# Patient Record
Sex: MALE | Race: WHITE
[De-identification: names, ages, dates, MRNs, and addresses within clinical notes are randomized per-mention and may not be internally consistent; named-entity substitution may affect disease eponyms.]

---

## 2020-01-13 ENCOUNTER — HOSPITAL ENCOUNTER (INPATIENT)
Dept: HOSPITAL 46 - ED | Age: 75
LOS: 2 days | Discharge: HOME | DRG: 446 | End: 2020-01-15
Attending: SURGERY | Admitting: SURGERY
Payer: MEDICARE

## 2020-01-13 VITALS — WEIGHT: 144.91 LBS | HEIGHT: 67 IN | BODY MASS INDEX: 22.74 KG/M2

## 2020-01-13 DIAGNOSIS — Z79.899: ICD-10-CM

## 2020-01-13 DIAGNOSIS — R63.0: ICD-10-CM

## 2020-01-13 DIAGNOSIS — E78.5: ICD-10-CM

## 2020-01-13 DIAGNOSIS — Z79.82: ICD-10-CM

## 2020-01-13 DIAGNOSIS — K83.1: Primary | ICD-10-CM

## 2020-01-13 DIAGNOSIS — Z87.891: ICD-10-CM

## 2020-01-13 DIAGNOSIS — I10: ICD-10-CM

## 2020-01-13 DIAGNOSIS — K82.8: ICD-10-CM

## 2020-01-13 DIAGNOSIS — R63.4: ICD-10-CM

## 2020-01-13 DIAGNOSIS — Z79.02: ICD-10-CM

## 2020-01-13 PROCEDURE — A9579 GAD-BASE MR CONTRAST NOS,1ML: HCPCS

## 2020-01-13 NOTE — XMS
Encounter Summary
  Created on: 2020
 
 Pasquale Nehemias Martinez
 External Reference #: [id number]
 : 43
 Sex: Male
 
 Demographics
 
 
+-----------------------+----------------------+
| Address               | [address]         |
|                       | [place]  [address] |
+-----------------------+----------------------+
| Home Phone            | [phone]      |
+-----------------------+----------------------+
| Preferred Language    | Unknown              |
+-----------------------+----------------------+
| Marital Status        |               |
+-----------------------+----------------------+
| Yazidi Affiliation | 1077                 |
+-----------------------+----------------------+
| Race                  | Unknown              |
+-----------------------+----------------------+
| Ethnic Group          | Unknown              |
+-----------------------+----------------------+
 
 
 Author
 
 
+--------------+--------------------------------------------+
| Author       | [facility] [place]  |
|              | and [facility]                                |
+--------------+--------------------------------------------+
| Organization | [facility]  |
|              | and [place]                                |
+--------------+--------------------------------------------+
| Address      | Unknown                                    |
+--------------+--------------------------------------------+
| Phone        | Unavailable                                |
+--------------+--------------------------------------------+
 
 
 
 Support
 
 
+--------------+--------------+---------------------+-----------------+
| Name         | Relationship | Address             | Phone           |
+--------------+--------------+---------------------+-----------------+
| Rupa Giordano | ECON         | [address]             | [phone] |
|              |              | [place]   |                 |
|              |              | [id number]               |                 |
+--------------+--------------+---------------------+-----------------+
 
 
 
 
 Care Team Providers
 
 
+-----------------------+------+-------------+
| Care Team Member Name | Role | Phone       |
+-----------------------+------+-------------+
| No, Physician         | PCP  | Unavailable |
+-----------------------+------+-------------+
 
 
 
 Reason for Visit
 
 
+--------+--------------+
| Reason | Comments     |
+--------+--------------+
| Other  | instructions |
+--------+--------------+
 
 
 
 Encounter Details
 
 
+--------+-----------+----------------------+----------------------+----------------------+
| Date   | Type      | Department           | Care Team            | Description          |
+--------+-----------+----------------------+----------------------+----------------------+
| 10/22/ | Telephone |   [facility]      |   Augustin Lopez DNP      | Other (instructions) |
| 2019   |           | VASCULAR SURGERY     | [address]     |                      |
|        |           | [address] | [address]  [place], [place]  |                      |
|        |           |  [place]  [place]     | [address]  [phone]  |                      |
|        |           | [address]           |  [phone] (Fax)  |                      |
|        |           | [phone]         |                      |                      |
+--------+-----------+----------------------+----------------------+----------------------+
 
 
 
 Social History
 
 
+---------------+-------+-----------+--------+------+
| Tobacco Use   | Types | Packs/Day | Years  | Date |
|               |       |           | Used   |      |
+---------------+-------+-----------+--------+------+
| Former Smoker |       |           |        |      |
+---------------+-------+-----------+--------+------+
 
 
 
+---------------------+---+---+---+
| Smokeless Tobacco:  |   |   |   |
| Former User         |   |   |   |
+---------------------+---+---+---+
 
 
 
+------------------------------+
| Comments: quit 50 years ago  |
 
+------------------------------+
 
 
 
+---------------+-------------+---------+----------+
| Alcohol Use   | Drinks/Week | oz/Week | Comments |
+---------------+-------------+---------+----------+
| Not Currently |             |         | 90m days |
+---------------+-------------+---------+----------+
 
 
 
+------------------+---------------+
| Sex Assigned at  | Date Recorded |
| Birth            |               |
+------------------+---------------+
| Not on file      |               |
+------------------+---------------+
 
 
 
+----------------+-------------+-------------+
| Job Start Date | Occupation  | Industry    |
+----------------+-------------+-------------+
| Not on file    | Not on file | Not on file |
+----------------+-------------+-------------+
 
 
 
+----------------+--------------+------------+
| Travel History | Travel Start | Travel End |
+----------------+--------------+------------+
 
 
 
+-------------------------------------+
| No recent travel history available. |
+-------------------------------------+
 documented as of this encounter
 
 Functional Status
 
 
+---------------------------------------------+----------+--------------------+
| Functional Status                           | Response | Date of Assessment |
+---------------------------------------------+----------+--------------------+
| Are you deaf or do you have serious         | No       | 2019         |
| difficulty hearing?                         |          |                    |
+---------------------------------------------+----------+--------------------+
| Are you blind or do you have serious        | No       | 2019         |
| difficulty seeing, even when wearing        |          |                    |
| glasses?                                    |          |                    |
+---------------------------------------------+----------+--------------------+
| Do you have serious difficulty walking or   | No       | 2019         |
| climbing stairs? (5 years old or older)     |          |                    |
+---------------------------------------------+----------+--------------------+
| Do you have difficulty dressing or bathing? | No       | 2019         |
|  (5 years old or older)                     |          |                    |
+---------------------------------------------+----------+--------------------+
| Because of a physical, mental, or emotional | No       | 2019         |
 
|  condition, do you have difficulty doing    |          |                    |
| errands alone such as visiting a doctor's   |          |                    |
| office or shopping? [15 years old or        |          |                    |
| older)]                                     |          |                    |
+---------------------------------------------+----------+--------------------+
 
 
 
+---------------------------------------------+----------+--------------------+
| Cognitive Status                            | Response | Date of Assessment |
+---------------------------------------------+----------+--------------------+
| Because of a physical, mental, or emotional | No       | 2019         |
|  condition, do you have serious difficulty  |          |                    |
| concentrating, remembering, or making       |          |                    |
| decisions? (5 years old or older)           |          |                    |
+---------------------------------------------+----------+--------------------+
 documented as of this encounter
 
 Plan of Treatment
 
 
+--------+-------------+------------------+----------------------+-------------+
| Date   | Type        | Specialty        | Care Team            | Description |
+--------+-------------+------------------+----------------------+-------------+
| / | Appointment | Radiology        |   Augustin Lopez DNP      |             |
|    |             |                  | [address]     |             |
|        |             |                  | [place]  |             |
|        |             |                  | [address]  [phone]  |             |
|        |             |                  |  [phone] (Fax)  |             |
+--------+-------------+------------------+----------------------+-------------+
| / | Office      | Vascular Surgery |   Augustin Lopez DNP      |             |
|    | Visit       |                  | [address]     |             |
|        |             |                  | [place]  |             |
|        |             |                  | [id number] [phone]  |             |
|        |             |                  |  [phone] (Fax)  |             |
+--------+-------------+------------------+----------------------+-------------+
 documented as of this encounter
 
 Visit Diagnoses
 Not on filedocumented in this encounter

## 2020-01-13 NOTE — XMS
Encounter Summary
  Created on: 2020
 
 Pasquale Nehemias Martinez
 External Reference #: [id number]
 : 43
 Sex: Male
 
 Demographics
 
 
+-----------------------+----------------------+
| Address               | [address]         |
|                       | [place]  [address] |
+-----------------------+----------------------+
| Home Phone            | [phone]      |
+-----------------------+----------------------+
| Preferred Language    | Unknown              |
+-----------------------+----------------------+
| Marital Status        |               |
+-----------------------+----------------------+
| Synagogue Affiliation | 1077                 |
+-----------------------+----------------------+
| Race                  | Unknown              |
+-----------------------+----------------------+
| Ethnic Group          | Unknown              |
+-----------------------+----------------------+
 
 
 Author
 
 
+--------------+--------------------------------------------+
| Author       | [facility] [place]  |
|              | and [facility]                                |
+--------------+--------------------------------------------+
| Organization | [facility] [place]  |
|              | and [place]                                |
+--------------+--------------------------------------------+
| Address      | Unknown                                    |
+--------------+--------------------------------------------+
| Phone        | Unavailable                                |
+--------------+--------------------------------------------+
 
 
 
 Support
 
 
+--------------+--------------+---------------------+-----------------+
| Name         | Relationship | Address             | Phone           |
+--------------+--------------+---------------------+-----------------+
| Rupa Giordano | E[employer]         | [address]             | [phone] |
|              |              | [facility] [place]   |                 |
|              |              | [address]               |                 |
+--------------+--------------+---------------------+-----------------+
 
 
 
 
 Care Team Providers
 
 
+-----------------------+------+-------------+
| Care Team Member Name | Role | Phone       |
+-----------------------+------+-------------+
| No, Physician         | PCP  | Unavailable |
+-----------------------+------+-------------+
 
 
 
 Reason for Visit
 
 
+-----------+------------------+
| Reason    | Comments         |
+-----------+------------------+
| Follow-up |                  |
+-----------+------------------+
| Other     | speak with nurse |
+-----------+------------------+
 
 
 
 Encounter Details
 
 
+--------+-----------+----------------------+----------------------+--------------------+
| Date   | Type      | Department           | Care Team            | Description        |
+--------+-----------+----------------------+----------------------+--------------------+
| / | Telephone |   [facility]      |   Briana Casper, | Follow-up; Other   |
| 2019   |           | VASCULAR SURGERY     |  RN                  | (speak with nurse) |
|        |           | [address] |                      |                    |
|        |           |  [address]  [place]     |                      |                    |
|        |           | [address]           |                      |                    |
|        |           | [phone]         |                      |                    |
+--------+-----------+----------------------+----------------------+--------------------+
 
 
 
 Social History
 
 
+---------------+-------+-----------+--------+------+
| Tobacco Use   | Types | Packs/Day | Years  | Date |
|               |       |           | Used   |      |
+---------------+-------+-----------+--------+------+
| Former Smoker |       |           |        |      |
+---------------+-------+-----------+--------+------+
 
 
 
+---------------------+---+---+---+
| Smokeless Tobacco:  |   |   |   |
| Former User         |   |   |   |
+---------------------+---+---+---+
 
 
 
 
+------------------------------+
| Comments: quit 50 years ago  |
+------------------------------+
 
 
 
+---------------+-------------+---------+----------+
| Alcohol Use   | Drinks/Week | oz/Week | Comments |
+---------------+-------------+---------+----------+
| Not Currently |             |         | 90m days |
+---------------+-------------+---------+----------+
 
 
 
+------------------+---------------+
| Sex Assigned at  | Date Recorded |
| Birth            |               |
+------------------+---------------+
| Not on file      |               |
+------------------+---------------+
 
 
 
+----------------+-------------+-------------+
| Job Start Date | Occupation  | Industry    |
+----------------+-------------+-------------+
| Not on file    | Not on file | Not on file |
+----------------+-------------+-------------+
 
 
 
+----------------+--------------+------------+
| Travel History | Travel Start | Travel End |
+----------------+--------------+------------+
 
 
 
+-------------------------------------+
| No recent travel history available. |
+-------------------------------------+
 documented as of this encounter
 
 Functional Status
 
 
+---------------------------------------------+----------+--------------------+
| Functional Status                           | Response | Date of Assessment |
+---------------------------------------------+----------+--------------------+
| Are you deaf or do you have serious         | No       | 2019         |
| difficulty hearing?                         |          |                    |
+---------------------------------------------+----------+--------------------+
| Are you blind or do you have serious        | No       | 2019         |
| difficulty seeing, even when wearing        |          |                    |
| glasses?                                    |          |                    |
+---------------------------------------------+----------+--------------------+
| Do you have serious difficulty walking or   | No       | 2019         |
| climbing stairs? (5 years old or older)     |          |                    |
+---------------------------------------------+----------+--------------------+
| Do you have difficulty dressing or bathing? | No       | 2019         |
|  (5 years old or older)                     |          |                    |
 
+---------------------------------------------+----------+--------------------+
| Because of a physical, mental, or emotional | No       | 2019         |
|  condition, do you have difficulty doing    |          |                    |
| errands alone such as visiting a doctor's   |          |                    |
| office or shopping? [15 years old or        |          |                    |
| older)]                                     |          |                    |
+---------------------------------------------+----------+--------------------+
 
 
 
+---------------------------------------------+----------+--------------------+
| Cognitive Status                            | Response | Date of Assessment |
+---------------------------------------------+----------+--------------------+
| Because of a physical, mental, or emotional | No       | 2019         |
|  condition, do you have serious difficulty  |          |                    |
| concentrating, remembering, or making       |          |                    |
| decisions? (5 years old or older)           |          |                    |
+---------------------------------------------+----------+--------------------+
 documented as of this encounter
 
 Plan of Treatment
 
 
+--------+-------------+------------------+----------------------+-------------+
| Date   | Type        | Specialty        | Care Team            | Description |
+--------+-------------+------------------+----------------------+-------------+
| / | Appointment | Radiology        |   Augustin Lopez DNP      |             |
|    |             |                  | [address]     |             |
|        |             |                  | [place]  |             |
|        |             |                  | [address]  [phone]  |             |
|        |             |                  |  [phone] (Fax)  |             |
+--------+-------------+------------------+----------------------+-------------+
| / | Office      | Vascular Surgery |   Augustin Lopez DNP      |             |
|    | Visit       |                  | [address]     |             |
|        |             |                  | [place]  |             |
|        |             |                  | [address]  [phone]  |             |
|        |             |                  |  [phone] (Fax)  |             |
+--------+-------------+------------------+----------------------+-------------+
 documented as of this encounter
 
 Visit Diagnoses
 Not on filedocumented in this encounter

## 2020-01-13 NOTE — XMS
Clinical Summary
  Created on: 2020
 
 Lashanda Rahman
 External Reference #: [id number]
 : 43
 Sex: Male
 
 Demographics
 
 
+-----------------------+----------------------+
| Address               | [address]         |
|                       | [place]  [address] |
+-----------------------+----------------------+
| Home Phone            | [phone]      |
+-----------------------+----------------------+
| Preferred Language    | Unknown              |
+-----------------------+----------------------+
| Marital Status        | Unknown              |
+-----------------------+----------------------+
| Zoroastrian Affiliation | Unknown              |
+-----------------------+----------------------+
| Race                  | Unknown              |
+-----------------------+----------------------+
| Ethnic Group          | Unknown              |
+-----------------------+----------------------+
 
 
 Author
 
 
+--------------+------------------------------------------+
| Author       | [facility] (Historical as of  |
|              | 19)                                 |
+--------------+------------------------------------------+
| Organization | [facility] ([facility] as [facility]  |
|              | 19)                                 |
+--------------+------------------------------------------+
| Address      | Unknown                                  |
+--------------+------------------------------------------+
| Phone        | Unavailable                              |
+--------------+------------------------------------------+
 
 
 
 Care Team Providers
 
 
+-----------------------+------+-------------+
| Care Team Member Name | Role | Phone       |
+-----------------------+------+-------------+
 PP   | Unavailable |
+-----------------------+------+-------------+
 
 
 
 Allergies
 
 Not on File
 
 Current Medications
 Not on file
 
 Active Problems
 Not on file
 
 Social History
 
 
+----------------+-------+-----------+--------+------+
| Tobacco Use    | Types | Packs/Day | Years  | Date |
|                |       |           | Used   |      |
+----------------+-------+-----------+--------+------+
| Never Assessed |       |           |        |      |
+----------------+-------+-----------+--------+------+
 
 
 
+------------------+---------------+
| Sex Assigned at  | Date Recorded |
| Birth            |               |
+------------------+---------------+
| Not on file      |               |
+------------------+---------------+
 
 
 
 Plan of Treatment
 
 
+---------------------+-----------+-----------+----------+
| Health Maintenance  | Due Date  | Last Done | Comments |
+---------------------+-----------+-----------+----------+
| Vaccine:            |  |           |          |
| Dtap/Tdap/Td (1 -   | 3         |           |          |
| Tdap)               |           |           |          |
+---------------------+-----------+-----------+----------+
| Colon Cancer        |  |           |          |
| Screening           | 4         |           |          |
| (Colonoscopy)       |           |           |          |
+---------------------+-----------+-----------+----------+
| Vaccine: Zoster (1  |  |           |          |
| of 2)               | 4         |           |          |
+---------------------+-----------+-----------+----------+
| Vaccine:            |  |           |          |
| Pneumococcal 65+    | 9         |           |          |
| Low/Medium Risk (1  |           |           |          |
| of 2 - PCV13)       |           |           |          |
+---------------------+-----------+-----------+----------+
| Vaccine: Influenza  |  |           |          |
| (#1)                | 9         |           |          |
+---------------------+-----------+-----------+----------+
 
 
 
 Results
 Not on filefrom Last 3 Months
 
 
 Insurance
 
 
+----------------+--------+-------------+------+-------------+----------------------+
| Payer          | Benefi | Subscriber  | Type | Phone       | Address              |
|                | t Plan | ID          |      |             |                      |
|                |  /     |             |      |             |                      |
|                | Group  |             |      |             |                      |
+----------------+--------+-------------+------+-------------+----------------------+
| [employer]       | [employer] | [id number]   |      | [phone] |   [employer] [id number]  |
| ADMINISTRATION | [address]     |             |      | [id number]        | FEE  [id number]   |
|                | [address]I |             |      |             | [address]IVE  [place]    |
|                | [place] |             |      |             | [id number]                |
|                | ON     |             |      |             |                      |
|                | GENERI |             |      |             |                      |
|                | C      |             |      |             |                      |
+----------------+--------+-------------+------+-------------+----------------------+
 
 
 
+----------------+--------+-------------+--------+-------------+---------------------+
| Guarantor Name | [employer] | Relation to | Date   | Phone       | Billing Address     |
|                | t Type |  Patient    | of     |             |                     |
|                |        |             | Birth  |             |                     |
+----------------+--------+-------------+--------+-------------+---------------------+
| LASHANDA RAHMAN   | Vetera | Self        | / |   Home:     |   [address]      |
|                | ns     |             | 1945   | [phone] | [place] [address] |
|                | [address]i |             |        | [id number]        |                     |
|                | [address] |             |        |             |                     |
|                | on     |             |        |             |                     |
+----------------+--------+-------------+--------+-------------+---------------------+
| LASHANDA RAHMAN   | Person | Self        | / |             |                     |
|                | al/Fam |             |    |             |                     |
|                | deidre    |             |        |             |                     |
+----------------+--------+-------------+--------+-------------+---------------------+

## 2020-01-13 NOTE — XMS
Encounter Summary
  Created on: 2020
 
 Pasquale Nehemias Martinez
 External Reference #: [id number]
 : 43
 Sex: Male
 
 Demographics
 
 
+-----------------------+----------------------+
| Address               | [address]         |
|                       | [place]  [address] |
+-----------------------+----------------------+
| Home Phone            | [phone]      |
+-----------------------+----------------------+
| Preferred Language    | Unknown              |
+-----------------------+----------------------+
| Marital Status        |               |
+-----------------------+----------------------+
| Anglican Affiliation | [id number]                 |
+-----------------------+----------------------+
| Race                  | Unknown              |
+-----------------------+----------------------+
| Ethnic Group          | Unknown              |
+-----------------------+----------------------+
 
 
 Author
 
 
+--------------+--------------------------------------------+
| Author       | [facility]  |
|              | and [facility]                                |
+--------------+--------------------------------------------+
| Organization | [facility]  |
|              | and [place]                                |
+--------------+--------------------------------------------+
| Address      | Unknown                                    |
+--------------+--------------------------------------------+
| Phone        | Unavailable                                |
+--------------+--------------------------------------------+
 
 
 
 Support
 
 
+--------------+--------------+---------------------+-----------------+
| Name         | Relationship | Address             | Phone           |
+--------------+--------------+---------------------+-----------------+
| Rupa Giordano | ECON         | [address]             | [phone] |
|              |              | [facility] [place]   |                 |
|              |              | [address]               |                 |
+--------------+--------------+---------------------+-----------------+
 
 
 
 
 Care Team Providers
 
 
+-----------------------+------+-------------+
| Care Team Member Name | Role | Phone       |
+-----------------------+------+-------------+
| No, Physician         | PCP  | Unavailable |
+-----------------------+------+-------------+
 
 
 
 Reason for Referral
 Diagnostic/Screening (Routine)
 
+--------+--------+-----------+--------------+--------------+--------------+
| Status | Reason | Specialty | Diagnoses /  | Referred By  | Referred To  |
|        |        |           | Procedures   | Contact      | Contact      |
+--------+--------+-----------+--------------+--------------+--------------+
| Closed |        | Radiology |   Diagnoses  |   Erasmo         |              |
|        |        |           |  Mesenteric  | Vascular     |              |
|        |        |           | ischemia,    | Surgery      |              |
|        |        |           | chronic      | [address]         |              |
|        |        |           | ([facility])        | [address]  |              |
|        |        |           | Procedures   | [address]        |              |
|        |        |           | IR Angiogram | [place], [place] |              |
|        |        |           |  Mesenteric  |  [address]  |              |
|        |        |           | Visceral     |  Phone:      |              |
|        |        |           |              | [phone] |              |
|        |        |           |              |   Fax:       |              |
|        |        |           |              | [phone] |              |
+--------+--------+-----------+--------------+--------------+--------------+
 
 
 
 
 Reason for Visit
 Auth/Cert
 
+--------+--------+-----------+--------------+--------------+--------------+
| Status | Reason | Specialty | Diagnoses /  | Referred By  | Referred To  |
|        |        |           | Procedures   | Contact      | Contact      |
+--------+--------+-----------+--------------+--------------+--------------+
|        |        |           |   Diagnoses  |              |              |
|        |        |           |  Mesenteric  |              |              |
|        |        |           | ischemia,    |              |              |
|        |        |           | chronic      |              |              |
|        |        |           | ([facility])        |              |              |
|        |        |           | Procedures   |              |              |
|        |        |           | IR ANGIOGRAM |              |              |
|        |        |           |  VISCERAL    |              |              |
|        |        |           | SELECTIVE    |              |              |
+--------+--------+-----------+--------------+--------------+--------------+
 
 
 
 
 Encounter Details
 
 
 
+--------+-----------+----------------------+----------------------+----------------------+
| Date   | Type      | Department           | Care Team            | Description          |
+--------+-----------+----------------------+----------------------+----------------------+
| / | Hospital  |   [facility]    |   Sukhdeep Pardo MD     | Mesenteric ischemia, |
| 2019   | Encounter [facility]       | [address] [place] [place]     |  chronic (HCC)       |
|        |           | CLINICAL DECISION    | [address]  [facility] [place]  |                      |
|        |           | [address]  [address] | [address]           |                      |
|        |           |   [place]       | [phone]         |                      |
|        |           | [address]           | [phone] (Fax)   |                      |
|        |           | [phone]         |                      |                      |
+--------+-----------+----------------------+----------------------+----------------------+
 
 
 
 Social History
 
 
+---------------+-------+-----------+--------+------+
| Tobacco Use   | Types | Packs/Day | Years  | Date |
|               |       |           | Used   |      |
+---------------+-------+-----------+--------+------+
| Former Smoker |       |           |        |      |
+---------------+-------+-----------+--------+------+
 
 
 
+---------------------+---+---+---+
| Smokeless Tobacco:  |   |   |   |
| Former User         |   |   |   |
+---------------------+---+---+---+
 
 
 
+------------------------------+
| Comments: quit 50 years ago  |
+------------------------------+
 
 
 
+---------------+-------------+---------+----------+
| Alcohol Use   | Drinks/Week | oz/Week | Comments |
+---------------+-------------+---------+----------+
| Not Currently |             |         | 90m days |
+---------------+-------------+---------+----------+
 
 
 
+------------------+---------------+
| Sex Assigned at  | Date Recorded |
| Birth            |               |
+------------------+---------------+
| Not on file      |               |
+------------------+---------------+
 
 
 
+----------------+-------------+-------------+
| Job Start Date | Occupation  | Industry    |
+----------------+-------------+-------------+
| Not on file    | Not on file | Not on file |
 
+----------------+-------------+-------------+
 
 
 
+----------------+--------------+------------+
| Travel History | Travel Start | Travel End |
+----------------+--------------+------------+
 
 
 
+-------------------------------------+
| No recent travel history available. |
+-------------------------------------+
 documented as of this encounter
 
 Last Filed Vital Signs
 
 
+-------------------+----------------------+----------------------+----------+
| Vital Sign        | Reading              | Time Taken           | Comments |
+-------------------+----------------------+----------------------+----------+
| Blood Pressure    | 150/90               | 2019  1:25 PM  |          |
|                   |                      | PST                  |          |
+-------------------+----------------------+----------------------+----------+
| Pulse             | 80                   | 2019  1:25 PM  |          |
|                   |                      | PST                  |          |
+-------------------+----------------------+----------------------+----------+
| Temperature       | 36.6   C (97.9   F)  | 2019 11:27 AM  |          |
|                   |                      | PST                  |          |
+-------------------+----------------------+----------------------+----------+
| Respiratory Rate  | 18                   | 2019 11:27 AM  |          |
|                   |                      | PST                  |          |
+-------------------+----------------------+----------------------+----------+
| Oxygen Saturation | 97%                  | 2019  1:25 PM  |          |
|                   |                      | PST                  |          |
+-------------------+----------------------+----------------------+----------+
| Inhaled Oxygen    | -                    | -                    |          |
| Concentration     |                      |                      |          |
+-------------------+----------------------+----------------------+----------+
| Weight            | 79.3 kg (174 lb 13.2 | 2019  8:01 AM  |          |
|                   |  oz)                 | PST                  |          |
+-------------------+----------------------+----------------------+----------+
| Height            | 167.6 cm (5' 6")     | 2019  8:01 AM  |          |
|                   |                      | PST                  |          |
+-------------------+----------------------+----------------------+----------+
| Body Mass Index   | 28.22                | 2019  8:01 AM  |          |
|                   |                      | PST                  |          |
+-------------------+----------------------+----------------------+----------+
 documented in this encounter
 
 Functional Status
 
 
+---------------------------------------------+----------+--------------------+
| Functional Status                           | Response | Date of Assessment |
+---------------------------------------------+----------+--------------------+
| Are you deaf or do you have serious         | No       | 2019         |
| difficulty hearing?                         |          |                    |
+---------------------------------------------+----------+--------------------+
| Are you blind or do you have serious        | No       | 2019         |
 
| difficulty seeing, even when wearing        |          |                    |
| glasses?                                    |          |                    |
+---------------------------------------------+----------+--------------------+
| Do you have serious difficulty walking or   | No       | 2019         |
| climbing stairs? (5 years old or older)     |          |                    |
+---------------------------------------------+----------+--------------------+
| Do you have difficulty dressing or bathing? | No       | 2019         |
|  (5 years old or older)                     |          |                    |
+---------------------------------------------+----------+--------------------+
| Because of a physical, mental, or emotional | No       | 2019         |
|  condition, do you have difficulty doing    |          |                    |
| errands alone such as visiting a doctor's   |          |                    |
| office or shopping? [15 years old or        |          |                    |
| older)]                                     |          |                    |
+---------------------------------------------+----------+--------------------+
 
 
 
+---------------------------------------------+----------+--------------------+
| Cognitive Status                            | Response | Date of Assessment |
+---------------------------------------------+----------+--------------------+
| Because of a physical, mental, or emotional | No       | 2019         |
|  condition, do you have serious difficulty  |          |                    |
| concentrating, remembering, or making       |          |                    |
| decisions? (5 years old or older)           |          |                    |
+---------------------------------------------+----------+--------------------+
 documented as of this encounter
 
 Discharge Instructions
 Instructions Katie Angelo RN - 2019Formatting of this note might be different fro
m the original.
 
 Peripheral Angioplasty
 Peripheral angioplasty is a procedure that helps open blockages in peripheral arteries. The
se vessels carry blood to your lower body, legs, and arms.
 Talk with your healthcare provider about the risks and complications of angioplasty. 
    
 Balloon is inserted Balloon is inflated Blood flow is improved 
 Before the procedure
 Recommendations of what to do include:
  Tell your healthcare provider about all medicines you take including over-the-counter me
dicines, herbal supplements, and any allergies you may have, especially to iodine.
  Follow any directions you  re given for not eating or drinking before the procedure.
  Arrange for a family member or friend to drive you home.
 During the procedure
  You may get medicine through an IV (intravenous) line to relax you. An injection will nu
mb the site your healthcare provider will use to perform the procedure. The site used is gen
erally an artery in the groin although the wrist or arm may be used. The healthcare provider
 makes a tiny skin cut (incision) near an artery in your groin.
  Your healthcare provider puts a thin, flexible tube (catheter) through the incision. He 
or she then threads the catheter into the affected artery while using X-ray monitoring.
  Contrast   dye  is injected into the catheter. X-rays (angiography) are taken.
  A tiny balloon is pushed through the catheter to the blockage. Your healthcare provider 
inflates and deflates the balloon a few times. This compresses the plaque. A small metal or 
mesh tube (stent) may be put in the artery to help keep it open. The balloon and catheter ar
e then taken out.
 After the procedure
 You  ll be taken to a recovery area. Pressure is put on the insertion site for about 30 to
 45 minutes. Your healthcare provider will tell you how long to lie down and keep the insert
ion site still.You will go home that day or spend the night in the facility. You will be g
 
mariana aftercare instructions for when you go home.
 Call your healthcare provider
 Call your healthcare provider right away or get immediate medical attentionif:
  You notice a lump or bleeding at the site where the catheter was inserted
  You feel increasing pain at the insertion site
  You become lightheaded or dizzy
  You have leg pain or numbness
  You have a leg that turns blue or feels cold
  You develop a fever greater than 101.5F (38.6C).
  You develop a skin rash
  You cannot urinate after the procedure
  You have chest pain or shortness of breath 
 Date Last Reviewed: 2016-2018 [employer]. [address] [place], [place] [address]. All [employer]
ts reserved. This information is not intended as a substitute for professional medical care.
 Always follow your healthcare professional's instructions.
 
 
 documented in this encounter
 
 Medications at Time of Discharge
 
 
+----------------------+----------------------+-----------+---------+----------+----------+
| Medication           | Sig                  | Dispensed | Refills | Start    | End Date |
|                      |                      |           |         | Date     |          |
+----------------------+----------------------+-----------+---------+----------+----------+
|   ASPIRIN 81 PO      | Take 81 mg by mouth. |           | 0       |          |          |
+----------------------+----------------------+-----------+---------+----------+----------+
|   clopidogrel        | Take 1 tablet by     |   30      | 11      | 20 |          |
| (PLAVIX) 75 mg       | mouth Daily.         | tablet    |         | 19       |          |
| tablet               |                      |           |         |          |          |
+----------------------+----------------------+-----------+---------+----------+----------+
|   docusate sodium    | Take 1 capsule by    |   30      | 0       | 20 |          |
| (COLACE) 100 mg      | mouth 2 times daily. | capsule   |         | 19       |          |
| capsule              |                      |           |         |          |          |
+----------------------+----------------------+-----------+---------+----------+----------+
|                      | Take 25 mg by mouth  |           | 0       |          |          |
| hydroCHLOROthiazide  | Daily.               |           |         |          |          |
| 25 mg tablet         |                      |           |         |          |          |
+----------------------+----------------------+-----------+---------+----------+----------+
|                      | Take 1 tablet by     |   30      | 0       | 20 |          |
| HYDROcodone-acetamin | mouth every 4 hours  | tablet    |         | 19       |          |
| ophen (NORCO) 5-325  | as needed for Pain.  |           |         |          |          |
| mg per tablet        |                      |           |         |          |          |
+----------------------+----------------------+-----------+---------+----------+----------+
|   losartan (COZAAR)  | Take 50 mg by mouth  |           | 0       |          |          |
| 50 mg tablet         | Daily.               |           |         |          |          |
+----------------------+----------------------+-----------+---------+----------+----------+
|   Multiple           | Take  by mouth.      |           | 0       |          |          |
| Vitamins-Minerals    |                      |           |         |          |          |
| (CENTRUM SILVER      |                      |           |         |          |          |
| 50+MEN PO)           |                      |           |         |          |          |
+----------------------+----------------------+-----------+---------+----------+----------+
|   simvastatin        | Take 10 mg by mouth  |           | 0       |          |          |
| (ZOCOR) 10 mg tablet | nightly.             |           |         |          |          |
+----------------------+----------------------+-----------+---------+----------+----------+
 documented as of this encounter
 
 Plan of Treatment
 
 
 
+--------+-------------+------------------+----------------------+-------------+
| Date   | Type        | Specialty        | Care Team            | Description |
+--------+-------------+------------------+----------------------+-------------+
| / | Appointment | Radiology        |   Augustin Lopez DNP      |             |
|    |             |                  | [address]     |             |
|        |             |                  | [place]  |             |
|        |             |                  | [address]  [phone]  |             |
|        |             |                  |  [phone] (Fax)  |             |
+--------+-------------+------------------+----------------------+-------------+
| / | Office      | Vascular Surgery |   Augustin Lopez DNP      |             |
|    | Visit       |                  | [address]     |             |
|        |             |                  | [place]  |             |
|        |             |                  | [address]  [phone]  |             |
|        |             |                  |  [phone] (Fax)  |             |
+--------+-------------+------------------+----------------------+-------------+
 documented as of this encounter
 
 Procedures
 
 
+---------------------+--------+-------------+----------------------+----------------------+
| Procedure Name      | Priori | Date/Time   | Associated Diagnosis | Comments             |
|                     | ty     |             |                      |                      |
+---------------------+--------+-------------+----------------------+----------------------+
| IR ANGIOGRAM        | Routin | 2019  |   Mesenteric         |   Results for this   |
| MESENTERIC VISCERAL | e      | 10:03 AM    | ischemia, chronic    | procedure are in the |
|                     |        | PST         | ([facility])                |  results section.    |
+---------------------+--------+-------------+----------------------+----------------------+
 documented in this encounter
 
 Results
 IR Angiogram Mesenteric Visceral (2019 10:03 AM PST)
 
+----------+
| Specimen |
+----------+
|          |
+----------+
 
 
 
+-----------------------------------------------------------------------+---------------+
| Impressions                                                           | Performed At  |
+-----------------------------------------------------------------------+---------------+
|      1.   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
| around large   branches.  2.   After angioplasty, moderate            |               |
| angiographic results.  3.   Not a good place for stent placement due  |               |
| to multiple large branches.                                           |               |
+-----------------------------------------------------------------------+---------------+
 
 
 
+----------------------------------------------------------------------------+--------------
-+
| Narrative                                                                  | Performed At 
 |
+----------------------------------------------------------------------------+--------------
-+
 
|   This result has an attachment that is not available.  ABDOMINAL          |   [facility]
 |
| AORTOGRAM PREOPERATIVE DIAGNOSIS: Chronic mesenteric ischemia              |              
 |
| POSTOPERATIVE DIAGNOSIS: Same PROCEDURE:   1. Moderate conscious           |              
 |
| sedation2. Ultrasound guided access of the right common femoral            |              
 |
| artery3. Aortogram4. Selective catheterization of superior mesenteric      |              
 |
| artery with mesenteric arteriogram5.   Superior mesenteric artery          |              
 |
| angioplasty using 7 x 40 mm angioplasty balloon SURGEON: Sukhdeep Pardo MD       |              
 |
| ASSISTANT: None ANESTHESIA: Moderate sedation and local anesthesia         |              
 |
|       Informed consent was obtained from the patient. Continuous           |              
 |
| cardiac monitoring was performed throughout the procedure. Conscious       |              
 |
| sedation was provided by the nursing staff during the procedure under      |              
 |
| my supervision.          Sedation time: 60 minutes                         |              
 |
|   Medications: 1 mg Versed IV, 50 Mcg Fentanyl IV ESTIMATED BLOOD          |              
 |
| LOSS: Minimal CONTRAST: 89 mL of Omnipaque 240 INDICATIONS: See            |              
 |
| preoperative history and physical FINDINGS: SMA was nearly occluded at     |              
 |
|  origin.   After angioplasty, there was still a focal area of stenosis     |              
 |
|  but this was not in a good place to stent due to multiple large           |              
 |
| branches around stenosis.   However, there was improved flow               |              
 |
| throughout SMA at end of case. DESCRIPTION OF PROCEDURE:   The patient     |              
 |
|  was properly identified and brought to the Cath Lab. The patient was      |              
 |
| placed supine on the catheter table and patient was given moderate         |              
 |
| sedation by nursing staff under my supervision. Patient's bilateral        |              
 |
| groins were then prepped and draped in the usual sterile fashion.          |              
 |
| Local anesthetic was given to the right groin and the right common         |              
 |
| femoral artery was accessed under ultrasound guidance using a              |              
 |
| micropuncture needle. A micropuncture sheath was inserted over a wire      |              
 |
| and up sized to a 4 French sheath. A Omni flush catheter was then          |              
 |
| inserted into the aorta and aortogram was then performed with the          |              
 |
| findings as described above. Next, an Amplatzer wire was then inserted     |              
 |
|  over the catheter and the 4 French sheath was removed. A 6.5 French       |              
 |
 
| durable sheath was then inserted over the wire with the tip of the         |              
 |
| sheath being placed into the proximal SMA. A vertebral catheter was        |              
 |
| inserted over the wire and the wire was then removed. A Glidewire was      |              
 |
| then placed into the vertebral catheter and used to cross through the      |              
 |
| SMA occlusion.   Next, a 260 fix core wire was then inserted over the      |              
 |
| catheter.   The SMA was then angioplastied using 7 x 40 mm angioplasty     |              
 |
|  balloon.   A final arteriogram was then performed through the sheath.     |              
 |
|  The steerable sheath was then removed and a Mynx closure device was       |              
 |
| then used on the right common femoral artery and hemostasis was            |              
 |
| ensured. The patient was then taken to the observation unit for            |              
 |
| further monitoring.                                                        |              
 |
|patient was given moderate sedation by nursing staff under my supervision.  |              
 |
|Patient's bilateral groins were then prepped and draped in the usual        |              
 |
|sterile fashion. Local anesthetic was given to the right groin and the      |              
 |
|right common femoral artery was accessed under ultrasound guidance using a  |              
 |
|micropuncture needle. A micropuncture sheath was inserted over a wire and   |              
 |
|up sized to a 4 French sheath. A Omni flush catheter was then inserted      |              
 |
|into the aorta and aortogram was then performed with the findings as        |              
 |
|described above. Next, an Amplatzer wire was then inserted over the         |              
 |
|catheter and the 4 French sheath was removed. A 6.5 French durable sheath   |              
 |
|was then inserted over the wire with the tip of the sheath being placed     |              
 |
|into the proximal SMA. A vertebral catheter was inserted over the wire and  |              
 |
|the wire was then removed. A Glidewire was then placed into the vertebral   |              
 |
|catheter and used to cross through the SMA occlusion.   Next, a 260 fix     |              
 |
|core wire was then inserted over the catheter.   The SMA was then           |              
 |
|angioplastied using 7 x 40 mm angioplasty balloon.   A final arteriogram    |              
 |
|was then performed through the sheath. The steerable sheath was then        |              
 |
|removed and a Mynx closure device was then used on the right common         |              
 |
|femoral artery and hemostasis was ensured. The patient was then taken to    |              
 |
|the observation unit for further monitoring.                                |              
 |
 
|                                                                            |              
 |
+----------------------------------------------------------------------------+--------------
-+
 
 
 
+---------------+---------+--------------------+--------------+
| Performing    | Address | City/State/Zipcode | Phone Number |
| Organization  |         |                    |              |
+---------------+---------+--------------------+--------------+
|   [facility] |         |                    |              |
+---------------+---------+--------------------+--------------+
 documented in this encounter
 
 Visit Diagnoses
 
 
+-----------------------------------------------------------------------------------+
| Diagnosis                                                                         |
+-----------------------------------------------------------------------------------+
|   Mesenteric ischemia, chronic (HCC)  Chronic vascular insufficiency of intestine |
+-----------------------------------------------------------------------------------+
 documented in this encounter
 
 Administered Medications
 
 
+----------------------------------+--------+----------+--------+------+------+
| Medication Order                 | MAR    | Action   | Dose   | Rate | Site |
|                                  | Action | Date     |        |      |      |
+----------------------------------+--------+----------+--------+------+------+
|   clopidogrel (PLAVIX) tablet    | Given  | 20 | 300 mg |      |      |
| Oral, PRN, Starting 19  |        | 19  9:45 |        |      |      |
| at 0945                          |        |  AM PST  |        |      |      |
+----------------------------------+--------+----------+--------+------+------+
 
 
 
+-----------------------------------+---+
|                                   |   |
+-----------------------------------+---+
|   fentaNYL (PF) injection   |   |
| mcg   mcg, Intravenous,     |   |
| EVERY 2 HOURS PRN, Pain, Starting |   |
|  19 at 1028              |   |
+-----------------------------------+---+
|                                   |   |
+-----------------------------------+---+
 
 
 
+---------------------------------+-------+----------+--------+---+---+
|   fentaNYL (PF) injection       | Given | 20 | 50 mcg |   |   |
| Intravenous, PRN, Starting Wed  |       | 19  8:46 |        |   |   |
| 19 at 0846                 |       |  AM PST  |        |   |   |
+---------------------------------+-------+----------+--------+---+---+
 
 
 
 
+---+---+
|   |   |
+---+---+
 
 
 
+-------------------------------+-------+----------+--------+---+---+
|   heparin 1,000 units/mL      | Given | 20 | 7,000  |   |   |
| injection  Intravenous, PRN,  |       | 19  9:13 | Units  |   |   |
| Starting Wed 19 at 0913  |       |  AM PST  |        |   |   |
+-------------------------------+-------+----------+--------+---+---+
 
 
 
+---+---+
|   |   |
+---+---+
 
 
 
+-----------------------------------+-------+----------+----------+---+---+
|   HYDROcodone-acetaminophen       | Given | 20 | 1 tablet |   |   |
| (NORCO) 5-325 mg per tablet 1-2   |       | 19 10:51 |          |   |   |
| tablet  1-2 tablet, Oral, EVERY 4 |       |  AM PST  |          |   |   |
|  HOURS PRN, Pain, Starting Wed    |       |          |          |   |   |
| 19 at 1028                   |       |          |          |   |   |
+-----------------------------------+-------+----------+----------+---+---+
 
 
 
+---+---+
|   |   |
+---+---+
 
 
 
+-----------------------------------+-------+----------+--------+---+---+
|   iohexol (OMNIPAQUE 240) 240     | Given | 20 | 89 mLs |   |   |
| mg/mL injection  Intravenous,     |       | 19  9:47 |        |   |   |
| PRN, Starting Wed 19 at 0945 |       |  AM PST  |        |   |   |
+-----------------------------------+-------+----------+--------+---+---+
 
 
 
+-------+----------+--------+---+---+
| Given | 20 | 30 mLs |   |   |
|       | 19  9:47 |        |   |   |
|       |  AM PST  |        |   |   |
+-------+----------+--------+---+---+
| Given | 20 | 59 mLs |   |   |
|       | 19  9:45 |        |   |   |
|       |  AM PST  |        |   |   |
+-------+----------+--------+---+---+
 
 
 
+---+---+
|   |   |
+---+---+
 
 
 
 
+---------------------------------+-------+----------+--------+---+---+
|   lidocaine 1% injection  PRN,  | Given | 20 | 10 mLs |   |   |
| Starting Wed 19 at 0846    |       | 19  8:46 |        |   |   |
|                                 |       |  AM PST  |        |   |   |
+---------------------------------+-------+----------+--------+---+---+
 
 
 
+-----------------------------------+---+
|                                   |   |
+-----------------------------------+---+
|   metoclopramide (REGLAN) 5 mg/mL |   |
|  injection 10 mg  10 mg,          |   |
| Intravenous, EVERY 4 HOURS PRN,   |   |
| Nausea, Vomiting, Starting Wed    |   |
| 19 at 1021, Use if           |   |
| ondansetron and prochlorperazine  |   |
| ineffective after 30min or not    |   |
| ordered. Use PO option unless NPO |   |
|  status or unable to tolerate.    |   |
| Protect from light.,              |   |
| Post-op/Phase II                  |   |
+-----------------------------------+---+
|                                   |   |
+-----------------------------------+---+
|   metoclopramide (REGLAN) tablet  |   |
| 10 mg  10 mg, Oral, EVERY 4 HOURS |   |
|  PRN, Nausea, Vomiting, Starting  |   |
| Wed 19 at 1021, Use if       |   |
| ondansetron and prochlorperazine  |   |
| ineffective after 30min or not    |   |
| ordered, Post-op/Phase II         |   |
+-----------------------------------+---+
|                                   |   |
+-----------------------------------+---+
 
 
 
+-------------------------------+-------+----------+------+---+---+
|   midazolam (VERSED) 1 mg/mL  | Given | 20 | 1 mg |   |   |
| injection  Intravenous, PRN,  |       | 19  8:46 |      |   |   |
| Starting Wed 19 at 0846  |       |  AM PST  |      |   |   |
+-------------------------------+-------+----------+------+---+---+
 
 
 
+---+---+
|   |   |
+---+---+
 
 
 
+---------------------------------+-------+----------+---------+---+---+
|   nitroglycerin injection       | Given | 20 | 300 mcg |   |   |
| Intravenous, PRN, Starting Wed  |       | 19  9:25 |         |   |   |
| 19 at 0925                 |       |  AM PST  |         |   |   |
+---------------------------------+-------+----------+---------+---+---+
 
 
 
 
+---+---+
|   |   |
+---+---+
 
 
 
+-----------------------------------+-------+----------+------+---+---+
|   ondansetron (ZOFRAN ODT)        | Given | 20 | 4 mg |   |   |
| disintegrating tablet 4 mg  4 mg, |       | 19 12:11 |      |   |   |
|  Oral, EVERY 6 HOURS PRN, Nausea, |       |  PM PST  |      |   |   |
|  Vomiting, Starting 19   |       |          |      |   |   |
| at 1021, First line agent,        |       |          |      |   |   |
| Post-op/Phase II                  |       |          |      |   |   |
+-----------------------------------+-------+----------+------+---+---+
 
 
 
+-----------------------------------+---+
|                                   |   |
+-----------------------------------+---+
|   ondansetron (ZOFRAN) injection  |   |
| 4 mg  4 mg, Intravenous, EVERY 6  |   |
| HOURS PRN, Nausea, Vomiting,      |   |
| Starting Wed 19 at 1021,     |   |
| First line agent. Use PO option   |   |
| unless NPO status or unable to    |   |
| tolerate., Post-op/Phase II       |   |
+-----------------------------------+---+
|                                   |   |
+-----------------------------------+---+
 documented in this encounter

## 2020-01-13 NOTE — XMS
Encounter Summary
  Created on: 2020
 
 Pasquale Nehemias Martinez
 External Reference #: [id number]
 : 43
 Sex: Male
 
 Demographics
 
 
+-----------------------+----------------------+
| Address               | [address]         |
|                       | [place]  [address] |
+-----------------------+----------------------+
| Home Phone            | [phone]      |
+-----------------------+----------------------+
| Preferred Language    | Unknown              |
+-----------------------+----------------------+
| Marital Status        |               |
+-----------------------+----------------------+
| Restoration Affiliation | [id number]                 |
+-----------------------+----------------------+
| Race                  | Unknown              |
+-----------------------+----------------------+
| Ethnic Group          | Unknown              |
+-----------------------+----------------------+
 
 
 Author
 
 
+--------------+--------------------------------------------+
| Author       | [facility]  |
|              | and [facility]                                |
+--------------+--------------------------------------------+
| Organization | [facility]  |
|              | and [place]                                |
+--------------+--------------------------------------------+
| Address      | Unknown                                    |
+--------------+--------------------------------------------+
| Phone        | Unavailable                                |
+--------------+--------------------------------------------+
 
 
 
 Support
 
 
+--------------+--------------+---------------------+-----------------+
| Name         | Relationship | Address             | Phone           |
+--------------+--------------+---------------------+-----------------+
| Rupa Giordano | ECON         | [address]             | [phone] |
|              |              | [facility] [place]   |                 |
|              |              | [id number]               |                 |
+--------------+--------------+---------------------+-----------------+
 
 
 
 
 Care Team Providers
 
 
+-----------------------+------+-------------+
| Care Team Member Name | Role | Phone       |
+-----------------------+------+-------------+
| No, Physician         | PCP  | Unavailable |
+-----------------------+------+-------------+
 
 
 
 Reason for Visit
 
 
+-----------+------------------+
| Reason    | Comments         |
+-----------+------------------+
| Follow-up |                  |
+-----------+------------------+
| Other     | speak with nurse |
+-----------+------------------+
 
 
 
 Encounter Details
 
 
+--------+-----------+----------------------+----------------------+--------------------+
| Date   | Type      | Department           | Care Team            | Description        |
+--------+-----------+----------------------+----------------------+--------------------+
| / | Telephone |   [facility]      |   Briana Casper, | Follow-up; Other   |
| 2019   |           | VASCULAR SURGERY     |  RN                  | (speak with nurse) |
|        |           | [address] |                      |                    |
|        |           |  [place]  [place]     |                      |                    |
|        |           | [address]           |                      |                    |
|        |           | [phone]         |                      |                    |
+--------+-----------+----------------------+----------------------+--------------------+
 
 
 
 Social History
 
 
+---------------+-------+-----------+--------+------+
| Tobacco Use   | Types | Packs/Day | Years  | Date |
|               |       |           | Used   |      |
+---------------+-------+-----------+--------+------+
| Former Smoker |       |           |        |      |
+---------------+-------+-----------+--------+------+
 
 
 
+---------------------+---+---+---+
| Smokeless Tobacco:  |   |   |   |
| Former User         |   |   |   |
+---------------------+---+---+---+
 
 
 
 
+------------------------------+
| Comments: quit 50 years ago  |
+------------------------------+
 
 
 
+---------------+-------------+---------+----------+
| Alcohol Use   | Drinks/Week | oz/Week | Comments |
+---------------+-------------+---------+----------+
| Not Currently |             |         | 90m days |
+---------------+-------------+---------+----------+
 
 
 
+------------------+---------------+
| Sex Assigned at  | Date Recorded |
| Birth            |               |
+------------------+---------------+
| Not on file      |               |
+------------------+---------------+
 
 
 
+----------------+-------------+-------------+
| Job Start Date | Occupation  | Industry    |
+----------------+-------------+-------------+
| Not on file    | Not on file | Not on file |
+----------------+-------------+-------------+
 
 
 
+----------------+--------------+------------+
| Travel History | Travel Start | Travel End |
+----------------+--------------+------------+
 
 
 
+-------------------------------------+
| No recent travel history available. |
+-------------------------------------+
 documented as of this encounter
 
 Functional Status
 
 
+---------------------------------------------+----------+--------------------+
| Functional Status                           | Response | Date of Assessment |
+---------------------------------------------+----------+--------------------+
| Are you deaf or do you have serious         | No       | 2019         |
| difficulty hearing?                         |          |                    |
+---------------------------------------------+----------+--------------------+
| Are you blind or do you have serious        | No       | 2019         |
| difficulty seeing, even when wearing        |          |                    |
| glasses?                                    |          |                    |
+---------------------------------------------+----------+--------------------+
| Do you have serious difficulty walking or   | No       | 2019         |
| climbing stairs? (5 years old or older)     |          |                    |
+---------------------------------------------+----------+--------------------+
| Do you have difficulty dressing or bathing? | No       | 2019         |
|  (5 years old or older)                     |          |                    |
 
+---------------------------------------------+----------+--------------------+
| Because of a physical, mental, or emotional | No       | 2019         |
|  condition, do you have difficulty doing    |          |                    |
| errands alone such as visiting a doctor's   |          |                    |
| office or shopping? [15 years old or        |          |                    |
| older)]                                     |          |                    |
+---------------------------------------------+----------+--------------------+
 
 
 
+---------------------------------------------+----------+--------------------+
| Cognitive Status                            | Response | Date of Assessment |
+---------------------------------------------+----------+--------------------+
| Because of a physical, mental, or emotional | No       | 2019         |
|  condition, do you have serious difficulty  |          |                    |
| concentrating, remembering, or making       |          |                    |
| decisions? (5 years old or older)           |          |                    |
+---------------------------------------------+----------+--------------------+
 documented as of this encounter
 
 Plan of Treatment
 
 
+--------+-------------+------------------+----------------------+-------------+
| Date   | Type        | Specialty        | Care Team            | Description |
+--------+-------------+------------------+----------------------+-------------+
| / | Appointment | Radiology        |   Augustin Lopez DNP      |             |
|    |             |                  | [address]     |             |
|        |             |                  | [place]  |             |
|        |             |                  | [address]  [phone]  |             |
|        |             |                  |  [phone] (Fax)  |             |
+--------+-------------+------------------+----------------------+-------------+
| / | Office      | Vascular Surgery |   Augustin Lopez DNP      |             |
|    | Visit       |                  | [address]     |             |
|        |             |                  | [place]  |             |
|        |             |                  | [address]  [phone]  |             |
|        |             |                  |  [phone] (Fax)  |             |
+--------+-------------+------------------+----------------------+-------------+
 documented as of this encounter
 
 Visit Diagnoses
 Not on filedocumented in this encounter

## 2020-01-13 NOTE — XMS
Encounter Summary
  Created on: 2020
 
 Pasquale Nehemias Martinez
 External Reference #: [id number]
 : 43
 Sex: Male
 
 Demographics
 
 
+-----------------------+----------------------+
| Address               | [address]         |
|                       | [place]  [address] |
+-----------------------+----------------------+
| Home Phone            | [phone]      |
+-----------------------+----------------------+
| Preferred Language    | Unknown              |
+-----------------------+----------------------+
| Marital Status        |               |
+-----------------------+----------------------+
| Yazdanism Affiliation | 1077                 |
+-----------------------+----------------------+
| Race                  | Unknown              |
+-----------------------+----------------------+
| Ethnic Group          | Unknown              |
+-----------------------+----------------------+
 
 
 Author
 
 
+--------------+--------------------------------------------+
| Author       | [facility]  |
|              | and [facility]                                |
+--------------+--------------------------------------------+
| Organization | [facility]  |
|              | and [place]                                |
+--------------+--------------------------------------------+
| Address      | Unknown                                    |
+--------------+--------------------------------------------+
| Phone        | Unavailable                                |
+--------------+--------------------------------------------+
 
 
 
 Support
 
 
+--------------+--------------+---------------------+-----------------+
| Name         | Relationship | Address             | Phone           |
+--------------+--------------+---------------------+-----------------+
| Rupa Giordano | ECON         | [address]             | [phone] |
|              |              | [place], [place]   |                 |
|              |              | [address]               |                 |
+--------------+--------------+---------------------+-----------------+
 
 
 
 
 Care Team Providers
 
 
+-----------------------+------+-------------+
| Care Team Member Name | Role | Phone       |
+-----------------------+------+-------------+
| No, Physician         | PCP  | Unavailable |
+-----------------------+------+-------------+
 
 
 
 Encounter Details
 
 
+--------+-----------+---------------------+----------------------+----------------------+
| Date   | Type      | Department          | Care Team            | Description          |
+--------+-----------+---------------------+----------------------+----------------------+
| 10/23/ | Hospital  |   [facility]     |   Augustin Lopez DNP      | Mesenteric ischemia, |
| 2019   | Encounter | VASCULAR SURGERY    | [address]     |  chronic (HCC)       |
|        |           | ULTRASOUND  [address]    | [place]  |                      |
|        |           | [place]   | [address] [phone]  |                      |
|        |           | [place]        |  [phone] (Fax)  |                      |
|        |           | [address]          |                      |                      |
|        |           | [phone]        |                      |                      |
+--------+-----------+---------------------+----------------------+----------------------+
 
 
 
 Social History
 
 
+---------------+-------+-----------+--------+------+
| Tobacco Use   | Types | Packs/Day | Years  | Date |
|               |       |           | Used   |      |
+---------------+-------+-----------+--------+------+
| Former Smoker |       |           |        |      |
+---------------+-------+-----------+--------+------+
 
 
 
+---------------------+---+---+---+
| Smokeless Tobacco:  |   |   |   |
| Former User         |   |   |   |
+---------------------+---+---+---+
 
 
 
+------------------------------+
| Comments: quit 50 years ago  |
+------------------------------+
 
 
 
+---------------+-------------+---------+----------+
| Alcohol Use   | Drinks/Week | oz/Week | Comments |
+---------------+-------------+---------+----------+
| Not Currently |             |         | 90m days |
+---------------+-------------+---------+----------+
 
 
 
 
+------------------+---------------+
| Sex Assigned at  | Date Recorded |
| Birth            |               |
+------------------+---------------+
| Not on file      |               |
+------------------+---------------+
 
 
 
+----------------+-------------+-------------+
| Job Start Date | Occupation  | Industry    |
+----------------+-------------+-------------+
| Not on file    | Not on file | Not on file |
+----------------+-------------+-------------+
 
 
 
+----------------+--------------+------------+
| Travel History | Travel Start | Travel End |
+----------------+--------------+------------+
 
 
 
+-------------------------------------+
| No recent travel history available. |
+-------------------------------------+
 documented as of this encounter
 
 Functional Status
 
 
+---------------------------------------------+----------+--------------------+
| Functional Status                           | Response | Date of Assessment |
+---------------------------------------------+----------+--------------------+
| Are you deaf or do you have serious         | No       | 2019         |
| difficulty hearing?                         |          |                    |
+---------------------------------------------+----------+--------------------+
| Are you blind or do you have serious        | No       | 2019         |
| difficulty seeing, even when wearing        |          |                    |
| glasses?                                    |          |                    |
+---------------------------------------------+----------+--------------------+
| Do you have serious difficulty walking or   | No       | 2019         |
| climbing stairs? (5 years old or older)     |          |                    |
+---------------------------------------------+----------+--------------------+
| Do you have difficulty dressing or bathing? | No       | 2019         |
|  (5 years old or older)                     |          |                    |
+---------------------------------------------+----------+--------------------+
| Because of a physical, mental, or emotional | No       | 2019         |
|  condition, do you have difficulty doing    |          |                    |
| errands alone such as visiting a doctor's   |          |                    |
| office or shopping? [15 years old or        |          |                    |
| older)]                                     |          |                    |
+---------------------------------------------+----------+--------------------+
 
 
 
+---------------------------------------------+----------+--------------------+
| Cognitive Status                            | Response | Date of Assessment |
 
+---------------------------------------------+----------+--------------------+
| Because of a physical, mental, or emotional | No       | 2019         |
|  condition, do you have serious difficulty  |          |                    |
| concentrating, remembering, or making       |          |                    |
| decisions? (5 years old or older)           |          |                    |
+---------------------------------------------+----------+--------------------+
 documented as of this encounter
 
 Medications at Time of Discharge
 
 
+----------------------+----------------------+-----------+---------+----------+----------+
| Medication           | Sig                  | Dispensed | Refills | Start    | End Date |
|                      |                      |           |         | Date     |          |
+----------------------+----------------------+-----------+---------+----------+----------+
|   ASPIRIN 81 PO      | Take 81 mg by mouth. |           | 0       |          |          |
+----------------------+----------------------+-----------+---------+----------+----------+
|   docusate sodium    | Take 1 capsule by    |   30      | 0       | 20 |          |
| (COLACE) 100 mg      | mouth 2 times daily. | capsule   |         | 19       |          |
| capsule              |                      |           |         |          |          |
+----------------------+----------------------+-----------+---------+----------+----------+
|                      | Take 25 mg by mouth  |           | 0       |          |          |
| hydroCHLOROthiazide  | Daily.               |           |         |          |          |
| 25 mg tablet         |                      |           |         |          |          |
+----------------------+----------------------+-----------+---------+----------+----------+
|                      | Take 1 tablet by     |   30      | 0       | 20 |          |
| HYDROcodone-acetamin | mouth every 4 hours  | tablet    |         | 19       |          |
| ophen (NORCO) 5-325  | as needed for Pain.  |           |         |          |          |
| mg per tablet        |                      |           |         |          |          |
+----------------------+----------------------+-----------+---------+----------+----------+
|   losartan (COZAAR)  | Take 50 mg by mouth  |           | 0       |          |          |
| 50 mg tablet         | Daily.               |           |         |          |          |
+----------------------+----------------------+-----------+---------+----------+----------+
|   Multiple           | Take  by mouth.      |           | 0       |          |          |
| Vitamins-Minerals    |                      |           |         |          |          |
| (CENTRUM SILVER      |                      |           |         |          |          |
| 50+MEN PO)           |                      |           |         |          |          |
+----------------------+----------------------+-----------+---------+----------+----------+
|   simvastatin        | Take 10 mg by mouth  |           | 0       |          |          |
| (ZOCOR) 10 mg tablet | nightly.             |           |         |          |          |
+----------------------+----------------------+-----------+---------+----------+----------+
 documented as of this encounter
 
 Plan of Treatment
 
 
+--------+-------------+------------------+----------------------+-------------+
| Date   | Type        | Specialty        | Care Team            | Description |
+--------+-------------+------------------+----------------------+-------------+
| / | Appointment | Radiology        |   Augustin Lopez DNP      |             |
| 2020   |             |                  | [address]     |             |
|        |             |                  | [place]  |             |
|        |             |                  | [address]  [phone]  |             |
|        |             |                  |  [phone] (Fax)  |             |
+--------+-------------+------------------+----------------------+-------------+
| / | Office      | Vascular Surgery |   Augustin Lopez DNP      |             |
|    | Visit       |                  | [address]     |             |
|        |             |                  | [place]  |             |
|        |             |                  | [address]  [phone]  |             |
|        |             |                  |  [phone] (Fax)  |             |
 
+--------+-------------+------------------+----------------------+-------------+
 documented as of this encounter
 
 Visit Diagnoses
 
 
+-----------------------------------------------------------------------------------+
| Diagnosis                                                                         |
+-----------------------------------------------------------------------------------+
|   Mesenteric ischemia, chronic (HCC)  Chronic vascular insufficiency of intestine |
+-----------------------------------------------------------------------------------+
 documented in this encounter

## 2020-01-13 NOTE — XMS
Encounter Summary
  Created on: 2020
 
 Pasquale Nehemias Martinez
 External Reference #: [id number]
 : 43
 Sex: Male
 
 Demographics
 
 
+-----------------------+----------------------+
| Address               | [address]         |
|                       | [place]  [address] |
+-----------------------+----------------------+
| Home Phone            | [phone]      |
+-----------------------+----------------------+
| Preferred Language    | Unknown              |
+-----------------------+----------------------+
| Marital Status        |               |
+-----------------------+----------------------+
| Sikh Affiliation | 1077                 |
+-----------------------+----------------------+
| Race                  | Unknown              |
+-----------------------+----------------------+
| Ethnic Group          | Unknown              |
+-----------------------+----------------------+
 
 
 Author
 
 
+--------------+--------------------------------------------+
| Author       | [facility]  |
|              | and [facility]                                |
+--------------+--------------------------------------------+
| Organization | [facility]  |
|              | and [place]                                |
+--------------+--------------------------------------------+
| Address      | Unknown                                    |
+--------------+--------------------------------------------+
| Phone        | Unavailable                                |
+--------------+--------------------------------------------+
 
 
 
 Support
 
 
+--------------+--------------+---------------------+-----------------+
| Name         | Relationship | Address             | Phone           |
+--------------+--------------+---------------------+-----------------+
| Rupa Giordano | E[employer]         | [address]             | [phone] |
|              |              | [place]   |                 |
|              |              | [address]               |                 |
+--------------+--------------+---------------------+-----------------+
 
 
 
 
 Care Team Providers
 
 
+-----------------------+------+-------------+
| Care Team Member Name | Role | Phone       |
+-----------------------+------+-------------+
| No, Physician         | PCP  | Unavailable |
+-----------------------+------+-------------+
 
 
 
 Reason for Visit
 
 
+-------------------+----------+
| Reason            | Comments |
+-------------------+----------+
| Medication Refill |          |
+-------------------+----------+
 
 
 
 Encounter Details
 
 
+--------+--------+----------------------+----------------------+-------------------+
| Date   | Type   | Department           | Care Team            | Description       |
+--------+--------+----------------------+----------------------+-------------------+
| 10/09/ | Refill |   [facility]      |   Yudi Mckeon,  | Medication Refill |
|    |        | VASCULAR SURGERY     | Medical Assistant    |                   |
|        |        | [address] |                      |                   |
|        |        |  [place]  [place]     |                      |                   |
|        |        | [address]           |                      |                   |
|        |        | [phone]         |                      |                   |
+--------+--------+----------------------+----------------------+-------------------+
 
 
 
 Social History
 
 
+---------------+-------+-----------+--------+------+
| Tobacco Use   | Types | Packs/Day | Years  | Date |
|               |       |           | Used   |      |
+---------------+-------+-----------+--------+------+
| Former Smoker |       |           |        |      |
+---------------+-------+-----------+--------+------+
 
 
 
+---------------------+---+---+---+
| Smokeless Tobacco:  |   |   |   |
| Former User         |   |   |   |
+---------------------+---+---+---+
 
 
 
+------------------------------+
| Comments: quit 50 years ago  |
 
+------------------------------+
 
 
 
+---------------+-------------+---------+----------+
| Alcohol Use   | Drinks/Week | oz/Week | Comments |
+---------------+-------------+---------+----------+
| Not Currently |             |         | 90m days |
+---------------+-------------+---------+----------+
 
 
 
+------------------+---------------+
| Sex Assigned at  | Date Recorded |
| Birth            |               |
+------------------+---------------+
| Not on file      |               |
+------------------+---------------+
 
 
 
+----------------+-------------+-------------+
| Job Start Date | Occupation  | Industry    |
+----------------+-------------+-------------+
| Not on file    | Not on file | Not on file |
+----------------+-------------+-------------+
 
 
 
+----------------+--------------+------------+
| Travel History | Travel Start | Travel End |
+----------------+--------------+------------+
 
 
 
+-------------------------------------+
| No recent travel history available. |
+-------------------------------------+
 documented as of this encounter
 
 Functional Status
 
 
+---------------------------------------------+----------+--------------------+
| Functional Status                           | Response | Date of Assessment |
+---------------------------------------------+----------+--------------------+
| Are you deaf or do you have serious         | No       | 2019         |
| difficulty hearing?                         |          |                    |
+---------------------------------------------+----------+--------------------+
| Are you blind or do you have serious        | No       | 2019         |
| difficulty seeing, even when wearing        |          |                    |
| glasses?                                    |          |                    |
+---------------------------------------------+----------+--------------------+
| Do you have serious difficulty walking or   | No       | 2019         |
| climbing stairs? (5 years old or older)     |          |                    |
+---------------------------------------------+----------+--------------------+
| Do you have difficulty dressing or bathing? | No       | 2019         |
|  (5 years old or older)                     |          |                    |
+---------------------------------------------+----------+--------------------+
| Because of a physical, mental, or emotional | No       | 2019         |
 
|  condition, do you have difficulty doing    |          |                    |
| errands alone such as visiting a doctor's   |          |                    |
| office or shopping? [15 years old or        |          |                    |
| older)]                                     |          |                    |
+---------------------------------------------+----------+--------------------+
 
 
 
+---------------------------------------------+----------+--------------------+
| Cognitive Status                            | Response | Date of Assessment |
+---------------------------------------------+----------+--------------------+
| Because of a physical, mental, or emotional | No       | 2019         |
|  condition, do you have serious difficulty  |          |                    |
| concentrating, remembering, or making       |          |                    |
| decisions? (5 years old or older)           |          |                    |
+---------------------------------------------+----------+--------------------+
 documented as of this encounter
 
 Plan of Treatment
 
 
+--------+-------------+------------------+----------------------+-------------+
| Date   | Type        | Specialty        | Care Team            | Description |
+--------+-------------+------------------+----------------------+-------------+
| / | Appointment | Radiology        |   Augustin Lopez DNP      |             |
|    |             |                  | [address]     |             |
|        |             |                  | [place]  |             |
|        |             |                  | [address]  [phone]  |             |
|        |             |                  |  [phone] (Fax)  |             |
+--------+-------------+------------------+----------------------+-------------+
| / | Office      | Vascular Surgery |   Augustin Lopez DNP      |             |
| 2020   | Visit       |                  | [address]     |             |
|        |             |                  | [place]  |             |
|        |             |                  | [address] [phone]  |             |
|        |             |                  |  [phone] (Fax)  |             |
+--------+-------------+------------------+----------------------+-------------+
 documented as of this encounter
 
 Visit Diagnoses
 Not on filedocumented in this encounter

## 2020-01-13 NOTE — XMS
Encounter Summary
  Created on: 2020
 
 Pasquale Nehemias Martinez
 External Reference #: [id number]
 : 43
 Sex: Male
 
 Demographics
 
 
+-----------------------+----------------------+
| Address               | [address]         |
|                       | [place]  [address] |
+-----------------------+----------------------+
| Home Phone            | [phone]      |
+-----------------------+----------------------+
| Preferred Language    | Unknown              |
+-----------------------+----------------------+
| Marital Status        |               |
+-----------------------+----------------------+
| Taoism Affiliation | 1077                 |
+-----------------------+----------------------+
| Race                  | Unknown              |
+-----------------------+----------------------+
| Ethnic Group          | Unknown              |
+-----------------------+----------------------+
 
 
 Author
 
 
+--------------+--------------------------------------------+
| Author       | [facility] [place]  |
|              | and [facility]                                |
+--------------+--------------------------------------------+
| Organization | [facility]  |
|              | and [place]                                |
+--------------+--------------------------------------------+
| Address      | Unknown                                    |
+--------------+--------------------------------------------+
| Phone        | Unavailable                                |
+--------------+--------------------------------------------+
 
 
 
 Support
 
 
+--------------+--------------+---------------------+-----------------+
| Name         | Relationship | Address             | Phone           |
+--------------+--------------+---------------------+-----------------+
| Rupa Giordano | ECON         | [address]             | [phone] |
|              |              | [place], [place]   |                 |
|              |              | [address]               |                 |
+--------------+--------------+---------------------+-----------------+
 
 
 
 
 Care Team Providers
 
 
+-----------------------+------+-------------+
| Care Team Member Name | Role | Phone       |
+-----------------------+------+-------------+
| No, Physician         | PCP  | Unavailable |
+-----------------------+------+-------------+
 
 
 
 Reason for Visit
 
 
+-----------+----------+
| Reason    | Comments |
+-----------+----------+
| Follow-up |          |
+-----------+----------+
 
 
 
 Encounter Details
 
 
+--------+-----------+----------------------+----------------------+-------------+
| Date   | Type      | Department           | Care Team            | Description |
+--------+-----------+----------------------+----------------------+-------------+
| 10/02/ | Telephone |   [facility]      |   Augustin Lopez DNP      | Follow-up   |
| 2019   |           | VASCULAR SURGERY     | [address]     |             |
|        |           | [address] | [address]  [place], [place]  |             |
|        |           |  E  [place]     | [address]  [phone]  |             |
|        |           | [address]           |  [phone] (Fax)  |             |
|        |           | [phone]         |                      |             |
+--------+-----------+----------------------+----------------------+-------------+
 
 
 
 Social History
 
 
+---------------+-------+-----------+--------+------+
| Tobacco Use   | Types | Packs/Day | Years  | Date |
|               |       |           | Used   |      |
+---------------+-------+-----------+--------+------+
| Former Smoker |       |           |        |      |
+---------------+-------+-----------+--------+------+
 
 
 
+---------------------+---+---+---+
| Smokeless Tobacco:  |   |   |   |
| Former User         |   |   |   |
+---------------------+---+---+---+
 
 
 
+------------------------------+
| Comments: quit 50 years ago  |
 
+------------------------------+
 
 
 
+---------------+-------------+---------+----------+
| Alcohol Use   | Drinks/Week | oz/Week | Comments |
+---------------+-------------+---------+----------+
| Not Currently |             |         | 90m days |
+---------------+-------------+---------+----------+
 
 
 
+------------------+---------------+
| Sex Assigned at  | Date Recorded |
| Birth            |               |
+------------------+---------------+
| Not on file      |               |
+------------------+---------------+
 
 
 
+----------------+-------------+-------------+
| Job Start Date | Occupation  | Industry    |
+----------------+-------------+-------------+
| Not on file    | Not on file | Not on file |
+----------------+-------------+-------------+
 
 
 
+----------------+--------------+------------+
| Travel History | Travel Start | Travel End |
+----------------+--------------+------------+
 
 
 
+-------------------------------------+
| No recent travel history available. |
+-------------------------------------+
 documented as of this encounter
 
 Functional Status
 
 
+---------------------------------------------+----------+--------------------+
| Functional Status                           | Response | Date of Assessment |
+---------------------------------------------+----------+--------------------+
| Are you deaf or do you have serious         | No       | 2019         |
| difficulty hearing?                         |          |                    |
+---------------------------------------------+----------+--------------------+
| Are you blind or do you have serious        | No       | 2019         |
| difficulty seeing, even when wearing        |          |                    |
| glasses?                                    |          |                    |
+---------------------------------------------+----------+--------------------+
| Do you have serious difficulty walking or   | No       | 2019         |
| climbing stairs? (5 years old or older)     |          |                    |
+---------------------------------------------+----------+--------------------+
| Do you have difficulty dressing or bathing? | No       | 2019         |
|  (5 years old or older)                     |          |                    |
+---------------------------------------------+----------+--------------------+
| Because of a physical, mental, or emotional | No       | 2019         |
 
|  condition, do you have difficulty doing    |          |                    |
| errands alone such as visiting a doctor's   |          |                    |
| office or shopping? [15 years old or        |          |                    |
| older)]                                     |          |                    |
+---------------------------------------------+----------+--------------------+
 
 
 
+---------------------------------------------+----------+--------------------+
| Cognitive Status                            | Response | Date of Assessment |
+---------------------------------------------+----------+--------------------+
| Because of a physical, mental, or emotional | No       | 2019         |
|  condition, do you have serious difficulty  |          |                    |
| concentrating, remembering, or making       |          |                    |
| decisions? (5 years old or older)           |          |                    |
+---------------------------------------------+----------+--------------------+
 documented as of this encounter
 
 Plan of Treatment
 
 
+--------+-------------+------------------+----------------------+-------------+
| Date   | Type        | Specialty        | Care Team            | Description |
+--------+-------------+------------------+----------------------+-------------+
| / | Appointment | Radiology        |   Augustin Lopez DNP      |             |
| 2020   |             |                  | [address]     |             |
|        |             |                  | [place]  |             |
|        |             |                  | [address]  [phone]  |             |
|        |             |                  |  [phone] (Fax)  |             |
+--------+-------------+------------------+----------------------+-------------+
| / | Office      | Vascular Surgery |   Augustin Lopez DNP      |             |
|    | Visit       |                  | [address]     |             |
|        |             |                  | [address]  [place]  |             |
|        |             |                  | [address]  [phone]  |             |
|        |             |                  |  [phone] (Fax)  |             |
+--------+-------------+------------------+----------------------+-------------+
 documented as of this encounter
 
 Visit Diagnoses
 
 
+------------------------------------------------------------------------------------+
| Diagnosis                                                                          |
+------------------------------------------------------------------------------------+
|   Mesenteric ischemia, chronic (HCC) - Primary  Chronic vascular insufficiency of  |
| intestine                                                                          |
+------------------------------------------------------------------------------------+
 documented in this encounter

## 2020-01-13 NOTE — XMS
Encounter Summary
  Created on: 2020
 
 Pasquale Nehemias Martinez
 External Reference #: [id number]
 : 43
 Sex: Male
 
 Demographics
 
 
+-----------------------+----------------------+
| Address               | [address]         |
|                       | [place]  [address] |
+-----------------------+----------------------+
| Home Phone            | [phone]      |
+-----------------------+----------------------+
| Preferred Language    | Unknown              |
+-----------------------+----------------------+
| Marital Status        |               |
+-----------------------+----------------------+
| Roman Catholic Affiliation | 1077                 |
+-----------------------+----------------------+
| Race                  | Unknown              |
+-----------------------+----------------------+
| Ethnic Group          | Unknown              |
+-----------------------+----------------------+
 
 
 Author
 
 
+--------------+--------------------------------------------+
| Author       | [facility] [place]  |
|              | and [facility]                                |
+--------------+--------------------------------------------+
| Organization | [facility]  |
|              | and [place]                                |
+--------------+--------------------------------------------+
| Address      | Unknown                                    |
+--------------+--------------------------------------------+
| Phone        | Unavailable                                |
+--------------+--------------------------------------------+
 
 
 
 Support
 
 
+--------------+--------------+---------------------+-----------------+
| Name         | Relationship | Address             | Phone           |
+--------------+--------------+---------------------+-----------------+
| Rupa Giordano | ECON         | [address]             | [phone] |
|              |              | [place], [place]   |                 |
|              |              | [address]               |                 |
+--------------+--------------+---------------------+-----------------+
 
 
 
 
 Care Team Providers
 
 
+-----------------------+------+-------------+
| Care Team Member Name | Role | Phone       |
+-----------------------+------+-------------+
| No, Physician         | PCP  | Unavailable |
+-----------------------+------+-------------+
 
 
 
 Reason for Visit
 
 
+-----------+----------+
| Reason    | Comments |
+-----------+----------+
| Follow-up |          |
+-----------+----------+
 
 
 
 Encounter Details
 
 
+--------+-----------+----------------------+----------------------+-------------+
| Date   | Type      | Department           | Care Team            | Description |
+--------+-----------+----------------------+----------------------+-------------+
| / | Telephone |   [facility]      |   Yudi Mckeon,  | Follow-up   |
| 2019   |           | VASCULAR SURGERY     | Medical Assistant    |             |
|        |           | [address] |                      |             |
|        |           |  [place]  [place], [place]     |                      |             |
|        |           | [address]           |                      |             |
|        |           | [phone]         |                      |             |
+--------+-----------+----------------------+----------------------+-------------+
 
 
 
 Social History
 
 
+---------------+-------+-----------+--------+------+
| Tobacco Use   | Types | Packs/Day | Years  | Date |
|               |       |           | Used   |      |
+---------------+-------+-----------+--------+------+
| Former Smoker |       |           |        |      |
+---------------+-------+-----------+--------+------+
 
 
 
+---------------------+---+---+---+
| Smokeless Tobacco:  |   |   |   |
| Former User         |   |   |   |
+---------------------+---+---+---+
 
 
 
+------------------------------+
| Comments: quit 50 years ago  |
 
+------------------------------+
 
 
 
+---------------+-------------+---------+----------+
| Alcohol Use   | Drinks/Week | oz/Week | Comments |
+---------------+-------------+---------+----------+
| Not Currently |             |         | 90m days |
+---------------+-------------+---------+----------+
 
 
 
+------------------+---------------+
| Sex Assigned at  | Date Recorded |
| Birth            |               |
+------------------+---------------+
| Not on file      |               |
+------------------+---------------+
 
 
 
+----------------+-------------+-------------+
| Job Start Date | Occupation  | Industry    |
+----------------+-------------+-------------+
| Not on file    | Not on file | Not on file |
+----------------+-------------+-------------+
 
 
 
+----------------+--------------+------------+
| Travel History | Travel Start | Travel End |
+----------------+--------------+------------+
 
 
 
+-------------------------------------+
| No recent travel history available. |
+-------------------------------------+
 documented as of this encounter
 
 Functional Status
 
 
+---------------------------------------------+----------+--------------------+
| Functional Status                           | Response | Date of Assessment |
+---------------------------------------------+----------+--------------------+
| Are you deaf or do you have serious         | No       | 2019         |
| difficulty hearing?                         |          |                    |
+---------------------------------------------+----------+--------------------+
| Are you blind or do you have serious        | No       | 2019         |
| difficulty seeing, even when wearing        |          |                    |
| glasses?                                    |          |                    |
+---------------------------------------------+----------+--------------------+
| Do you have serious difficulty walking or   | No       | 2019         |
| climbing stairs? (5 years old or older)     |          |                    |
+---------------------------------------------+----------+--------------------+
| Do you have difficulty dressing or bathing? | No       | 2019         |
|  (5 years old or older)                     |          |                    |
+---------------------------------------------+----------+--------------------+
| Because of a physical, mental, or emotional | No       | 2019         |
 
|  condition, do you have difficulty doing    |          |                    |
| errands alone such as visiting a doctor's   |          |                    |
| office or shopping? [15 years old or        |          |                    |
| older)]                                     |          |                    |
+---------------------------------------------+----------+--------------------+
 
 
 
+---------------------------------------------+----------+--------------------+
| Cognitive Status                            | Response | Date of Assessment |
+---------------------------------------------+----------+--------------------+
| Because of a physical, mental, or emotional | No       | 2019         |
|  condition, do you have serious difficulty  |          |                    |
| concentrating, remembering, or making       |          |                    |
| decisions? (5 years old or older)           |          |                    |
+---------------------------------------------+----------+--------------------+
 documented as of this encounter
 
 Plan of Treatment
 
 
+--------+-------------+------------------+----------------------+-------------+
| Date   | Type        | Specialty        | Care Team            | Description |
+--------+-------------+------------------+----------------------+-------------+
| / | Appointment | Radiology        |   Augustin Lopez DNP      |             |
|    |             |                  | [address]     |             |
|        |             |                  | [place]  |             |
|        |             |                  | [id number] [phone]  |             |
|        |             |                  |  [phone] (Fax)  |             |
+--------+-------------+------------------+----------------------+-------------+
| / | Office      | Vascular Surgery |   Augustin Lopez DNP      |             |
| 2020   | Visit       |                  | [address]     |             |
|        |             |                  | [place]  |             |
|        |             |                  | [address]  [phone]  |             |
|        |             |                  |  [phone] (Fax)  |             |
+--------+-------------+------------------+----------------------+-------------+
 documented as of this encounter
 
 Visit Diagnoses
 Not on filedocumented in this encounter

## 2020-01-13 NOTE — XMS
Clinical Summary
  Created on: 2020
 
 Nehemias Giordano
 External Reference #: [id number]
 : 43
 Sex: Male
 
 Demographics
 
 
+-----------------------+----------------------+
| Address               | [address]         |
|                       | [place]  [address] |
+-----------------------+----------------------+
| Home Phone            | [phone]      |
+-----------------------+----------------------+
| Preferred Language    | Unknown              |
+-----------------------+----------------------+
| Marital Status        |               |
+-----------------------+----------------------+
| Holiness Affiliation | [id number]                 |
+-----------------------+----------------------+
| Race                  | Unknown              |
+-----------------------+----------------------+
| Ethnic Group          | Unknown              |
+-----------------------+----------------------+
 
 
 Author
 
 
+--------------+--------------------------------------------+
| Author       | [facility]  |
|              | and [facility]                                |
+--------------+--------------------------------------------+
| Organization | [facility]  |
|              | and [place]                                |
+--------------+--------------------------------------------+
| Address      | Unknown                                    |
+--------------+--------------------------------------------+
| Phone        | Unavailable                                |
+--------------+--------------------------------------------+
 
 
 
 Support
 
 
+--------------+--------------+---------------------+-----------------+
| Name         | Relationship | Address             | Phone           |
+--------------+--------------+---------------------+-----------------+
| Rupa Giordano | ECON         | [address]             | [phone] |
|              |              | [facility] [place]   |                 |
|              |              | [address]               |                 |
+--------------+--------------+---------------------+-----------------+
 
 
 
 
 Care Team Providers
 
 
+-----------------------+------+-------------+
| Care Team Member Name | Role | Phone       |
+-----------------------+------+-------------+
| No, Physician         | PCP  | Unavailable |
+-----------------------+------+-------------+
 
 
 
 Allergies
 No Known Allergies
 
 Medications
 
 
+----------------------+----------------------+-----------+---------+------+------+-------+
| Medication           | Sig                  | Dispensed | Refills | Star | End  | Statu |
|                      |                      |           |         | t    | Date | s     |
|                      |                      |           |         | Date |      |       |
+----------------------+----------------------+-----------+---------+------+------+-------+
|   simvastatin        | Take 10 mg by mouth  |           | 0       |      |      | Activ |
| (ZOCOR) 10 mg tablet | nightly.             |           |         |      |      | e     |
+----------------------+----------------------+-----------+---------+------+------+-------+
|                      | Take 25 mg by mouth  |           | 0       |      |      | Activ |
| hydroCHLOROthiazide  | Daily.               |           |         |      |      | e     |
| 25 mg tablet         |                      |           |         |      |      |       |
+----------------------+----------------------+-----------+---------+------+------+-------+
|   Multiple           | Take  by mouth.      |           | 0       |      |      | Activ |
| Vitamins-Minerals    |                      |           |         |      |      | e     |
| (CENTRUM SILVER      |                      |           |         |      |      |       |
| 50+MEN PO)           |                      |           |         |      |      |       |
+----------------------+----------------------+-----------+---------+------+------+-------+
|   ASPIRIN 81 PO      | Take 81 mg by mouth. |           | 0       |      |      | Activ |
|                      |                      |           |         |      |      | e     |
+----------------------+----------------------+-----------+---------+------+------+-------+
|   losartan (COZAAR)  | Take 50 mg by mouth  |           | 0       |      |      | Activ |
| 50 mg tablet         | Daily.               |           |         |      |      | e     |
+----------------------+----------------------+-----------+---------+------+------+-------+
|                      | Take 1 tablet by     |   30      | 0       | 09/3 |      | Activ |
| HYDROcodone-acetamin | mouth every 4 hours  | tablet    |         | 0/20 |      | e     |
| ophen (NORCO) 5-325  | as needed for Pain.  |           |         | 19   |      |       |
| mg per tablet        |                      |           |         |      |      |       |
+----------------------+----------------------+-----------+---------+------+------+-------+
|   docusate sodium    | Take 1 capsule by    |   30      | 0       | 09/3 |      | Activ |
| (COLACE) 100 mg      | mouth 2 times daily. | capsule   |         | 0/20 |      | e     |
| capsule              |                      |           |         | 19   |      |       |
+----------------------+----------------------+-----------+---------+------+------+-------+
|   clopidogrel        | Take 1 tablet by     |   30      | 11      | 12/0 |      | Activ |
| (PLAVIX) 75 mg       | mouth Daily.         | tablet    |         | 4/20 |      | e     |
| tablet               |                      |           |         | 19   |      |       |
+----------------------+----------------------+-----------+---------+------+------+-------+
 
 
 
 Active Problems
 
 
 
+------------------------------+------------+
| Problem                      | Noted Date |
+------------------------------+------------+
| H/O endarterectomy           | 2020 |
+------------------------------+------------+
| S/P angioplasty              | 2020 |
+------------------------------+------------+
| Hypertension                 | 2019 |
+------------------------------+------------+
| Hyperlipidemia               | 2019 |
+------------------------------+------------+
| Mesenteric ischemia, chronic | 2019 |
+------------------------------+------------+
 
 
 
+-------------------------------------------------------------+
|   Overview:   Added automatically from request for surgery  |
| [id number]                                                     |
+-------------------------------------------------------------+
 
 
 
 Resolved Problems
 
 
+-------------------+----------+-----------+
| Problem           | Noted    | Resolved  |
|                   | Date     | Date      |
+-------------------+----------+-----------+
| Diabetes mellitus | 20 |  |
|                   | 19       | 9         |
+-------------------+----------+-----------+
 
 
 
 Encounters
 
 
+--------+-----------+------------------+----------------------+----------------------+
| Date   | Type      | Specialty        | Care Team            | Description          |
+--------+-----------+------------------+----------------------+----------------------+
| / | Telephone | Vascular Surgery |   Briana Casper, | Appointment Question |
| 2020   |           |                  |  RN                  |                      |
+--------+-----------+------------------+----------------------+----------------------+
| / | Office    | Vascular Surgery |   Augustin Lopez DNP      | Mesenteric ischemia, |
|    | Visit     |                  |                      |  chronic (HCC)       |
|        |           |                  |                      | (Primary Dx); H/O    |
|        |           |                  |                      | endarterectomy; S/P  |
|        |           |                  |                      | angioplasty          |
+--------+-----------+------------------+----------------------+----------------------+
| / | Telephone | Vascular Surgery |   Briana Casper, | Appointment          |
|    |           |                  |  RN                  |                      |
+--------+-----------+------------------+----------------------+----------------------+
| / | Telephone | Vascular Surgery |   Briana Casper, | Follow-up; Other     |
|    |           |                  |  RN                  | (speak with nurse)   |
+--------+-----------+------------------+----------------------+----------------------+
| / | Telephone | Vascular Surgery |   Yudi Mckeon,  | Follow-up            |
2019   |           |                  | Medical Assistant    |                      |
+--------+-----------+------------------+----------------------+----------------------+
 
| / | Hospital  |                  |   Sukhdeep Pardo MD     | Mesenteric ischemia, |
|    | Encounter |                  |                      |  chronic (HCC)       |
+--------+-----------+------------------+----------------------+----------------------+
| / | Telephone | Radiology        |   Sukhdeep Pardo MD     |                      |
|    |           |                  |                      |                      |
+--------+-----------+------------------+----------------------+----------------------+
| / | Telephone | Vascular Surgery |   Briana Casper, | Results              |
|    |           |                  |  RN                  |                      |
+--------+-----------+------------------+----------------------+----------------------+
| / | Telephone | Vascular Surgery |   Genie Saxena,  | Diagnostic Order     |
|    |           |                  | RN                   |                      |
+--------+-----------+------------------+----------------------+----------------------+
| / | Telephone | Vascular Surgery |   Augustin Lopez DNP      | Imaging (CT order)   |
|    |           |                  |                      |                      |
+--------+-----------+------------------+----------------------+----------------------+
| 10/29/ | Telephone | Vascular Surgery |   Sukhdeep Pardo MD     | Other (Labs and      |
|    |           |                  |                      | Imaging)             |
+--------+-----------+------------------+----------------------+----------------------+
| 10/23/ | Office    | Vascular Surgery |   Augustin Lopez DNP      | Mesenteric ischemia, |
|    | Visit     |                  |                      |  chronic (HCC)       |
|        |           |                  |                      | (Primary Dx); H/O    |
|        |           |                  |                      | endarterectomy       |
+--------+-----------+------------------+----------------------+----------------------+
| 10/23/ | Hospital  | Radiology        |   Augustin Lopez DNP      | Mesenteric ischemia, |
|    | Encounter |                  |                      |  chronic (HCC)       |
+--------+-----------+------------------+----------------------+----------------------+
| 10/22/ | Telephone | Vascular Surgery |   Augustin Lopez DNP      | Other (instructions) |
|    |           |                  |                      |                      |
+--------+-----------+------------------+----------------------+----------------------+
 from Last 3 Months
 
 Family History
 
 
+-----------------+----------+------+----------+
| Medical History | Relation | Name | Comments |
+-----------------+----------+------+----------+
| Qiana hyperten  | Neg Hx   |      |          |
+-----------------+----------+------+----------+
 
 
 
 Social History
 
 
+---------------+-------+-----------+--------+------+
| Tobacco Use   | Types | Packs/Day | Years  | Date |
|               |       |           | Used   |      |
+---------------+-------+-----------+--------+------+
| Former Smoker |       |           |        |      |
+---------------+-------+-----------+--------+------+
 
 
 
+---------------------+---+---+---+
| Smokeless Tobacco:  |   |   |   |
| Former User         |   |   |   |
+---------------------+---+---+---+
 
 
 
 
+------------------------------+
| Comments: quit 50 years ago  |
+------------------------------+
 
 
 
+---------------+-------------+---------+----------+
| Alcohol Use   | Drinks/Week | oz/Week | Comments |
+---------------+-------------+---------+----------+
| Not Currently |             |         | 90m days |
+---------------+-------------+---------+----------+
 
 
 
+------------------+---------------+
| Sex Assigned at  | Date Recorded |
| Birth            |               |
+------------------+---------------+
| Not on file      |               |
+------------------+---------------+
 
 
 
+----------------+-------------+-------------+
| Job Start Date | Occupation  | Industry    |
+----------------+-------------+-------------+
| Not on file    | Not on file | Not on file |
+----------------+-------------+-------------+
 
 
 
+----------------+--------------+------------+
| Travel History | Travel Start | Travel End |
+----------------+--------------+------------+
 
 
 
+-------------------------------------+
| No recent travel history available. |
+-------------------------------------+
 
 
 
 Last Filed Vital Signs
 
 
+-------------------+----------------------+----------------------+----------+
| Vital Sign        | Reading              | Time Taken           | Comments |
+-------------------+----------------------+----------------------+----------+
| Blood Pressure    | 126/79               | 2020 10:14 AM  |          |
|                   |                      | PST                  |          |
+-------------------+----------------------+----------------------+----------+
| Pulse             | 64                   | 2020 10:14 AM  |          |
|                   |                      | PST                  |          |
+-------------------+----------------------+----------------------+----------+
| Temperature       | 36.6   C (97.9   F)  | 2019 11:27 AM  |          |
|                   |                      | PST                  |          |
+-------------------+----------------------+----------------------+----------+
| Respiratory Rate  | 18                   | 2019 11:27 AM  |          |
 
|                   |                      | PST                  |          |
+-------------------+----------------------+----------------------+----------+
| Oxygen Saturation | 100%                 | 2020 10:14 AM  |          |
|                   |                      | PST                  |          |
+-------------------+----------------------+----------------------+----------+
| Inhaled Oxygen    | -                    | -                    |          |
| Concentration     |                      |                      |          |
+-------------------+----------------------+----------------------+----------+
| Weight            | 79.3 kg (174 lb 13.2 | 2019  8:01 AM  |          |
|                   |  oz)                 | PST                  |          |
+-------------------+----------------------+----------------------+----------+
| Height            | 167.6 cm (5' 6")     | 2019  8:01 AM  |          |
|                   |                      | PST                  |          |
+-------------------+----------------------+----------------------+----------+
| Body Mass Index   | 28.22                | 2019  8:01 AM  |          |
|                   |                      | PST                  |          |
+-------------------+----------------------+----------------------+----------+
 
 
 
 Plan of Treatment
 
 
+--------+-------------+------------------+----------------------+-------------+
| Date   | Type        | Specialty        | Care Team            | Description |
+--------+-------------+------------------+----------------------+-------------+
| / | Appointment | Radiology        |   Augustin Lopez DNP      |             |
|    |             |                  | [address]     |             |
|        |             |                  | [place]  |             |
|        |             |                  | [address]  [phone]  |             |
|        |             |                  |  [phone] (Fax)  |             |
+--------+-------------+------------------+----------------------+-------------+
| / | Office      | Vascular Surgery |   Augustin Lopez DNP      |             |
|    | Visit       |                  | [address]     |             |
|        |             |                  | [place]  |             |
|        |             |                  | [address]  [phone]  |             |
|        |             |                  |  [phone] (Fax)  |             |
+--------+-------------+------------------+----------------------+-------------+
 
 
 
+----------------------+-----------+------------+----------+
| Health Maintenance   | Due Date  | Last Done  | Comments |
+----------------------+-----------+------------+----------+
| Vaccine:             |  |            |          |
| Dtap/Tdap/Td (1 -    | 5         |            |          |
| Tdap)                |           |            |          |
+----------------------+-----------+------------+----------+
| Colorectal Cancer    |  |            |          |
| Screening            | 4         |            |          |
| (Colonoscopy)        |           |            |          |
+----------------------+-----------+------------+----------+
| AAA Screening        |  |            |          |
|                      | 9         |            |          |
+----------------------+-----------+------------+----------+
| Vaccine:             |  |            |          |
| Pneumococcal 65+ (1  | 9         |            |          |
| of 2 - PCV13)        |           |            |          |
+----------------------+-----------+------------+----------+
| Vaccine: Zoster (2   | 04/15/200 | 2009 |          |
 
| of 3)                | 9         |            |          |
+----------------------+-----------+------------+----------+
| Adult Annual         |  |            |          |
| Wellness Visit       | 9         |            |          |
+----------------------+-----------+------------+----------+
| Vaccine: Influenza   |  | 2016 |          |
| (#1)                 | 9         |            |          |
+----------------------+-----------+------------+----------+
 
 
 
 Implants
 
 
+-------------------------------+-------+------+-------------+--------+--------+--------+
| Implanted                     | Type  | Area | Manufacture | Device | Shelf  | Model  |
|                               |       |      | r           |        | Expira | /      |
|                               |       |      |             | Identi | tion   | Serial |
|                               |       |      |             | fier   | Date   |  / Lot |
+-------------------------------+-------+------+-------------+--------+--------+--------+
| Xgrft Vascu-Guard Ptch 0.8x8  | Graft |      | BOBBI      |        | / | VG-010 |
| - SnaImplanted: Qty: 1 on     |       |      | BIOSCIENCE  |        |    | 8N /NA |
| 2019 by Sukhdeep Pardo MD  |       |      | - [place]      |        |        |        |
| at [facility]        |       |      |             |        |        | /[id number] |
| [facility]                |       |      |             |        |        |  |
|                               |       |      |             |        |        | 1520   |
+-------------------------------+-------+------+-------------+--------+--------+--------+
 
 
 
 Procedures
 
 
+---------------------+--------+-------------+----------------------+----------------------+
| Procedure Name      | Priori | Date/Time   | Associated Diagnosis | Comments             |
|                     | ty     |             |                      |                      |
+---------------------+--------+-------------+----------------------+----------------------+
| IR ANGIOGRAM        | Routin | 2019  |   Mesenteric         |   Results for this   |
| MESENTERIC VISCERAL | e      | 10:03 AM    | ischemia, chronic    | procedure are in the |
|                     |        | PST         | ([facility])                |  results section.    |
+---------------------+--------+-------------+----------------------+----------------------+
| IMAGING REPORT -    |        | 11/15/2019  |                      |   Results for this   |
| EXTERNAL SCAN       |        | 12:00 AM    |                      | procedure are in the |
|                     |        | PST         |                      |  results section.    |
+---------------------+--------+-------------+----------------------+----------------------+
 from Last 3 Months
 
 Results
 IR Angiogram Mesenteric Visceral (2019 10:03 AM PST)
 
+----------+
| Specimen |
+----------+
|          |
+----------+
 
 
 
+-----------------------------------------------------------------------+---------------+
| Impressions                                                           | Performed At  |
 
+-----------------------------------------------------------------------+---------------+
|      1.   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
| around large   branches.  2.   After angioplasty, moderate            |               |
| angiographic results.  3.   Not a good place for stent placement due  |               |
| to multiple large branches.                                           |               |
+-----------------------------------------------------------------------+---------------+
 
 
 
+----------------------------------------------------------------------------+--------------
-+
| Narrative                                                                  | Performed At 
 |
+----------------------------------------------------------------------------+--------------
-+
|   This result has an attachment that is not available.  ABDOMINAL          |   [facility]
 |
| AORTOGRAM PREOPERATIVE DIAGNOSIS: Chronic mesenteric ischemia              |              
 |
| POSTOPERATIVE DIAGNOSIS: Same PROCEDURE:   1. Moderate conscious           |              
 |
| sedation2. Ultrasound guided access of the right common femoral            |              
 |
| artery3. Aortogram4. Selective catheterization of superior mesenteric      |              
 |
| artery with mesenteric arteriogram5.   Superior mesenteric artery          |              
 |
| angioplasty using 7 x 40 mm angioplasty balloon SURGEON: Sukhdeep Pardo MD       |              
 |
| ASSISTANT: None ANESTHESIA: Moderate sedation and local anesthesia         |              
 |
|       Informed consent was obtained from the patient. Continuous           |              
 |
| cardiac monitoring was performed throughout the procedure. Conscious       |              
 |
| sedation was provided by the nursing staff during the procedure under      |              
 |
| my supervision.          Sedation time: 60 minutes                         |              
 |
|   Medications: 1 mg Versed IV, 50 Mcg Fentanyl IV ESTIMATED BLOOD          |              
 |
| LOSS: Minimal CONTRAST: 89 mL of Omnipaque 240 INDICATIONS: See            |              
 |
| preoperative history and physical FINDINGS: SMA was nearly occluded at     |              
 |
|  origin.   After angioplasty, there was still a focal area of stenosis     |              
 |
|  but this was not in a good place to stent due to multiple large           |              
 |
| branches around stenosis.   However, there was improved flow               |              
 |
| throughout SMA at end of case. DESCRIPTION OF PROCEDURE:   The patient     |              
 |
|  was properly identified and brought to the Cath Lab. The patient was      |              
 |
| placed supine on the catheter table and patient was given moderate         |              
 |
| sedation by nursing staff under my supervision. Patient's bilateral        |              
 |
| groins were then prepped and draped in the usual sterile fashion.          |              
 
 |
| Local anesthetic was given to the right groin and the right common         |              
 |
| femoral artery was accessed under ultrasound guidance using a              |              
 |
| micropuncture needle. A micropuncture sheath was inserted over a wire      |              
 |
| and up sized to a 4 French sheath. A Omni flush catheter was then          |              
 |
| inserted into the aorta and aortogram was then performed with the          |              
 |
| findings as described above. Next, an Amplatzer wire was then inserted     |              
 |
|  over the catheter and the 4 French sheath was removed. A 6.5 French       |              
 |
| durable sheath was then inserted over the wire with the tip of the         |              
 |
| sheath being placed into the proximal SMA. A vertebral catheter was        |              
 |
| inserted over the wire and the wire was then removed. A Glidewire was      |              
 |
| then placed into the vertebral catheter and used to cross through the      |              
 |
| SMA occlusion.   Next, a 260 fix core wire was then inserted over the      |              
 |
| catheter.   The SMA was then angioplastied using 7 x 40 mm angioplasty     |              
 |
|  balloon.   A final arteriogram was then performed through the sheath.     |              
 |
|  The steerable sheath was then removed and a Mynx closure device was       |              
 |
| then used on the right common femoral artery and hemostasis was            |              
 |
| ensured. The patient was then taken to the observation unit for            |              
 |
| further monitoring.                                                        |              
 |
|patient was given moderate sedation by nursing staff under my supervision.  |              
 |
|Patient's bilateral groins were then prepped and draped in the usual        |              
 |
|sterile fashion. Local anesthetic was given to the right groin and the      |              
 |
|right common femoral artery was accessed under ultrasound guidance using a  |              
 |
|micropuncture needle. A micropuncture sheath was inserted over a wire and   |              
 |
|up sized to a 4 French sheath. A Omni flush catheter was then inserted      |              
 |
|into the aorta and aortogram was then performed with the findings as        |              
 |
|described above. Next, an Amplatzer wire was then inserted over the         |              
 |
|catheter and the 4 French sheath was removed. A 6.5 French durable sheath   |              
 |
|was then inserted over the wire with the tip of the sheath being placed     |              
 |
|into the proximal SMA. A vertebral catheter was inserted over the wire and  |              
 |
|the wire was then removed. A Glidewire was then placed into the vertebral   |              
 
 |
|catheter and used to cross through the SMA occlusion.   Next, a 260 fix     |              
 |
|core wire was then inserted over the catheter.   The SMA was then           |              
 |
|angioplastied using 7 x 40 mm angioplasty balloon.   A final arteriogram    |              
 |
|was then performed through the sheath. The steerable sheath was then        |              
 |
|removed and a Mynx closure device was then used on the right common         |              
 |
|femoral artery and hemostasis was ensured. The patient was then taken to    |              
 |
|the observation unit for further monitoring.                                |              
 |
|                                                                            |              
 |
+----------------------------------------------------------------------------+--------------
-+
 
 
 
+---------------+---------+--------------------+--------------+
| Performing    | Address | City/State/Zipcode | Phone Number |
| Organization  |         |                    |              |
+---------------+---------+--------------------+--------------+
|   [facility] |         |                    |              |
+---------------+---------+--------------------+--------------+
 IMAGING REPORT - EXTERNAL SCAN (11/15/2019 12:00 AM PST)
 
+------------------------------------------------------------------------+--------------+
| Narrative                                                              | Performed At |
+------------------------------------------------------------------------+--------------+
|   This result has an attachment that is not available.  Ordered by an  |              |
| unspecified provider.                                                  |              |
+------------------------------------------------------------------------+--------------+
 from Last 3 Months
 
 Insurance
 
 
+----------------+--------+-------------+--------+-------+---------+--------+
| Payer          | Benefi | Subscriber  | Effect | Phone | Address | Type   |
|                | t Plan | ID          | dave    |       |         |        |
|                |  /     |             | Dates  |       |         |        |
|                | Group  |             |        |       |         |        |
+----------------+--------+-------------+--------+-------+---------+--------+
| [employer] [facility] [place]     | [id number]   | 20 |       |         | Indemn |
|                | CHOICE |             | 18-Pre |       |         | ity    |
|                |  PC3   |             | sent   |       |         |        |
+----------------+--------+-------------+--------+-------+---------+--------+
 
 
 
+--------------------+--------+-------------+--------+-------------+---------------------+
| Guarantor Name     | [employer] | Relation to | Date   | Phone       | Billing Address     |
|                    | t Type |  Patient    | of     |             |                     |
|                    |        |             | Birth  |             |                     |
+--------------------+--------+-------------+--------+-------------+---------------------+
| Nehemias Giordano | Person | Self        | / |             |   [address]      |
 
|                    | al/Ray |             | 1945   | [phone] | [place] [id number] |
|                    | [place]    |             |        | 4 (Home)    |                     |
+--------------------+--------+-------------+--------+-------------+---------------------+
 
 
 
 Advance Directives
 
 
+-----------+-----------------+----------------+-------------+
| Type      | Date Recorded   | Patient        | Explanation |
|           |                 | Representative |             |
+-----------+-----------------+----------------+-------------+
| Power of  |                 |                |             |
|   |                 |                |             |
+-----------+-----------------+----------------+-------------+
| Advance   | 2019  8:41 |                | no          |
| Directive |  AM             |                |             |
+-----------+-----------------+----------------+-------------+
 
 
 
+-------------+-------------+-------------+----------+
| Code Status | Date        | Date        | Comments |
|             | Activated   | Inactivated |          |
+-------------+-------------+-------------+----------+
| Full Code   | 2019   | 2019   |          |
|             | 3:07 PM     | 1:49 PM     |          |
+-------------+-------------+-------------+----------+

## 2020-01-13 NOTE — XMS
Encounter Summary
  Created on: 2020
 
 Pasquale Nehemias Martinez
 External Reference #: [id number]
 : 43
 Sex: Male
 
 Demographics
 
 
+-----------------------+----------------------+
| Address               | [address]         |
|                       | [place]  [address] |
+-----------------------+----------------------+
| Home Phone            | [phone]      |
+-----------------------+----------------------+
| Preferred Language    | Unknown              |
+-----------------------+----------------------+
| Marital Status        |               |
+-----------------------+----------------------+
| Quaker Affiliation | [id number]                 |
+-----------------------+----------------------+
| Race                  | Unknown              |
+-----------------------+----------------------+
| Ethnic Group          | Unknown              |
+-----------------------+----------------------+
 
 
 Author
 
 
+--------------+--------------------------------------------+
| Author       | [facility]  |
|              | and [facility]                                |
+--------------+--------------------------------------------+
| Organization | [facility]  |
|              | and [place]                                |
+--------------+--------------------------------------------+
| Address      | Unknown                                    |
+--------------+--------------------------------------------+
| Phone        | Unavailable                                |
+--------------+--------------------------------------------+
 
 
 
 Support
 
 
+--------------+--------------+---------------------+-----------------+
| Name         | Relationship | Address             | Phone           |
+--------------+--------------+---------------------+-----------------+
| Rupa Giordano | ECON         | [address]             | [phone] |
|              |              | [place], [place]   |                 |
|              |              | [address]               |                 |
+--------------+--------------+---------------------+-----------------+
 
 
 
 
 Care Team Providers
 
 
+-----------------------+------+-------------+
| Care Team Member Name | Role | Phone       |
+-----------------------+------+-------------+
 PCP  | Unavailable |
+-----------------------+------+-------------+
 
 
 
 Reason for Visit
 
 
+---------+-----------+
| Reason  | Comments  |
+---------+-----------+
| Consult | abd pain  |
+---------+-----------+
 Evaluate & Treat (Routine)
 
+--------+--------+-----------+--------------+--------------+---------------+
| Status | Reason | Specialty | Diagnoses /  | Referred By  | Referred To   |
|        |        |           | Procedures   | Contact      | Contact       |
+--------+--------+-----------+--------------+--------------+---------------+
| Closed |        |           |   Diagnoses  |   Ferranco,  |   Sukhdeep Pardo, |
|        |        |           |  Lower       | Claudio DELEON,    |  MD  [address]     |
|        |        |           | abdominal    | AR[address]  [address]     | [address]   |
|        |        |           | pain,        | [address]   | [address]         |
|        |        |           | unspecified  | [address]    | [place], [place]  |
|        |        |           |              | [place]    | [address]    |
|        |        |           |              | [address]        | Phone:        |
|        |        |           |              | Phone:       | [phone]  |
|        |        |           |              | [phone] |  Fax:         |
|        |        |           |              |   Fax:       | [phone]  |
|        |        |           |              | [phone] |               |
+--------+--------+-----------+--------------+--------------+---------------+
 
 
 
 
 Encounter Details
 
 
+--------+---------+----------------------+----------------------+----------------------+
| Date   | Type    | Department           | Care Team            | Description          |
+--------+---------+----------------------+----------------------+----------------------+
| / | Office  |   [facility]      |   Sukhdeep Pardo MD     | Mesenteric ischemia, |
| 2019   | Visit   | VASCULAR SURGERY     | [address]     |  chronic (HCC)       |
|        |         | [address] | [facility] [place]  | (Primary Dx)         |
|        |         |  E  [facility] [place]     | [address]           |                      |
|        |         | [address]           | [phone]         |                      |
|        |         | [phone]         | [phone] (Fax)   |                      |
+--------+---------+----------------------+----------------------+----------------------+
 
 
 
 Social History
 
 
 
+---------------+-------+-----------+--------+------+
| Tobacco Use   | Types | Packs/Day | Years  | Date |
|               |       |           | Used   |      |
+---------------+-------+-----------+--------+------+
| Former Smoker |       |           |        |      |
+---------------+-------+-----------+--------+------+
 
 
 
+---------------------+---+---+---+
| Smokeless Tobacco:  |   |   |   |
| Never Used          |   |   |   |
+---------------------+---+---+---+
 
 
 
+------------------------------+
| Comments: quit 50 years ago  |
+------------------------------+
 
 
 
+------------------+---------------+
| Sex Assigned at  | Date Recorded |
| Birth            |               |
+------------------+---------------+
| Not on file      |               |
+------------------+---------------+
 
 
 
+----------------+-------------+-------------+
| Job Start Date | Occupation  | Industry    |
+----------------+-------------+-------------+
| Not on file    | Not on file | Not on file |
+----------------+-------------+-------------+
 
 
 
+----------------+--------------+------------+
| Travel History | Travel Start | Travel End |
+----------------+--------------+------------+
 
 
 
+-------------------------------------+
| No recent travel history available. |
+-------------------------------------+
 documented as of this encounter
 
 Last Filed Vital Signs
 
 
+-------------------+---------+----------------------+----------+
| Vital Sign        | Reading | Time Taken           | Comments |
+-------------------+---------+----------------------+----------+
| Blood Pressure    | 133/71  | 2019  8:53 AM  |          |
|                   |         | PDT                  |          |
+-------------------+---------+----------------------+----------+
 
| Pulse             | 66      | 2019  8:53 AM  |          |
|                   |         | PDT                  |          |
+-------------------+---------+----------------------+----------+
| Temperature       | -       | -                    |          |
+-------------------+---------+----------------------+----------+
| Respiratory Rate  | -       | -                    |          |
+-------------------+---------+----------------------+----------+
| Oxygen Saturation | 97%     | 2019  8:53 AM  |          |
|                   |         | PDT                  |          |
+-------------------+---------+----------------------+----------+
| Inhaled Oxygen    | -       | -                    |          |
| Concentration     |         |                      |          |
+-------------------+---------+----------------------+----------+
| Weight            | -       | -                    |          |
+-------------------+---------+----------------------+----------+
| Height            | -       | -                    |          |
+-------------------+---------+----------------------+----------+
| Body Mass Index   | -       | -                    |          |
+-------------------+---------+----------------------+----------+
 documented in this encounter
 
 Progress Notes
 Sukhdeep Pardo MD - 2019  9:00 AM PDTFormatting of this note might be different from the
 original.
 Subjective 
 Subjective 
 
 Mr. Giordano is a pleasant 74 y.o. male with no significant PMH who is referred to me for abd
ominal pain. Patient has been describing intermittent abdominal pain that radiates across hi
s abdomen to his back. Patient does not endorse any specific worsening pain associated with 
eating, however he does state that he has not been eating a lot. Patient has been watching h
is diet and has been trying to decrease his carbohydrates. Patient has lost 24 lbs over the 
last 2 months. The patient had a recent CT and MRI abdomen from [facility]. [facility]
ent denies any surgical intervention for this abdominal pain, however has had past abdominal
 surgeries. The patient indicates that his heart function is good. 
 
 History reviewed. No pertinent past medical history.
 
 History reviewed. No pertinent surgical history.
 
 Social History 
 
 Tobacco Use 
   Smoking status: Former Smoker 
   Smokeless tobacco: Never Used 
   Tobacco comment: quit 50 years ago  
 Substance Use Topics 
   Alcohol use: Not on file 
   Drug use: Not on file 
 
 History reviewed. No pertinent family history.
 
 Current Outpatient Medications on File Prior to Visit 
 Medication Sig Dispense Refill 
   ASPIRIN 81 PO Take 81 mg by mouth.   
   hydroCHLOROthiazide 25 mg tablet Take 25 mg by mouth Daily.   
   losartan (COZAAR) 50 mg tablet Take 50 mg by mouth Daily.   
   Multiple Vitamins-Minerals (CENTRUM SILVER 50+MEN PO) Take  by mouth.   
   simvastatin (ZOCOR) 10 mg tablet Take 10 mg by mouth nightly.   
 
 
 No current facility-administered medications on file prior to visit.  
 
 No Known Allergies
 
 Comprehensive ROS performed and pertinent items described in the HPI. 
 
  
 
 Objective 
 Objective 
 
 Vitals:reviewed
 CONSTITUTIONAL:  Conversant, well developed, NAD
 EYES: Anicteric sclerae, no lid drag, no proptosis
 RESP: Normal effort, regular, even, unlabored rate
 CV: No peripheral edema, rate regular
 SKIN: Pink, warm, dry without rash/lesion
 MS: ROM not limited, no digital cyanosis, normal gait
 NEURO: Cranial nerves II-XII grossly intact, A&O times 3
 PSYCH: appropriate affect, speech and tone, judgement and insight intact
 Vascular:  Palpable femoral pulses
 
  
 
 Assessment 
 Assessment and Plan 
 
 CT and MRI abdomen results from [facility] reviewed and discussed with the [facility]
ent. The patient's imaging is is concerning for mesenteric ischemia as he has short segment 
occlusion of the superior mesenteric artery. The patient does not have classic symptoms for 
mesenteric ischemia, however he does have concerning symptoms nonetheless of significant josette
ght loss and intermittent abdominal pain. Surgical options for treatment of the patient's me
senteric artery disease have been discussed with the patient and I have discussed my recomme
ndation of pursuing surgery to prevent worsening symptoms. Patient understands that this is 
a major surgery requiring extended hospital stay afterwards and he will be placed under gene
ral anesthesia. Discussed surgical option and mechanism of superior mesenteric artery endart
erectomy with the patient. We have discussed benefits and risks of this procedure, including
 bleeding, infection, heart attack, stroke, death. Patient is understanding and agreeable to
 superior mesenteric artery endarterectomy, and has signed consent for surgery on 19. MARIMAR carroll was instructed not to eat or drink fluids after 11:59 PM on 19. All questions an
d concerns addressed. Patient will follow up after surgery. Patient understands and is agree
able. 
 
  
 
 Attending Note: Documentation assistance provided by Dale Santiago (Scribe). Information sandeep
rded by the scribe has been reviewed and validated by me. I agree with its contents.
 
 Signed by: Dale Santiago, Juan Manuelibe 19, 16:57
 
 Sukhdeep Pardo MDElectronically signed by Sukhdeep Pardo MD at 2019  4:59 PM PDTdocumented in
 this encounter
 
 Plan of Treatment
 
 
+--------+-------------+------------------+----------------------+-------------+
| Date   | Type        | Specialty        | Care Team            | Description |
+--------+-------------+------------------+----------------------+-------------+
| / | Appointment | Radiology        |   Augustin Lopez DNP      |             |
 
|    |             |                  |  [address]     |             |
|        |             |                  | [place]  |             |
|        |             |                  | [id number] [phone]  |             |
|        |             |                  |  [phone] (Fax)  |             |
+--------+-------------+------------------+----------------------+-------------+
| / | Office      | Vascular Surgery |   Augustin Lopez DNP      |             |
|    | Visit       |                  | [address]     |             |
|        |             |                  | [place]  |             |
|        |             |                  | [address]  [phone]  |             |
|        |             |                  |  [phone] (Fax)  |             |
+--------+-------------+------------------+----------------------+-------------+
 
 
 
+-------------+------+--------+----------------------+----------------------+
| Name        | Type | Priori | Associated Diagnoses | Order Schedule       |
|             |      | ty     |                      |                      |
+-------------+------+--------+----------------------+----------------------+
| ECG 12 lead | ECG  | Routin |   Mesenteric         | 1 Occurrences        |
|             |      | e      | ischemia, chronic    | starting 2019  |
|             |      |        | (HCC)                | until 2020     |
+-------------+------+--------+----------------------+----------------------+
 documented as of this encounter
 
 Results
 XR Chest PA and Lateral (2019 10:15 AM PDT)
 
+----------+
| Specimen |
+----------+
|          |
+----------+
 
 
 
+------------------------------------------------------------------------+---------------+
| Narrative                                                              | Performed At  |
+------------------------------------------------------------------------+---------------+
|      CHEST PA AND LATERAL     CLINICAL INFORMATION:  Pre operative     |   [facility] |
| evaluation     COMPARISON:  CT ANGIOGRAPHY ABDOMEN AND PELVIS          |               |
| (2019);     FINDINGS:  There is a BB in the posteromedial right   |               |
| lower lobe, better defined on  CT abdomen 2019. the lungs are     |               |
| clear.   The heart size is normal  c..   Mild to moderate degenerative |               |
|  changes of the thoracic spine     IMPRESSION:  No active              |               |
| intrathoracic disease.           Signed by: PADILLA Singh Shawn  Sign   |               |
| Date/Time: 2019 10:27 AM                                         |               |
+------------------------------------------------------------------------+---------------+
 
 
 
+------------------------------------------------------------------------+
| Procedure Note                                                         |
+------------------------------------------------------------------------+
|   Cuong, Rad Results In - 2019 10:31 AM PDT                        |
| CHEST PA AND LATERAL                                                   |
|                                                                        |
| CLINICAL INFORMATION:                                                  |
| Pre operative evaluation                                               |
|                                                                        |
| COMPARISON:                                                            |
 
| CT ANGIOGRAPHY ABDOMEN AND PELVIS (2019);                         |
|                                                                        |
| FINDINGS:                                                              |
| There is a BB in the posteromedial right lower lobe, better defined on |
| CT abdomen 2019. the lungs are clear.  The heart size is normal   |
| c..  Mild to moderate degenerative changes of the thoracic spine       |
|                                                                        |
| IMPRESSION:                                                            |
| No active intrathoracic disease.                                       |
|                                                                        |
| Signed by: PADILLA Singh Shawn                                          |
| Sign Date/Time: 2019 10:27 AM                                    |
+------------------------------------------------------------------------+
 
 
 
+---------------+---------+--------------------+--------------+
| Performing    | Address | City/State/Zipcode | Phone Number |
| Organization  |         |                    |              |
+---------------+---------+--------------------+--------------+
|   [facility] |         |                    |              |
+---------------+---------+--------------------+--------------+
 documented in this encounter
 
 Visit Diagnoses
 
 
+------------------------------------------------------------------------------------+
| Diagnosis                                                                          |
+------------------------------------------------------------------------------------+
|   Mesenteric ischemia, chronic (HCC) - Primary  Chronic vascular insufficiency of  |
| intestine                                                                          |
+------------------------------------------------------------------------------------+
 documented in this encounter

## 2020-01-13 NOTE — XMS
Encounter Summary
  Created on: 2020
 
 Pasquale Nehemias Martinez
 External Reference #: [id number]
 : 43
 Sex: Male
 
 Demographics
 
 
+-----------------------+----------------------+
| Address               | [address]         |
|                       | [place]  [address] |
+-----------------------+----------------------+
| Home Phone            | [phone]      |
+-----------------------+----------------------+
| Preferred Language    | Unknown              |
+-----------------------+----------------------+
| Marital Status        |               |
+-----------------------+----------------------+
| Yazidism Affiliation | [id number]                 |
+-----------------------+----------------------+
| Race                  | Unknown              |
+-----------------------+----------------------+
| Ethnic Group          | Unknown              |
+-----------------------+----------------------+
 
 
 Author
 
 
+--------------+--------------------------------------------+
| Author       | [facility]  |
|              | and [facility]                                |
+--------------+--------------------------------------------+
| Organization | [facility]  |
|              | and [place]                                |
+--------------+--------------------------------------------+
| Address      | Unknown                                    |
+--------------+--------------------------------------------+
| Phone        | Unavailable                                |
+--------------+--------------------------------------------+
 
 
 
 Support
 
 
+--------------+--------------+---------------------+-----------------+
| Name         | Relationship | Address             | Phone           |
+--------------+--------------+---------------------+-----------------+
| Rupa Giordnao | ECON         | [address]             | [phone] |
|              |              | [facility] [place]   |                 |
|              |              | [address]               |                 |
+--------------+--------------+---------------------+-----------------+
 
 
 
 
 Care Team Providers
 
 
+-----------------------+------+-------------+
| Care Team Member Name | Role | Phone       |
+-----------------------+------+-------------+
| No, Physician         | PCP  | Unavailable |
+-----------------------+------+-------------+
 
 
 
 Reason for Referral
 Diagnostic/Screening (Routine)
 
+------------+--------+-----------+--------------+--------------+---------------+
| Status     | Reason | Specialty | Diagnoses /  | Referred By  | Referred To   |
|            |        |           | Procedures   | Contact      | Contact       |
+------------+--------+-----------+--------------+--------------+---------------+
| Authorized |        |           |   Diagnoses  |   Augustin Lopez,  |   [facility]  |
|            |        |           |  Mesenteric  | DNP  [address]    | HOSPITAL      |
|            |        |           | ischemia,    | [address]  | [address]       |
|            |        |           | chronic      |  [address]       | [place]   |
|            |        |           | (Formerly [facility])  H/O   | [address] [address] | [place] |
|            |        |           | endarterecto |  [address]       |  [address]   |
|            |        |           | my           | Phone:       | Phone:        |
|            |        |           | Procedures   | [phone] | [phone]  |
|            |        |           | CT Angiogram |   Fax:       |  Fax:         |
|            |        |           |  Abdomen     | [phone] | [phone]  |
|            |        |           | Pelvis w     |              |               |
|            |        |           | Contrast     |              |               |
+------------+--------+-----------+--------------+--------------+---------------+
 
 
 
 
 Reason for Visit
 
 
+-----------+----------+
| Reason    | Comments |
+-----------+----------+
| Follow-up |          |
+-----------+----------+
 
 
 
 Encounter Details
 
 
+--------+---------+----------------------+----------------------+----------------------+
| Date   | Type    | Department           | Care Team            | Description          |
+--------+---------+----------------------+----------------------+----------------------+
| 10/23/ | Office  |   [facility]      |   Augustin Lopez, ZAY      | Mesenteric ischemia, |
| 2019   | Visit   | VASCULAR SURGERY     | [address]     |  chronic (HCC)       |
|        |         | [address] | [address]  [place], [place]  | (Primary Dx); H/O    |
|        |         |  E  [place], [place]     | [address]  [phone]  | endarterectomy       |
|        |         | [address]           |  [phone] (Fax)  |                      |
|        |         | [phone]         |                      |                      |
+--------+---------+----------------------+----------------------+----------------------+
 
 
 
 
 Social History
 
 
+---------------+-------+-----------+--------+------+
| Tobacco Use   | Types | Packs/Day | Years  | Date |
|               |       |           | Used   |      |
+---------------+-------+-----------+--------+------+
| Former Smoker |       |           |        |      |
+---------------+-------+-----------+--------+------+
 
 
 
+---------------------+---+---+---+
| Smokeless Tobacco:  |   |   |   |
| Former User         |   |   |   |
+---------------------+---+---+---+
 
 
 
+------------------------------+
| Comments: quit 50 years ago  |
+------------------------------+
 
 
 
+---------------+-------------+---------+----------+
| Alcohol Use   | Drinks/Week | oz/Week | Comments |
+---------------+-------------+---------+----------+
| Not Currently |             |         | 90m days |
+---------------+-------------+---------+----------+
 
 
 
+------------------+---------------+
| Sex Assigned at  | Date Recorded |
| Birth            |               |
+------------------+---------------+
| Not on file      |               |
+------------------+---------------+
 
 
 
+----------------+-------------+-------------+
| Job Start Date | Occupation  | Industry    |
+----------------+-------------+-------------+
| Not on file    | Not on file | Not on file |
+----------------+-------------+-------------+
 
 
 
+----------------+--------------+------------+
| Travel History | Travel Start | Travel End |
+----------------+--------------+------------+
 
 
 
+-------------------------------------+
 
| No recent travel history available. |
+-------------------------------------+
 documented as of this encounter
 
 Last Filed Vital Signs
 
 
+-------------------+----------------------+----------------------+----------+
| Vital Sign        | Reading              | Time Taken           | Comments |
+-------------------+----------------------+----------------------+----------+
| Blood Pressure    | 141/87               | 10/23/2019  9:22 AM  |          |
|                   |                      | PDT                  |          |
+-------------------+----------------------+----------------------+----------+
| Pulse             | 69                   | 10/23/2019  9:22 AM  |          |
|                   |                      | PDT                  |          |
+-------------------+----------------------+----------------------+----------+
| Temperature       | -                    | -                    |          |
+-------------------+----------------------+----------------------+----------+
| Respiratory Rate  | -                    | -                    |          |
+-------------------+----------------------+----------------------+----------+
| Oxygen Saturation | 100%                 | 10/23/2019  9:22 AM  |          |
|                   |                      | PDT                  |          |
+-------------------+----------------------+----------------------+----------+
| Inhaled Oxygen    | -                    | -                    |          |
| Concentration     |                      |                      |          |
+-------------------+----------------------+----------------------+----------+
| Weight            | 73.5 kg (162 lb 1.6  | 10/23/2019  9:22 AM  |          |
|                   | oz)                  | PDT                  |          |
+-------------------+----------------------+----------------------+----------+
| Height            | -                    | -                    |          |
+-------------------+----------------------+----------------------+----------+
| Body Mass Index   | 25.77                | 2019 10:43 AM  |          |
|                   |                      | PDT                  |          |
+-------------------+----------------------+----------------------+----------+
 documented in this encounter
 
 Functional Status
 
 
+---------------------------------------------+----------+--------------------+
| Functional Status                           | Response | Date of Assessment |
+---------------------------------------------+----------+--------------------+
| Are you deaf or do you have serious         | No       | 2019         |
| difficulty hearing?                         |          |                    |
+---------------------------------------------+----------+--------------------+
| Are you blind or do you have serious        | No       | 2019         |
| difficulty seeing, even when wearing        |          |                    |
| glasses?                                    |          |                    |
+---------------------------------------------+----------+--------------------+
| Do you have serious difficulty walking or   | No       | 2019         |
| climbing stairs? (5 years old or older)     |          |                    |
+---------------------------------------------+----------+--------------------+
| Do you have difficulty dressing or bathing? | No       | 2019         |
|  (5 years old or older)                     |          |                    |
+---------------------------------------------+----------+--------------------+
| Because of a physical, mental, or emotional | No       | 2019         |
|  condition, do you have difficulty doing    |          |                    |
| errands alone such as visiting a doctor's   |          |                    |
| office or shopping? [15 years old or        |          |                    |
| older)]                                     |          |                    |
 
+---------------------------------------------+----------+--------------------+
 
 
 
+---------------------------------------------+----------+--------------------+
| Cognitive Status                            | Response | Date of Assessment |
+---------------------------------------------+----------+--------------------+
| Because of a physical, mental, or emotional | No       | 2019         |
|  condition, do you have serious difficulty  |          |                    |
| concentrating, remembering, or making       |          |                    |
| decisions? (5 years old or older)           |          |                    |
+---------------------------------------------+----------+--------------------+
 documented as of this encounter
 
 Progress Notes
 Augustin Lopez DNP - 10/23/2019 10:30 AM [facility]
 [address] [place] [address]
 Office: [phone] Fax: [phone]
 
 DATE OF VISIT: 10/23/2019
 PATIENT NAME: Nehemias Giordano
 : 1945; AGE: 74 y.o.; Sex:M 
 PHONE  NUMBER:  [phone]  (Home)
 MRN: [id number]; 
 PROVIDER: Augustin Lopez DNP
 PRIMARY  CARE  /  REFERRING  PHYSICIAN:  No ref. provider found  /  No Physician on file  /
 p 
 
 REASON  FOR  EVALUATION  /  CHIEF  COMPLAINT: Vascular Surgery Postoperative Visit for SMA 
endarterectomy 
 
 The patient presents today for a Vascular Surgery Postoperative Visit.
 
 Mr. Giordano is a pleasant 74 y.o. male with no significant PMH who is referred to me for abd
ominal pain. Patient has been describing intermittent abdominal pain that radiates across hi
s abdomen to his back. Patient does not endorse any specific worsening pain associated with 
eating, however he does state that he has not been eating a lot. Patient has been watching h
is diet and has been trying to decrease his carbohydrates. Patient has lost 24 lbs over the 
last 2 months. The patient had a recent CT and MRI abdomen from [facility]. Elyse
ent denies any surgical intervention for this abdominal pain, however has had past abdominal
 surgeries. Due to chronic mesenteric ischemia, he is status post SMA endarterectomy with karsten
vine pericardial patch on 2019. The patient reports his weight has been stable, appetit
e is fair, eating more, slightly constipated. Pain is controlled with current analgesics. Me
dications being used: acetaminophen. The patient denies  fever, wound drainage, increasing r
edness, pus, increasing pain, increasing swelling. Wife reports he is not drinking enough wa
ter and has been sedentary after surgery. Physical examination revealed surgical incision wh
ich is healing well without signs of infection. He has been able to ambulate without difficu
lty. He has chronic back pain. 
 
 VITAL SIGNS: /87  | Pulse 69  | Wt 73.5 kg (162 lb 1.6 oz)  | SpO2 100%  | BMI 25.77 
kg/m  
 
 PHYSICAL EXAM: 
 Constitutional: Well nourished, no signs of distress 
 Cardiovascular: Normal rate, regular rhythm.
 Pulmonary/Chest: No respiratory distress. No adventitious sounds. 
 Abdominal: Soft. No abdominal distension or tenderness. 
 Musculoskeletal: Normal range of motion. 
 Extremities: No edema, cyanosis or clubbing.
 Neurological: He is alert and oriented. No muscle weakness and normal gait.
 
 VASCULAR: Palpable femoralpulses were present bilaterally with palpable bilateral popli
teal pulses. Palpable pulses were present in bilateral dorsalis pedis and poster
ior tibial artery arteries. Palpable bilateral carotid, radial, brachial pulses. Surgical  i
ncision  wounds  are healing well without signs of infection.
 
 Assessment & Plan:
 Chronic mesenteric ischemia with status post SMA endarterectomy - The patient is doing well
. All staples removed today. Wound care discussed with patient, monitor for signs of infecti
on. Patient unable to tolerate abdominal ultrasound today. We will need to obtain a CTA for 
postop evaluation, order sent to [facility]. Continue aspirin daily. I've explained to th
e patient that in the event he experiences mesenteric ischemia as evidenced by post-prandial
 abdominal pain, appetite change, or unintentional weight loss, the patient should contact m
e immediately to return to my clinic or go to nearby emergency room right away.
 
 Hypertension - The patient is advised to maintain his antihypertensive medication to keep s
ystolic blood pressure target level of 130-140 mm Hg and diastolic blood pressure level of 7
0-80 mm Hg. The patient is advised that uncontrolled hypertension can accelerate atheroscler
otic disease progression. 
 
 Dyslipidemia - The patient is advised to undergo lipid level evaluation with fasting blood 
test every 6 months with target LDL level of less than 70 mg/dL. The patient is advised to c
ontinue taking antiplatelet daily for life to reduce the risk of myocardial infarction and p
eripheral arterial disease ( He is currently on aspirin). Additionally, the patient is infor
med that hyperlipidemia can accelerate atherosclerotic disease progression and negatively af
fect the outcome of lower leg vascular interventions. 
 
 Augustin Lopez DNP
 Electronically signed by Augustin Lopez DNP at 10/23/2019 11:16 AM PDTdocumented in this encounte
r
 
 Plan of Treatment
 
 
+--------+-------------+------------------+----------------------+-------------+
| Date   | Type        | Specialty        | Care Team            | Description |
+--------+-------------+------------------+----------------------+-------------+
| / | Appointment | Radiology        |   Augustin Lopez DNP      |             |
|    |             |                  |  [address]     |             |
|        |             |                  | [facility] [place]  |             |
|        |             |                  | [id number] [phone]  |             |
|        |             |                  |  [phone] (Fax)  |             |
+--------+-------------+------------------+----------------------+-------------+
| / | Office      | Vascular Surgery |   Augustin Lopez DNP      |             |
|    | Visit       |                  | [address]     |             |
|        |             |                  | [address]  [place]  |             |
|        |             |                  | [address]  [phone]  |             |
|        |             |                  |  [phone] (Fax)  |             |
+--------+-------------+------------------+----------------------+-------------+
 
 
 
+----------------------+---------+--------+----------------------+----------------------+
| Name                 | Type    | Priori | Associated Diagnoses | Order Schedule       |
|                      |         | ty     |                      |                      |
+----------------------+---------+--------+----------------------+----------------------+
| CT Angiogram Abdomen | Imaging | Routin |   Mesenteric         | Expected: 10/23/2019 |
|  Pelvis w Contrast   |         | e      | ischemia, chronic    |  (Approximate),      |
|                      |         |        | (HCC)  H/O           | Expires: 10/23/2020  |
|                      |         |        | endarterectomy       |                      |
+----------------------+---------+--------+----------------------+----------------------+
 
| Creatinine           | Lab     | Routin |   Mesenteric         | Expected: 10/23/2019 |
|                      |         | e      | ischemia, chronic    |  (Approximate),      |
|                      |         |        | (HCC)  H/O           | Expires: 10/23/2020  |
|                      |         |        | endarterectomy       |                      |
+----------------------+---------+--------+----------------------+----------------------+
 documented as of this encounter
 
 Visit Diagnoses
 
 
+------------------------------------------------------------------------------------+
| Diagnosis                                                                          |
+------------------------------------------------------------------------------------+
|   Mesenteric ischemia, chronic (HCC) - Primary  Chronic vascular insufficiency of  |
| intestine                                                                          |
+------------------------------------------------------------------------------------+
|   H/O endarterectomy  Personal history of surgery to other organs                  |
+------------------------------------------------------------------------------------+
 documented in this encounter

## 2020-01-13 NOTE — XMS
Encounter Summary
  Created on: 2020
 
 Pasquale Nehemias Martinez
 External Reference #: [id number]
 : 43
 Sex: Male
 
 Demographics
 
 
+-----------------------+----------------------+
| Address               | [address]         |
|                       | [place]  [address] |
+-----------------------+----------------------+
| Home Phone            | [phone]      |
+-----------------------+----------------------+
| Preferred Language    | Unknown              |
+-----------------------+----------------------+
| Marital Status        |               |
+-----------------------+----------------------+
| Islam Affiliation | 1077                 |
+-----------------------+----------------------+
| Race                  | Unknown              |
+-----------------------+----------------------+
| Ethnic Group          | Unknown              |
+-----------------------+----------------------+
 
 
 Author
 
 
+--------------+--------------------------------------------+
| Author       | [facility]  |
|              | and [facility]                                |
+--------------+--------------------------------------------+
| Organization | [facility]  |
|              | and [place]                                |
+--------------+--------------------------------------------+
| Address      | Unknown                                    |
+--------------+--------------------------------------------+
| Phone        | Unavailable                                |
+--------------+--------------------------------------------+
 
 
 
 Support
 
 
+--------------+--------------+---------------------+-----------------+
| Name         | Relationship | Address             | Phone           |
+--------------+--------------+---------------------+-----------------+
| Rupa Giordano | ECON         | [address]             | [phone] |
|              |              | [place]   |                 |
|              |              | [address]               |                 |
+--------------+--------------+---------------------+-----------------+
 
 
 
 
 Care Team Providers
 
 
+-----------------------+------+-------------+
| Care Team Member Name | Role | Phone       |
+-----------------------+------+-------------+
| No, Physician         | PCP  | Unavailable |
+-----------------------+------+-------------+
 
 
 
 Reason for Visit
 
 
+-------------------+----------+
| Reason            | Comments |
+-------------------+----------+
| Medication Refill |          |
+-------------------+----------+
 
 
 
 Encounter Details
 
 
+--------+--------+----------------------+----------------------+-------------------+
| Date   | Type   | Department           | Care Team            | Description       |
+--------+--------+----------------------+----------------------+-------------------+
| 10/09/ | Refill |   [facility]      |   Yudi Mckeon,  | Medication Refill |
|    |        | VASCULAR SURGERY     | Medical Assistant    |                   |
|        |        | [address] |                      |                   |
|        |        |  E  [place]     |                      |                   |
|        |        | [address]           |                      |                   |
|        |        | [phone]         |                      |                   |
+--------+--------+----------------------+----------------------+-------------------+
 
 
 
 Social History
 
 
+---------------+-------+-----------+--------+------+
| Tobacco Use   | Types | Packs/Day | Years  | Date |
|               |       |           | Used   |      |
+---------------+-------+-----------+--------+------+
| Former Smoker |       |           |        |      |
+---------------+-------+-----------+--------+------+
 
 
 
+---------------------+---+---+---+
| Smokeless Tobacco:  |   |   |   |
| Former User         |   |   |   |
+---------------------+---+---+---+
 
 
 
+------------------------------+
| Comments: quit 50 years ago  |
 
+------------------------------+
 
 
 
+---------------+-------------+---------+----------+
| Alcohol Use   | Drinks/Week | oz/Week | Comments |
+---------------+-------------+---------+----------+
| Not Currently |             |         | 90m days |
+---------------+-------------+---------+----------+
 
 
 
+------------------+---------------+
| Sex Assigned at  | Date Recorded |
| Birth            |               |
+------------------+---------------+
| Not on file      |               |
+------------------+---------------+
 
 
 
+----------------+-------------+-------------+
| Job Start Date | Occupation  | Industry    |
+----------------+-------------+-------------+
| Not on file    | Not on file | Not on file |
+----------------+-------------+-------------+
 
 
 
+----------------+--------------+------------+
| Travel History | Travel Start | Travel End |
+----------------+--------------+------------+
 
 
 
+-------------------------------------+
| No recent travel history available. |
+-------------------------------------+
 documented as of this encounter
 
 Functional Status
 
 
+---------------------------------------------+----------+--------------------+
| Functional Status                           | Response | Date of Assessment |
+---------------------------------------------+----------+--------------------+
| Are you deaf or do you have serious         | No       | 2019         |
| difficulty hearing?                         |          |                    |
+---------------------------------------------+----------+--------------------+
| Are you blind or do you have serious        | No       | 2019         |
| difficulty seeing, even when wearing        |          |                    |
| glasses?                                    |          |                    |
+---------------------------------------------+----------+--------------------+
| Do you have serious difficulty walking or   | No       | 2019         |
| climbing stairs? (5 years old or older)     |          |                    |
+---------------------------------------------+----------+--------------------+
| Do you have difficulty dressing or bathing? | No       | 2019         |
|  (5 years old or older)                     |          |                    |
+---------------------------------------------+----------+--------------------+
| Because of a physical, mental, or emotional | No       | 2019         |
 
|  condition, do you have difficulty doing    |          |                    |
| errands alone such as visiting a doctor's   |          |                    |
| office or shopping? [15 years old or        |          |                    |
| older)]                                     |          |                    |
+---------------------------------------------+----------+--------------------+
 
 
 
+---------------------------------------------+----------+--------------------+
| Cognitive Status                            | Response | Date of Assessment |
+---------------------------------------------+----------+--------------------+
| Because of a physical, mental, or emotional | No       | 2019         |
|  condition, do you have serious difficulty  |          |                    |
| concentrating, remembering, or making       |          |                    |
| decisions? (5 years old or older)           |          |                    |
+---------------------------------------------+----------+--------------------+
 documented as of this encounter
 
 Plan of Treatment
 
 
+--------+-------------+------------------+----------------------+-------------+
| Date   | Type        | Specialty        | Care Team            | Description |
+--------+-------------+------------------+----------------------+-------------+
| / | Appointment | Radiology        |   Augustin Lopez DNP      |             |
|    |             |                  | [address]     |             |
|        |             |                  | [place]  |             |
|        |             |                  | [address]  [phone]  |             |
|        |             |                  |  [phone] (Fax)  |             |
+--------+-------------+------------------+----------------------+-------------+
| / | Office      | Vascular Surgery |   Augustin Lopez DNP      |             |
| 2020   | Visit       |                  | [address]     |             |
|        |             |                  | [place]  |             |
|        |             |                  | [address] [phone]  |             |
|        |             |                  |  [phone] (Fax)  |             |
+--------+-------------+------------------+----------------------+-------------+
 documented as of this encounter
 
 Visit Diagnoses
 Not on filedocumented in this encounter

## 2020-01-13 NOTE — XMS
Encounter Summary
  Created on: 2020
 
 Pasquale Nehemias Martinez
 External Reference #: [id number]
 : 43
 Sex: Male
 
 Demographics
 
 
+-----------------------+----------------------+
| Address               | [address]         |
|                       | [place]  [address] |
+-----------------------+----------------------+
| Home Phone            | [phone]      |
+-----------------------+----------------------+
| Preferred Language    | Unknown              |
+-----------------------+----------------------+
| Marital Status        |               |
+-----------------------+----------------------+
| Caodaism Affiliation | 1077                 |
+-----------------------+----------------------+
| Race                  | Unknown              |
+-----------------------+----------------------+
| Ethnic Group          | Unknown              |
+-----------------------+----------------------+
 
 
 Author
 
 
+--------------+--------------------------------------------+
| Author       | [facility]  |
|              | and [facility]                                |
+--------------+--------------------------------------------+
| Organization | [facility]  |
|              | and [place]                                |
+--------------+--------------------------------------------+
| Address      | Unknown                                    |
+--------------+--------------------------------------------+
| Phone        | Unavailable                                |
+--------------+--------------------------------------------+
 
 
 
 Support
 
 
+--------------+--------------+---------------------+-----------------+
| Name         | Relationship | Address             | Phone           |
+--------------+--------------+---------------------+-----------------+
| Rupa Giordano | [employer]         | [address]             | [phone] |
|              |              | [place], [place], [place]   |                 |
|              |              | [address]               |                 |
+--------------+--------------+---------------------+-----------------+
 
 
 
 
 Care Team Providers
 
 
+-----------------------+------+-------------+
| Care Team Member Name | Role | Phone       |
+-----------------------+------+-------------+
| No, Physician         | PCP  | Unavailable |
+-----------------------+------+-------------+
 
 
 
 Reason for Visit
 
 
+--------+------------------+
| Reason | Comments         |
+--------+------------------+
| Other  | Labs and Imaging |
+--------+------------------+
 
 
 
 Encounter Details
 
 
+--------+-----------+----------------------+----------------------+------------------+
| Date   | Type      | Department           | Care Team            | Description      |
+--------+-----------+----------------------+----------------------+------------------+
| 10/29/ | Telephone |   [facility]      |   Sukhdeep Pardo MD     | Other (Labs and  |
|    |           | VASCULAR SURGERY     | [address]     | Imaging)         |
|        |           | [address] | [address]  [place], [place]  |                  |
|        |           |  E  [place]     | [address]           |                  |
|        |           | [address]           | [phone]         |                  |
|        |           | [phone]         | [phone] (Fax)   |                  |
+--------+-----------+----------------------+----------------------+------------------+
 
 
 
 Social History
 
 
+---------------+-------+-----------+--------+------+
| Tobacco Use   | Types | Packs/Day | Years  | Date |
|               |       |           | Used   |      |
+---------------+-------+-----------+--------+------+
| Former Smoker |       |           |        |      |
+---------------+-------+-----------+--------+------+
 
 
 
+---------------------+---+---+---+
| Smokeless Tobacco:  |   |   |   |
| Former User         |   |   |   |
+---------------------+---+---+---+
 
 
 
+------------------------------+
| Comments: quit 50 years ago  |
 
+------------------------------+
 
 
 
+---------------+-------------+---------+----------+
| Alcohol Use   | Drinks/Week | oz/Week | Comments |
+---------------+-------------+---------+----------+
| Not Currently |             |         | 90m days |
+---------------+-------------+---------+----------+
 
 
 
+------------------+---------------+
| Sex Assigned at  | Date Recorded |
| Birth            |               |
+------------------+---------------+
| Not on file      |               |
+------------------+---------------+
 
 
 
+----------------+-------------+-------------+
| Job Start Date | Occupation  | Industry    |
+----------------+-------------+-------------+
| Not on file    | Not on file | Not on file |
+----------------+-------------+-------------+
 
 
 
+----------------+--------------+------------+
| Travel History | Travel Start | Travel End |
+----------------+--------------+------------+
 
 
 
+-------------------------------------+
| No recent travel history available. |
+-------------------------------------+
 documented as of this encounter
 
 Functional Status
 
 
+---------------------------------------------+----------+--------------------+
| Functional Status                           | Response | Date of Assessment |
+---------------------------------------------+----------+--------------------+
| Are you deaf or do you have serious         | No       | 2019         |
| difficulty hearing?                         |          |                    |
+---------------------------------------------+----------+--------------------+
| Are you blind or do you have serious        | No       | 2019         |
| difficulty seeing, even when wearing        |          |                    |
| glasses?                                    |          |                    |
+---------------------------------------------+----------+--------------------+
| Do you have serious difficulty walking or   | No       | 2019         |
| climbing stairs? (5 years old or older)     |          |                    |
+---------------------------------------------+----------+--------------------+
| Do you have difficulty dressing or bathing? | No       | 2019         |
|  (5 years old or older)                     |          |                    |
+---------------------------------------------+----------+--------------------+
| Because of a physical, mental, or emotional | No       | 2019         |
 
|  condition, do you have difficulty doing    |          |                    |
| errands alone such as visiting a doctor's   |          |                    |
| office or shopping? [15 years old or        |          |                    |
| older)]                                     |          |                    |
+---------------------------------------------+----------+--------------------+
 
 
 
+---------------------------------------------+----------+--------------------+
| Cognitive Status                            | Response | Date of Assessment |
+---------------------------------------------+----------+--------------------+
| Because of a physical, mental, or emotional | No       | 2019         |
|  condition, do you have serious difficulty  |          |                    |
| concentrating, remembering, or making       |          |                    |
| decisions? (5 years old or older)           |          |                    |
+---------------------------------------------+----------+--------------------+
 documented as of this encounter
 
 Plan of Treatment
 
 
+--------+-------------+------------------+----------------------+-------------+
| Date   | Type        | Specialty        | Care Team            | Description |
+--------+-------------+------------------+----------------------+-------------+
| / | Appointment | Radiology        |   Augustin Lopez DNP      |             |
|    |             |                  | [address]     |             |
|        |             |                  | [place]  |             |
|        |             |                  | [address]  [phone]  |             |
|        |             |                  |  [phone] (Fax)  |             |
+--------+-------------+------------------+----------------------+-------------+
| / | Office      | Vascular Surgery |   Augustin Lopez DNP      |             |
|    | Visit       |                  | [address]     |             |
|        |             |                  | [place]  |             |
|        |             |                  | [address]  [phone]  |             |
|        |             |                  |  [phone] (Fax)  |             |
+--------+-------------+------------------+----------------------+-------------+
 documented as of this encounter
 
 Visit Diagnoses
 Not on filedocumented in this encounter

## 2020-01-13 NOTE — XMS
Encounter Summary
  Created on: 2020
 
 Pasquale Nehemias Martinez
 External Reference #: [id number]
 : 43
 Sex: Male
 
 Demographics
 
 
+-----------------------+----------------------+
| Address               | [address]         |
|                       | [place]  [address] |
+-----------------------+----------------------+
| Home Phone            | [phone]      |
+-----------------------+----------------------+
| Preferred Language    | Unknown              |
+-----------------------+----------------------+
| Marital Status        |               |
+-----------------------+----------------------+
| Orthodox Affiliation | 1077                 |
+-----------------------+----------------------+
| Race                  | Unknown              |
+-----------------------+----------------------+
| Ethnic Group          | Unknown              |
+-----------------------+----------------------+
 
 
 Author
 
 
+--------------+--------------------------------------------+
| Author       | [facility]  |
|              | and [facility]                                |
+--------------+--------------------------------------------+
| Organization | [facility]  |
|              | and [place]                                |
+--------------+--------------------------------------------+
| Address      | Unknown                                    |
+--------------+--------------------------------------------+
| Phone        | Unavailable                                |
+--------------+--------------------------------------------+
 
 
 
 Support
 
 
+--------------+--------------+---------------------+-----------------+
| Name         | Relationship | Address             | Phone           |
+--------------+--------------+---------------------+-----------------+
| Rupa Giordano | ECON         | [address]             | [phone] |
|              |              | [place], [place]   |                 |
|              |              | [address]               |                 |
+--------------+--------------+---------------------+-----------------+
 
 
 
 
 Care Team Providers
 
 
+-----------------------+------+-------------+
| Care Team Member Name | Role | Phone       |
+-----------------------+------+-------------+
| No, Physician         | PCP  | Unavailable |
+-----------------------+------+-------------+
 
 
 
 Encounter Details
 
 
+--------+-----------+---------------------+----------------------+----------------------+
| Date   | Type      | Department          | Care Team            | Description          |
+--------+-----------+---------------------+----------------------+----------------------+
| / | Preadmit  |   [facility]    |   Sukhdeep Pardo MD     | Mesenteric ischemia, |
| 2019   | Visit     | CENTER [facility]     | [address]     |  chronic (HCC)       |
|        |           | CLINIC  [address]   | [address]  [place], [place]  |                      |
|        |           | [address]  [place]  | [address]           |                      |
|        |           | [address]          | [phone]         |                      |
|        |           | [phone]        | [phone] (Fax)   |                      |
+--------+-----------+---------------------+----------------------+----------------------+
 
 
 
 Social History
 
 
+---------------+-------+-----------+--------+------+
| Tobacco Use   | Types | Packs/Day | Years  | Date |
|               |       |           | Used   |      |
+---------------+-------+-----------+--------+------+
| Former Smoker |       |           |        |      |
+---------------+-------+-----------+--------+------+
 
 
 
+---------------------+---+---+---+
| Smokeless Tobacco:  |   |   |   |
| Former User         |   |   |   |
+---------------------+---+---+---+
 
 
 
+------------------------------+
| Comments: quit 50 years ago  |
+------------------------------+
 
 
 
+---------------+-------------+---------+----------+
| Alcohol Use   | Drinks/Week | oz/Week | Comments |
+---------------+-------------+---------+----------+
| Not Currently |             |         | 90m days |
+---------------+-------------+---------+----------+
 
 
 
 
+------------------+---------------+
| Sex Assigned at  | Date Recorded |
| Birth            |               |
+------------------+---------------+
| Not on file      |               |
+------------------+---------------+
 
 
 
+----------------+-------------+-------------+
| Job Start Date | Occupation  | Industry    |
+----------------+-------------+-------------+
| Not on file    | Not on file | Not on file |
+----------------+-------------+-------------+
 
 
 
+----------------+--------------+------------+
| Travel History | Travel Start | Travel End |
+----------------+--------------+------------+
 
 
 
+-------------------------------------+
| No recent travel history available. |
+-------------------------------------+
 documented as of this encounter
 
 Last Filed Vital Signs
 
 
+-------------------+----------------------+----------------------+----------+
| Vital Sign        | Reading              | Time Taken           | Comments |
+-------------------+----------------------+----------------------+----------+
| Blood Pressure    | 122/56               | 2019  9:24 AM  |          |
|                   |                      | PDT                  |          |
+-------------------+----------------------+----------------------+----------+
| Pulse             | 62                   | 2019  9:24 AM  |          |
|                   |                      | PDT                  |          |
+-------------------+----------------------+----------------------+----------+
| Temperature       | -                    | -                    |          |
+-------------------+----------------------+----------------------+----------+
| Respiratory Rate  | -                    | -                    |          |
+-------------------+----------------------+----------------------+----------+
| Oxygen Saturation | 97%                  | 2019  9:24 AM  |          |
|                   |                      | PDT                  |          |
+-------------------+----------------------+----------------------+----------+
| Inhaled Oxygen    | -                    | -                    |          |
| Concentration     |                      |                      |          |
+-------------------+----------------------+----------------------+----------+
| Weight            | 80.6 kg (177 lb 11.1 | 2019  9:24 AM  |          |
|                   |  oz)                 | PDT                  |          |
+-------------------+----------------------+----------------------+----------+
| Height            | 167.6 cm (5' 6")     | 2019  9:24 AM  |          |
|                   |                      | PDT                  |          |
+-------------------+----------------------+----------------------+----------+
| Body Mass Index   | 28.68                | 2019  9:24 AM  |          |
|                   |                      | PDT                  |          |
+-------------------+----------------------+----------------------+----------+
 
 documented in this encounter
 
 Plan of Treatment
 
 
+--------+-------------+------------------+----------------------+-------------+
| Date   | Type        | Specialty        | Care Team            | Description |
+--------+-------------+------------------+----------------------+-------------+
| / | Appointment | Radiology        |   Augustin Lopez DNP      |             |
|    |             |                  | [address]     |             |
|        |             |                  | [place]  |             |
|        |             |                  | [address]  [phone]  |             |
|        |             |                  |  [phone] (Fax)  |             |
+--------+-------------+------------------+----------------------+-------------+
| / | Office      | Vascular Surgery |   Augustin Lopez DNP      |             |
|    | Visit       |                  | [address]     |             |
|        |             |                  | [address]  [place]  |             |
|        |             |                  | [address]  [phone]  |             |
|        |             |                  |  [phone] (Fax)  |             |
+--------+-------------+------------------+----------------------+-------------+
 documented as of this encounter
 
 Procedures
 
 
+------------------+--------+-------------+----------------------+----------------------+
| Procedure Name   | Priori | Date/Time   | Associated Diagnosis | Comments             |
|                  | ty     |             |                      |                      |
+------------------+--------+-------------+----------------------+----------------------+
| XR CHEST PA AND  | Routin | 2019  |   Mesenteric         |   Results for this   |
| LATERAL          | e      | 10:15 AM    | ischemia, chronic    | procedure are in the |
|                  |        | PDT         | ([facility])                |  results section.    |
+------------------+--------+-------------+----------------------+----------------------+
| TYPE AND SCREEN  | ASAP   | 2019  |                      |   Results for this   |
|                  |        |  9:15 AM    |                      | procedure are in the |
|                  |        | PDT         |                      |  results section.    |
+------------------+--------+-------------+----------------------+----------------------+
| CBC WITH         | Timed  | 2019  |                      |   Results for this   |
| DIFFERENTIAL     |        |  9:14 AM    |                      | procedure are in the |
|                  |        | PDT         |                      |  results section.    |
+------------------+--------+-------------+----------------------+----------------------+
| COMPREHENSIVE    | Timed  | 2019  |                      |   Results for this   |
| METABOLIC PANEL  |        |  9:14 AM    |                      | procedure are in the |
|                  |        | PDT         |                      |  results section.    |
+------------------+--------+-------------+----------------------+----------------------+
| ECG 12 LEAD      | Timed  | 2019  |                      |   Results for this   |
|                  |        |  9:12 AM    |                      | procedure are in the |
|                  |        | PDT         |                      |  results section.    |
+------------------+--------+-------------+----------------------+----------------------+
 documented in this encounter
 
 Results
 XR Chest PA and Lateral (2019 10:15 AM PDT)
 
+----------+
| Specimen |
+----------+
|          |
+----------+
 
 
 
 
+------------------------------------------------------------------------+---------------+
| Narrative                                                              | Performed At  |
+------------------------------------------------------------------------+---------------+
|      CHEST PA AND LATERAL     CLINICAL INFORMATION:  Pre operative     |   [facility] |
| evaluation     COMPARISON:  CT ANGIOGRAPHY ABDOMEN AND PELVIS          |               |
| (2019);     FINDINGS:  There is a BB in the posteromedial right   |               |
| lower lobe, better defined on  CT abdomen 2019. the lungs are     |               |
| clear.   The heart size is normal  c..   Mild to moderate degenerative |               |
|  changes of the thoracic spine     IMPRESSION:  No active              |               |
| intrathoracic disease.           Signed by: PADILLA Singh Shawn  Sign   |               |
| Date/Time: 2019 10:27 AM                                         |               |
+------------------------------------------------------------------------+---------------+
 
 
 
+------------------------------------------------------------------------+
| Procedure Note                                                         |
+------------------------------------------------------------------------+
|   Cuong, Rad Results In - 2019 10:31 AM PDT                        |
| CHEST PA AND LATERAL                                                   |
|                                                                        |
| CLINICAL INFORMATION:                                                  |
| Pre operative evaluation                                               |
|                                                                        |
| COMPARISON:                                                            |
| CT ANGIOGRAPHY ABDOMEN AND PELVIS (2019);                         |
|                                                                        |
| FINDINGS:                                                              |
| There is a BB in the posteromedial right lower lobe, better defined on |
| CT abdomen 2019. the lungs are clear.  The heart size is normal   |
| c..  Mild to moderate degenerative changes of the thoracic spine       |
|                                                                        |
| IMPRESSION:                                                            |
| No active intrathoracic disease.                                       |
|                                                                        |
| Signed by: PADILLA Singh Shawn                                          |
| Sign Date/Time: 2019 10:27 AM                                    |
+------------------------------------------------------------------------+
 
 
 
+---------------+---------+--------------------+--------------+
| Performing    | Address | City/State/Zipcode | Phone Number |
| Organization  |         |                    |              |
+---------------+---------+--------------------+--------------+
|   [facility] |         |                    |              |
+---------------+---------+--------------------+--------------+
 Type and Screen (2019  9:15 AM PDT)
 
+-----------+------------------------+-----------+------------+--------------+
| Component | Value                  | Ref Range | Performed  | Pathologist  |
|           |                        |           | At         | Signature    |
+-----------+------------------------+-----------+------------+--------------+
| ABO Rh    | O POSITIVE             |           | [facility]       |              |
|           |                        |           | LABORATORY |              |
+-----------+------------------------+-----------+------------+--------------+
 
| Antibody  | NEGATIVE               |           | [facility]       |              |
| Screen    |                        |           | LABORATORY |              |
+-----------+------------------------+-----------+------------+--------------+
| Antibody  | Testing performed at   |           | [facility]       |              |
| Screen    | [facility];[address]          |           | LABORATORY |              |
|           | [address];[place] [address] |           |            |              |
+-----------+------------------------+-----------+------------+--------------+
 
 
 
+----------+
| Specimen |
+----------+
| Blood    |
+----------+
 
 
 
+-------------------+------------------+--------------------+----------------+
| Performing        | Address          | City/State/Zipcode | Phone Number   |
| Organization      |                  |                    |                |
+-------------------+------------------+--------------------+----------------+
|   [facility] |   [address] | [place] [address] |   [phone] |
+-------------------+------------------+--------------------+----------------+
 CBC with Differential (2019  9:14 AM PDT)
 
+-------------+-------------------------+-----------------+------------+--------------+
| Component   | Value                   | Ref Range       | Performed  | Pathologist  |
|             |                         |                 | At         | Signature    |
+-------------+-------------------------+-----------------+------------+--------------+
| WBC         | 4.96                    | 3.80 - 11.00    | [facility]       |              |
|             |                         | K/uL            | LABORATORY |              |
+-------------+-------------------------+-----------------+------------+--------------+
| RBC         | 4.20                    | 4.20 - 5.70     | [facility]       |              |
|             |                         | M/uL            | LABORATORY |              |
+-------------+-------------------------+-----------------+------------+--------------+
| Hemoglobin  | 13.9                    | 13.2 - 17.0     | [facility]       |              |
|             |                         | g/dL            | LABORATORY |              |
+-------------+-------------------------+-----------------+------------+--------------+
| Hematocrit  | 41.3                    | 39.0 - 50.0 %   | [facility]       |              |
|             |                         |                 | LABORATORY |              |
+-------------+-------------------------+-----------------+------------+--------------+
| MCV         | 98.4                    | 80.0 - 100.0 fl | [facility]       |              |
|             |                         |                 | LABORATORY |              |
+-------------+-------------------------+-----------------+------------+--------------+
| MCH         | 33.2                    | 27.0 - 34.0 pg  | [facility]       |              |
|             |                         |                 | LABORATORY |              |
+-------------+-------------------------+-----------------+------------+--------------+
| MCHC        | 33.7                    | 32.0 - 35.5     | [facility]       |              |
|             |                         | g/dL            | LABORATORY |              |
+-------------+-------------------------+-----------------+------------+--------------+
| RDW-SD      | 46.8                    | 37 - 53 fl      | [facility]       |              |
|             |                         |                 | LABORATORY |              |
+-------------+-------------------------+-----------------+------------+--------------+
| Platelet    | 152                     | 150 - 400 K/uL  | [facility]       |              |
| Count       |                         |                 | LABORATORY |              |
+-------------+-------------------------+-----------------+------------+--------------+
| MPV         | 8.0                     | fl              | [facility]       |              |
|             |                         |                 | LABORATORY |              |
+-------------+-------------------------+-----------------+------------+--------------+
 
| Diff Type   | MANUAL                  |                 | [facility]       |              |
|             |                         |                 | LABORATORY |              |
+-------------+-------------------------+-----------------+------------+--------------+
| % Segmented | 35                      | %               | [facility]       |              |
|             |                         |                 | LABORATORY |              |
| Neutrophils |                         |                 |            |              |
+-------------+-------------------------+-----------------+------------+--------------+
| %           | 56                      | %               | [facility]       |              |
| Lymphocytes |                         |                 | LABORATORY |              |
+-------------+-------------------------+-----------------+------------+--------------+
| % Reactive  | 4                       | %               | [facility]       |              |
| Lymphocytes |                         |                 | LABORATORY |              |
+-------------+-------------------------+-----------------+------------+--------------+
| % Monocytes | 5                       | %               | [facility]       |              |
|             |                         |                 | LABORATORY |              |
+-------------+-------------------------+-----------------+------------+--------------+
| Neutrophils | 1.74 (L)                | 1.90 - 7.40     | [facility]       |              |
| , Absolute  |                         | K/uL            | LABORATORY |              |
+-------------+-------------------------+-----------------+------------+--------------+
| Absolute    | 2.77                    | 1.00 - 3.90     | [facility]       |              |
| Lymphocytes |                         | K/uL            | LABORATORY |              |
+-------------+-------------------------+-----------------+------------+--------------+
| Absolute    | 0.20 (H)                | 0.00 K/uL       | [facility]       |              |
| Variant     |                         |                 | LABORATORY |              |
| Lymphocytes |                         |                 |            |              |
+-------------+-------------------------+-----------------+------------+--------------+
| Absolute    | 0.25                    | 0.00 - 0.80     | [facility]       |              |
| Monocytes   |                         | K/uL            | LABORATORY |              |
+-------------+-------------------------+-----------------+------------+--------------+
| RBC         | RBC AND PLT MORPHOLOGY  |                 | [facility]       |              |
| Morphology  | APPEAR NORMALComment:   |                 | LABORATORY |              |
|             | Testing performed at    |                 |            |              |
|             | [facility], [address]  |                 |            |              |
|             | [place]     |                 |            |              |
|             | [address]                   |                 |            |              |
+-------------+-------------------------+-----------------+------------+--------------+
 
 
 
+----------+
| Specimen |
+----------+
| Blood    |
+----------+
 
 
 
+-------------------+------------------+--------------------+----------------+
| Performing        | Address          | City/State/Zipcode | Phone Number   |
| Organization      |                  |                    |                |
+-------------------+------------------+--------------------+----------------+
|   [facility] |   [address] | [place] [address] |   [phone] |
+-------------------+------------------+--------------------+----------------+
 Comprehensive Metabolic Panel (2019  9:14 AM PDT)
 
+-------------+--------------------------+-----------------+------------+--------------+
| Component   | Value                    | Ref Range       | Performed  | Pathologist  |
|             |                          |                 | At         | Signature    |
+-------------+--------------------------+-----------------+------------+--------------+
| Na          | 138                      | 135 - 145       | [facility]       |              |
 
|             |                          | mmol/L          | LABORATORY |              |
+-------------+--------------------------+-----------------+------------+--------------+
| K           | 3.8                      | 3.5 - 4.9       | [facility]       |              |
|             |                          | mmol/L          | LABORATORY |              |
+-------------+--------------------------+-----------------+------------+--------------+
| Cl          | 107                      | 99 - 109 mmol/L | [facility]       |              |
|             |                          |                 | LABORATORY |              |
+-------------+--------------------------+-----------------+------------+--------------+
| CO2         | 28                       | 23 - 32 mmol/L  | [facility]       |              |
|             |                          |                 | LABORATORY |              |
+-------------+--------------------------+-----------------+------------+--------------+
| Anion Gap   | 7                        | 5 - 20 mmol/L   | [facility]       |              |
|             |                          |                 | LABORATORY |              |
+-------------+--------------------------+-----------------+------------+--------------+
| Glucose     | 104 (H)                  | 65 - 99 mg/dL   | [facility]       |              |
|             |                          |                 | LABORATORY |              |
+-------------+--------------------------+-----------------+------------+--------------+
| BUN         | 16                       | 8 - 25 mg/dL    | [facility]       |              |
|             |                          |                 | LABORATORY |              |
+-------------+--------------------------+-----------------+------------+--------------+
| Creatinine  | 1.0                      | 0.70 - 1.30     | [facility]       |              |
|             |                          | mg/dL           | LABORATORY |              |
+-------------+--------------------------+-----------------+------------+--------------+
| BUN/Creatin | 16                       |                 | [facility]       |              |
| ine Ratio   |                          |                 | LABORATORY |              |
+-------------+--------------------------+-----------------+------------+--------------+
| Calcium     | 9.6                      | 8.5 - 10.5      | [facility]       |              |
|             |                          | mg/dL           | LABORATORY |              |
+-------------+--------------------------+-----------------+------------+--------------+
| Protein,    | 7.3                      | 6.3 - 8.2 g/dL  | [facility]       |              |
| Total       |                          |                 | LABORATORY |              |
+-------------+--------------------------+-----------------+------------+--------------+
| Albumin     | 4.1                      | 3.3 - 4.8 g/dL  | [facility]       |              |
|             |                          |                 | LABORATORY |              |
+-------------+--------------------------+-----------------+------------+--------------+
| Globulin    | 3.2                      | 1.3 - 4.9 g/dL  | [facility]       |              |
|             |                          |                 | LABORATORY |              |
+-------------+--------------------------+-----------------+------------+--------------+
| A/G Ratio   | 1.3                      | 1.0 - 2.4       | [facility]       |              |
|             |                          |                 | LABORATORY |              |
+-------------+--------------------------+-----------------+------------+--------------+
| BILIRUBIN,  | 0.5                      | 0.1 - 1.5 mg/dL | [facility]       |              |
| TOTAL       |                          |                 | LABORATORY |              |
+-------------+--------------------------+-----------------+------------+--------------+
| ALK PHOS    | 70                       | 35 - 115 U/L    | [facility]       |              |
|             |                          |                 | LABORATORY |              |
+-------------+--------------------------+-----------------+------------+--------------+
| AST         | 14                       | 10 - 45 U/L     | [facility]       |              |
|             |                          |                 | LABORATORY |              |
+-------------+--------------------------+-----------------+------------+--------------+
| ALT         | 22                       | 10 - 65 U/L     | [facility]       |              |
|             |                          |                 | LABORATORY |              |
+-------------+--------------------------+-----------------+------------+--------------+
| Estimated   | >60Comment: GFR <60:     | >60             | [facility]       |              |
| GFR         | CHRONIC KIDNEY DISEASE,  | mL/min/1.73m2   | LABORATORY |              |
|             | IF FOUND OVER A 3 MONTH  |                 |            |              |
|             | PERIOD.GFR <15: KIDNEY   |                 |            |              |
|             | FAILURE.FOR       |                 |            |              |
|             | [employer], MULTIPLY THE  |                 |            |              |
|             | CALCULATED GFR BY        |                 |            |              |
 
|             | 1.210.This eGFR is       |                 |            |              |
|             | calculated using the     |                 |            |              |
|             | MDRD IDMS traceable      |                 |            |              |
|             | equation.Testing         |                 |            |              |
|             | performed at [facility], [address] |                 |            |              |
|             |  [facility],        |                 |            |              |
|             | [place]   [address]    |                 |            |              |
+-------------+--------------------------+-----------------+------------+--------------+
 
 
 
+----------+
| Specimen |
+----------+
| Blood    |
+----------+
 
 
 
+-------------------+------------------+--------------------+----------------+
| Performing        | Address          | City/State/Zipcode | Phone Number   |
| Organization      |                  |                    |                |
+-------------------+------------------+--------------------+----------------+
|   [facility] |   [address] | [place] [address] |   [phone] |
+-------------------+------------------+--------------------+----------------+
 ECG 12 lead (2019  9:12 AM PDT)
 
+-------------+------------------------+-----------+------------+--------------+
| Component   | Value                  | Ref Range | Performed  | Pathologist  |
|             |                        |           | At         | Signature    |
+-------------+------------------------+-----------+------------+--------------+
| VENTRICULAR | 61                     | BPM       | [facility]  |              |
|  RATE EKG   |                        |           |            |              |
+-------------+------------------------+-----------+------------+--------------+
| ATRIAL RATE | 61                     | BPM       | [facility]  |              |
+-------------+------------------------+-----------+------------+--------------+
| P-R         | 200                    | ms        | [facility]  |              |
| INTERVAL    |                        |           |            |              |
+-------------+------------------------+-----------+------------+--------------+
| QRS         | 78                     | ms        | [facility]  |              |
| DURATION    |                        |           |            |              |
+-------------+------------------------+-----------+------------+--------------+
| Q-T         | 448                    | ms        | [facility]  |              |
| INTERVAL    |                        |           |            |              |
+-------------+------------------------+-----------+------------+--------------+
| Q-T         | 450                    | ms        | [facility]  |              |
| INTERVAL    |                        |           |            |              |
| (CORRECTED) |                        |           |            |              |
+-------------+------------------------+-----------+------------+--------------+
| P WAVE AXIS | 28                     | degrees   | [facility]  |              |
+-------------+------------------------+-----------+------------+--------------+
| QRS AXIS    | -5                     | degrees   | [facility]  |              |
+-------------+------------------------+-----------+------------+--------------+
| T AXIS      | 0                      | degrees   | [facility]  |              |
+-------------+------------------------+-----------+------------+--------------+
| INTERPRETAT | Normal sinus           |           | [facility]  |              |
| ION TEXT    | rhythmNormal ECGNo     |           |            |              |
|             | previous ECGs          |           |            |              |
|             | availableConfirmed by  |           |            |              |
|             | Blake Benedict MD  |           |            |              |
 
|             | (68) on 2019      |           |            |              |
|             | 8:32:02 PM             |           |            |              |
+-------------+------------------------+-----------+------------+--------------+
 
 
 
+----------+
| Specimen |
+----------+
|          |
+----------+
 
 
 
+----------------------------------------------------------+--------------+
| Narrative                                                | Performed At |
+----------------------------------------------------------+--------------+
|   This result has an attachment that is not available.   |              |
+----------------------------------------------------------+--------------+
 
 
 
+--------------+---------+--------------------+--------------+
| Performing   | Address | City/State/Zipcode | Phone Number |
| Organization |         |                    |              |
+--------------+---------+--------------------+--------------+
|   [facility]  |         |                    |              |
+--------------+---------+--------------------+--------------+
 documented in this encounter
 
 Visit Diagnoses
 
 
+-----------------------------------------------------------------------------------+
| Diagnosis                                                                         |
+-----------------------------------------------------------------------------------+
|   Mesenteric ischemia, chronic (HCC)  Chronic vascular insufficiency of intestine |
+-----------------------------------------------------------------------------------+
 documented in this encounter

## 2020-01-13 NOTE — XMS
Encounter Summary
  Created on: 2020
 
 Pasquale Nehemias Martinez
 External Reference #: [id number]
 : 43
 Sex: Male
 
 Demographics
 
 
+-----------------------+----------------------+
| Address               | [address]         |
|                       | [place]  [address] |
+-----------------------+----------------------+
| Home Phone            | [phone]      |
+-----------------------+----------------------+
| Preferred Language    | Unknown              |
+-----------------------+----------------------+
| Marital Status        |               |
+-----------------------+----------------------+
| Church Affiliation | [id number]                 |
+-----------------------+----------------------+
| Race                  | Unknown              |
+-----------------------+----------------------+
| Ethnic Group          | Unknown              |
+-----------------------+----------------------+
 
 
 Author
 
 
+--------------+--------------------------------------------+
| Author       | [facility]  |
|              | and [place]                                |
+--------------+--------------------------------------------+
| Organization | [facility]  |
|              | and [place]                                |
+--------------+--------------------------------------------+
| Address      | Unknown                                    |
+--------------+--------------------------------------------+
| Phone        | Unavailable                                |
+--------------+--------------------------------------------+
 
 
 
 Support
 
 
+--------------+--------------+---------------------+-----------------+
| Name         | Relationship | Address             | Phone           |
+--------------+--------------+---------------------+-----------------+
| Rupa Giordano | ECON         | [address]             | [phone] |
|              |              | [place], [place]   |                 |
|              |              | [address]               |                 |
+--------------+--------------+---------------------+-----------------+
 
 
 
 
 Care Team Providers
 
 
+-----------------------+------+-------------+
| Care Team Member Name | Role | Phone       |
+-----------------------+------+-------------+
| No, Physician         | PCP  | Unavailable |
+-----------------------+------+-------------+
 
 
 
 Reason for Referral
 Diagnostic/Screening (Routine)
 
+--------+--------+-----------+--------------+--------------+--------------+
| Status | Reason | Specialty | Diagnoses /  | Referred By  | Referred To  |
|        |        |           | Procedures   | Contact      | Contact      |
+--------+--------+-----------+--------------+--------------+--------------+
| Closed |        | Radiology |   Diagnoses  |   Augustin Lopez,  |              |
|        |        |           |  Mesenteric  | DNP  [address]    |              |
|        |        |           | ischemia,    | [address]  |              |
|        |        |           | chronic      |  [place] [address]       |              |
|        |        |           | ([facility])  H/O   | [place] |              |
|        |        |           | endarterecto |  [id number]       |              |
|        |        |           | my           | Phone:       |              |
|        |        |           | Procedures   | [phone] |              |
|        |        |           | VAS          |   Fax:       |              |
|        |        |           | Mesenteric   | [phone] |              |
|        |        |           | Arterial     |              |              |
|        |        |           | Duplex       |              |              |
+--------+--------+-----------+--------------+--------------+--------------+
 
 
 
 
 Reason for Visit
 
 
+-----------+----------+
| Reason    | Comments |
+-----------+----------+
| Follow-up |          |
+-----------+----------+
 
 
 
 Encounter Details
 
 
+--------+---------+----------------------+----------------------+----------------------+
| Date   | Type    | Department           | Care Team            | Description          |
+--------+---------+----------------------+----------------------+----------------------+
| / | Office  |   [facility]      |   Augustin Lopez DNP      | Mesenteric ischemia, |
|    | Visit   | VASCULAR SURGERY     | [address]     |  chronic (HCC)       |
|        |         | [address] | [place]  | (Primary Dx); H/O    |
|        |         |  E  [place], [place]     | [address]  [phone]  | endarterectomy; S/P  |
|        |         | [address]           |  [phone] (Fax)  | angioplasty          |
|        |         | [phone]         |                      |                      |
+--------+---------+----------------------+----------------------+----------------------+
 
 
 
 
 Social History
 
 
+---------------+-------+-----------+--------+------+
| Tobacco Use   | Types | Packs/Day | Years  | Date |
|               |       |           | Used   |      |
+---------------+-------+-----------+--------+------+
| Former Smoker |       |           |        |      |
+---------------+-------+-----------+--------+------+
 
 
 
+---------------------+---+---+---+
| Smokeless Tobacco:  |   |   |   |
| Former User         |   |   |   |
+---------------------+---+---+---+
 
 
 
+------------------------------+
| Comments: quit 50 years ago  |
+------------------------------+
 
 
 
+---------------+-------------+---------+----------+
| Alcohol Use   | Drinks/Week | oz/Week | Comments |
+---------------+-------------+---------+----------+
| Not Currently |             |         | 90m days |
+---------------+-------------+---------+----------+
 
 
 
+------------------+---------------+
| Sex Assigned at  | Date Recorded |
| Birth            |               |
+------------------+---------------+
| Not on file      |               |
+------------------+---------------+
 
 
 
+----------------+-------------+-------------+
| Job Start Date | Occupation  | Industry    |
+----------------+-------------+-------------+
| Not on file    | Not on file | Not on file |
+----------------+-------------+-------------+
 
 
 
+----------------+--------------+------------+
| Travel History | Travel Start | Travel End |
+----------------+--------------+------------+
 
 
 
+-------------------------------------+
 
| No recent travel history available. |
+-------------------------------------+
 documented as of this encounter
 
 Last Filed Vital Signs
 
 
+-------------------+---------+----------------------+----------+
| Vital Sign        | Reading | Time Taken           | Comments |
+-------------------+---------+----------------------+----------+
| Blood Pressure    | 126/79  | 2020 10:14 AM  |          |
|                   |         | PST                  |          |
+-------------------+---------+----------------------+----------+
| Pulse             | 64      | 2020 10:14 AM  |          |
|                   |         | PST                  |          |
+-------------------+---------+----------------------+----------+
| Temperature       | -       | -                    |          |
+-------------------+---------+----------------------+----------+
| Respiratory Rate  | -       | -                    |          |
+-------------------+---------+----------------------+----------+
| Oxygen Saturation | 100%    | 2020 10:14 AM  |          |
|                   |         | PST                  |          |
+-------------------+---------+----------------------+----------+
| Inhaled Oxygen    | -       | -                    |          |
| Concentration     |         |                      |          |
+-------------------+---------+----------------------+----------+
| Weight            | -       | -                    |          |
+-------------------+---------+----------------------+----------+
| Height            | -       | -                    |          |
+-------------------+---------+----------------------+----------+
| Body Mass Index   | -       | -                    |          |
+-------------------+---------+----------------------+----------+
 documented in this encounter
 
 Functional Status
 
 
+---------------------------------------------+----------+--------------------+
| Functional Status                           | Response | Date of Assessment |
+---------------------------------------------+----------+--------------------+
| Are you deaf or do you have serious         | No       | 2019         |
| difficulty hearing?                         |          |                    |
+---------------------------------------------+----------+--------------------+
| Are you blind or do you have serious        | No       | 2019         |
| difficulty seeing, even when wearing        |          |                    |
| glasses?                                    |          |                    |
+---------------------------------------------+----------+--------------------+
| Do you have serious difficulty walking or   | No       | 2019         |
| climbing stairs? (5 years old or older)     |          |                    |
+---------------------------------------------+----------+--------------------+
| Do you have difficulty dressing or bathing? | No       | 2019         |
|  (5 years old or older)                     |          |                    |
+---------------------------------------------+----------+--------------------+
| Because of a physical, mental, or emotional | No       | 2019         |
|  condition, do you have difficulty doing    |          |                    |
| errands alone such as visiting a doctor's   |          |                    |
| office or shopping? [15 years old or        |          |                    |
| older)]                                     |          |                    |
+---------------------------------------------+----------+--------------------+
 
 
 
 
+---------------------------------------------+----------+--------------------+
| Cognitive Status                            | Response | Date of Assessment |
+---------------------------------------------+----------+--------------------+
| Because of a physical, mental, or emotional | No       | 2019         |
|  condition, do you have serious difficulty  |          |                    |
| concentrating, remembering, or making       |          |                    |
| decisions? (5 years old or older)           |          |                    |
+---------------------------------------------+----------+--------------------+
 documented as of this encounter
 
 Progress Notes
 Augustin Lopez DNP - 2020 10:30 AM [facility]
 [address] [place] [address]
 Office: [phone] Fax: [phone]
 
 DATE OF VISIT: 2020
 PATIENT NAME: Nehemias Giordano
 : 1945; AGE: 74 y.o.; Sex:M 
 PHONE  NUMBER:  [phone]  (Home)
 MRN: [id number]; 
 PROVIDER: Augustin Lopez DNP
 PRIMARY  CARE  /  REFERRING  PHYSICIAN:  No ref. provider found  /  No Physician on file  /
 p 
 
 REASON  FOR  EVALUATION  /  CHIEF  COMPLAINT: Vascular Surgery Postoperative Visit for SMA 
endarterectomy and balloon angioplasty 
 
 The patient presents today for a Vascular Surgery Postoperative Visit.
 
 Mr. Giordano is a pleasant 74 y.o. male with no significant PMH who is referred to me for abd
ominal pain. Patient has been describing intermittent abdominal pain that radiates across hi
s abdomen to his back. Patient does not endorse any specific worsening pain associated with 
eating, however he does state that he has not been eating a lot. Patient has been watching h
is diet and has been trying to decrease his carbohydrates. Patient has lost 24 lbs over the 
last 2 months. The patient had a recent CT and MRI abdomen from [facility]. Elyse
ent denies any surgical intervention for this abdominal pain, however has had past abdominal
 surgeries. Due to chronic mesenteric ischemia, he is status post SMA endarterectomy with karsten
vine pericardial patch on 2019. 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 
balloon angioplasty of SMA on 2019. After angioplasty, there was still a focal area of 
stenosis but this was not in a good place to stent due to multiple large branches around albino
nosis. However, there was improved flow throughout SMA at end of case.The patient reports hi
s weight has been stable around 150 Ibs, appetite is fair. He reports he has been feeling ga
ssy and has frequent BM's. He eats cereal in the morning with added fiber. He also has hemor
rhoids, which he has been applying OTC cream. His wife reports he is not drinking enough sven
er and has been sedentary after surgery. He denies abdominal pain during or after meals. Phy
sical examination revealed surgical incision is healed, right groin access site is healed. BELINDA castaneda has been able to ambulate without difficulty. He has chronic back pain. He is taking aspir
in, plavix and statin daily. 
 
 VITAL SIGNS: /79  | Pulse 64  | SpO2 100%  
 
 PHYSICAL EXAM: 
 Constitutional: Well nourished, no signs of distress 
 Cardiovascular: Normal rate, regular rhythm.
 Pulmonary/Chest: No respiratory distress. No adventitious sounds. 
 Abdominal: Soft. No abdominal distension or tenderness. 
 Musculoskeletal: Normal range of motion. 
 Extremities: No edema, cyanosis or clubbing.
 
 Neurological: He is alert and oriented. No muscle weakness and normal gait.
 VASCULAR: Palpable femoralpulses were present bilaterally with palpable bilateral popli
teal pulses. Palpable pulses were present in bilateral dorsalis pedis and poster
ior tibial artery arteries. Palpable bilateral carotid, radial, brachial pulses. 
 
 Assessment & Plan:
 Chronic mesenteric ischemia with status post SMA endarterectomy and balloon angioplasty - C
ontinue aspirin and plavix daily. I've explained to the patient that in the event he experie
nces mesenteric ischemia as evidenced by post-prandial abdominal pain, appetite change, or u
nintentional weight loss, the patient should contact me immediately to return to my clinic o
r go to nearby emergency room right away. Recommend the patient to stop taking added fiber a
nd drink more water. RN will call in 1 month to check on the patient. Follow up with me in 3
 months with mesenteric arterial ultrasound. 
 
 Hypertension - The patient is advised to maintain his antihypertensive medication to keep s
ystolic blood pressure target level of 130-140 mm Hg and diastolic blood pressure level of 7
0-80 mm Hg. The patient is advised that uncontrolled hypertension can accelerate atheroscler
otic disease progression. 
 
 Dyslipidemia - The patient is advised to undergo lipid level evaluation with fasting blood 
test every 6 months with target LDL level of less than 70 mg/dL. The patient is advised to c
ontinue taking antiplatelet daily for life to reduce the risk of myocardial infarction and p
eripheral arterial disease ( He is currently on aspirin). Additionally, the patient is infor
med that hyperlipidemia can accelerate atherosclerotic disease progression and negatively af
fect the outcome of lower leg vascular interventions. 
 
 Augustin Lopez DNPElectronically signed by Augustin Lopez DNP at 2020  4:06 PM PSTdocumented in t
his encounter
 
 Plan of Treatment
 
 
+--------+-------------+------------------+----------------------+-------------+
| Date   | Type        | Specialty        | Care Team            | Description |
+--------+-------------+------------------+----------------------+-------------+
| / | Appointment | Radiology        |   Augustin Lopez DNP      |             |
2020   |             |                  | [address]     |             |
|        |             |                  | [place]  |             |
|        |             |                  | [id number] [phone]  |             |
|        |             |                  |  [phone] (Fax)  |             |
+--------+-------------+------------------+----------------------+-------------+
| / | Office      | Vascular Surgery |   Augustin Lopez DNP      |             |
2020   | Visit       |                  | [address]     |             |
|        |             |                  | [place]  |             |
|        |             |                  | [address]  [phone]  |             |
|        |             |                  |  [phone] (Fax)  |             |
+--------+-------------+------------------+----------------------+-------------+
 
 
 
+-----------------+---------+--------+----------------------+----------------------+
| Name            | Type    | Priori | Associated Diagnoses | Order Schedule       |
|                 |         | ty     |                      |                      |
+-----------------+---------+--------+----------------------+----------------------+
| VAS Mesenteric  | Imaging | Routin |   Mesenteric         | Expected: 2020 |
| Arterial Duplex |         | e      | ischemia, chronic    |  (Approximate),      |
|                 |         |        | (HCC)  H/O           | Expires: 2021  |
|                 |         |        | endarterectomy       |                      |
+-----------------+---------+--------+----------------------+----------------------+
 documented as of this encounter
 
 
 Visit Diagnoses
 
 
+---------------------------------------------------------------------------------------+
| Diagnosis                                                                             |
+---------------------------------------------------------------------------------------+
|   Mesenteric ischemia, chronic (HCC) - Primary  Chronic vascular insufficiency of     |
| intestine                                                                             |
+---------------------------------------------------------------------------------------+
|   H/O endarterectomy  Personal history of surgery to other organs                     |
+---------------------------------------------------------------------------------------+
|   S/P angioplasty  Postsurgical percutaneous transluminal coronary angioplasty status |
+---------------------------------------------------------------------------------------+
 documented in this encounter

## 2020-01-13 NOTE — XMS
Encounter Summary
  Created on: 2020
 
 Pasquale Nehemias Martinez
 External Reference #: [id number]
 : 43
 Sex: Male
 
 Demographics
 
 
+-----------------------+----------------------+
| Address               | [address]         |
|                       | [place]  [address] |
+-----------------------+----------------------+
| Home Phone            | [phone]      |
+-----------------------+----------------------+
| Preferred Language    | Unknown              |
+-----------------------+----------------------+
| Marital Status        |               |
+-----------------------+----------------------+
| Protestant Affiliation | [id number]                 |
+-----------------------+----------------------+
| Race                  | Unknown              |
+-----------------------+----------------------+
| Ethnic Group          | Unknown              |
+-----------------------+----------------------+
 
 
 Author
 
 
+--------------+--------------------------------------------+
| Author       | [facility]  |
|              | and [facility]                                |
+--------------+--------------------------------------------+
| Organization | [facility]  |
|              | and [place]                                |
+--------------+--------------------------------------------+
| Address      | Unknown                                    |
+--------------+--------------------------------------------+
| Phone        | Unavailable                                |
+--------------+--------------------------------------------+
 
 
 
 Support
 
 
+--------------+--------------+---------------------+-----------------+
| Name         | Relationship | Address             | Phone           |
+--------------+--------------+---------------------+-----------------+
| Rupa Giordano | E[employer]         | [address]             | [phone] |
|              |              | [place], [place]   |                 |
|              |              | [address]               |                 |
+--------------+--------------+---------------------+-----------------+
 
 
 
 
 Care Team Providers
 
 
+-----------------------+------+-------------+
| Care Team Member Name | Role | Phone       |
+-----------------------+------+-------------+
 PCP  | Unavailable |
+-----------------------+------+-------------+
 
 
 
 Reason for Visit
 
 
+-------------+----------+
| Reason      | Comments |
+-------------+----------+
| Advice Only |          |
+-------------+----------+
 
 
 
 Encounter Details
 
 
+--------+-----------+----------------------+----------------------+-------------+
| Date   | Type      | Department           | Care Team            | Description |
+--------+-----------+----------------------+----------------------+-------------+
| / | Telephone |   [facility]      |   Sukhdeep Pardo MD     | Advice Only |
| 2019   |           | VASCULAR SURGERY     | [address]     |             |
|        |           | [address] | [address]  [place], [place]  |             |
|        |           |  E  [place], [place]     | [address]           |             |
|        |           | [address]           | [phone]         |             |
|        |           | [phone]         | [phone] (Fax)   |             |
+--------+-----------+----------------------+----------------------+-------------+
 
 
 
 Social History
 
 
+---------------+-------+-----------+--------+------+
| Tobacco Use   | Types | Packs/Day | Years  | Date |
|               |       |           | Used   |      |
+---------------+-------+-----------+--------+------+
| Former Smoker |       |           |        |      |
+---------------+-------+-----------+--------+------+
 
 
 
+---------------------+---+---+---+
| Smokeless Tobacco:  |   |   |   |
| Never Used          |   |   |   |
+---------------------+---+---+---+
 
 
 
+------------------------------+
| Comments: quit 50 years ago  |
 
+------------------------------+
 
 
 
+------------------+---------------+
| Sex Assigned at  | Date Recorded |
| Birth            |               |
+------------------+---------------+
| Not on file      |               |
+------------------+---------------+
 
 
 
+----------------+-------------+-------------+
| Job Start Date | Occupation  | Industry    |
+----------------+-------------+-------------+
| Not on file    | Not on file | Not on file |
+----------------+-------------+-------------+
 
 
 
+----------------+--------------+------------+
| Travel History | Travel Start | Travel End |
+----------------+--------------+------------+
 
 
 
+-------------------------------------+
| No recent travel history available. |
+-------------------------------------+
 documented as of this encounter
 
 Plan of Treatment
 
 
+--------+-------------+------------------+----------------------+-------------+
| Date   | Type        | Specialty        | Care Team            | Description |
+--------+-------------+------------------+----------------------+-------------+
| / | Appointment | Radiology        |   Augustin Lopez DNP      |             |
|    |             |                  | [address]     |             |
|        |             |                  | [place]  |             |
|        |             |                  | [address]  [phone]  |             |
|        |             |                  |  [phone] (Fax)  |             |
+--------+-------------+------------------+----------------------+-------------+
| / | Office      | Vascular Surgery |   Augustin Lopez DNP      |             |
|    | Visit       |                  | [address]     |             |
|        |             |                  | [place]  |             |
|        |             |                  | [address]  [phone]  |             |
|        |             |                  |  [phone] (Fax)  |             |
+--------+-------------+------------------+----------------------+-------------+
 documented as of this encounter
 
 Visit Diagnoses
 Not on filedocumented in this encounter

## 2020-01-13 NOTE — XMS
Encounter Summary
  Created on: 2020
 
 Pasquale Nehemias Martinez
 External Reference #: [id number]
 : 43
 Sex: Male
 
 Demographics
 
 
+-----------------------+----------------------+
| Address               | [address]         |
|                       | [place]  [address] |
+-----------------------+----------------------+
| Home Phone            | [phone]      |
+-----------------------+----------------------+
| Preferred Language    | Unknown              |
+-----------------------+----------------------+
| Marital Status        |               |
+-----------------------+----------------------+
| Syn[employer] Affiliation | 1077                 |
+-----------------------+----------------------+
| Race                  | Unknown              |
+-----------------------+----------------------+
| Ethnic Group          | Unknown              |
+-----------------------+----------------------+
 
 
 Author
 
 
+--------------+--------------------------------------------+
| Author       | [facility]  |
|              | and [facility]                                |
+--------------+--------------------------------------------+
| Organization | [facility]  |
|              | and [place]                                |
+--------------+--------------------------------------------+
| Address      | Unknown                                    |
+--------------+--------------------------------------------+
| Phone        | Unavailable                                |
+--------------+--------------------------------------------+
 
 
 
 Support
 
 
+--------------+--------------+---------------------+-----------------+
| Name         | Relationship | Address             | Phone           |
+--------------+--------------+---------------------+-----------------+
| Rupa Giordano | ECON         | [address]             | [phone] |
|              |              | [place], [place]   |                 |
|              |              | [address]               |                 |
+--------------+--------------+---------------------+-----------------+
 
 
 
 
 Care Team Providers
 
 
+-----------------------+------+-------------+
| Care Team Member Name | Role | Phone       |
+-----------------------+------+-------------+
| No, Physician         | PCP  | Unavailable |
+-----------------------+------+-------------+
 
 
 
 Reason for Referral
 Diagnostic/Screening (Routine)
 
+--------+--------+-----------+--------------+--------------+--------------+
| Status | Reason | Specialty | Diagnoses /  | Referred By  | Referred To  |
|        |        |           | Procedures   | Contact      | Contact      |
+--------+--------+-----------+--------------+--------------+--------------+
| Closed |        | Radiology |   Diagnoses  |   Augsutin Lopez,  |              |
|        |        |           |  Mesenteric  | DNP  [address]    |              |
|        |        |           | ischemia,    | [address]  |              |
|        |        |           | chronic      |  [place] [address]       |              |
|        |        |           | ([facility])  H/O   | [place] |              |
|        |        |           | endarterecto |  [address]       |              |
|        |        |           | my           | Phone:       |              |
|        |        |           | Procedures   | [phone] |              |
|        |        |           | VAS          |   Fax:       |              |
|        |        |           | Mesenteric   | [phone] |              |
|        |        |           | Arterial     |              |              |
|        |        |           | Duplex       |              |              |
+--------+--------+-----------+--------------+--------------+--------------+
 
 
 
 
 Reason for Visit
 
 
+-----------+----------+
| Reason    | Comments |
+-----------+----------+
| Follow-up |          |
+-----------+----------+
 
 
 
 Encounter Details
 
 
+--------+---------+----------------------+----------------------+----------------------+
| Date   | Type    | Department           | Care Team            | Description          |
+--------+---------+----------------------+----------------------+----------------------+
| / | Office  |   [facility]      |   Augustin Lopez DNP      | Mesenteric ischemia, |
|    | Visit   | VASCULAR SURGERY     | [address]     |  chronic (HCC)       |
|        |         | [address] | [place]  | (Primary Dx); H/O    |
|        |         |  E  [place], [place]     | [address]  [phone]  | endarterectomy; S/P  |
|        |         | [address]           |  [phone] (Fax)  | angioplasty          |
|        |         | [phone]         |                      |                      |
+--------+---------+----------------------+----------------------+----------------------+
 
 
 
 
 Social History
 
 
+---------------+-------+-----------+--------+------+
| Tobacco Use   | Types | Packs/Day | Years  | Date |
|               |       |           | Used   |      |
+---------------+-------+-----------+--------+------+
| Former Smoker |       |           |        |      |
+---------------+-------+-----------+--------+------+
 
 
 
+---------------------+---+---+---+
| Smokeless Tobacco:  |   |   |   |
| Former User         |   |   |   |
+---------------------+---+---+---+
 
 
 
+------------------------------+
| Comments: quit 50 years ago  |
+------------------------------+
 
 
 
+---------------+-------------+---------+----------+
| Alcohol Use   | Drinks/Week | oz/Week | Comments |
+---------------+-------------+---------+----------+
| Not Currently |             |         | 90m days |
+---------------+-------------+---------+----------+
 
 
 
+------------------+---------------+
| Sex Assigned at  | Date Recorded |
| Birth            |               |
+------------------+---------------+
| Not on file      |               |
+------------------+---------------+
 
 
 
+----------------+-------------+-------------+
| Job Start Date | Occupation  | Industry    |
+----------------+-------------+-------------+
| Not on file    | Not on file | Not on file |
+----------------+-------------+-------------+
 
 
 
+----------------+--------------+------------+
| Travel History | Travel Start | Travel End |
+----------------+--------------+------------+
 
 
 
+-------------------------------------+
 
| No recent travel history available. |
+-------------------------------------+
 documented as of this encounter
 
 Last Filed Vital Signs
 
 
+-------------------+---------+----------------------+----------+
| Vital Sign        | Reading | Time Taken           | Comments |
+-------------------+---------+----------------------+----------+
| Blood Pressure    | 126/79  | 2020 10:14 AM  |          |
|                   |         | PST                  |          |
+-------------------+---------+----------------------+----------+
| Pulse             | 64      | 2020 10:14 AM  |          |
|                   |         | PST                  |          |
+-------------------+---------+----------------------+----------+
| Temperature       | -       | -                    |          |
+-------------------+---------+----------------------+----------+
| Respiratory Rate  | -       | -                    |          |
+-------------------+---------+----------------------+----------+
| Oxygen Saturation | 100%    | 2020 10:14 AM  |          |
|                   |         | PST                  |          |
+-------------------+---------+----------------------+----------+
| Inhaled Oxygen    | -       | -                    |          |
| Concentration     |         |                      |          |
+-------------------+---------+----------------------+----------+
| Weight            | -       | -                    |          |
+-------------------+---------+----------------------+----------+
| Height            | -       | -                    |          |
+-------------------+---------+----------------------+----------+
| Body Mass Index   | -       | -                    |          |
+-------------------+---------+----------------------+----------+
 documented in this encounter
 
 Functional Status
 
 
+---------------------------------------------+----------+--------------------+
| Functional Status                           | Response | Date of Assessment |
+---------------------------------------------+----------+--------------------+
| Are you deaf or do you have serious         | No       | 2019         |
| difficulty hearing?                         |          |                    |
+---------------------------------------------+----------+--------------------+
| Are you blind or do you have serious        | No       | 2019         |
| difficulty seeing, even when wearing        |          |                    |
| glasses?                                    |          |                    |
+---------------------------------------------+----------+--------------------+
| Do you have serious difficulty walking or   | No       | 2019         |
| climbing stairs? (5 years old or older)     |          |                    |
+---------------------------------------------+----------+--------------------+
| Do you have difficulty dressing or bathing? | No       | 2019         |
|  (5 years old or older)                     |          |                    |
+---------------------------------------------+----------+--------------------+
| Because of a physical, mental, or emotional | No       | 2019         |
|  condition, do you have difficulty doing    |          |                    |
| errands alone such as visiting a doctor's   |          |                    |
| office or shopping? [15 years old or        |          |                    |
| older)]                                     |          |                    |
+---------------------------------------------+----------+--------------------+
 
 
 
 
+---------------------------------------------+----------+--------------------+
| Cognitive Status                            | Response | Date of Assessment |
+---------------------------------------------+----------+--------------------+
| Because of a physical, mental, or emotional | No       | 2019         |
|  condition, do you have serious difficulty  |          |                    |
| concentrating, remembering, or making       |          |                    |
| decisions? (5 years old or older)           |          |                    |
+---------------------------------------------+----------+--------------------+
 documented as of this encounter
 
 Progress Notes
 Augustin Lopez DNP - 2020 10:30 AM [facility]
 [address] [address]
 Office: [phone] Fax: [phone]
 
 DATE OF VISIT: 2020
 PATIENT NAME: Nehemias Giordano
 : 1945; AGE: 74 y.o.; Sex:M 
 PHONE  NUMBER:  [phone]  (Home)
 MRN: [id number]; 
 PROVIDER: Augustin Lopez DNP
 PRIMARY  CARE  /  REFERRING  PHYSICIAN:  No ref. provider found  /  No Physician on file  /
 p 
 
 REASON  FOR  EVALUATION  /  CHIEF  COMPLAINT: Vascular Surgery Postoperative Visit for SMA 
endarterectomy and balloon angioplasty 
 
 The patient presents today for a Vascular Surgery Postoperative Visit.
 
 Mr. Giordano is a pleasant 74 y.o. male with no significant PMH who is referred to me for abd
ominal pain. Patient has been describing intermittent abdominal pain that radiates across hi
s abdomen to his back. Patient does not endorse any specific worsening pain associated with 
eating, however he does state that he has not been eating a lot. Patient has been watching h
is diet and has been trying to decrease his carbohydrates. Patient has lost 24 lbs over the 
last 2 months. The patient had a recent CT and MRI abdomen from [facility]. Elyse
ent denies any surgical intervention for this abdominal pain, however has had past abdominal
 surgeries. Due to chronic mesenteric ischemia, he is status post SMA endarterectomy with karsten
vine pericardial patch on 2019. 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 
balloon angioplasty of SMA on 2019. After angioplasty, there was still a focal area of 
stenosis but this was not in a good place to stent due to multiple large branches around albino
nosis. However, there was improved flow throughout SMA at end of case.The patient reports hi
s weight has been stable around 150 Ibs, appetite is fair. He reports he has been feeling ga
ssy and has frequent BM's. He eats cereal in the morning with added fiber. He also has hemor
rhoids, which he has been applying OTC cream. His wife reports he is not drinking enough sven
er and has been sedentary after surgery. He denies abdominal pain during or after meals. Phy
sical examination revealed surgical incision is healed, right groin access site is healed. BELINDA castaneda has been able to ambulate without difficulty. He has chronic back pain. He is taking aspir
in, plavix and statin daily. 
 
 VITAL SIGNS: /79  | Pulse 64  | SpO2 100%  
 
 PHYSICAL EXAM: 
 Constitutional: Well nourished, no signs of distress 
 Cardiovascular: Normal rate, regular rhythm.
 Pulmonary/Chest: No respiratory distress. No adventitious sounds. 
 Abdominal: Soft. No abdominal distension or tenderness. 
 Musculoskeletal: Normal range of motion. 
 Extremities: No edema, cyanosis or clubbing.
 
 Neurological: He is alert and oriented. No muscle weakness and normal gait.
 VASCULAR: Palpable femoralpulses were present bilaterally with palpable bilateral popli
teal pulses. Palpable pulses were present in bilateral dorsalis pedis and poster
ior tibial artery arteries. Palpable bilateral carotid, radial, brachial pulses. 
 
 Assessment & Plan:
 Chronic mesenteric ischemia with status post SMA endarterectomy and balloon angioplasty - C
ontinue aspirin and plavix daily. I've explained to the patient that in the event he experie
nces mesenteric ischemia as evidenced by post-prandial abdominal pain, appetite change, or u
nintentional weight loss, the patient should contact me immediately to return to my clinic o
r go to nearby emergency room right away. Recommend the patient to stop taking added fiber a
nd drink more water. RN will call in 1 month to check on the patient. Follow up with me in 3
 months with mesenteric arterial ultrasound. 
 
 Hypertension - The patient is advised to maintain his antihypertensive medication to keep s
ystolic blood pressure target level of 130-140 mm Hg and diastolic blood pressure level of 7
0-80 mm Hg. The patient is advised that uncontrolled hypertension can accelerate atheroscler
otic disease progression. 
 
 Dyslipidemia - The patient is advised to undergo lipid level evaluation with fasting blood 
test every 6 months with target LDL level of less than 70 mg/dL. The patient is advised to c
ontinue taking antiplatelet daily for life to reduce the risk of myocardial infarction and p
eripheral arterial disease ( He is currently on aspirin). Additionally, the patient is infor
med that hyperlipidemia can accelerate atherosclerotic disease progression and negatively af
fect the outcome of lower leg vascular interventions. 
 
 Augustin Lopez DNPElectronically signed by Augustin Lopez DNP at 2020  4:06 PM PSTdocumented in t
his encounter
 
 Plan of Treatment
 
 
+--------+-------------+------------------+----------------------+-------------+
| Date   | Type        | Specialty        | Care Team            | Description |
+--------+-------------+------------------+----------------------+-------------+
| / | Appointment | Radiology        |   Augustin Lopez DNP      |             |
2020   |             |                  | [address]     |             |
|        |             |                  | [place]  |             |
|        |             |                  | [id number] [phone]  |             |
|        |             |                  |  [phone] (Fax)  |             |
+--------+-------------+------------------+----------------------+-------------+
| / | Office      | Vascular Surgery |   Augustin Lopez DNP      |             |
2020   | Visit       |                  | [address]     |             |
|        |             |                  | [place]  |             |
|        |             |                  | [address]  [phone]  |             |
|        |             |                  |  [phone] (Fax)  |             |
+--------+-------------+------------------+----------------------+-------------+
 
 
 
+-----------------+---------+--------+----------------------+----------------------+
| Name            | Type    | Priori | Associated Diagnoses | Order Schedule       |
|                 |         | ty     |                      |                      |
+-----------------+---------+--------+----------------------+----------------------+
| VAS Mesenteric  | Imaging | Routin |   Mesenteric         | Expected: 2020 |
| Arterial Duplex |         | e      | ischemia, chronic    |  (Approximate),      |
|                 |         |        | (HCC)  H/O           | Expires: 2021  |
|                 |         |        | endarterectomy       |                      |
+-----------------+---------+--------+----------------------+----------------------+
 documented as of this encounter
 
 
 Visit Diagnoses
 
 
+---------------------------------------------------------------------------------------+
| Diagnosis                                                                             |
+---------------------------------------------------------------------------------------+
|   Mesenteric ischemia, chronic (HCC) - Primary  Chronic vascular insufficiency of     |
| intestine                                                                             |
+---------------------------------------------------------------------------------------+
|   H/O endarterectomy  Personal history of surgery to other organs                     |
+---------------------------------------------------------------------------------------+
|   S/P angioplasty  Postsurgical percutaneous transluminal coronary angioplasty status |
+---------------------------------------------------------------------------------------+
 documented in this encounter

## 2020-01-13 NOTE — XMS
Clinical Summary
  Created on: 2020
 
 Nehemias Giordano
 External Reference #: [id number]
 : 43
 Sex: Male
 
 Demographics
 
 
+-----------------------+----------------------+
| Address               | [address]         |
|                       | [place]  [address] |
+-----------------------+----------------------+
| Home Phone            | [phone]      |
+-----------------------+----------------------+
| Preferred Language    | Unknown              |
+-----------------------+----------------------+
| Marital Status        |               |
+-----------------------+----------------------+
| Orthodoxy Affiliation | 1077                 |
+-----------------------+----------------------+
| Race                  | Unknown              |
+-----------------------+----------------------+
| Ethnic Group          | Unknown              |
+-----------------------+----------------------+
 
 
 Author
 
 
+--------------+--------------------------------------------+
| Author       | [facility]  |
|              | and [facility]                                |
+--------------+--------------------------------------------+
| Organization | [facility]  |
|              | and [place]                                |
+--------------+--------------------------------------------+
| Address      | Unknown                                    |
+--------------+--------------------------------------------+
| Phone        | Unavailable                                |
+--------------+--------------------------------------------+
 
 
 
 Support
 
 
+--------------+--------------+---------------------+-----------------+
| Name         | Relationship | Address             | Phone           |
+--------------+--------------+---------------------+-----------------+
| Rupa Giordano | ECON         | [address]             | [phone] |
|              |              | [facility] [place]   |                 |
|              |              | [address]               |                 |
+--------------+--------------+---------------------+-----------------+
 
 
 
 
 Care Team Providers
 
 
+-----------------------+------+-------------+
| Care Team Member Name | Role | Phone       |
+-----------------------+------+-------------+
| No, Physician         | PCP  | Unavailable |
+-----------------------+------+-------------+
 
 
 
 Allergies
 No Known Allergies
 
 Medications
 
 
+----------------------+----------------------+-----------+---------+------+------+-------+
| Medication           | Sig                  | Dispensed | Refills | Star | End  | Statu |
|                      |                      |           |         | t    | Date | s     |
|                      |                      |           |         | Date |      |       |
+----------------------+----------------------+-----------+---------+------+------+-------+
|   simvastatin        | Take 10 mg by mouth  |           | 0       |      |      | Activ |
| (ZOCOR) 10 mg tablet | nightly.             |           |         |      |      | e     |
+----------------------+----------------------+-----------+---------+------+------+-------+
|                      | Take 25 mg by mouth  |           | 0       |      |      | Activ |
| hydroCHLOROthiazide  | Daily.               |           |         |      |      | e     |
| 25 mg tablet         |                      |           |         |      |      |       |
+----------------------+----------------------+-----------+---------+------+------+-------+
|   Multiple           | Take  by mouth.      |           | 0       |      |      | Activ |
| Vitamins-Minerals    |                      |           |         |      |      | e     |
| (CENTRUM SILVER      |                      |           |         |      |      |       |
| 50+MEN PO)           |                      |           |         |      |      |       |
+----------------------+----------------------+-----------+---------+------+------+-------+
|   ASPIRIN 81 PO      | Take 81 mg by mouth. |           | 0       |      |      | Activ |
|                      |                      |           |         |      |      | e     |
+----------------------+----------------------+-----------+---------+------+------+-------+
|   losartan (COZAAR)  | Take 50 mg by mouth  |           | 0       |      |      | Activ |
| 50 mg tablet         | Daily.               |           |         |      |      | e     |
+----------------------+----------------------+-----------+---------+------+------+-------+
|                      | Take 1 tablet by     |   30      | 0       | 09/3 |      | Activ |
| HYDROcodone-acetamin | mouth every 4 hours  | tablet    |         | 0/20 |      | e     |
| ophen (NORCO) 5-325  | as needed for Pain.  |           |         | 19   |      |       |
| mg per tablet        |                      |           |         |      |      |       |
+----------------------+----------------------+-----------+---------+------+------+-------+
|   docusate sodium    | Take 1 capsule by    |   30      | 0       | 09/3 |      | Activ |
| (COLACE) 100 mg      | mouth 2 times daily. | capsule   |         | 0/20 |      | e     |
| capsule              |                      |           |         | 19   |      |       |
+----------------------+----------------------+-----------+---------+------+------+-------+
|   clopidogrel        | Take 1 tablet by     |   30      | 11      | 12/0 |      | Activ |
| (PLAVIX) 75 mg       | mouth Daily.         | tablet    |         | 4/20 |      | e     |
| tablet               |                      |           |         | 19   |      |       |
+----------------------+----------------------+-----------+---------+------+------+-------+
 
 
 
 Active Problems
 
 
 
+------------------------------+------------+
| Problem                      | Noted Date |
+------------------------------+------------+
| H/O endarterectomy           | 2020 |
+------------------------------+------------+
| S/P angioplasty              | 2020 |
+------------------------------+------------+
| Hypertension                 | 2019 |
+------------------------------+------------+
| Hyperlipidemia               | 2019 |
+------------------------------+------------+
| Mesenteric ischemia, chronic | 2019 |
+------------------------------+------------+
 
 
 
+-------------------------------------------------------------+
|   Overview:   Added automatically from request for surgery  |
| [id number]                                                     |
+-------------------------------------------------------------+
 
 
 
 Resolved Problems
 
 
+-------------------+----------+-----------+
| Problem           | Noted    | Resolved  |
|                   | Date     | Date      |
+-------------------+----------+-----------+
| Diabetes mellitus | 20 |  |
|                   | 19       | 9         |
+-------------------+----------+-----------+
 
 
 
 Encounters
 
 
+--------+-----------+------------------+----------------------+----------------------+
| Date   | Type      | Specialty        | Care Team            | Description          |
+--------+-----------+------------------+----------------------+----------------------+
| / | Telephone | Vascular Surgery |   Briana Casper, | Appointment Question |
| 2020   |           |                  |  RN                  |                      |
+--------+-----------+------------------+----------------------+----------------------+
| / | Office    | Vascular Surgery |   Augustin Lopez DNP      | Mesenteric ischemia, |
|    | Visit     |                  |                      |  chronic (HCC)       |
|        |           |                  |                      | (Primary Dx); H/O    |
|        |           |                  |                      | endarterectomy; S/P  |
|        |           |                  |                      | angioplasty          |
+--------+-----------+------------------+----------------------+----------------------+
| / | Telephone | Vascular Surgery |   Briana Casper, | Appointment          |
|    |           |                  |  RN                  |                      |
+--------+-----------+------------------+----------------------+----------------------+
| / | Telephone | Vascular Surgery |   Briana Casper, | Follow-up; Other     |
|    |           |                  |  RN                  | (speak with nurse)   |
+--------+-----------+------------------+----------------------+----------------------+
| / | Telephone | Vascular Surgery |   Yudi Mckeon,  | Follow-up            |
2019   |           |                  | Medical Assistant    |                      |
+--------+-----------+------------------+----------------------+----------------------+
 
| / | Hospital  |                  |   Sukhdeep Pardo MD     | Mesenteric ischemia, |
|    | Encounter |                  |                      |  chronic (HCC)       |
+--------+-----------+------------------+----------------------+----------------------+
| / | Telephone | Radiology        |   Sukhdeep Pardo MD     |                      |
|    |           |                  |                      |                      |
+--------+-----------+------------------+----------------------+----------------------+
| / | Telephone | Vascular Surgery |   Briana Casper, | Results              |
|    |           |                  |  RN                  |                      |
+--------+-----------+------------------+----------------------+----------------------+
| / | Telephone | Vascular Surgery |   Genie Saxena,  | Diagnostic Order     |
|    |           |                  | RN                   |                      |
+--------+-----------+------------------+----------------------+----------------------+
| / | Telephone | Vascular Surgery |   Augustin Lopez DNP      | Imaging (CT order)   |
|    |           |                  |                      |                      |
+--------+-----------+------------------+----------------------+----------------------+
| 10/29/ | Telephone | Vascular Surgery |   Sukhdeep Pardo MD     | Other (Labs and      |
|    |           |                  |                      | Imaging)             |
+--------+-----------+------------------+----------------------+----------------------+
| 10/23/ | Office    | Vascular Surgery |   Augustin Lopez DNP      | Mesenteric ischemia, |
|    | Visit     |                  |                      |  chronic (HCC)       |
|        |           |                  |                      | (Primary Dx); H/O    |
|        |           |                  |                      | endarterectomy       |
+--------+-----------+------------------+----------------------+----------------------+
| 10/23/ | Hospital  | Radiology        |   Augustin Lopez DNP      | Mesenteric ischemia, |
|    | Encounter |                  |                      |  chronic (HCC)       |
+--------+-----------+------------------+----------------------+----------------------+
| 10/22/ | Telephone | Vascular Surgery |   Augustin Lopez DNP      | Other (instructions) |
|    |           |                  |                      |                      |
+--------+-----------+------------------+----------------------+----------------------+
 from Last 3 Months
 
 Family History
 
 
+-----------------+----------+------+----------+
| Medical History | Relation | Name | Comments |
+-----------------+----------+------+----------+
| Qiana hyperten  | Neg Hx   |      |          |
+-----------------+----------+------+----------+
 
 
 
 Social History
 
 
+---------------+-------+-----------+--------+------+
| Tobacco Use   | Types | Packs/Day | Years  | Date |
|               |       |           | Used   |      |
+---------------+-------+-----------+--------+------+
| Former Smoker |       |           |        |      |
+---------------+-------+-----------+--------+------+
 
 
 
+---------------------+---+---+---+
| Smokeless Tobacco:  |   |   |   |
| Former User         |   |   |   |
+---------------------+---+---+---+
 
 
 
 
+------------------------------+
| Comments: quit 50 years ago  |
+------------------------------+
 
 
 
+---------------+-------------+---------+----------+
| Alcohol Use   | Drinks/Week | oz/Week | Comments |
+---------------+-------------+---------+----------+
| Not Currently |             |         | 90m days |
+---------------+-------------+---------+----------+
 
 
 
+------------------+---------------+
| Sex Assigned at  | Date Recorded |
| Birth            |               |
+------------------+---------------+
| Not on file      |               |
+------------------+---------------+
 
 
 
+----------------+-------------+-------------+
| Job Start Date | Occupation  | Industry    |
+----------------+-------------+-------------+
| Not on file    | Not on file | Not on file |
+----------------+-------------+-------------+
 
 
 
+----------------+--------------+------------+
| Travel History | Travel Start | Travel End |
+----------------+--------------+------------+
 
 
 
+-------------------------------------+
| No recent travel history available. |
+-------------------------------------+
 
 
 
 Last Filed Vital Signs
 
 
+-------------------+----------------------+----------------------+----------+
| Vital Sign        | Reading              | Time Taken           | Comments |
+-------------------+----------------------+----------------------+----------+
| Blood Pressure    | 126/79               | 2020 10:14 AM  |          |
|                   |                      | PST                  |          |
+-------------------+----------------------+----------------------+----------+
| Pulse             | 64                   | 2020 10:14 AM  |          |
|                   |                      | PST                  |          |
+-------------------+----------------------+----------------------+----------+
| Temperature       | 36.6   C (97.9   F)  | 2019 11:27 AM  |          |
|                   |                      | PST                  |          |
+-------------------+----------------------+----------------------+----------+
| Respiratory Rate  | 18                   | 2019 11:27 AM  |          |
 
|                   |                      | PST                  |          |
+-------------------+----------------------+----------------------+----------+
| Oxygen Saturation | 100%                 | 2020 10:14 AM  |          |
|                   |                      | PST                  |          |
+-------------------+----------------------+----------------------+----------+
| Inhaled Oxygen    | -                    | -                    |          |
| Concentration     |                      |                      |          |
+-------------------+----------------------+----------------------+----------+
| Weight            | 79.3 kg (174 lb 13.2 | 2019  8:01 AM  |          |
|                   |  oz)                 | PST                  |          |
+-------------------+----------------------+----------------------+----------+
| Height            | 167.6 cm (5' 6")     | 2019  8:01 AM  |          |
|                   |                      | PST                  |          |
+-------------------+----------------------+----------------------+----------+
| Body Mass Index   | 28.22                | 2019  8:01 AM  |          |
|                   |                      | PST                  |          |
+-------------------+----------------------+----------------------+----------+
 
 
 
 Plan of Treatment
 
 
+--------+-------------+------------------+----------------------+-------------+
| Date   | Type        | Specialty        | Care Team            | Description |
+--------+-------------+------------------+----------------------+-------------+
| / | Appointment | Radiology        |   Augustin Lopez DNP      |             |
|    |             |                  | [address]     |             |
|        |             |                  | [place]  |             |
|        |             |                  | [address]  [phone]  |             |
|        |             |                  |  [phone] (Fax)  |             |
+--------+-------------+------------------+----------------------+-------------+
| / | Office      | Vascular Surgery |   Augustin Lopez DNP      |             |
|    | Visit       |                  | [address]     |             |
|        |             |                  | [place]  |             |
|        |             |                  | [address]  [phone]  |             |
|        |             |                  |  [phone] (Fax)  |             |
+--------+-------------+------------------+----------------------+-------------+
 
 
 
+----------------------+-----------+------------+----------+
| Health Maintenance   | Due Date  | Last Done  | Comments |
+----------------------+-----------+------------+----------+
| Vaccine:             |  |            |          |
| Dtap/Tdap/Td (1 -    | 5         |            |          |
| Tdap)                |           |            |          |
+----------------------+-----------+------------+----------+
| Colorectal Cancer    |  |            |          |
| Screening            | 4         |            |          |
| (Colonoscopy)        |           |            |          |
+----------------------+-----------+------------+----------+
| AAA Screening        |  |            |          |
|                      | 9         |            |          |
+----------------------+-----------+------------+----------+
| Vaccine:             |  |            |          |
| Pneumococcal 65+ (1  | 9         |            |          |
| of 2 - PCV13)        |           |            |          |
+----------------------+-----------+------------+----------+
| Vaccine: Zoster (2   | 04/15/200 | 2009 |          |
 
| of 3)                | 9         |            |          |
+----------------------+-----------+------------+----------+
| Adult Annual         |  |            |          |
| Wellness Visit       | 9         |            |          |
+----------------------+-----------+------------+----------+
| Vaccine: Influenza   |  | 2016 |          |
| (#1)                 | 9         |            |          |
+----------------------+-----------+------------+----------+
 
 
 
 Implants
 
 
+-------------------------------+-------+------+-------------+--------+--------+--------+
| Implanted                     | Type  | Area | Manufacture | Device | Shelf  | Model  |
|                               |       |      | r           |        | Expira | /      |
|                               |       |      |             | Identi | tion   | Serial |
|                               |       |      |             | fier   | Date   |  / Lot |
+-------------------------------+-------+------+-------------+--------+--------+--------+
| Xgrft Vascu-Guard Ptch 0.8x8  | Graft |      | BOBBI      |        | / | VG-010 |
| - SnaImplanted: Qty: 1 on     |       |      | BIOSCIENCE  |        |    | 8N /NA |
| 2019 by Sukhdeep Pardo MD  |       |      | - [place]      |        |        |        |
| at [facility]        |       |      |             |        |        | /[id number] |
| [facility]                |       |      |             |        |        |  |
|                               |       |      |             |        |        | 1520   |
+-------------------------------+-------+------+-------------+--------+--------+--------+
 
 
 
 Procedures
 
 
+---------------------+--------+-------------+----------------------+----------------------+
| Procedure Name      | Priori | Date/Time   | Associated Diagnosis | Comments             |
|                     | ty     |             |                      |                      |
+---------------------+--------+-------------+----------------------+----------------------+
| IR ANGIOGRAM        | Routin | 2019  |   Mesenteric         |   Results for this   |
| MESENTERIC VISCERAL | e      | 10:03 AM    | ischemia, chronic    | procedure are in the |
|                     |        | PST         | ([facility])                |  results section.    |
+---------------------+--------+-------------+----------------------+----------------------+
| IMAGING REPORT -    |        | 11/15/2019  |                      |   Results for this   |
| EXTERNAL SCAN       |        | 12:00 AM    |                      | procedure are in the |
|                     |        | PST         |                      |  results section.    |
+---------------------+--------+-------------+----------------------+----------------------+
 from Last 3 Months
 
 Results
 IR Angiogram Mesenteric Visceral (2019 10:03 AM PST)
 
+----------+
| Specimen |
+----------+
|          |
+----------+
 
 
 
+-----------------------------------------------------------------------+---------------+
| Impressions                                                           | Performed At  |
 
+-----------------------------------------------------------------------+---------------+
|      1.   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
| around large   branches.  2.   After angioplasty, moderate            |               |
| angiographic results.  3.   Not a good place for stent placement due  |               |
| to multiple large branches.                                           |               |
+-----------------------------------------------------------------------+---------------+
 
 
 
+----------------------------------------------------------------------------+--------------
-+
| Narrative                                                                  | Performed At 
 |
+----------------------------------------------------------------------------+--------------
-+
|   This result has an attachment that is not available.  ABDOMINAL          |   [facility]
 |
| AORTOGRAM PREOPERATIVE DIAGNOSIS: Chronic mesenteric ischemia              |              
 |
| POSTOPERATIVE DIAGNOSIS: Same PROCEDURE:   1. Moderate conscious           |              
 |
| sedation2. Ultrasound guided access of the right common femoral            |              
 |
| artery3. Aortogram4. Selective catheterization of superior mesenteric      |              
 |
| artery with mesenteric arteriogram5.   Superior mesenteric artery          |              
 |
| angioplasty using 7 x 40 mm angioplasty balloon SURGEON: Sukhdeep Pardo MD       |              
 |
| ASSISTANT: None ANESTHESIA: Moderate sedation and local anesthesia         |              
 |
|       Informed consent was obtained from the patient. Continuous           |              
 |
| cardiac monitoring was performed throughout the procedure. Conscious       |              
 |
| sedation was provided by the nursing staff during the procedure under      |              
 |
| my supervision.          Sedation time: 60 minutes                         |              
 |
|   Medications: 1 mg Versed IV, 50 Mcg Fentanyl IV ESTIMATED BLOOD          |              
 |
| LOSS: Minimal CONTRAST: 89 mL of Omnipaque 240 INDICATIONS: See            |              
 |
| preoperative history and physical FINDINGS: SMA was nearly occluded at     |              
 |
|  origin.   After angioplasty, there was still a focal area of stenosis     |              
 |
|  but this was not in a good place to stent due to multiple large           |              
 |
| branches around stenosis.   However, there was improved flow               |              
 |
| throughout SMA at end of case. DESCRIPTION OF PROCEDURE:   The patient     |              
 |
|  was properly identified and brought to the Cath Lab. The patient was      |              
 |
| placed supine on the catheter table and patient was given moderate         |              
 |
| sedation by nursing staff under my supervision. Patient's bilateral        |              
 |
| groins were then prepped and draped in the usual sterile fashion.          |              
 
 |
| Local anesthetic was given to the right groin and the right common         |              
 |
| femoral artery was accessed under ultrasound guidance using a              |              
 |
| micropuncture needle. A micropuncture sheath was inserted over a wire      |              
 |
| and up sized to a 4 French sheath. A Omni flush catheter was then          |              
 |
| inserted into the aorta and aortogram was then performed with the          |              
 |
| findings as described above. Next, an Amplatzer wire was then inserted     |              
 |
|  over the catheter and the 4 French sheath was removed. A 6.5 French       |              
 |
| durable sheath was then inserted over the wire with the tip of the         |              
 |
| sheath being placed into the proximal SMA. A vertebral catheter was        |              
 |
| inserted over the wire and the wire was then removed. A Glidewire was      |              
 |
| then placed into the vertebral catheter and used to cross through the      |              
 |
| SMA occlusion.   Next, a 260 fix core wire was then inserted over the      |              
 |
| catheter.   The SMA was then angioplastied using 7 x 40 mm angioplasty     |              
 |
|  balloon.   A final arteriogram was then performed through the sheath.     |              
 |
|  The steerable sheath was then removed and a Mynx closure device was       |              
 |
| then used on the right common femoral artery and hemostasis was            |              
 |
| ensured. The patient was then taken to the observation unit for            |              
 |
| further monitoring.                                                        |              
 |
|patient was given moderate sedation by nursing staff under my supervision.  |              
 |
|Patient's bilateral groins were then prepped and draped in the usual        |              
 |
|sterile fashion. Local anesthetic was given to the right groin and the      |              
 |
|right common femoral artery was accessed under ultrasound guidance using a  |              
 |
|micropuncture needle. A micropuncture sheath was inserted over a wire and   |              
 |
|up sized to a 4 French sheath. A Omni flush catheter was then inserted      |              
 |
|into the aorta and aortogram was then performed with the findings as        |              
 |
|described above. Next, an Amplatzer wire was then inserted over the         |              
 |
|catheter and the 4 French sheath was removed. A 6.5 French durable sheath   |              
 |
|was then inserted over the wire with the tip of the sheath being placed     |              
 |
|into the proximal SMA. A vertebral catheter was inserted over the wire and  |              
 |
|the wire was then removed. A Glidewire was then placed into the vertebral   |              
 
 |
|catheter and used to cross through the SMA occlusion.   Next, a 260 fix     |              
 |
|core wire was then inserted over the catheter.   The SMA was then           |              
 |
|angioplastied using 7 x 40 mm angioplasty balloon.   A final arteriogram    |              
 |
|was then performed through the sheath. The steerable sheath was then        |              
 |
|removed and a Mynx closure device was then used on the right common         |              
 |
|femoral artery and hemostasis was ensured. The patient was then taken to    |              
 |
|the observation unit for further monitoring.                                |              
 |
|                                                                            |              
 |
+----------------------------------------------------------------------------+--------------
-+
 
 
 
+---------------+---------+--------------------+--------------+
| Performing    | Address | City/State/Zipcode | Phone Number |
| Organization  |         |                    |              |
+---------------+---------+--------------------+--------------+
|   [facility] |         |                    |              |
+---------------+---------+--------------------+--------------+
 IMAGING REPORT - EXTERNAL SCAN (11/15/2019 12:00 AM PST)
 
+------------------------------------------------------------------------+--------------+
| Narrative                                                              | Performed At |
+------------------------------------------------------------------------+--------------+
|   This result has an attachment that is not available.  Ordered by an  |              |
| unspecified provider.                                                  |              |
+------------------------------------------------------------------------+--------------+
 from Last 3 Months
 
 Insurance
 
 
+----------------+--------+-------------+--------+-------+---------+--------+
| Payer          | Benefi | Subscriber  | Effect | Phone | Address | Type   |
|                | t Plan | ID          | dave    |       |         |        |
|                |  /     |             | Dates  |       |         |        |
|                | Group  |             |        |       |         |        |
+----------------+--------+-------------+--------+-------+---------+--------+
| [employer] [facility] [place]     | [id number]   | 20 |       |         | Indemn |
|                | CHOICE |             | 18-Pre |       |         | ity    |
|                |  PC3   |             | sent   |       |         |        |
+----------------+--------+-------------+--------+-------+---------+--------+
 
 
 
+--------------------+--------+-------------+--------+-------------+---------------------+
| Guarantor Name     | [employer] | Relation to | Date   | Phone       | Billing Address     |
|                    | t Type |  Patient    | of     |             |                     |
|                    |        |             | Birth  |             |                     |
+--------------------+--------+-------------+--------+-------------+---------------------+
| Nehemias Giordano | Person | Self        | / |             |   [address]      |
 
|                    | al/Ray |             | 1945   | [phone] | [place] [id number] |
|                    | [place]    |             |        | 4 (Home)    |                     |
+--------------------+--------+-------------+--------+-------------+---------------------+
 
 
 
 [employer] Directives
 
 
+-----------+-----------------+----------------+-------------+
| Type      | Date Recorded   | Patient        | Explanation |
|           |                 | Representative |             |
+-----------+-----------------+----------------+-------------+
| Power of  |                 |                |             |
|   |                 |                |             |
+-----------+-----------------+----------------+-------------+
| [employer]   | 2019  8:41 |                | no          |
| Directive |  AM             |                |             |
+-----------+-----------------+----------------+-------------+
 
 
 
+-------------+-------------+-------------+----------+
| Code Status | Date        | Date        | Comments |
|             | Activated   | Inactivated |          |
+-------------+-------------+-------------+----------+
| Full Code   | 2019   | 2019   |          |
|             | 3:07 PM     | 1:49 PM     |          |
+-------------+-------------+-------------+----------+

## 2020-01-13 NOTE — XMS
Encounter Summary
  Created on: 2020
 
 Pasquale Nehemias Martinez
 External Reference #: [id number]
 : 43
 Sex: Male
 
 Demographics
 
 
+-----------------------+----------------------+
| Address               | [address]         |
|                       | [place]  [address] |
+-----------------------+----------------------+
| Home Phone            | [phone]      |
+-----------------------+----------------------+
| Preferred Language    | Unknown              |
+-----------------------+----------------------+
| Marital Status        |               |
+-----------------------+----------------------+
| [employer] Affiliation | [id number]                 |
+-----------------------+----------------------+
| Race                  | Unknown              |
+-----------------------+----------------------+
| Ethnic Group          | Unknown              |
+-----------------------+----------------------+
 
 
 Author
 
 
+--------------+--------------------------------------------+
| Author       | [facility]  |
|              | and [place]                                |
+--------------+--------------------------------------------+
| Organization | [facility]  |
|              | and [place]                                |
+--------------+--------------------------------------------+
| Address      | Unknown                                    |
+--------------+--------------------------------------------+
| Phone        | Unavailable                                |
+--------------+--------------------------------------------+
 
 
 
 Support
 
 
+--------------+--------------+---------------------+-----------------+
| Name         | Relationship | Address             | Phone           |
+--------------+--------------+---------------------+-----------------+
| Rupa Giordano | ECON         | [address]             | [phone] |
|              |              | [address] [place] [address], [place]   |                 |
|              |              | [id number]               |                 |
+--------------+--------------+---------------------+-----------------+
 
 
 
 
 Care Team Providers
 
 
+-----------------------+------+-------------+
| Care Team Member Name | Role | Phone       |
+-----------------------+------+-------------+
| No, Physician         | PCP  | Unavailable |
+-----------------------+------+-------------+
 
 
 
 Reason for Visit
 Auth/Cert
 
+--------+--------+-----------+--------------+--------------+--------------+
| Status | Reason | Specialty | Diagnoses /  | Referred By  | Referred To  |
|        |        |           | Procedures   | Contact      | Contact      |
+--------+--------+-----------+--------------+--------------+--------------+
|        |        |           |   Diagnoses  |              |              |
|        |        |           |  Mesenteric  |              |              |
|        |        |           | ischemia,    |              |              |
|        |        |           | chronic      |              |              |
|        |        |           | ([facility])        |              |              |
|        |        |           | Procedures   |              |              |
|        |        |           | REVASCULARIZ |              |              |
|        |        |           | ATION        |              |              |
|        |        |           | MESENTERIC   |              |              |
+--------+--------+-----------+--------------+--------------+--------------+
 
 
 
 
 Encounter Details
 
 
+--------+---------+----------------------+----------------------+--------------------+
| Date   | Type    | Department           | Care Team            | Description        |
+--------+---------+----------------------+----------------------+--------------------+
| / | Surgery |   [facility]    |   Sukhdeep Pardo MD     | REVASCULARIZATION  |
| 2019   |         | [facility]       | [address]     | MESENTERIC         |
|        |         | OPERATING ROOM  [address]  | [address]  [place], [place]  |                    |
|        |         | [place]           | [address]           |                    |
|        |         | [place], [place]         | [phone]         |                    |
|        |         | [address]           | [phone] (Fax)   |                    |
|        |         | [phone]         |                      |                    |
+--------+---------+----------------------+----------------------+--------------------+
 
 
 
 Social History
 
 
+---------------+-------+-----------+--------+------+
| Tobacco Use   | Types | Packs/Day | Years  | Date |
|               |       |           | Used   |      |
+---------------+-------+-----------+--------+------+
| Former Smoker |       |           |        |      |
+---------------+-------+-----------+--------+------+
 
 
 
 
+---------------------+---+---+---+
| Smokeless Tobacco:  |   |   |   |
| Former User         |   |   |   |
+---------------------+---+---+---+
 
 
 
+------------------------------+
| Comments: quit 50 years ago  |
+------------------------------+
 
 
 
+---------------+-------------+---------+----------+
| Alcohol Use   | Drinks/Week | oz/Week | Comments |
+---------------+-------------+---------+----------+
| Not Currently |             |         | 90m days |
+---------------+-------------+---------+----------+
 
 
 
+------------------+---------------+
| Sex Assigned at  | Date Recorded |
| Birth            |               |
+------------------+---------------+
| Not on file      |               |
+------------------+---------------+
 
 
 
+----------------+-------------+-------------+
| Job Start Date | Occupation  | Industry    |
+----------------+-------------+-------------+
| Not on file    | Not on file | Not on file |
+----------------+-------------+-------------+
 
 
 
+----------------+--------------+------------+
| Travel History | Travel Start | Travel End |
+----------------+--------------+------------+
 
 
 
+-------------------------------------+
| No recent travel history available. |
+-------------------------------------+
 documented as of this encounter
 
 Last Filed Vital Signs
 
 
+-------------------+----------------------+----------------------+----------+
| Vital Sign        | Reading              | Time Taken           | Comments |
+-------------------+----------------------+----------------------+----------+
| Blood Pressure    | 141/66               | 2019 10:42 AM  |          |
|                   |                      | PDT                  |          |
+-------------------+----------------------+----------------------+----------+
 
| Pulse             | 76                   | 2019  7:57 AM  |          |
|                   |                      | PDT                  |          |
+-------------------+----------------------+----------------------+----------+
| Temperature       | 36.9   C (98.5   F)  | 2019  7:57 AM  |          |
|                   |                      | PDT                  |          |
+-------------------+----------------------+----------------------+----------+
| Respiratory Rate  | 18                   | 2019  7:57 AM  |          |
|                   |                      | PDT                  |          |
+-------------------+----------------------+----------------------+----------+
| Oxygen Saturation | 98%                  | 2019  7:57 AM  |          |
|                   |                      | PDT                  |          |
+-------------------+----------------------+----------------------+----------+
| Inhaled Oxygen    | -                    | -                    |          |
| Concentration     |                      |                      |          |
+-------------------+----------------------+----------------------+----------+
| Weight            | 78.3 kg (172 lb 9.9  | 2019  3:50 AM  |          |
|                   | oz)                  | PDT                  |          |
+-------------------+----------------------+----------------------+----------+
| Height            | 168.9 cm (5' 6.5")   | 2019 10:43 AM  |          |
|                   |                      | PDT                  |          |
+-------------------+----------------------+----------------------+----------+
| Body Mass Index   | 27.44                | 2019 10:43 AM  |          |
|                   |                      | PDT                  |          |
+-------------------+----------------------+----------------------+----------+
 documented in this encounter
 
 Functional Status
 
 
+---------------------------------------------+----------+--------------------+
| Functional Status                           | Response | Date of Assessment |
+---------------------------------------------+----------+--------------------+
| Are you deaf or do you have serious         | No       | 2019         |
| difficulty hearing?                         |          |                    |
+---------------------------------------------+----------+--------------------+
| Are you blind or do you have serious        | No       | 2019         |
| difficulty seeing, even when wearing        |          |                    |
| glasses?                                    |          |                    |
+---------------------------------------------+----------+--------------------+
| Do you have serious difficulty walking or   | No       | 2019         |
| climbing stairs? (5 years old or older)     |          |                    |
+---------------------------------------------+----------+--------------------+
| Do you have difficulty dressing or bathing? | No       | 2019         |
|  (5 years old or older)                     |          |                    |
+---------------------------------------------+----------+--------------------+
| Because of a physical, mental, or emotional | No       | 2019         |
|  condition, do you have difficulty doing    |          |                    |
| errands alone such as visiting a doctor's   |          |                    |
| office or shopping? [15 years old or        |          |                    |
| older)]                                     |          |                    |
+---------------------------------------------+----------+--------------------+
 
 
 
+---------------------------------------------+----------+--------------------+
| Cognitive Status                            | Response | Date of Assessment |
+---------------------------------------------+----------+--------------------+
| Because of a physical, mental, or emotional | No       | 2019         |
|  condition, do you have serious difficulty  |          |                    |
| concentrating, remembering, or making       |          |                    |
 
| decisions? (5 years old or older)           |          |                    |
+---------------------------------------------+----------+--------------------+
 documented as of this encounter
 
 Discharge Summaries
 Billy Gutierres PA-C - 2019  8:36 AM PDTFormatting of this note might be different f
rom the original.
 Physician Discharge Summary 
 
 Patient ID:
 Nehemias Giordano
 [id number]
 74 y.o.
 1945
 
 Admit date: 2019
 
 Discharge date and time: 2019
 
 Admitting Physician: Sukhdeep Pardo MD 
 
 Discharge Physician: Billy Gutierres PA-C
 
 Admission Diagnoses: Mesenteric ischemia, chronic (HCC) [K55.1]
 
 Discharge Diagnoses: Same; Status post mesenteric artery revascularization
 
 Admission Condition: poor
 
 Discharged Condition: stable
 
 Indication for Admission: Symptomatic SMA stenosis
 
 Hospital Course: Patient was admitted on 2019 and underwent open SMA endarterectomy an
d patch angioplasty. Post operative course has been uneventful. Began passing flatus two day
s ago and diet has been advanced. Stable for discharge home.
 
 Consults: none
 
 Treatments: surgery: SMA endart and patch angioplasty
 
 Discharge Exam:
 Vitals:reviewed
 CONSTITUTIONAL:   Conversant, well developed, NAD
 EYES: Anicteric sclerae, no lid drag, no proptosis
 RESP: Normal effort, regular, even, unlabored rate
 CV: No peripheral edema, rate regular
 ABD:  Appropriate tenderness.  Hypoactive bowel tones
 SKIN: Pink, warm, dry without rash/lesion
 MS: ROM not limited, no digital cyanosis, normal gait
 NEURO: Conversant, A&O times 3
 PSYCH: appropriate affect, speech and tone, judgement and insight intact
 Vascular:  Palpable brachial pulses bilaterally. Abdominal binder in place. Staples intact.
 
 Disposition: home
 
 Patient Instructions: Keep wound clean and dry, ice to area for comfort and remove dressing
 in two days. Leave open to air. Monitor for signs of infection. 
  
 Discharge Medications 
 
  
 New Medications  
   Details 
 docusate sodium 100 mg capsule
  Take 1 capsule by mouth 2 times daily.
 aka:  COLACE
  
 HYDROcodone-acetaminophen 5-325 mg per tablet
  Take 1 tablet by mouth every 4 hours as needed for Pain.
 aka:  NORCO
  
  
 Unchanged Medications  
   Details 
 ASPIRIN 81 PO
  Take 81 mg by mouth.
  
 CENTRUM SILVER 50+MEN PO
  Take  by mouth.
  
 hydroCHLOROthiazide 25 mg tablet
  Take 25 mg by mouth Daily.
  
 losartan 50 mg tablet
  Take 50 mg by mouth Daily.
 aka:  COZAAR
  
 simvastatin 10 mg tablet
  Take 10 mg by mouth nightly.
 aka:  ZOCOR
  
  
 
 Plan: POD 6. Stable for discharge. Continue aspirin and statin therapy. Continue ambulation
 and pain management. Getting more aggressive with constipation. Patient understands to come
 back in with worsening abdominal pain and failure of bowel movement. 
 Activity: activity as tolerated, ambulate in house and no lifting, Driving, or Strenuous ex
ercise for 6 weeks
 Diet: cardiac diet
 Wound Care: keep wound clean and dry, ice to area for comfort and remove dressing in two da
ys. Leave open to air. Monitor for signs of infection. 
 
 Follow-up with Vascular clinic in 2 weeks.
 
 Signed:
 Billy Gutierres PA-C
 2019
 8:36
 Electronically signed by Billy Gutierres PA-C at 2019  8:42 AM PDTdocumented in this 
encounter
 
 Discharge Instructions
 Instructions Araceli Bartholomew RN - 2019Formatting of this note might be different fro
m the original.
 
 Constipation (Adult)
 Constipation means that you have bowel movements that are less frequent than usual. Stools 
often become very hard and difficult to pass.
 Constipation is very common. At some point in life, it affects almost everyone. Since every
one's bowel habits are different, what is constipation to one person may not be to another. 
 
Your healthcare provider may do tests to diagnose constipation. It depends on whathe or sh
efinds when evaluating you.
 
 Symptoms of constipation include:
  Abdominal pain
  Bloating
  Vomiting
  Painful bowel movements
  Itching, swelling, bleeding, or pain around the anus
 Causes
 Constipation can have many causes. These include:
  Diet low in fiber
  Too much dairy
  Not drinking enough liquids
  Lack of exercise or physical activity (especially true for older adults)
  Changes in lifestyle or daily routine, including pregnancy, aging, work, and travel
  Frequent use or misuse of laxatives
  Ignoring the urge to have a bowel movement or delaying it until later
  Medicines, such as certain prescription pain medicines, iron supplements, antacids, cert
ain antidepressants, and calcium supplements
  Diseases like irritable bowel syndrome, bowel obstructions, stroke, diabetes, thyroid di
sease, Parkinson disease, hemorrhoids, and colon cancer
 Complications
 Potential complications of constipation can include:
  Hemorrhoids
  Rectal bleeding from hemorrhoids or anal fissures(skin tears)
  Hernias
  Dependency on laxatives
  Chronic constipation
  Fecal impaction, a severe form of constipation in which a large amount of hard stool is 
in your rectum that you can't pass
  Bowel obstruction or perforation
 Home care
 All treatment should be done after talking with your healthcare provider. This is especiall
y true if you have another medical problems, are taking prescription medicines, or are an ol
deandre adult. Treatment most often involves lifestyle changes. You may also need medicines. You
r healthcare provider will tell you which will work best for you. Follow the advice below to
 help avoid this problem in the future.
 Lifestyle changes
 These lifestyle changes can help prevent constipation:
  Diet. Eat a high-fiber diet, with fresh fruit and vegetables, and reduce dairy intake, m
eats, and processed foods
  Fluids. It's important to get enough fluids each day. Drink plenty of water when you eat
 more fiber. If you are on diet that limits the amount of fluid you can have, talk about thi
s with your healthcare provider.
  Regular exercise. Check with your healthcare provider first.
 Medicines
 Take any medicines as directed. Some laxatives are safe to use only every now and then. Oth
ers can be taken on a regular basis. While laxatives don't cause bowel dependence, they are 
treating the symptoms. So your constipation may return if you don't make other changes. Talk
 with your healthcare provider or pharmacist if you have questions.
 Prescription pain medicines can cause constipation. If you are taking this kind of medicine
, ask your healthcare provider if you should also take a stool softener.
 Medicines you may take to treat constipation include:
  Fiber supplements
  Stool softeners
  Laxatives
  Enemas
  Rectal suppositories
 Follow-up care
 
 Follow up with your healthcare provider if symptoms don't get better in the next few days. 
You may need to have more tests or see a specialist.
 Call 911
 Call 911 if any of these occur:
  Trouble breathing
  Stiff, rigid abdomen that is severely painful to touch
  Confusion
  Fainting or loss of consciousness
  Rapid heart rate
  Chest pain
 When to seek medical advice
 Call your healthcare provider right away if any of these occur:
  Fever of 100.4F (38C) or higher, or as directed by your healthcare provider
  Failure to resume normal bowel movements
  Pain in your abdomen or back gets worse
  Nausea or vomiting
  Swelling in your abdomen
  Blood in the stool
  Black, tarry stool
  Involuntary weight loss
  Weakness
 Date Last Reviewed: 2018 [employer]. [address] [place] [address]. All righ
ts reserved. This information is not intended as a substitute for professional medical care.
 Always follow your healthcare professional's instructions.
 
 After Open Abdominal Superior Mesenteric Artery Surgery
 You have hadsurgery to repair SMA stenosis.
 Home care
 Recommendations for taking care of yourself at home include the following:
  Avoid strenuous activity for 4 to 6 weeks after your surgery.
  Ask your healthcare provider how long it will be before you can return to work.
  Gradually increase your activity. It may take some time for you to return to your normal
 activity level.
  Don  t drive for 2 weeks after surgery or while you are taking opioid pain medicine. As
k someone to take you to any appointments.
  Check your incision every day for signs of infection (swelling, redness, drainage, or wa
rmth).
  Keep your incision clean. Wash it gently with soap and water when you shower.
  Don  t lift anything heavier than 5 pounds for 2 weeks after surgery.
  Avoid sitting or standing for long periods without moving your legs and feet.
  Keep your feet up when you sit in a chair.
  Take your medicines exactly as directed.
 
 When to call your healthcare provider
 Call your healthcare provider right away if you have any of the following:
  Redness, pain, swelling, or drainage from your incision
  Fever of 100.4F (38C) or higher, or as directed by your healthcare provider
  Sudden coldness, pain, or paleness in your leg
  Loss of feeling in your legs
  Severe or sudden pain in your stomach
  Fail to pass gas
  Bloody bowel movements
  Prolonged constipation
  Nausea or vomiting
  Trouble breathing
  Pain or heaviness in your chest or arms 
 Date Last Reviewed: 2016 [employer]. [address] [place] [id number]. All righ
ts reserved. This information is not intended as a substitute for professional medical care.
 
 Always follow your healthcare professional's instructions.
 
 
 documented in this encounter
 
 Medications at Time of Discharge
 
 
+----------------------+----------------------+-----------+---------+----------+----------+
| Medication           | Sig                  | Dispensed | Refills | Start    | End Date |
|                      |                      |           |         | Date     |          |
+----------------------+----------------------+-----------+---------+----------+----------+
|   ASPIRIN 81 PO      | Take 81 mg by mouth. |           | 0       |          |          |
+----------------------+----------------------+-----------+---------+----------+----------+
|   docusate sodium    | Take 1 capsule by    |   30      | 0       | / |          |
| (COLACE) 100 mg      | mouth 2 times daily. | capsule   |         | 19       |          |
| capsule              |                      |           |         |          |          |
+----------------------+----------------------+-----------+---------+----------+----------+
|                      | Take 25 mg by mouth  |           | 0       |          |          |
| hydroCHLOROthiazide  | Daily.               |           |         |          |          |
| 25 mg tablet         |                      |           |         |          |          |
+----------------------+----------------------+-----------+---------+----------+----------+
|                      | Take 1 tablet by     |   30      | 0       | 20 |          |
| HYDROcodone-acetamin | mouth every 4 hours  | tablet    |         | 19       |          |
| ophen (NORCO) 5-325  | as needed for Pain.  |           |         |          |          |
| mg per tablet        |                      |           |         |          |          |
+----------------------+----------------------+-----------+---------+----------+----------+
|   losartan (COZAAR)  | Take 50 mg by mouth  |           | 0       |          |          |
| 50 mg tablet         | Daily.               |           |         |          |          |
+----------------------+----------------------+-----------+---------+----------+----------+
|   Multiple           | Take  by mouth.      |           | 0       |          |          |
| Vitamins-Minerals    |                      |           |         |          |          |
| (CENTRUM SILVER      |                      |           |         |          |          |
| 50+MEN PO)           |                      |           |         |          |          |
+----------------------+----------------------+-----------+---------+----------+----------+
|   simvastatin        | Take 10 mg by mouth  |           | 0       |          |          |
| (ZOCOR) 10 mg tablet | nightly.             |           |         |          |          |
+----------------------+----------------------+-----------+---------+----------+----------+
 documented as of this encounter
 
 Progress Notes
 Araceli Bartholomew RN - 2019 11:37 AM PDTPatient discharged to home via private car. Ve
rbal and written instructions provided to patient, all questions addressed. Rx and all belon
gings gathered. IVs removed and bandages applied. Patient wheeled to car and discharged in s
table condition with family. 
 Electronically signed by Araceli Bartholomew RN at 2019 11:37 AM Gracy Mary RN 
- 2019  6:58 AM PDTPt passing gas but still no BM. Bowel tones: normoactive to hypoact
dave in upper quadrants, hypoactive in both lower quadrants.  Pt declined suppository twice, 
stating, "If I still haven't had a BM by the morning, I might do the suppository around 8."
 
 Pt set to dc home with wife @ 11 (lives in [place]).  
 
 Abdominal hydrocolloid dressing C/D/I, with no new drainage.  Pt splints appropriately when
 coughing.
 
 End of shift review complete.Electronically signed by Gracy Joe RN at 2019  8:
04 AM Beatrice Man RN - 2019  6:31 PM PDTPt advanced to full liquids, continues 
to pass gas but no BM yet. Bowel tones present. Will continue to monitor.
 
 Beatrice Goel RN Electronically signed by Beatrice Goel RN at 2019  6:32 PM Christopher Cho MD - 2019 12:46 PM PDTVascular Surgery
  still with no BM.  Just took 1 tab of Norco.  Seems to be controlling the pain.  He wants 
to wait till tomorrow. Discussed his care with his wife by phone.  Questions answered about 
diet, climbing 13 stairs to get into one level home. Wife will arrive by 11am with friend to
 help him home and into house.  Wants a walker for home as well. Ok to shower.Electronically
 signed by Christopher Man MD at 2019 12:48 PM Christopher Prince MD - 2019  8:46 AM PDTFormatting of this note might be different from the original.
 [facility]
 Service: Vascular Surgery
 Progress Note
 
 Hospital Day:   LOS: 5 days 
 Post-Op Day:  #5
 SUBJECTIVE
 
      Patient Summary:  S/p SMA endarterectomy for chronic mesenteric ischemia
 
      Events Overnight:    Abdominal binder in place. Passing gas, no stool.  No N/V. Thirst
y.
 
 Scheduled Medications 
   aspirin  81 mg Oral Daily 
   hydroCHLOROthiazide  25 mg Oral Daily 
   losartan  50 mg Oral Daily 
   pantoprazole  40 mg Intravenous Daily 
 
 Continuous Infusions 
   niCARdipine Stopped (19 1628) 
 
 PRN Medications
 acetaminophen, bisacodyl, HYDROcodone-acetaminophen, HYDROmorphone, insulin lispro, morphin
e, sodium chloride 0.9%
 
 OBJECTIVE
 Vital Signs:
 Vitals: 
  19 0715 
 BP: 155/74 
 Pulse: 71 
 Resp: 18 
 Temp: 36.7 C (98 F) 
 
 Physical Exam
 Vitals:reviewed
 CONSTITUTIONAL:  Alert/appropriate
 CV: No peripheral edema, rate regular
 ABD:  Incisional tenderness only. normal bowel tones
 DRSNG: dry, spotty strikethru.
 SKIN: Pink, warm, well hydrated
 LE: w/out calf tenderness. No edema. 
 
 DATA
 Recent Results (from the past 24 hour(s)) 
 Basic Metabolic Panel 
  Collection Time: 19  4:17 
 Result Value Ref Range 
  Na 135 135 - 145 mmol/L 
  K 3.8 3.5 - 4.9 mmol/L 
  Cl 102 99 - 109 mmol/L 
  CO2 26 23 - 32 mmol/L 
 
  Anion Gap 11 5 - 20 mmol/L 
  Glucose 140 (H) 65 - 99 mg/dL 
  BUN 8 8 - 25 mg/dL 
  Creatinine 0.9 0.70 - 1.30 mg/dL 
  BUN/Creatinine Ratio 9  
  Calcium 8.5 8.5 - 10.5 mg/dL 
  Estimated GFR >60 >60 mL/min/1.73m2 
 CBC with Differential 
  Collection Time: 19  4:17 
 Result Value Ref Range 
  WBC 5.40 3.80 - 11.00 K/uL 
  RBC 3.57 (L) 4.20 - 5.70 M/uL 
  Hemoglobin 12.1 (L) 13.2 - 17.0 g/dL 
  Hematocrit 34.5 (L) 39.0 - 50.0 % 
  MCV 96.7 80.0 - 100.0 fl 
  MCH 33.8 27.0 - 34.0 pg 
  MCHC 35.0 32.0 - 35.5 g/dL 
  RDW-SD 45.5 37 - 53 fl 
  Platelet Count 158 150 - 400 K/uL 
  MPV 7.5 fl 
  Diff Type AUTOMATED  
  % Neutrophils 55.42 % 
  % Lymphocytes 36.41 % 
  Monocyte % 6.61 % 
  Eosinophils % 1.23 % 
  Basophils % 0.33 % 
  Neutrophils, Absolute 3.00 1.90 - 7.40 K/uL 
  Absolute Lymphocytes 1.97 1.00 - 3.90 K/uL 
  Absolute Monocytes 0.36 0.00 - 0.80 K/uL 
  Eosinophils, Absolute 0.07 0.00 - 0.50 K/uL 
  Basophils, Absolute 0.02 0.00 - 0.10 K/uL 
 
 PROBLEM LIST
 
 Principal Problem:
   Mesenteric ischemia, chronic 
 Active Problems:
   Hypertension
   Hyperlipidemia
 
 ASSESSMENT & PLAN
 
 S/p SMA endarterectomy for chronic mesenteric ischemia.  POD5- ileus resolving. Will begin 
advance diet.  Continue ambulation.  Oral pain med, hold IV dilaudid. Home afternoon if tole
rates diet and pain controlled.   
 
 Disposition:  [facility]
 Code Status:  Full Code
 
 Christopher Man MD
 2019                                           [phone] signed by Mi
luciana Man MD at 2019 12:46 PM Gracy Mary RN - 2019  6:54
 AM PDTHourly rounding performed.  Pt passing gas but has not had BM yet. Ambulated 1 lap ar
ound unit. Cares & concerns will be passed on to dayshift RN.
 End of shift review complete.Electronically signed by Gracy Joe RN at 2019  7:
58 AM Natasha Ace DAVID RN - 2019  6:28 PM PDTA/O, up with supervision, passing gas t
his shift, still no BM, requesting dilaudid q2, plan to transition to PO pain meds tomorrow,
 advanced to clear liquids today, tolerating well
 
 Chart check complete
 
 
 Electronically signed by: Ace Mas RN 2019 18:30
 Electronically signed by Ace Mas RN at 2019  6:30 PM Christopher Prince MD - 2019  9:46 AM PDTFormatting of this note might be different from the [facility]
 Service: Vascular Surgery
 Progress Note
 
 Hospital Day:   LOS: 4 days 
 Post-Op Day:  3
 SUBJECTIVE
 
      Patient Summary:  S/p SMA endarterectomy for chronic mesenteric ischemia
 
      Events Overnight:  Transferred from ICU to floor. Abdominal binder in place. Passing g
as, no stool.  No N/V. Thirsty.
 
 Scheduled Medications 
   pantoprazole  40 mg Intravenous Daily 
 
 Continuous Infusions 
   dextrose 5% and sodium chloride 0.45% with KCl 20 mEq/L 125 mL/hr at 19 0219 
   niCARdipine Stopped (19 1628) 
 
 PRN Medications
 acetaminophen, bisacodyl, HYDROmorphone, insulin lispro, morphine, sodium chloride 0.9%
 
 OBJECTIVE
 Vital Signs:
 Vitals: 
  19 0807 
 BP: 174/85 
 Pulse: 83 
 Resp: 18 
 Temp: 37.9 C (100.2 F) 
 
 Physical Exam
 Vitals:reviewed
 CONSTITUTIONAL:  Alert/appropriate
 CV: No peripheral edema, rate regular
 ABD:  Incisional tenderness only. normal bowel tones
 DRSNG: dry, spotty strikethru.
 SKIN: Pink, warm, well hydrated
 LE: w/out calf tenderness. No edema. 
 
 DATA
 Recent Results (from the past 24 hour(s)) 
 Basic Metabolic Panel 
  Collection Time: 19  4:29 
 Result Value Ref Range 
  Na 135 135 - 145 mmol/L 
  K 4.0 3.5 - 4.9 mmol/L 
  Cl 103 99 - 109 mmol/L 
  CO2 25 23 - 32 mmol/L 
  Anion Gap 11 5 - 20 mmol/L 
  Glucose 140 (H) 65 - 99 mg/dL 
  BUN 9 8 - 25 mg/dL 
  Creatinine 0.8 0.70 - 1.30 mg/dL 
  BUN/Creatinine Ratio 11  
 
  Calcium 8.7 8.5 - 10.5 mg/dL 
  Estimated GFR >60 >60 mL/min/1.73m2 
 CBC with Differential 
  Collection Time: 19  4:29 
 Result Value Ref Range 
  WBC 5.54 3.80 - 11.00 K/uL 
  RBC 3.72 (L) 4.20 - 5.70 M/uL 
  Hemoglobin 12.3 (L) 13.2 - 17.0 g/dL 
  Hematocrit 36.0 (L) 39.0 - 50.0 % 
  MCV 96.8 80.0 - 100.0 fl 
  MCH 33.1 27.0 - 34.0 pg 
  MCHC 34.2 32.0 - 35.5 g/dL 
  RDW-SD 46.8 37 - 53 fl 
  Platelet Count 140 (L) 150 - 400 K/uL 
  MPV 7.4 fl 
  Diff Type AUTOMATED  
  % Neutrophils 57.00 % 
  % Lymphocytes 36.25 % 
  Monocyte % 5.29 % 
  Eosinophils % 1.03 % 
  Basophils % 0.43 % 
  Neutrophils, Absolute 3.16 1.90 - 7.40 K/uL 
  Absolute Lymphocytes 2.01 1.00 - 3.90 K/uL 
  Absolute Monocytes 0.29 0.00 - 0.80 K/uL 
  Eosinophils, Absolute 0.06 0.00 - 0.50 K/uL 
  Basophils, Absolute 0.02 0.00 - 0.10 K/uL 
 
 PROBLEM LIST
 
 Principal Problem:
   Mesenteric ischemia, chronic 
 Active Problems:
   Hypertension
   Hyperlipidemia
 
 ASSESSMENT & PLAN
 
 S/p SMA endarterectomy for chronic mesenteric ischemia.  POD4- ileus resolving. Will begin 
clear liquids.  Continue ambulation.  Resume po BP meds for sBP climbing. Wait on po pain me
d.  
 
 Disposition:  [facility]
 Code Status:  Full Code
 
 Christopher Man MD
 2019                                           [phone] signed by Mi
luciana Man MD at 2019  9:52 AM Lizzy Navarro RN - 2019  5:57
 PM PDTPt transferred to 9114, report received from LUZ MARIA Gdofrey. Medicated for pain, see MAR
. Pt up walking this shift with FWW. Bowel tones hypoactive, but gurgling.
 
 Chart check complete.
 
 Lizzy Orantes RN
 Electronically signed by Lizzy Orantes RN at 2019  6:00 PM Billy Briceno PA- C - 2019  3:56 PM PDTFormatting of this note might be different from the original.
 [facility]
 Service: Vascular Surgery
 Progress Note
 
 Hospital Day:   LOS: 3 days 
 
 Post-Op Day:  3
 SUBJECTIVE
 
      Patient Summary:  S/p SMA endarterectomy for chronic mesenteric ischemia
 
      Events Overnight:  No events overnight. Abdominal binder in place. 
 
 Scheduled Medications 
   pantoprazole  40 mg Intravenous Daily 
 
 Continuous Infusions 
   dextrose 5% and sodium chloride 0.45% with KCl 20 mEq/L 125 mL/hr at 19 0819 
   niCARdipine Stopped (19 1628) 
 
 PRN Medications
 acetaminophen, bisacodyl, HYDROmorphone, insulin lispro, morphine, sodium chloride 0.9%
 
 OBJECTIVE
 Vital Signs:
 Vitals: 
  19 1456 
 BP: (!) 166/91 
 Pulse: 81 
 Resp: 16 
 Temp: 36.8 C (98.2 F) 
 
 Physical Exam
 Vitals:reviewed
 CONSTITUTIONAL:  Conversant, well developed, NAD
 EYES: Anicteric sclerae, no lid drag, no proptosis
 RESP: Normal effort, regular, even, unlabored rate
 CV: No peripheral edema, rate regular
 ABD:  Appropriate tenderness.  Hypoactive bowel tones
 SKIN: Pink, warm, dry without rash/lesion
 MS: ROM not limited, no digital cyanosis, normal gait
 NEURO: Conversant, A&O times 3
 PSYCH: appropriate affect, speech and tone, judgement and insight intact
 Vascular:  Palpable brachial pulses bilaterally. Abdominal binder in place. Staples intact.
 
 DATA
 Recent Results (from the past 24 hour(s)) 
 Basic Metabolic Panel 
  Collection Time: 19  4:23 
 Result Value Ref Range 
  Na 137 135 - 145 mmol/L 
  K 4.0 3.5 - 4.9 mmol/L 
  Cl 104 99 - 109 mmol/L 
  CO2 26 23 - 32 mmol/L 
  Anion Gap 11 5 - 20 mmol/L 
  Glucose 140 (H) 65 - 99 mg/dL 
  BUN 8 8 - 25 mg/dL 
  Creatinine 0.9 0.70 - 1.30 mg/dL 
  BUN/Creatinine Ratio 9  
  Calcium 8.4 (L) 8.5 - 10.5 mg/dL 
  Estimated GFR >60 >60 mL/min/1.73m2 
 CBC with Differential 
  Collection Time: 19  4:23 
 Result Value Ref Range 
  WBC 9.31 3.80 - 11.00 K/uL 
  RBC 3.81 (L) 4.20 - 5.70 M/uL 
 
  Hemoglobin 12.7 (L) 13.2 - 17.0 g/dL 
  Hematocrit 37.1 (L) 39.0 - 50.0 % 
  MCV 97.5 80.0 - 100.0 fl 
  MCH 33.5 27.0 - 34.0 pg 
  MCHC 34.3 32.0 - 35.5 g/dL 
  RDW-SD 46.8 37 - 53 fl 
  Platelet Count 162 150 - 400 K/uL 
  MPV 7.5 fl 
  Diff Type AUTOMATED  
  % Neutrophils 54.94 % 
  % Lymphocytes 39.44 % 
  Monocyte % 4.62 % 
  Eosinophils % 0.69 % 
  Basophils % 0.31 % 
  Neutrophils, Absolute 5.12 1.90 - 7.40 K/uL 
  Absolute Lymphocytes 3.67 1.00 - 3.90 K/uL 
  Absolute Monocytes 0.43 0.00 - 0.80 K/uL 
  Eosinophils, Absolute 0.06 0.00 - 0.50 K/uL 
  Basophils, Absolute 0.03 0.00 - 0.10 K/uL 
 
 PROBLEM LIST
 
 Principal Problem:
   Mesenteric ischemia, chronic 
 Active Problems:
   Hypertension
   Hyperlipidemia
 
 ASSESSMENT & PLAN
 
 S/p SMA endarterectomy for chronic mesenteric ischemia.  POD3.  Denies flatus still. Contin
ue strict NPO with swabs for comfort until return of bowel function.  Continue ambulation wi
th PT.  
 
 Disposition:  [facility]
 Code Status:  Full Code
 
 Billy Gutierres PA-C
 2019Electronically signed by Billy Gutierres PA-C at 2019  3:58 PM Bekah Carmona RN - 2019  2:51 PM PDTPt transferred to 9RP. Electronically signed by Hina Lord RN at 2019  2:52 PM Bekah Carmona RN - 2019  2:33 PM PD
TReport called to Lizett on 9RP. Will transfer pt at this time. Electronically signed by Nino Lord RN at 2019  2:33 PM Maye Hawkins RN - 2019  7:31 PM PDT
Patient worked with PT this morning and has sat in chair the remainder of the shift. Pt c/o 
occasional pain, worse with ambulation, coughing, or laughing. Medicated with 1mg Dilaudid x
 3 with relief. Patient made several trips to bathroom with use of 2WW and CGA. Abdominal bi
nder in place per JAILYN Sierra. Pt denies passing flatus this shift, continues to be NPO.
 Maye Guevara RN
 Electronically signed by Maye Guevara RN at 2019  7:33 PM Sukhdeep Castro MD -   6:56 AM PDTFormatting of this note might be different from the original.
 [facility]
 Service: Vascular Surgery
 Progress Note
 
 Hospital Day:   LOS: 2 days 
 Post-Op Day:  2 Days Post-Op
 SUBJECTIVE
 
      Patient Summary:  S/p SMA endarterectomy for chronic mesenteric ischemia
 
 
      Events Overnight:  No events overnight.  Pain well controlled with pain meds.  Ambulat
ed yesterday with assistance.  
 
 Scheduled Medications 
   pantoprazole  40 mg Intravenous Daily 
 
 Continuous Infusions 
   dextrose 5% and sodium chloride 0.45% with KCl 20 mEq/L 125 mL/hr at 19 2228 
   niCARdipine Stopped (19 1628) 
 
 PRN Medications
 acetaminophen, bisacodyl, HYDROmorphone, insulin lispro, morphine, sodium chloride 0.9%
 
 OBJECTIVE
 Vital Signs:
 Vitals: 
  19 0421 
 BP: 149/80 
 Pulse: 63 
 Resp: 16 
 Temp: 37.2 C (99 F) 
 
 Physical Exam
 Vitals:reviewed
 CONSTITUTIONAL:  Conversant, well developed, NAD
 EYES: Anicteric sclerae, no lid drag, no proptosis
 RESP: Normal effort, regular, even, unlabored rate
 CV: No peripheral edema, rate regular
 ABD:  Appropriate tenderness.  Hypoactive bowel tones
 SKIN: Pink, warm, dry without rash/lesion
 MS: ROM not limited, no digital cyanosis, normal gait
 NEURO: Conversant, A&O times 3
 PSYCH: appropriate affect, speech and tone, judgement and insight intact
 Vascular:  Palpable brachial pulses bilaterally. 
 
 DATA
 Recent Results (from the past 24 hour(s)) 
 Lactic Acid 
  Collection Time: 19  9:09 
 Result Value Ref Range 
  Lactate, Serum 1.6 0.4 - 2.0 mmol/L 
 Basic Metabolic Panel 
  Collection Time: 19  4:06 
 Result Value Ref Range 
  Na 138 135 - 145 mmol/L 
  K 4.4 3.5 - 4.9 mmol/L 
  Cl 106 99 - 109 mmol/L 
  CO2 25 23 - 32 mmol/L 
  Anion Gap 11 5 - 20 mmol/L 
  Glucose 144 (H) 65 - 99 mg/dL 
  BUN 16 8 - 25 mg/dL 
  Creatinine 0.9 0.70 - 1.30 mg/dL 
  BUN/Creatinine Ratio 18  
  Calcium 8.6 8.5 - 10.5 mg/dL 
  Estimated GFR >60 >60 mL/min/1.73m2 
 CBC with Differential 
  Collection Time: 19  4:06 
 Result Value Ref Range 
  WBC 10.14 3.80 - 11.00 K/uL 
  RBC 3.79 (L) 4.20 - 5.70 M/uL 
 
  Hemoglobin 12.6 (L) 13.2 - 17.0 g/dL 
  Hematocrit 37.1 (L) 39.0 - 50.0 % 
  MCV 97.8 80.0 - 100.0 fl 
  MCH 33.3 27.0 - 34.0 pg 
  MCHC 34.1 32.0 - 35.5 g/dL 
  RDW-SD 47.3 37 - 53 fl 
  Platelet Count 149 (L) 150 - 400 K/uL 
  MPV 7.4 fl 
  Diff Type AUTOMATED  
  % Neutrophils 63.73 % 
  % Lymphocytes 31.45 % 
  Monocyte % 4.45 % 
  Eosinophils % 0.24 % 
  Basophils % 0.13 % 
  Neutrophils, Absolute 6.46 1.90 - 7.40 K/uL 
  Absolute Lymphocytes 3.19 1.00 - 3.90 K/uL 
  Absolute Monocytes 0.45 0.00 - 0.80 K/uL 
  Eosinophils, Absolute 0.02 0.00 - 0.50 K/uL 
  Basophils, Absolute 0.01 0.00 - 0.10 K/uL 
 
 PROBLEM LIST
 
 Principal Problem:
   Mesenteric ischemia, chronic 
 Active Problems:
   Hypertension
   Hyperlipidemia
 
 ASSESSMENT & PLAN
 
 S/p SMA endarterectomy for chronic mesenteric ischemia.  Doing well.  Denies flatus. Contin
ue strict NPO with swabs for comfort until return of bowel function.  Continue ambulation wi
th PT.  
 
 Disposition:  [facility]
 Code Status:  Full Code
 
 Sukhdeep Pardo MD
 2019Electronically signed by Sukhdeep Pardo MD at 2019  6:59 AM Sukhdeep Castro MD - 
2019  9:10 AM PDTFormatting of this note might be different from the original.
 [facility]
 Service: Vascular Surgery
 Progress Note
 
 Hospital Day:   LOS: 1 day 
 Post-Op Day:  1 Day Post-Op
 SUBJECTIVE
 
      Patient Summary:  S/p SMA endarterectomy for chronic mesenteric ischemia
 
      Events Overnight:  No events overnight.  Pain well controlled with pain meds.  
 
 Scheduled Medications 
 
 Continuous Infusions 
   dextrose 5% and sodium chloride 0.45% with KCl 20 mEq/L 125 mL/hr at 19 0724 
   niCARdipine Stopped (19 1628) 
 
 PRN Medications
 acetaminophen, aluminum & magnesium hydroxide-simethicone, bisacodyl, calcium carbonate, do
 
cusate sodium, HYDROmorphone, insulin lispro, morphine, sodium chloride 0.9%
 
 OBJECTIVE
 Vital Signs:
 Vitals: 
  19 0800 
 BP: 128/68 
 Pulse: 78 
 Resp: 12 
 Temp: 37 C (98.6 F) 
 
 Physical Exam
 Vitals:reviewed
 CONSTITUTIONAL:  Conversant, well developed, NAD
 EYES: Anicteric sclerae, no lid drag, no proptosis
 RESP: Normal effort, regular, even, unlabored rate
 CV: No peripheral edema, rate regular
 SKIN: Pink, warm, dry without rash/lesion
 ABD:  Soft, appropriate tenderness, hypoactive bowel tones
 MS: ROM not limited, no digital cyanosis
 NEURO: Conversant, A&O times 3
 PSYCH: appropriate affect, speech and tone, judgement and insight intact
 
 DATA
 Recent Results (from the past 24 hour(s)) 
 Type and Screen 
  Collection Time: 19 11:03 
 Result Value Ref Range 
  ABO Rh O POSITIVE  
  Antibody Screen NEGATIVE  
  BB BAND [id number]  
  BB BAND   
   Testing performed at [facility];[address] [place] [address];[place] [address] 
 Basic Metabolic Panel 
  Collection Time: 19  4:40 
 Result Value Ref Range 
  Na 140 135 - 145 mmol/L 
  K 4.5 3.5 - 4.9 mmol/L 
  Cl 107 99 - 109 mmol/L 
  CO2 23 23 - 32 mmol/L 
  Anion Gap 15 5 - 20 mmol/L 
  Glucose 192 (H) 65 - 99 mg/dL 
  BUN 17 8 - 25 mg/dL 
  Creatinine 1.02 0.70 - 1.30 mg/dL 
  BUN/Creatinine Ratio 17  
  Calcium 8.6 8.5 - 10.5 mg/dL 
  Estimated GFR >60 >60 mL/min/1.73m2 
 CBC with Differential 
  Collection Time: 19  4:40 
 Result Value Ref Range 
  WBC 13.98 (H) 3.80 - 11.00 K/uL 
  RBC 4.07 (L) 4.20 - 5.70 M/uL 
  Hemoglobin 13.3 13.2 - 17.0 g/dL 
  Hematocrit 39.6 39.0 - 50.0 % 
  MCV 97.4 80.0 - 100.0 fl 
  MCH 32.8 27.0 - 34.0 pg 
  MCHC 33.7 32.0 - 35.5 g/dL 
  RDW-SD 47.3 37 - 53 fl 
  Platelet Count 185 150 - 400 K/uL 
  MPV 7.3 fl 
 
  Diff Type AUTOMATED  
  % Neutrophils 55.68 % 
  % Lymphocytes 39.54 % 
  Monocyte % 4.55 % 
  Eosinophils % 0.05 % 
  Basophils % 0.18 % 
  Neutrophils, Absolute 7.79 (H) 1.90 - 7.40 K/uL 
  Absolute Lymphocytes 5.53 (H) 1.00 - 3.90 K/uL 
  Absolute Monocytes 0.64 0.00 - 0.80 K/uL 
  Eosinophils, Absolute 0.01 0.00 - 0.50 K/uL 
  Basophils, Absolute 0.03 0.00 - 0.10 K/uL 
  RBC Morphology RBC AND PLT MORPHOLOGY APPEAR NORMAL  
  Platelet Estimate ADEQUATE  
  Comment SLIDE SCANNED, AGREES WITH AUTOMATED RESULTS.  
 ECG 12 lead 
  Collection Time: 19  6:38 
 Result Value Ref Range 
  INTERPRETATION TEXT Not Confirmed  
 
 PROBLEM LIST
 
 Principal Problem:
   Mesenteric ischemia, chronic 
 Active Problems:
   Hypertension
   Hyperlipidemia
 
 ASSESSMENT & PLAN
 
 S/p SMA endarterectomy for chronic mesenteric ischemia.  Doing well.  Transfer to cardiac u
nit today. Up out of bed with PT and ambulate with assist.  Keep NPO until return of bowel f
unction.  Discontinue yi catheter today.  
 
 Disposition:  [facility]
 Code Status:  Full Code
 
 Sukhdeep Pardo MD
 2019Electronically signed by Sukhdeep Pardo MD at 2019  9:15 AM Maye Forrester RN
 - 2019  6:30 PM PDTPt received after OR procedure with Dr Pardo.  His main complaint wa
s of abdominal pain.  He was also hypertensive.  Goal BP <160.  Once pain was better control
led, he no longer met the criteria for the nicardipene gtt.  He will be NPO until he passes 
gas which is anticipated to be 3-4 days.
 
 No blood, restraints, protocols, or signed and held orders.
 
 Morphine given x1 per parameters with no effect.
 Dilaudid given x3 per parameters with moderate effect.
 
 End of shift review complete. Electronically signed by Maye Amin RN at 2019  6:32
 PM PDTdocumented in this encounter
 
 Plan of Treatment
 
 
+--------+-------------+------------------+----------------------+-------------+
| Date   | Type        | Specialty        | Care Team            | Description |
+--------+-------------+------------------+----------------------+-------------+
| / | Appointment | Radiology        |   Augustin Lopez DNP      |             |
|    |             |                  | [address]     |             |
|        |             |                  | [place]  |             |
 
|        |             |                  | [id number] [phone]  |             |
|        |             |                  |  [phone] (Fax)  |             |
+--------+-------------+------------------+----------------------+-------------+
| / | Office      | Vascular Surgery |   Augustin Lopez DNP      |             |
|    | Visit       |                  | [address]     |             |
|        |             |                  | [place]  |             |
|        |             |                  | [id number]  [phone]  |             |
|        |             |                  |  [phone] (Fax)  |             |
+--------+-------------+------------------+----------------------+-------------+
 documented as of this encounter
 
 Procedures
 
 
+---------------------+--------+-------------+----------------------+----------------------+
| Procedure Name      | Priori | Date/Time   | Associated Diagnosis | Comments             |
|                     | ty     |             |                      |                      |
+---------------------+--------+-------------+----------------------+----------------------+
| CBC WITH            | Routin | 2019  |                      |   Results for this   |
| DIFFERENTIAL        | e      |  4:17 AM    |                      | procedure are in the |
|                     |        | PDT         |                      |  results section.    |
+---------------------+--------+-------------+----------------------+----------------------+
| BASIC METABOLIC     | Routin | 2019  |                      |   Results for this   |
| PANEL               | e      |  4:17 AM    |                      | procedure are in the |
|                     |        | PDT         |                      |  results section.    |
+---------------------+--------+-------------+----------------------+----------------------+
| CBC WITH            | Routin | 2019  |                      |   Results for this   |
| DIFFERENTIAL        | e      |  4:29 AM    |                      | procedure are in the |
|                     |        | PDT         |                      |  results section.    |
+---------------------+--------+-------------+----------------------+----------------------+
| BASIC METABOLIC     | Routin | 2019  |                      |   Results for this   |
| PANEL               | e      |  4:29 AM    |                      | procedure are in the |
|                     |        | PDT         |                      |  results section.    |
+---------------------+--------+-------------+----------------------+----------------------+
| CBC WITH            | Routin | 2019  |                      |   Results for this   |
| DIFFERENTIAL        | e      |  4:23 AM    |                      | procedure are in the |
|                     |        | PDT         |                      |  results section.    |
+---------------------+--------+-------------+----------------------+----------------------+
| BASIC METABOLIC     | Routin | 2019  |                      |   Results for this   |
| PANEL               | e      |  4:23 AM    |                      | procedure are in the |
|                     |        | PDT         |                      |  results section.    |
+---------------------+--------+-------------+----------------------+----------------------+
| CBC WITH            | Routin | 2019  |                      |   Results for this   |
| DIFFERENTIAL        | e      |  4:06 AM    |                      | procedure are in the |
|                     |        | PDT         |                      |  results section.    |
+---------------------+--------+-------------+----------------------+----------------------+
| BASIC METABOLIC     | Routin | 2019  |                      |   Results for this   |
| PANEL               | e      |  4:06 AM    |                      | procedure are in the |
|                     |        | PDT         |                      |  results section.    |
+---------------------+--------+-------------+----------------------+----------------------+
| LACTIC ACID         | STAT   | 2019  |                      |   Results for this   |
|                     |        |  9:09 AM    |                      | procedure are in the |
|                     |        | PDT         |                      |  results section.    |
+---------------------+--------+-------------+----------------------+----------------------+
| ECG 12 LEAD         | Routin | 2019  |                      |   Results for this   |
|                     | e      |  5:37 AM    |                      | procedure are in the |
|                     |        | PDT         |                      |  results section.    |
+---------------------+--------+-------------+----------------------+----------------------+
| CBC WITH            | Routin | 2019  |                      |   Results for this   |
| DIFFERENTIAL        | e      |  4:40 AM    |                      | procedure are in the |
 
|                     |        | PDT         |                      |  results section.    |
+---------------------+--------+-------------+----------------------+----------------------+
| BASIC METABOLIC     | Routin | 2019  |                      |   Results for this   |
| PANEL               | e      |  4:40 AM    |                      | procedure are in the |
|                     |        | PDT         |                      |  results section.    |
+---------------------+--------+-------------+----------------------+----------------------+
| SURGICAL PATHOLOGY  | Routin | 2019  |   Mesenteric         |   Results for this   |
| EXAM                | e      |  1:50 PM    | ischemia, chronic    | procedure are in the |
|                     |        | PDT         | ([facility])                |  results section.    |
+---------------------+--------+-------------+----------------------+----------------------+
| REVASCULARIZATION   |        | 2019  |   Mesenteric         |                      |
| MESENTERIC          |        | 11:12 AM    | ischemia, chronic    |                      |
|                     |        | PDT         | ([facility])                |                      |
+---------------------+--------+-------------+----------------------+----------------------+
| TYPE AND SCREEN     | STAT   | 2019  |                      |   Results for this   |
|                     |        | 11:03 AM    |                      | procedure are in the |
|                     |        | PDT         |                      |  results section.    |
+---------------------+--------+-------------+----------------------+----------------------+
 documented in this encounter
 
 Results
 CBC with Differential (2019  4:17 AM PDT)
 
+-------------+--------------------------+-----------------+------------+--------------+
| Component   | Value                    | Ref Range       | Performed  | Pathologist  |
|             |                          |                 | At         | Signature    |
+-------------+--------------------------+-----------------+------------+--------------+
| WBC         | 5.40                     | 3.80 - 11.00    | [facility]       |              |
|             |                          | K/uL            | LABORATORY |              |
+-------------+--------------------------+-----------------+------------+--------------+
| RBC         | 3.57 (L)                 | 4.20 - 5.70     | [facility]       |              |
|             |                          | M/uL            | LABORATORY |              |
+-------------+--------------------------+-----------------+------------+--------------+
| Hemoglobin  | 12.1 (L)                 | 13.2 - 17.0     | [facility]       |              |
|             |                          | g/dL            | LABORATORY |              |
+-------------+--------------------------+-----------------+------------+--------------+
| Hematocrit  | 34.5 (L)                 | 39.0 - 50.0 %   | [facility]       |              |
|             |                          |                 | LABORATORY |              |
+-------------+--------------------------+-----------------+------------+--------------+
| MCV         | 96.7                     | 80.0 - 100.0 fl | [facility]       |              |
|             |                          |                 | LABORATORY |              |
+-------------+--------------------------+-----------------+------------+--------------+
| MCH         | 33.8                     | 27.0 - 34.0 pg  | [facility]       |              |
|             |                          |                 | LABORATORY |              |
+-------------+--------------------------+-----------------+------------+--------------+
| MCHC        | 35.0                     | 32.0 - 35.5     | [facility]       |              |
|             |                          | g/dL            | LABORATORY |              |
+-------------+--------------------------+-----------------+------------+--------------+
| RDW-SD      | 45.5                     | 37 - 53 fl      | [facility]       |              |
|             |                          |                 | LABORATORY |              |
+-------------+--------------------------+-----------------+------------+--------------+
| Platelet    | 158                      | 150 - 400 K/uL  | [facility]       |              |
| Count       |                          |                 | LABORATORY |              |
+-------------+--------------------------+-----------------+------------+--------------+
| MPV         | 7.5                      | fl              | [facility]       |              |
|             |                          |                 | LABORATORY |              |
+-------------+--------------------------+-----------------+------------+--------------+
| Diff Type   | AUTOMATED                |                 | [facility]       |              |
|             |                          |                 | LABORATORY |              |
+-------------+--------------------------+-----------------+------------+--------------+
 
| %           | 55.42                    | %               | [facility]       |              |
| Neutrophils |                          |                 | LABORATORY |              |
+-------------+--------------------------+-----------------+------------+--------------+
| %           | 36.41                    | %               | [facility]       |              |
| Lymphocytes |                          |                 | LABORATORY |              |
+-------------+--------------------------+-----------------+------------+--------------+
| Monocyte %  | 6.61                     | %               | [facility]       |              |
|             |                          |                 | LABORATORY |              |
+-------------+--------------------------+-----------------+------------+--------------+
| Eosinophils | 1.23                     | %               | [facility]       |              |
|  %          |                          |                 | LABORATORY |              |
+-------------+--------------------------+-----------------+------------+--------------+
| Basophils % | 0.33                     | %               | [facility]       |              |
|             |                          |                 | LABORATORY |              |
+-------------+--------------------------+-----------------+------------+--------------+
| Neutrophils | 3.00                     | 1.90 - 7.40     | [facility]       |              |
| , Absolute  |                          | K/uL            | LABORATORY |              |
+-------------+--------------------------+-----------------+------------+--------------+
| Absolute    | 1.97                     | 1.00 - 3.90     | [facility]       |              |
| Lymphocytes |                          | K/uL            | LABORATORY |              |
+-------------+--------------------------+-----------------+------------+--------------+
| Absolute    | 0.36                     | 0.00 - 0.80     | [facility]       |              |
| Monocytes   |                          | K/uL            | LABORATORY |              |
+-------------+--------------------------+-----------------+------------+--------------+
| Eosinophils | 0.07                     | 0.00 - 0.50     | [facility]       |              |
| , Absolute  |                          | K/uL            | LABORATORY |              |
+-------------+--------------------------+-----------------+------------+--------------+
| Basophils,  | 0.02Comment: Testing     | 0.00 - 0.10     | [facility]       |              |
| Absolute    | performed at [facility], [address] | K/uL            | LABORATORY |              |
|             |  [address],        |                 |            |              |
|             | [place]  [id number]     |                 |            |              |
+-------------+--------------------------+-----------------+------------+--------------+
 
 
 
+----------+
| Specimen |
+----------+
| Blood    |
+----------+
 
 
 
+-------------------+------------------+--------------------+----------------+
| Performing        | Address          | City/State/Zipcode | Phone Number   |
| Organization      |                  |                    |                |
+-------------------+------------------+--------------------+----------------+
|   [facility] |   [address] | [place] [address] |   [phone] |
+-------------------+------------------+--------------------+----------------+
 Basic Metabolic Panel (2019  4:17 AM PDT)
 
+-------------+--------------------------+-----------------+------------+--------------+
| Component   | Value                    | Ref Range       | Performed  | Pathologist  |
|             |                          |                 | At         | Signature    |
+-------------+--------------------------+-----------------+------------+--------------+
| Na          | 135                      | 135 - 145       | [facility]       |              |
|             |                          | mmol/L          | LABORATORY |              |
+-------------+--------------------------+-----------------+------------+--------------+
| K           | 3.8                      | 3.5 - 4.9       | [facility]       |              |
|             |                          | mmol/L          | LABORATORY |              |
 
+-------------+--------------------------+-----------------+------------+--------------+
| Cl          | 102                      | 99 - 109 mmol/L | [facility]       |              |
|             |                          |                 | LABORATORY |              |
+-------------+--------------------------+-----------------+------------+--------------+
| CO2         | 26                       | 23 - 32 mmol/L  | [facility]       |              |
|             |                          |                 | LABORATORY |              |
+-------------+--------------------------+-----------------+------------+--------------+
| Anion Gap   | 11                       | 5 - 20 mmol/L   | [facility]       |              |
|             |                          |                 | LABORATORY |              |
+-------------+--------------------------+-----------------+------------+--------------+
| Glucose     | 140 (H)                  | 65 - 99 mg/dL   | [facility]       |              |
|             |                          |                 | LABORATORY |              |
+-------------+--------------------------+-----------------+------------+--------------+
| BUN         | 8                        | 8 - 25 mg/dL    | [facility]       |              |
|             |                          |                 | LABORATORY |              |
+-------------+--------------------------+-----------------+------------+--------------+
| Creatinine  | 0.9                      | 0.70 - 1.30     | [facility]       |              |
|             |                          | mg/dL           | LABORATORY |              |
+-------------+--------------------------+-----------------+------------+--------------+
| BUN/Creatin | 9                        |                 | [facility]       |              |
| ine Ratio   |                          |                 | LABORATORY |              |
+-------------+--------------------------+-----------------+------------+--------------+
| Calcium     | 8.5                      | 8.5 - 10.5      | [facility]       |              |
|             |                          | mg/dL           | LABORATORY |              |
+-------------+--------------------------+-----------------+------------+--------------+
| Estimated   | >60Comment: GFR <60:     | >60             | [facility]       |              |
| GFR         | CHRONIC KIDNEY DISEASE,  | mL/min/1.73m2   | LABORATORY |              |
|             | IF FOUND OVER A 3 MONTH  |                 |            |              |
|             | PERIOD.GFR <15: KIDNEY   |                 |            |              |
|             | FAILURE.FOR       |                 |            |              |
|             | [employer], MULTIPLY THE  |                 |            |              |
|             | CALCULATED GFR BY        |                 |            |              |
|             | 1.210.This eGFR is       |                 |            |              |
|             | calculated using the     |                 |            |              |
|             | MDRD IDMS traceable      |                 |            |              |
|             | equation.Testing         |                 |            |              |
|             | performed at [facility], [address] |                 |            |              |
|             |  [facility],        |                 |            |              |
|             | [place]   [address]    |                 |            |              |
+-------------+--------------------------+-----------------+------------+--------------+
 
 
 
+----------+
| Specimen |
+----------+
| Blood    |
+----------+
 
 
 
+-------------------+------------------+--------------------+----------------+
| Performing        | Address          | City/State/Zipcode | Phone Number   |
| Organization      |                  |                    |                |
+-------------------+------------------+--------------------+----------------+
|   [facility] |   [address] | [place] [address] |   [phone] |
+-------------------+------------------+--------------------+----------------+
 CBC with Differential (2019  4:29 AM PDT)
 
+-------------+--------------------------+-----------------+------------+--------------+
 
| Component   | Value                    | Ref Range       | Performed  | Pathologist  |
|             |                          |                 | At         | Signature    |
+-------------+--------------------------+-----------------+------------+--------------+
| WBC         | 5.54                     | 3.80 - 11.00    | [facility]       |              |
|             |                          | K/uL            | LABORATORY |              |
+-------------+--------------------------+-----------------+------------+--------------+
| RBC         | 3.72 (L)                 | 4.20 - 5.70     | [facility]       |              |
|             |                          | M/uL            | LABORATORY |              |
+-------------+--------------------------+-----------------+------------+--------------+
| Hemoglobin  | 12.3 (L)                 | 13.2 - 17.0     | [facility]       |              |
|             |                          | g/dL            | LABORATORY |              |
+-------------+--------------------------+-----------------+------------+--------------+
| Hematocrit  | 36.0 (L)                 | 39.0 - 50.0 %   | [facility]       |              |
|             |                          |                 | LABORATORY |              |
+-------------+--------------------------+-----------------+------------+--------------+
| MCV         | 96.8                     | 80.0 - 100.0 fl | [facility]       |              |
|             |                          |                 | LABORATORY |              |
+-------------+--------------------------+-----------------+------------+--------------+
| MCH         | 33.1                     | 27.0 - 34.0 pg  | [facility]       |              |
|             |                          |                 | LABORATORY |              |
+-------------+--------------------------+-----------------+------------+--------------+
| MCHC        | 34.2                     | 32.0 - 35.5     | [facility]       |              |
|             |                          | g/dL            | LABORATORY |              |
+-------------+--------------------------+-----------------+------------+--------------+
| RDW-SD      | 46.8                     | 37 - 53 fl      | [facility]       |              |
|             |                          |                 | LABORATORY |              |
+-------------+--------------------------+-----------------+------------+--------------+
| Platelet    | 140 (L)                  | 150 - 400 K/uL  | [facility]       |              |
| Count       |                          |                 | LABORATORY |              |
+-------------+--------------------------+-----------------+------------+--------------+
| MPV         | 7.4                      | fl              | [facility]       |              |
|             |                          |                 | LABORATORY |              |
+-------------+--------------------------+-----------------+------------+--------------+
| Diff Type   | AUTOMATED                |                 | [facility]       |              |
|             |                          |                 | LABORATORY |              |
+-------------+--------------------------+-----------------+------------+--------------+
| %           | 57.00                    | %               | [facility]       |              |
| Neutrophils |                          |                 | LABORATORY |              |
+-------------+--------------------------+-----------------+------------+--------------+
| %           | 36.25                    | %               | [facility]       |              |
| Lymphocytes |                          |                 | LABORATORY |              |
+-------------+--------------------------+-----------------+------------+--------------+
| Monocyte %  | 5.29                     | %               | [facility]       |              |
|             |                          |                 | LABORATORY |              |
+-------------+--------------------------+-----------------+------------+--------------+
| Eosinophils | 1.03                     | %               | [facility]       |              |
|  %          |                          |                 | LABORATORY |              |
+-------------+--------------------------+-----------------+------------+--------------+
| Basophils % | 0.43                     | %               | [facility]       |              |
|             |                          |                 | LABORATORY |              |
+-------------+--------------------------+-----------------+------------+--------------+
| Neutrophils | 3.16                     | 1.90 - 7.40     | [facility]       |              |
| , Absolute  |                          | K/uL            | LABORATORY |              |
+-------------+--------------------------+-----------------+------------+--------------+
| Absolute    | 2.01                     | 1.00 - 3.90     | [facility]       |              |
| Lymphocytes |                          | K/uL            | LABORATORY |              |
+-------------+--------------------------+-----------------+------------+--------------+
| Absolute    | 0.29                     | 0.00 - 0.80     | [facility]       |              |
| Monocytes   |                          | K/uL            | LABORATORY |              |
+-------------+--------------------------+-----------------+------------+--------------+
 
| Eosinophils | 0.06                     | 0.00 - 0.50     | [facility]       |              |
| , Absolute  |                          | K/uL            | LABORATORY |              |
+-------------+--------------------------+-----------------+------------+--------------+
| Basophils,  | 0.02Comment: Testing     | 0.00 - 0.10     | [facility]       |              |
| Absolute    | performed at [facility], [address] | K/uL            | LABORATORY |              |
|             |  [facility],        |                 |            |              |
|             | [place]  [id number]     |                 |            |              |
+-------------+--------------------------+-----------------+------------+--------------+
 
 
 
+----------+
| Specimen |
+----------+
| Blood    |
+----------+
 
 
 
+-------------------+------------------+--------------------+----------------+
| Performing        | Address          | City/State/Zipcode | Phone Number   |
| Organization      |                  |                    |                |
+-------------------+------------------+--------------------+----------------+
|   [facility] |   [address] | [place] [address] |   [phone] |
+-------------------+------------------+--------------------+----------------+
 Basic Metabolic Panel (2019  4:29 AM PDT)
 
+-------------+--------------------------+-----------------+------------+--------------+
| Component   | Value                    | Ref Range       | Performed  | Pathologist  |
|             |                          |                 | At         | Signature    |
+-------------+--------------------------+-----------------+------------+--------------+
| Na          | 135                      | 135 - 145       | [facility]       |              |
|             |                          | mmol/L          | LABORATORY |              |
+-------------+--------------------------+-----------------+------------+--------------+
| K           | 4.0                      | 3.5 - 4.9       | [facility]       |              |
|             |                          | mmol/L          | LABORATORY |              |
+-------------+--------------------------+-----------------+------------+--------------+
| Cl          | 103                      | 99 - 109 mmol/L | [facility]       |              |
|             |                          |                 | LABORATORY |              |
+-------------+--------------------------+-----------------+------------+--------------+
| CO2         | 25                       | 23 - 32 mmol/L  | [facility]       |              |
|             |                          |                 | LABORATORY |              |
+-------------+--------------------------+-----------------+------------+--------------+
| Anion Gap   | 11                       | 5 - 20 mmol/L   | [facility]       |              |
|             |                          |                 | LABORATORY |              |
+-------------+--------------------------+-----------------+------------+--------------+
| Glucose     | 140 (H)                  | 65 - 99 mg/dL   | [facility]       |              |
|             |                          |                 | LABORATORY |              |
+-------------+--------------------------+-----------------+------------+--------------+
| BUN         | 9                        | 8 - 25 mg/dL    | [facility]       |              |
|             |                          |                 | LABORATORY |              |
+-------------+--------------------------+-----------------+------------+--------------+
| Creatinine  | 0.8                      | 0.70 - 1.30     | [facility]       |              |
|             |                          | mg/dL           | LABORATORY |              |
+-------------+--------------------------+-----------------+------------+--------------+
| BUN/Creatin | 11                       |                 | [facility]       |              |
| ine Ratio   |                          |                 | LABORATORY |              |
+-------------+--------------------------+-----------------+------------+--------------+
| Calcium     | 8.7                      | 8.5 - 10.5      | [facility]       |              |
|             |                          | mg/dL           | LABORATORY |              |
 
+-------------+--------------------------+-----------------+------------+--------------+
| Estimated   | >60Comment: GFR <60:     | >60             | [facility]       |              |
| GFR         | CHRONIC KIDNEY DISEASE,  | mL/min/1.73m2   | LABORATORY |              |
|             | IF FOUND OVER A 3 MONTH  |                 |            |              |
|             | PERIOD.GFR <15: KIDNEY   |                 |            |              |
|             | FAILURE.FOR       |                 |            |              |
|             | AMERICANS, MULTIPLY THE  |                 |            |              |
|             | CALCULATED GFR BY        |                 |            |              |
|             | 1.210.This eGFR is       |                 |            |              |
|             | calculated using the     |                 |            |              |
|             | MDRD IDMS traceable      |                 |            |              |
|             | equation.Testing         |                 |            |              |
|             | performed at [facility], [address] |                 |            |              |
|             |  [facility],        |                 |            |              |
|             | [place]   [address]    |                 |            |              |
+-------------+--------------------------+-----------------+------------+--------------+
 
 
 
+----------+
| Specimen |
+----------+
| Blood    |
+----------+
 
 
 
+-------------------+------------------+--------------------+----------------+
| Performing        | Address          | City/State/Zipcode | Phone Number   |
| Organization      |                  |                    |                |
+-------------------+------------------+--------------------+----------------+
|   [facility] |   [address] | [place] [address] |   [phone] |
+-------------------+------------------+--------------------+----------------+
 CBC with Differential (2019  4:23 AM PDT)
 
+-------------+--------------------------+-----------------+------------+--------------+
| Component   | Value                    | Ref Range       | Performed  | Pathologist  |
|             |                          |                 | At         | Signature    |
+-------------+--------------------------+-----------------+------------+--------------+
| WBC         | 9.31                     | 3.80 - 11.00    | [facility]       |              |
|             |                          | K/uL            | LABORATORY |              |
+-------------+--------------------------+-----------------+------------+--------------+
| RBC         | 3.81 (L)                 | 4.20 - 5.70     | [facility]       |              |
|             |                          | M/uL            | LABORATORY |              |
+-------------+--------------------------+-----------------+------------+--------------+
| Hemoglobin  | 12.7 (L)                 | 13.2 - 17.0     | [facility]       |              |
|             |                          | g/dL            | LABORATORY |              |
+-------------+--------------------------+-----------------+------------+--------------+
| Hematocrit  | 37.1 (L)                 | 39.0 - 50.0 %   | [facility]       |              |
|             |                          |                 | LABORATORY |              |
+-------------+--------------------------+-----------------+------------+--------------+
| MCV         | 97.5                     | 80.0 - 100.0 fl | [facility]       |              |
|             |                          |                 | LABORATORY |              |
+-------------+--------------------------+-----------------+------------+--------------+
| MCH         | 33.5                     | 27.0 - 34.0 pg  | [facility]       |              |
|             |                          |                 | LABORATORY |              |
+-------------+--------------------------+-----------------+------------+--------------+
| MCHC        | 34.3                     | 32.0 - 35.5     | [facility]       |              |
|             |                          | g/dL            | LABORATORY |              |
+-------------+--------------------------+-----------------+------------+--------------+
 
| RDW-SD      | 46.8                     | 37 - 53 fl      | [facility]       |              |
|             |                          |                 | LABORATORY |              |
+-------------+--------------------------+-----------------+------------+--------------+
| Platelet    | 162                      | 150 - 400 K/uL  | [facility]       |              |
| Count       |                          |                 | LABORATORY |              |
+-------------+--------------------------+-----------------+------------+--------------+
| MPV         | 7.5                      | fl              | [facility]       |              |
|             |                          |                 | LABORATORY |              |
+-------------+--------------------------+-----------------+------------+--------------+
| Diff Type   | AUTOMATED                |                 | [facility]       |              |
|             |                          |                 | LABORATORY |              |
+-------------+--------------------------+-----------------+------------+--------------+
| %           | 54.94                    | %               | [facility]       |              |
| Neutrophils |                          |                 | LABORATORY |              |
+-------------+--------------------------+-----------------+------------+--------------+
| %           | 39.44                    | %               | [facility]       |              |
| Lymphocytes |                          |                 | LABORATORY |              |
+-------------+--------------------------+-----------------+------------+--------------+
| Monocyte %  | 4.62                     | %               | [facility]       |              |
|             |                          |                 | LABORATORY |              |
+-------------+--------------------------+-----------------+------------+--------------+
| Eosinophils | 0.69                     | %               | [facility]       |              |
|  %          |                          |                 | LABORATORY |              |
+-------------+--------------------------+-----------------+------------+--------------+
| Basophils % | 0.31                     | %               | [facility]       |              |
|             |                          |                 | LABORATORY |              |
+-------------+--------------------------+-----------------+------------+--------------+
| Neutrophils | 5.12                     | 1.90 - 7.40     | [facility]       |              |
| , Absolute  |                          | K/uL            | LABORATORY |              |
+-------------+--------------------------+-----------------+------------+--------------+
| Absolute    | 3.67                     | 1.00 - 3.90     | [facility]       |              |
| Lymphocytes |                          | K/uL            | LABORATORY |              |
+-------------+--------------------------+-----------------+------------+--------------+
| Absolute    | 0.43                     | 0.00 - 0.80     | [facility]       |              |
| Monocytes   |                          | K/uL            | LABORATORY |              |
+-------------+--------------------------+-----------------+------------+--------------+
| Eosinophils | 0.06                     | 0.00 - 0.50     | [facility]       |              |
| , Absolute  |                          | K/uL            | LABORATORY |              |
+-------------+--------------------------+-----------------+------------+--------------+
| Basophils,  | 0.03Comment: Testing     | 0.00 - 0.10     | [facility]       |              |
| Absolute    | performed at [facility], [address] | K/uL            | LABORATORY |              |
|             |  [address],        |                 |            |              |
|             | [place]  [id number]     |                 |            |              |
+-------------+--------------------------+-----------------+------------+--------------+
 
 
 
+----------+
| Specimen |
+----------+
| Blood    |
+----------+
 
 
 
+-------------------+------------------+--------------------+----------------+
| Performing        | Address          | City/State/Zipcode | Phone Number   |
| Organization      |                  |                    |                |
+-------------------+------------------+--------------------+----------------+
|   [facility] |   [address] | [place] [address] |   [phone] |
 
+-------------------+------------------+--------------------+----------------+
 Basic Metabolic Panel (2019  4:23 AM PDT)
 
+-------------+--------------------------+-----------------+------------+--------------+
| Component   | Value                    | Ref Range       | Performed  | Pathologist  |
|             |                          |                 | At         | Signature    |
+-------------+--------------------------+-----------------+------------+--------------+
| Na          | 137                      | 135 - 145       | [facility]       |              |
|             |                          | mmol/L          | LABORATORY |              |
+-------------+--------------------------+-----------------+------------+--------------+
| K           | 4.0                      | 3.5 - 4.9       | [facility]       |              |
|             |                          | mmol/L          | LABORATORY |              |
+-------------+--------------------------+-----------------+------------+--------------+
| Cl          | 104                      | 99 - 109 mmol/L | [facility]       |              |
|             |                          |                 | LABORATORY |              |
+-------------+--------------------------+-----------------+------------+--------------+
| CO2         | 26                       | 23 - 32 mmol/L  | [facility]       |              |
|             |                          |                 | LABORATORY |              |
+-------------+--------------------------+-----------------+------------+--------------+
| Anion Gap   | 11                       | 5 - 20 mmol/L   | [facility]       |              |
|             |                          |                 | LABORATORY |              |
+-------------+--------------------------+-----------------+------------+--------------+
| Glucose     | 140 (H)                  | 65 - 99 mg/dL   | [facility]       |              |
|             |                          |                 | LABORATORY |              |
+-------------+--------------------------+-----------------+------------+--------------+
| BUN         | 8                        | 8 - 25 mg/dL    | [facility]       |              |
|             |                          |                 | LABORATORY |              |
+-------------+--------------------------+-----------------+------------+--------------+
| Creatinine  | 0.9                      | 0.70 - 1.30     | [facility]       |              |
|             |                          | mg/dL           | LABORATORY |              |
+-------------+--------------------------+-----------------+------------+--------------+
| BUN/Creatin | 9                        |                 | [facility]       |              |
| ine Ratio   |                          |                 | LABORATORY |              |
+-------------+--------------------------+-----------------+------------+--------------+
| Calcium     | 8.4 (L)                  | 8.5 - 10.5      | [facility]       |              |
|             |                          | mg/dL           | LABORATORY |              |
+-------------+--------------------------+-----------------+------------+--------------+
| Estimated   | >60Comment: GFR <60:     | >60             | [facility]       |              |
| GFR         | CHRONIC KIDNEY DISEASE,  | mL/min/1.73m2   | LABORATORY |              |
|             | IF FOUND OVER A 3 MONTH  |                 |            |              |
|             | PERIOD.GFR <15: KIDNEY   |                 |            |              |
|             | FAILURE.FOR       |                 |            |              |
|             | AMERICANS, MULTIPLY THE  |                 |            |              |
|             | CALCULATED GFR BY        |                 |            |              |
|             | 1.210.This eGFR is       |                 |            |              |
|             | calculated using the     |                 |            |              |
|             | MDRD IDMS traceable      |                 |            |              |
|             | equation.Testing         |                 |            |              |
|             | performed at [facility], [address] |                 |            |              |
|             |  [facility],        |                 |            |              |
|             | [place]   [address]    |                 |            |              |
+-------------+--------------------------+-----------------+------------+--------------+
 
 
 
+----------+
| Specimen |
+----------+
| Blood    |
+----------+
 
 
 
 
+-------------------+------------------+--------------------+----------------+
| Performing        | Address          | City/State/Zipcode | Phone Number   |
| Organization      |                  |                    |                |
+-------------------+------------------+--------------------+----------------+
|   [facility] |   [address] | [place] [address] |   [phone] |
+-------------------+------------------+--------------------+----------------+
 CBC with Differential (2019  4:06 AM PDT)
 
+-------------+--------------------------+-----------------+------------+--------------+
| Component   | Value                    | Ref Range       | Performed  | Pathologist  |
|             |                          |                 | At         | Signature    |
+-------------+--------------------------+-----------------+------------+--------------+
| WBC         | 10.14                    | 3.80 - 11.00    | [facility]       |              |
|             |                          | K/uL            | LABORATORY |              |
+-------------+--------------------------+-----------------+------------+--------------+
| RBC         | 3.79 (L)                 | 4.20 - 5.70     | [facility]       |              |
|             |                          | M/uL            | LABORATORY |              |
+-------------+--------------------------+-----------------+------------+--------------+
| Hemoglobin  | 12.6 (L)                 | 13.2 - 17.0     | [facility]       |              |
|             |                          | g/dL            | LABORATORY |              |
+-------------+--------------------------+-----------------+------------+--------------+
| Hematocrit  | 37.1 (L)                 | 39.0 - 50.0 %   | [facility]       |              |
|             |                          |                 | LABORATORY |              |
+-------------+--------------------------+-----------------+------------+--------------+
| MCV         | 97.8                     | 80.0 - 100.0 fl | [facility]       |              |
|             |                          |                 | LABORATORY |              |
+-------------+--------------------------+-----------------+------------+--------------+
| MCH         | 33.3                     | 27.0 - 34.0 pg  | [facility]       |              |
|             |                          |                 | LABORATORY |              |
+-------------+--------------------------+-----------------+------------+--------------+
| MCHC        | 34.1                     | 32.0 - 35.5     | [facility]       |              |
|             |                          | g/dL            | LABORATORY |              |
+-------------+--------------------------+-----------------+------------+--------------+
| RDW-SD      | 47.3                     | 37 - 53 fl      | [facility]       |              |
|             |                          |                 | LABORATORY |              |
+-------------+--------------------------+-----------------+------------+--------------+
| Platelet    | 149 (L)                  | 150 - 400 K/uL  | [facility]       |              |
| Count       |                          |                 | LABORATORY |              |
+-------------+--------------------------+-----------------+------------+--------------+
| MPV         | 7.4                      | fl              | [facility]       |              |
|             |                          |                 | LABORATORY |              |
+-------------+--------------------------+-----------------+------------+--------------+
| Diff Type   | AUTOMATED                |                 | [facility]       |              |
|             |                          |                 | LABORATORY |              |
+-------------+--------------------------+-----------------+------------+--------------+
| %           | 63.73                    | %               | [facility]       |              |
| Neutrophils |                          |                 | LABORATORY |              |
+-------------+--------------------------+-----------------+------------+--------------+
| %           | 31.45                    | %               | [facility]       |              |
| Lymphocytes |                          |                 | LABORATORY |              |
+-------------+--------------------------+-----------------+------------+--------------+
| Monocyte %  | 4.45                     | %               | [facility]       |              |
|             |                          |                 | LABORATORY |              |
+-------------+--------------------------+-----------------+------------+--------------+
| Eosinophils | 0.24                     | %               | [facility]       |              |
|  %          |                          |                 | LABORATORY |              |
+-------------+--------------------------+-----------------+------------+--------------+
 
| Basophils % | 0.13                     | %               | [facility]       |              |
|             |                          |                 | LABORATORY |              |
+-------------+--------------------------+-----------------+------------+--------------+
| Neutrophils | 6.46                     | 1.90 - 7.40     | [facility]       |              |
| , Absolute  |                          | K/uL            | LABORATORY |              |
+-------------+--------------------------+-----------------+------------+--------------+
| Absolute    | 3.19                     | 1.00 - 3.90     | [facility]       |              |
| Lymphocytes |                          | K/uL            | LABORATORY |              |
+-------------+--------------------------+-----------------+------------+--------------+
| Absolute    | 0.45                     | 0.00 - 0.80     | [facility]       |              |
| Monocytes   |                          | K/uL            | LABORATORY |              |
+-------------+--------------------------+-----------------+------------+--------------+
| Eosinophils | 0.02                     | 0.00 - 0.50     | [facility]       |              |
| , Absolute  |                          | K/uL            | LABORATORY |              |
+-------------+--------------------------+-----------------+------------+--------------+
| Basophils,  | 0.01Comment: Testing     | 0.00 - 0.10     | [facility]       |              |
| Absolute    | performed at [facility], [address] | K/uL            | LABORATORY |              |
|             |  [address],        |                 |            |              |
|             | [place]  [id number]     |                 |            |              |
+-------------+--------------------------+-----------------+------------+--------------+
 
 
 
+----------+
| Specimen |
+----------+
| Blood    |
+----------+
 
 
 
+-------------------+------------------+--------------------+----------------+
| Performing        | Address          | City/State/Zipcode | Phone Number   |
| Organization      |                  |                    |                |
+-------------------+------------------+--------------------+----------------+
|   [facility] |   [address] | [place] [address] |   [phone] |
+-------------------+------------------+--------------------+----------------+
 Basic Metabolic Panel (2019  4:06 AM PDT)
 
+-------------+--------------------------+-----------------+------------+--------------+
| Component   | Value                    | Ref Range       | Performed  | Pathologist  |
|             |                          |                 | At         | Signature    |
+-------------+--------------------------+-----------------+------------+--------------+
| Na          | 138                      | 135 - 145       | [facility]       |              |
|             |                          | mmol/L          | LABORATORY |              |
+-------------+--------------------------+-----------------+------------+--------------+
| K           | 4.4                      | 3.5 - 4.9       | [facility]       |              |
|             |                          | mmol/L          | LABORATORY |              |
+-------------+--------------------------+-----------------+------------+--------------+
| Cl          | 106                      | 99 - 109 mmol/L | [facility]       |              |
|             |                          |                 | LABORATORY |              |
+-------------+--------------------------+-----------------+------------+--------------+
| CO2         | 25                       | 23 - 32 mmol/L  | [facility]       |              |
|             |                          |                 | LABORATORY |              |
+-------------+--------------------------+-----------------+------------+--------------+
| Anion Gap   | 11                       | 5 - 20 mmol/L   | [facility]       |              |
|             |                          |                 | LABORATORY |              |
+-------------+--------------------------+-----------------+------------+--------------+
| Glucose     | 144 (H)                  | 65 - 99 mg/dL   | [facility]       |              |
|             |                          |                 | LABORATORY |              |
 
+-------------+--------------------------+-----------------+------------+--------------+
| BUN         | 16                       | 8 - 25 mg/dL    | [facility]       |              |
|             |                          |                 | LABORATORY |              |
+-------------+--------------------------+-----------------+------------+--------------+
| Creatinine  | 0.9                      | 0.70 - 1.30     | [facility]       |              |
|             |                          | mg/dL           | LABORATORY |              |
+-------------+--------------------------+-----------------+------------+--------------+
| BUN/Creatin | 18                       |                 | [facility]       |              |
| ine Ratio   |                          |                 | LABORATORY |              |
+-------------+--------------------------+-----------------+------------+--------------+
| Calcium     | 8.6                      | 8.5 - 10.5      | [facility]       |              |
|             |                          | mg/dL           | LABORATORY |              |
+-------------+--------------------------+-----------------+------------+--------------+
| Estimated   | >60Comment: GFR <60:     | >60             | [facility]       |              |
| GFR         | CHRONIC KIDNEY DISEASE,  | mL/min/1.73m2   | LABORATORY |              |
|             | IF FOUND OVER A 3 MONTH  |                 |            |              |
|             | PERIOD.GFR <15: KIDNEY   |                 |            |              |
|             | FAILURE.FOR       |                 |            |              |
|             | AMERICANS, MULTIPLY THE  |                 |            |              |
|             | CALCULATED GFR BY        |                 |            |              |
|             | 1.210.This eGFR is       |                 |            |              |
|             | calculated using the     |                 |            |              |
|             | MDRD IDMS traceable      |                 |            |              |
|             | equation.Testing         |                 |            |              |
|             | performed at [facility], [address] |                 |            |              |
|             |  [address],        |                 |            |              |
|             | [place]   [id number]    |                 |            |              |
+-------------+--------------------------+-----------------+------------+--------------+
 
 
 
+----------+
| Specimen |
+----------+
| Blood    |
+----------+
 
 
 
+-------------------+------------------+--------------------+----------------+
| Performing        | Address          | City/State/Zipcode | Phone Number   |
| Organization      |                  |                    |                |
+-------------------+------------------+--------------------+----------------+
|   [facility] |   [address] | [place] [address] |   [phone] |
+-------------------+------------------+--------------------+----------------+
 Lactic Acid (2019  9:09 AM PDT)
 
+-----------+-------------------------+------------+------------+--------------+
| Component | Value                   | Ref Range  | Performed  | Pathologist  |
|           |                         |            | At         | Signature    |
+-----------+-------------------------+------------+------------+--------------+
| Lactate,  | 1.6Comment: Testing     | 0.4 - 2.0  | [facility]       |              |
| Serum     | performed at [facility];[address]    | mmol/L     | LABORATORY |              |
|           | [address];[place], [place]  |            |            |              |
|           | [address]                   |            |            |              |
+-----------+-------------------------+------------+------------+--------------+
 
 
 
+----------+
 
| Specimen |
+----------+
| Blood    |
+----------+
 
 
 
+-------------------+------------------+--------------------+----------------+
| Performing        | Address          | City/State/Zipcode | Phone Number   |
| Organization      |                  |                    |                |
+-------------------+------------------+--------------------+----------------+
|   [facility] |   [address] | [place] [address] |   [phone] |
+-------------------+------------------+--------------------+----------------+
 ECG 12 lead (2019  5:37 AM PDT)
 
+-------------+--------------------------+-----------+------------+--------------+
| Component   | Value                    | Ref Range | Performed  | Pathologist  |
|             |                          |           | At         | Signature    |
+-------------+--------------------------+-----------+------------+--------------+
| VENTRICULAR | 74                       | BPM       | [facility]  |              |
|  RATE EKG   |                          |           |            |              |
+-------------+--------------------------+-----------+------------+--------------+
| ATRIAL RATE | 74                       | BPM       | [facility]  |              |
+-------------+--------------------------+-----------+------------+--------------+
| P-R         | 214                      | ms        | [facility]  |              |
| INTERVAL    |                          |           |            |              |
+-------------+--------------------------+-----------+------------+--------------+
| QRS         | 90                       | ms        | [facility]  |              |
| DURATION    |                          |           |            |              |
+-------------+--------------------------+-----------+------------+--------------+
| Q-T         | 412                      | ms        | [facility]  |              |
| INTERVAL    |                          |           |            |              |
+-------------+--------------------------+-----------+------------+--------------+
| Q-T         | 458                      | ms        | [facility]  |              |
| INTERVAL    |                          |           |            |              |
| (CORRECTED) |                          |           |            |              |
+-------------+--------------------------+-----------+------------+--------------+
| P WAVE AXIS | 38                       | degrees   | [facility]  |              |
+-------------+--------------------------+-----------+------------+--------------+
| QRS AXIS    | -8                       | degrees   | [facility]  |              |
+-------------+--------------------------+-----------+------------+--------------+
| T AXIS      | 5                        | degrees   | [facility]  |              |
+-------------+--------------------------+-----------+------------+--------------+
| INTERPRETAT | Sinus rhythm with 1st    |           | [facility]  |              |
| ION TEXT    | degree A-V blockAbnormal |           |            |              |
|             |  ECGWhen compared with   |           |            |              |
|             | ECG of 17-SEP-2019       |           |            |              |
|             | 09:12,No significant     |           |            |              |
|             | change was               |           |            |              |
|             | foundConfirmed by        |           |            |              |
|             | Geovanni Bermudez MD     |           |            |              |
|             | (127) on 2019       |           |            |              |
|             | 5:46:12 PM               |           |            |              |
+-------------+--------------------------+-----------+------------+--------------+
 
 
 
+----------+
| Specimen |
+----------+
 
|          |
+----------+
 
 
 
+----------------------------------------------------------+--------------+
| Narrative                                                | Performed At |
+----------------------------------------------------------+--------------+
|   This result has an attachment that is not available.   |              |
+----------------------------------------------------------+--------------+
 
 
 
+--------------+---------+--------------------+--------------+
| Performing   | Address | City/State/Zipcode | Phone Number |
| Organization |         |                    |              |
+--------------+---------+--------------------+--------------+
|   [facility]  |         |                    |              |
+--------------+---------+--------------------+--------------+
 CBC with Differential (2019  4:40 AM PDT)
 
+-------------+--------------------------+-----------------+------------+--------------+
| Component   | Value                    | Ref Range       | Performed  | Pathologist  |
|             |                          |                 | At         | Signature    |
+-------------+--------------------------+-----------------+------------+--------------+
| WBC         | 13.98 (H)                | 3.80 - 11.00    | [facility]       |              |
|             |                          | K/uL            | LABORATORY |              |
+-------------+--------------------------+-----------------+------------+--------------+
| RBC         | 4.07 (L)                 | 4.20 - 5.70     | [facility]       |              |
|             |                          | M/uL            | LABORATORY |              |
+-------------+--------------------------+-----------------+------------+--------------+
| Hemoglobin  | 13.3                     | 13.2 - 17.0     | [facility]       |              |
|             |                          | g/dL            | LABORATORY |              |
+-------------+--------------------------+-----------------+------------+--------------+
| Hematocrit  | 39.6                     | 39.0 - 50.0 %   | [facility]       |              |
|             |                          |                 | LABORATORY |              |
+-------------+--------------------------+-----------------+------------+--------------+
| MCV         | 97.4                     | 80.0 - 100.0 fl | [facility]       |              |
|             |                          |                 | LABORATORY |              |
+-------------+--------------------------+-----------------+------------+--------------+
| MCH         | 32.8                     | 27.0 - 34.0 pg  | [facility]       |              |
|             |                          |                 | LABORATORY |              |
+-------------+--------------------------+-----------------+------------+--------------+
| MCHC        | 33.7                     | 32.0 - 35.5     | [facility]       |              |
|             |                          | g/dL            | LABORATORY |              |
+-------------+--------------------------+-----------------+------------+--------------+
| RDW-SD      | 47.3                     | 37 - 53 fl      | [facility]       |              |
|             |                          |                 | LABORATORY |              |
+-------------+--------------------------+-----------------+------------+--------------+
| Platelet    | 185                      | 150 - 400 K/uL  | [facility]       |              |
| Count       |                          |                 | LABORATORY |              |
+-------------+--------------------------+-----------------+------------+--------------+
| MPV         | 7.3                      | fl              | [facility]       |              |
|             |                          |                 | LABORATORY |              |
+-------------+--------------------------+-----------------+------------+--------------+
| Diff Type   | AUTOMATED                |                 | [facility]       |              |
|             |                          |                 | LABORATORY |              |
+-------------+--------------------------+-----------------+------------+--------------+
| %           | 55.68                    | %               | [facility]       |              |
| Neutrophils |                          |                 | LABORATORY |              |
 
+-------------+--------------------------+-----------------+------------+--------------+
| %           | 39.54                    | %               | [facility]       |              |
| Lymphocytes |                          |                 | LABORATORY |              |
+-------------+--------------------------+-----------------+------------+--------------+
| Monocyte %  | 4.55                     | %               | [facility]       |              |
|             |                          |                 | LABORATORY |              |
+-------------+--------------------------+-----------------+------------+--------------+
| Eosinophils | 0.05                     | %               | [facility]       |              |
|  %          |                          |                 | LABORATORY |              |
+-------------+--------------------------+-----------------+------------+--------------+
| Basophils % | 0.18                     | %               | [facility]       |              |
|             |                          |                 | LABORATORY |              |
+-------------+--------------------------+-----------------+------------+--------------+
| Neutrophils | 7.79 (H)                 | 1.90 - 7.40     | [facility]       |              |
| , Absolute  |                          | K/uL            | LABORATORY |              |
+-------------+--------------------------+-----------------+------------+--------------+
| Absolute    | 5.53 (H)                 | 1.00 - 3.90     | [facility]       |              |
| Lymphocytes |                          | K/uL            | LABORATORY |              |
+-------------+--------------------------+-----------------+------------+--------------+
| Absolute    | 0.64                     | 0.00 - 0.80     | [facility]       |              |
| Monocytes   |                          | K/uL            | LABORATORY |              |
+-------------+--------------------------+-----------------+------------+--------------+
| Eosinophils | 0.01                     | 0.00 - 0.50     | [facility]       |              |
| , Absolute  |                          | K/uL            | LABORATORY |              |
+-------------+--------------------------+-----------------+------------+--------------+
| Basophils,  | 0.03                     | 0.00 - 0.10     | [facility]       |              |
| Absolute    |                          | K/uL            | LABORATORY |              |
+-------------+--------------------------+-----------------+------------+--------------+
| RBC         | RBC AND PLT MORPHOLOGY   |                 | [facility]       |              |
| Morphology  | APPEAR NORMAL            |                 | LABORATORY |              |
+-------------+--------------------------+-----------------+------------+--------------+
| Platelet    | ADEQUATE                 |                 | [facility]       |              |
| Estimate    |                          |                 | LABORATORY |              |
+-------------+--------------------------+-----------------+------------+--------------+
| Comment     | SLIDE SCANNED, AGREES    |                 | [facility]       |              |
|             | WITH AUTOMATED           |                 | LABORATORY |              |
|             | RESULTS.Comment: Testing |                 |            |              |
|             |  performed at [facility]    |                 |            |              |
|             | [facility];[place], [place]   |                 |            |              |
|             | [id number]                    |                 |            |              |
+-------------+--------------------------+-----------------+------------+--------------+
 
 
 
+----------+
| Specimen |
+----------+
| Blood    |
+----------+
 
 
 
+-------------------+------------------+--------------------+----------------+
| Performing        | Address          | City/State/Zipcode | Phone Number   |
| Organization      |                  |                    |                |
+-------------------+------------------+--------------------+----------------+
|   [facility] |   [address] | [place] [address] |   [phone] |
+-------------------+------------------+--------------------+----------------+
 Basic Metabolic Panel (2019  4:40 AM PDT)
 
 
+-------------+--------------------------+-----------------+------------+--------------+
| Component   | Value                    | Ref Range       | Performed  | Pathologist  |
|             |                          |                 | At         | Signature    |
+-------------+--------------------------+-----------------+------------+--------------+
| Na          | 140                      | 135 - 145       | [facility]       |              |
|             |                          | mmol/L          | LABORATORY |              |
+-------------+--------------------------+-----------------+------------+--------------+
| K           | 4.5                      | 3.5 - 4.9       | [facility]       |              |
|             |                          | mmol/L          | LABORATORY |              |
+-------------+--------------------------+-----------------+------------+--------------+
| Cl          | 107                      | 99 - 109 mmol/L | [facility]       |              |
|             |                          |                 | LABORATORY |              |
+-------------+--------------------------+-----------------+------------+--------------+
| CO2         | 23                       | 23 - 32 mmol/L  | [facility]       |              |
|             |                          |                 | LABORATORY |              |
+-------------+--------------------------+-----------------+------------+--------------+
| Anion Gap   | 15                       | 5 - 20 mmol/L   | [facility]       |              |
|             |                          |                 | LABORATORY |              |
+-------------+--------------------------+-----------------+------------+--------------+
| Glucose     | 192 (H)                  | 65 - 99 mg/dL   | [facility]       |              |
|             |                          |                 | LABORATORY |              |
+-------------+--------------------------+-----------------+------------+--------------+
| BUN         | 17                       | 8 - 25 mg/dL    | [facility]       |              |
|             |                          |                 | LABORATORY |              |
+-------------+--------------------------+-----------------+------------+--------------+
| Creatinine  | 1.02                     | 0.70 - 1.30     | [facility]       |              |
|             |                          | mg/dL           | LABORATORY |              |
+-------------+--------------------------+-----------------+------------+--------------+
| BUN/Creatin | 17                       |                 | [facility]       |              |
| ine Ratio   |                          |                 | LABORATORY |              |
+-------------+--------------------------+-----------------+------------+--------------+
| Calcium     | 8.6                      | 8.5 - 10.5      | [facility]       |              |
|             |                          | mg/dL           | LABORATORY |              |
+-------------+--------------------------+-----------------+------------+--------------+
| Estimated   | >60Comment: GFR <60:     | >60             | [facility]       |              |
| GFR         | CHRONIC KIDNEY DISEASE,  | mL/min/1.73m2   | LABORATORY |              |
|             | IF FOUND OVER A 3 MONTH  |                 |            |              |
|             | PERIOD.GFR <15: KIDNEY   |                 |            |              |
|             | FAILURE.FOR       |                 |            |              |
|             | [employer], MULTIPLY THE  |                 |            |              |
|             | CALCULATED GFR BY        |                 |            |              |
|             | 1.210.This eGFR is       |                 |            |              |
|             | calculated using the     |                 |            |              |
|             | MDRD [facility] traceable      |                 |            |              |
|             | equation.Testing         |                 |            |              |
|             | performed at [facility]     |                 |            |              |
|             | [facility];[place], [place]   |                 |            |              |
|             | [id number]                    |                 |            |              |
+-------------+--------------------------+-----------------+------------+--------------+
 
 
 
+----------+
| Specimen |
+----------+
| Blood    |
+----------+
 
 
 
 
+-------------------+------------------+--------------------+----------------+
| Performing        | Address          | City/State/Zipcode | Phone Number   |
| Organization      |                  |                    |                |
+-------------------+------------------+--------------------+----------------+
|   [facility] |   [address] | [place] [address] |   [phone] |
+-------------------+------------------+--------------------+----------------+
 Surgical Pathology Exam (2019  1:50 PM PDT)
 
+--------------------+
| Specimen           |
+--------------------+
| Tissue - Arterial  |
| part (body         |
| structure)         |
+--------------------+
 
 
 
+-------------------------------------------------------------------------------------------
--------------------------------------------------------------------------------------------
----------------+-----------------+
| Narrative                                                                                 
                                                                                            
                | Performed At    |
+-------------------------------------------------------------------------------------------
--------------------------------------------------------------------------------------------
----------------+-----------------+
|   This result has an attachment that is not available.  SPECIMEN(S): A                    
                                                                                            
                |   [facility]  |
|  SUPERIOR MESENTERIC ARTERY PLAQUE SPECIMEN SOURCE:A. SUPERIOR                            
                                                                                            
                | INCYTE          |
| MESENTERIC ARTERY PLAQUE CLINICAL HISTORY:K55.1 (mesenteric ischemia,                     
                                                                                            
                |                 |
| chronic). FINAL PATHOLOGIC DIAGNOSIS:Superior mesenteric artery                           
                                                                                            
                |                 |
| plaque:-   Benign atherosclerotic plaque and focal fragments of benign                    
                                                                                            
                |                 |
|  fibrous tissue and smooth muscle. JVR:[facility]:C2NR MICROSCOPIC                               
                                                                                            
                |                 |
| EXAMINATION:Histologic sections of all submitted blocks are examined                      
                                                                                            
                |                 |
| by light microscopy.   These findings, together with the gross                            
                                                                                            
                |                 |
| examination, support the pathologic diagnosis. GROSS DESCRIPTION:The                      
                                                                                            
                |                 |
| specimen, labeled "JH, superior mesenteric artery plaque," is received                    
                                                                                            
                |                 |
|  in formalin and consists of three tan-pink and focally calcified                         
                                                                                            
                |                 |
 
| portions of tissue ranging from 1.0-2.5 cm in greatestdimension.                          
                                                                                            
                |                 |
|   Sectioning shows tan-pink tan-yellow and calcified cut surfaces with                    
                                                                                            
                |                 |
|  no discrete lesions identified.   Representative sections are                            
                                                                                            
                |                 |
| submitted in cassette (A1), following decalcification inDecal [email or web address]                     
                                                                                            
                |                 |
| (under the direct supervision of a pathologist) The Gross Description                     
                                                                                            
                |                 |
| was prepared using a voice recognition system.   The report was                           
                                                                                            
                |                 |
| reviewed for accuracy; however, sound-alike word errors, addition                         
                                                                                            
                |                 |
| and/or deletions may occur.   If there is anyquestion about this                          
                                                                                            
                |                 |
| report, please contact Client Services. PERFORMING LABORATORY:The                         
                                                                                            
                |                 |
| technical component was performed by [facility], [address] [place] [address]                     
                                                                                            
                |                 |
| [place] [address] (Medical Director: Liset Naidu MD; CLIA#                           
                                                                                            
                |                 |
| [id number]).    Professional interpretation was performed by[facility]                        
                                                                                            
                |                 |
| [facility], [facility] [address]                     
                                                                                            
                |                 |
[place] [address] (Medical Director:   Liam GUZMAN                            
                                                                                            
                |                 |
| PADILLA Clarke; CLIA#:   [id number]). Diagnostician:   Mike Ryan                    
                                                                                            
                |                 |
|  MDPathologistElectronically Signed 2019                                            
                                                                                            
                |                 |
|The technical component was performed by [facility], [address] [place] [address] (Medical Director: Liset Naidu MD; CLIA# [id number]).    Professional interpretation w
as performed by |                 |
|[facility], [facility], [facility] [place] [address] (Medical Director:   Liam Clarke M.D.; CLIA#:   [id number]).       
                |                 |
|                                                                                           
                                                                                            
                |                 |
|Diagnostician:   Mike Ryan MD                                                       
                                                                                            
                |                 |
 
|Pathologist                                                                                
                                                                                            
               |                 |
|Electronically Signed 2019                                                           
                                                                                            
                |                 |
|                                                                                           
                                                                                            
                |                 |
|                                                                                           
                                                                                            
                |                 |
+-------------------------------------------------------------------------------------------
--------------------------------------------------------------------------------------------
----------------+-----------------+
 
 
 
+-----------------+---------+--------------------+--------------+
| Performing      | Address | City/State/Zipcode | Phone Number |
| Organization    |         |                    |              |
+-----------------+---------+--------------------+--------------+
|   [facility]  |         |                    |              |
| [facility]          |         |                    |              |
+-----------------+---------+--------------------+--------------+
 Type and Screen (2019 11:03 AM PDT)
 
+-----------+------------------------+-----------+------------+--------------+
| Component | Value                  | Ref Range | Performed  | Pathologist  |
|           |                        |           | At         | Signature    |
+-----------+------------------------+-----------+------------+--------------+
| ABO Rh    | O POSITIVE             |           | [facility]       |              |
|           |                        |           | LABORATORY |              |
+-----------+------------------------+-----------+------------+--------------+
| Antibody  | NEGATIVE               |           | [facility]       |              |
| Screen    |                        |           | LABORATORY |              |
+-----------+------------------------+-----------+------------+--------------+
| BB BAND   | [id number]               |           | [facility]       |              |
|           |                        |           | LABORATORY |              |
+-----------+------------------------+-----------+------------+--------------+
| BB BAND   | Testing performed at   |           | [facility]       |              |
|           | [facility];[address]          |           | LABORATORY |              |
|           | [address]vd;[place] [address] |           |            |              |
+-----------+------------------------+-----------+------------+--------------+
 
 
 
+----------+
| Specimen |
+----------+
| Blood    |
+----------+
 
 
 
+-------------------+------------------+--------------------+----------------+
| Performing        | Address          | City/State/Zipcode | Phone Number   |
| Organization      |                  |                    |                |
+-------------------+------------------+--------------------+----------------+
|   [facility] |   [address] | [place] [address] |   [phone] |
 
+-------------------+------------------+--------------------+----------------+
 documented in this encounter
 
 Visit Diagnoses
 
 
+-----------------------------------------------------------------------------------+
| Diagnosis                                                                         |
+-----------------------------------------------------------------------------------+
|   Mesenteric ischemia, chronic (HCC)  Chronic vascular insufficiency of intestine |
+-----------------------------------------------------------------------------------+
 documented in this encounter
 
 Admitting Diagnoses
 
 
+-----------------------------------------------------------------------------------+
| Diagnosis                                                                         |
+-----------------------------------------------------------------------------------+
|   Mesenteric ischemia, chronic (HCC)  Chronic vascular insufficiency of intestine |
+-----------------------------------------------------------------------------------+
 documented in this encounter
 
 Administered Medications
 
 
+-----------------------------------+--------+----------+-------+------+------+
| Medication Order                  | MAR    | Action   | Dose  | Rate | Site |
|                                   | Action | Date     |       |      |      |
+-----------------------------------+--------+----------+-------+------+------+
|   aspirin EC tablet 81 mg  81 mg, | Given  | 20 | 81 mg |      |      |
|  Oral, DAILY, First dose on Sat   |        | 19  8:09 |       |      |      |
| 19 at 1100                   |        |  AM PDT  |       |      |      |
+-----------------------------------+--------+----------+-------+------+------+
 
 
 
+-------+----------+-------+---+---+
| Given | 20 | 81 mg |   |   |
|       | 19  8:29 |       |   |   |
|       |  AM PDT  |       |   |   |
+-------+----------+-------+---+---+
| Given | 20 | 81 mg |   |   |
|       | 19 10:23 |       |   |   |
|       |  AM PDT  |       |   |   |
+-------+----------+-------+---+---+
 
 
 
+---+---+
|   |   |
+---+---+
 
 
 
+-----------------------------------+-------+----------+-------+---+---+
|   bisacodyl (DULCOLAX)            | Given | 20 | 10 mg |   |   |
| suppository 10 mg  10 mg, Rectal, |       | 19  8:11 |       |   |   |
|  DAILY PRN, Constipation,         |       |  AM PDT  |       |   |   |
| Starting Tue 19 at 1507, If  |       |          |       |   |   |
 
| all other bowel medications       |       |          |       |   |   |
| ineffective x 24 hours or not     |       |          |       |   |   |
| ordered,                          |       |          |       |   |   |
+-----------------------------------+-------+----------+-------+---+---+
 
 
 
+---+---+
|   |   |
+---+---+
 
 
 
+-------------------------------+-------+----------+---------+---+---+
|   heparin 5,000 units/mL      | Given | 20 | 10,000  |   |   |
| injection  PRN, Starting Tue  |       | 19 12:00 | Units   |   |   |
| 19 at 1200, Intra-op     |       |  PM PDT  |         |   |   |
+-------------------------------+-------+----------+---------+---+---+
 
 
 
+---+---+
|   |   |
+---+---+
 
 
 
+----------------------------------+-------+----------+-------+---+---+
|   hydroCHLOROthiazide tablet 25  | Given | 20 | 25 mg |   |   |
| mg  25 mg, Oral, DAILY, First    |       | 19  8:10 |       |   |   |
| dose on Sat 19 at 1100      |       |  AM PDT  |       |   |   |
+----------------------------------+-------+----------+-------+---+---+
 
 
 
+-------+----------+-------+---+---+
| Given | 20 | 25 mg |   |   |
|       | 19  8:30 |       |   |   |
|       |  AM PDT  |       |   |   |
+-------+----------+-------+---+---+
| Given | 20 | 25 mg |   |   |
|       | 19 10:23 |       |   |   |
|       |  AM PDT  |       |   |   |
+-------+----------+-------+---+---+
 
 
 
+---+---+
|   |   |
+---+---+
 
 
 
+-----------------------------------+-------+----------+---------+---+---+
|   HYDROcodone-acetaminophen       | Given | 20 | 2       |   |   |
| (NORCO) 5-325 mg per tablet 1-2   |       | 19 11:14 | tablets |   |   |
| tablet  1-2 tablet, Oral, EVERY 4 |       |  AM PDT  |         |   |   |
|  HOURS PRN, Pain, Starting Sun    |       |          |         |   |   |
| 19 at 0843                   |       |          |         |   |   |
+-----------------------------------+-------+----------+---------+---+---+
 
 
 
 
+-------+----------+---------+---+---+
| Given | 20 | 2       |   |   |
|       | 19  7:30 | tablets |   |   |
|       |  AM PDT  |         |   |   |
+-------+----------+---------+---+---+
| Given | 20 | 2       |   |   |
|       | 19 10:22 | tablets |   |   |
|       |  PM PDT  |         |   |   |
+-------+----------+---------+---+---+
 
 
 
+---+---+
|   |   |
+---+---+
 
 
 
+---------------------------------+-------+----------+------+---+---+
|   HYDROmorphone (DILAUDID)      | Given | 20 | 1 mg |   |   |
| injection 0.5-1 mg  0.5-1 mg,   |       | 19  8:30 |      |   |   |
| Intravenous, EVERY 1 HOUR PRN,  |       |  AM PDT  |      |   |   |
| Pain, Starting Tue 19 at   |       |          |      |   |   |
| 1535                            |       |          |      |   |   |
+---------------------------------+-------+----------+------+---+---+
 
 
 
+-------+----------+------+---+---+
| Given | 20 | 1 mg |   |   |
|       | 19  3:35 |      |   |   |
|       |  AM PDT  |      |   |   |
+-------+----------+------+---+---+
| Given | 20 | 1 mg |   |   |
|       | 19 10:05 |      |   |   |
|       |  PM PDT  |      |   |   |
+-------+----------+------+---+---+
 
 
 
+---+---+
|   |   |
+---+---+
 
 
 
+-----------------------------------+-------+----------+-------+---+---+
|   losartan (COZAAR) tablet 50 mg  | Given | 20 | 50 mg |   |   |
|  50 mg, Oral, DAILY, First dose   |       | 19  8:09 |       |   |   |
| on Sat 19 at 1100            |       |  AM PDT  |       |   |   |
+-----------------------------------+-------+----------+-------+---+---+
 
 
 
+-------+----------+-------+---+---+
| Given | 20 | 50 mg |   |   |
|       | 19  8:30 |       |   |   |
 
|       |  AM PDT  |       |   |   |
+-------+----------+-------+---+---+
| Given | 20 | 50 mg |   |   |
|       | 19 10:23 |       |   |   |
|       |  AM PDT  |       |   |   |
+-------+----------+-------+---+---+
 
 
 
+---+---+
|   |   |
+---+---+
 
 
 
+-----------------------------------+-------+----------+------+---+---+
|   morphine injection 1-2 mg  1-2  | Given | 20 | 2 mg |   |   |
| mg, Intravenous, EVERY 1 HOUR     |       | 19  3:14 |      |   |   |
| PRN, Pain, Starting Tue 19   |       |  PM PDT  |      |   |   |
| at 1507, If oral route not an     |       |          |      |   |   |
| option.  Slow IV push, not faster |       |          |      |   |   |
|  than 2mg/minute. First dose must |       |          |      |   |   |
|  be lowest dose, titrate to       |       |          |      |   |   |
| effective dose by repeat of       |       |          |      |   |   |
| lowest dose every 30 minutes prn  |       |          |      |   |   |
| pain, may not exceed maximum dose |       |          |      |   |   |
|  ordered per interval.  Use       |       |          |      |   |   |
| Pasero Sedation Scale.,           |       |          |      |   |   |
+-----------------------------------+-------+----------+------+---+---+
 
 
 
+---+---+
|   |   |
+---+---+
 
 
 
+-----------------------------------+-------+----------+-------+---+---+
|   pantoprazole (PROTONIX)         | Given | 20 | 40 mg |   |   |
| injection 40 mg  40 mg,           |       | 19  8:10 |       |   |   |
| Intravenous, DAILY, First dose on |       |  AM PDT  |       |   |   |
|  Wed 19 at 1100, If          |       |          |       |   |   |
| reconstituting, mix each 40 mg    |       |          |       |   |   |
| vial with 10 mL NS to make 4      |       |          |       |   |   |
| mg/mL., Indication: GERD          |       |          |       |   |   |
+-----------------------------------+-------+----------+-------+---+---+
 
 
 
+-------+----------+-------+---+---+
| Given | 20 | 40 mg |   |   |
|       | 19  8:30 |       |   |   |
|       |  AM PDT  |       |   |   |
+-------+----------+-------+---+---+
| Given | 20 | 40 mg |   |   |
|       | 19  8:00 |       |   |   |
|       |  AM PDT  |       |   |   |
+-------+----------+-------+---+---+
 
 
 
 
+---+---+
|   |   |
+---+---+
 
 
 
+--------------------------------+-------+----------+--------+---+----------+
|   thrombin (recombinant)       | Given | 20 | 5,000  |   | Surgical |
| (RECOTHROM) solution  PRN,     |       | 19 12:00 | Units  |   |  Site    |
| Starting Tue 19 at 1200,  |       |  PM PDT  |        |   |          |
| Intra-op                       |       |          |        |   |          |
+--------------------------------+-------+----------+--------+---+----------+
 
 
 
+---+---+
|   |   |
+---+---+
 documented in this encounter

## 2020-01-13 NOTE — XMS
Encounter Summary
  Created on: 2020
 
 Pasquale Nehemias Martinez
 External Reference #: [id number]
 : 43
 Sex: Male
 
 Demographics
 
 
+-----------------------+----------------------+
| Address               | [address]         |
|                       | [place]  [address] |
+-----------------------+----------------------+
| Home Phone            | [phone]      |
+-----------------------+----------------------+
| Preferred Language    | Unknown              |
+-----------------------+----------------------+
| Marital Status        |               |
+-----------------------+----------------------+
| Jew Affiliation | 1077                 |
+-----------------------+----------------------+
| Race                  | Unknown              |
+-----------------------+----------------------+
| Ethnic Group          | Unknown              |
+-----------------------+----------------------+
 
 
 Author
 
 
+--------------+--------------------------------------------+
| Author       | [facility]  |
|              | and [facility]                                |
+--------------+--------------------------------------------+
| Organization | [facility]  |
|              | and [place]                                |
+--------------+--------------------------------------------+
| Address      | Unknown                                    |
+--------------+--------------------------------------------+
| Phone        | Unavailable                                |
+--------------+--------------------------------------------+
 
 
 
 Support
 
 
+--------------+--------------+---------------------+-----------------+
| Name         | Relationship | Address             | Phone           |
+--------------+--------------+---------------------+-----------------+
| Rupa Giordano | ECON         | [address]             | [phone] |
|              |              | [facility] [place]   |                 |
|              |              | [address]               |                 |
+--------------+--------------+---------------------+-----------------+
 
 
 
 
 Care Team Providers
 
 
+-----------------------+------+-------------+
| Care Team Member Name | Role | Phone       |
+-----------------------+------+-------------+
| No, Physician         | PCP  | Unavailable |
+-----------------------+------+-------------+
 
 
 
 Encounter Details
 
 
+--------+-----------+----------------------+----------------------+-------------+
| Date   | Type      | Department           | Care Team            | Description |
+--------+-----------+----------------------+----------------------+-------------+
| / | Telephone |   [facility] MED[facility]     |   Sukhdeep Pardo MD     |             |
| 2019   |           | CENTER CV INTRA OP   | [address]     |             |
|        |           | [address]       | [address]  [place]  |             |
|        |           | [place]         | [address]           |             |
|        |           | [address]           | [phone]         |             |
|        |           | [phone]         | [phone] (Fax)   |             |
+--------+-----------+----------------------+----------------------+-------------+
 
 
 
 Social History
 
 
+---------------+-------+-----------+--------+------+
| Tobacco Use   | Types | Packs/Day | Years  | Date |
|               |       |           | Used   |      |
+---------------+-------+-----------+--------+------+
| Former Smoker |       |           |        |      |
+---------------+-------+-----------+--------+------+
 
 
 
+---------------------+---+---+---+
| Smokeless Tobacco:  |   |   |   |
| Former User         |   |   |   |
+---------------------+---+---+---+
 
 
 
+------------------------------+
| Comments: quit 50 years ago  |
+------------------------------+
 
 
 
+---------------+-------------+---------+----------+
| Alcohol Use   | Drinks/Week | oz/Week | Comments |
+---------------+-------------+---------+----------+
| Not Currently |             |         | 90m days |
+---------------+-------------+---------+----------+
 
 
 
 
+------------------+---------------+
| Sex Assigned at  | Date Recorded |
| Birth            |               |
+------------------+---------------+
| Not on file      |               |
+------------------+---------------+
 
 
 
+----------------+-------------+-------------+
| Job Start Date | Occupation  | Industry    |
+----------------+-------------+-------------+
| Not on file    | Not on file | Not on file |
+----------------+-------------+-------------+
 
 
 
+----------------+--------------+------------+
| Travel History | Travel Start | Travel End |
+----------------+--------------+------------+
 
 
 
+-------------------------------------+
| No recent travel history available. |
+-------------------------------------+
 documented as of this encounter
 
 Functional Status
 
 
+---------------------------------------------+----------+--------------------+
| Functional Status                           | Response | Date of Assessment |
+---------------------------------------------+----------+--------------------+
| Are you deaf or do you have serious         | No       | 2019         |
| difficulty hearing?                         |          |                    |
+---------------------------------------------+----------+--------------------+
| Are you blind or do you have serious        | No       | 2019         |
| difficulty seeing, even when wearing        |          |                    |
| glasses?                                    |          |                    |
+---------------------------------------------+----------+--------------------+
| Do you have serious difficulty walking or   | No       | 2019         |
| climbing stairs? (5 years old or older)     |          |                    |
+---------------------------------------------+----------+--------------------+
| Do you have difficulty dressing or bathing? | No       | 2019         |
|  (5 years old or older)                     |          |                    |
+---------------------------------------------+----------+--------------------+
| Because of a physical, mental, or emotional | No       | 2019         |
|  condition, do you have difficulty doing    |          |                    |
| errands alone such as visiting a doctor's   |          |                    |
| office or shopping? [15 years old or        |          |                    |
| older)]                                     |          |                    |
+---------------------------------------------+----------+--------------------+
 
 
 
+---------------------------------------------+----------+--------------------+
| Cognitive Status                            | Response | Date of Assessment |
+---------------------------------------------+----------+--------------------+
 
| Because of a physical, mental, or emotional | No       | 2019         |
|  condition, do you have serious difficulty  |          |                    |
| concentrating, remembering, or making       |          |                    |
| decisions? (5 years old or older)           |          |                    |
+---------------------------------------------+----------+--------------------+
 documented as of this encounter
 
 Plan of Treatment
 
 
+--------+-------------+------------------+----------------------+-------------+
| Date   | Type        | Specialty        | Care Team            | Description |
+--------+-------------+------------------+----------------------+-------------+
| / | Appointment | Radiology        |   Augustin Lopez DNP      |             |
|    |             |                  |  [address]     |             |
|        |             |                  | [place]  |             |
|        |             |                  | [address]  [phone]  |             |
|        |             |                  |  [phone] (Fax)  |             |
+--------+-------------+------------------+----------------------+-------------+
| / | Office      | Vascular Surgery |   Augustin Lopez DNP      |             |
|    | Visit       |                  | [address]     |             |
|        |             |                  | [place]  |             |
|        |             |                  | [address]  [phone]  |             |
|        |             |                  |  [phone] (Fax)  |             |
+--------+-------------+------------------+----------------------+-------------+
 documented as of this encounter
 
 Visit Diagnoses
 Not on filedocumented in this encounter

## 2020-01-13 NOTE — XMS
Encounter Summary
  Created on: 2020
 
 Pasquale Nehemias Martinez
 External Reference #: [id number]
 : 43
 Sex: Male
 
 Demographics
 
 
+-----------------------+----------------------+
| Address               | [address]         |
|                       | [place]  [address] |
+-----------------------+----------------------+
| Home Phone            | [phone]      |
+-----------------------+----------------------+
| Preferred Language    | Unknown              |
+-----------------------+----------------------+
| Marital Status        |               |
+-----------------------+----------------------+
| Protestant Affiliation | 1077                 |
+-----------------------+----------------------+
| Race                  | Unknown              |
+-----------------------+----------------------+
| Ethnic Group          | Unknown              |
+-----------------------+----------------------+
 
 
 Author
 
 
+--------------+--------------------------------------------+
| Author       | [facility] [place]  |
|              | and [place]                                |
+--------------+--------------------------------------------+
| Organization | [facility] [place]  |
|              | and [place]                                |
+--------------+--------------------------------------------+
| Address      | Unknown                                    |
+--------------+--------------------------------------------+
| Phone        | Unavailable                                |
+--------------+--------------------------------------------+
 
 
 
 Support
 
 
+--------------+--------------+---------------------+-----------------+
| Name         | Relationship | Address             | Phone           |
+--------------+--------------+---------------------+-----------------+
| Rupa Giordano | ECON         | [address]             | [phone] |
|              |              | [facility] [place]   |                 |
|              |              | [address]               |                 |
+--------------+--------------+---------------------+-----------------+
 
 
 
 
 Care Team Providers
 
 
+-----------------------+------+-------------+
| Care Team Member Name | Role | Phone       |
+-----------------------+------+-------------+
| No, Physician         | PCP  | Unavailable |
+-----------------------+------+-------------+
 
 
 
 Reason for Visit
 Diagnostic/Screening (Routine)
 
+--------+--------+-----------+--------------+--------------+---------------+
| Status | Reason | Specialty | Diagnoses /  | Referred By  | Referred To   |
|        |        |           | Procedures   | Contact      | Contact       |
+--------+--------+-----------+--------------+--------------+---------------+
| Closed |        | Radiology |   Diagnoses  |   Sukhdeep Pardo   |   [facility]    |
|        |        |           |  Chronic     | MD [address]  | [address]     |
|        |        |           | vascular     | [place]  | [address]          |
|        |        |           | disorders of |  [address]       | [place]  |
|        |        |           |  intestine   | [place], [place] | [address]    |
|        |        |           | ([facility])        |  [address]  | Phone:        |
|        |        |           | Procedures   |  Phone:      | [phone]  |
|        |        |           | CHG          | [phone] |  Fax:         |
|        |        |           | RADIOLOGIC   |   Fax:       | [phone]  |
|        |        |           | EXAM CHEST 2 | [phone] |               |
|        |        |           |  VIEWS  PHS  |              |               |
|        |        |           | XRAY         |              |               |
+--------+--------+-----------+--------------+--------------+---------------+
 
 
 
 
 Encounter Details
 
 
+--------+-----------+----------------------+----------------------+-------------+
| Date   | Type      | Department           | Care Team            | Description |
+--------+-----------+----------------------+----------------------+-------------+
| / | Hospital  |   [facility]    |   Sukhdeep Pardo MD     |             |
| 2019   | Encounter | [facility] XRAY  | [address]     |             |
|        |           |  [address]      | [address]  [place]  |             |
|        |           | [place]         | [address]           |             |
|        |           | [address]           | [phone]         |             |
|        |           | [phone]         | [phone] (Fax)   |             |
+--------+-----------+----------------------+----------------------+-------------+
 
 
 
 Social History
 
 
+---------------+-------+-----------+--------+------+
| Tobacco Use   | Types | Packs/Day | Years  | Date |
|               |       |           | Used   |      |
+---------------+-------+-----------+--------+------+
| Former Smoker |       |           |        |      |
 
+---------------+-------+-----------+--------+------+
 
 
 
+---------------------+---+---+---+
| Smokeless Tobacco:  |   |   |   |
| Former User         |   |   |   |
+---------------------+---+---+---+
 
 
 
+------------------------------+
| Comments: quit 50 years ago  |
+------------------------------+
 
 
 
+---------------+-------------+---------+----------+
| Alcohol Use   | Drinks/Week | oz/Week | Comments |
+---------------+-------------+---------+----------+
| Not Currently |             |         | 90m days |
+---------------+-------------+---------+----------+
 
 
 
+------------------+---------------+
| Sex Assigned at  | Date Recorded |
| Birth            |               |
+------------------+---------------+
| Not on file      |               |
+------------------+---------------+
 
 
 
+----------------+-------------+-------------+
| Job Start Date | Occupation  | Industry    |
+----------------+-------------+-------------+
| Not on file    | Not on file | Not on file |
+----------------+-------------+-------------+
 
 
 
+----------------+--------------+------------+
| Travel History | Travel Start | Travel End |
+----------------+--------------+------------+
 
 
 
+-------------------------------------+
| No recent travel history available. |
+-------------------------------------+
 documented as of this encounter
 
 Medications at Time of Discharge
 
 
+----------------------+----------------------+-----------+---------+----------+----------+
| Medication           | Sig                  | Dispensed | Refills | Start    | End Date |
|                      |                      |           |         | Date     |          |
+----------------------+----------------------+-----------+---------+----------+----------+
 
|   ASPIRIN 81 PO      | Take 81 mg by mouth. |           | 0       |          |          |
+----------------------+----------------------+-----------+---------+----------+----------+
|   docusate sodium    | Take 1 capsule by    |   30      | 0       | 20 |          |
| (COLACE) 100 mg      | mouth 2 times daily. | capsule   |         | 19       |          |
| capsule              |                      |           |         |          |          |
+----------------------+----------------------+-----------+---------+----------+----------+
|                      | Take 25 mg by mouth  |           | 0       |          |          |
| hydroCHLOROthiazide  | Daily.               |           |         |          |          |
| 25 mg tablet         |                      |           |         |          |          |
+----------------------+----------------------+-----------+---------+----------+----------+
|                      | Take 1 tablet by     |   30      | 0       | 20 |          |
| HYDROcodone-acetamin | mouth every 4 hours  | tablet    |         | 19       |          |
| ophen (NORCO) 5-325  | as needed for Pain.  |           |         |          |          |
| mg per tablet        |                      |           |         |          |          |
+----------------------+----------------------+-----------+---------+----------+----------+
|   losartan (COZAAR)  | Take 50 mg by mouth  |           | 0       |          |          |
| 50 mg tablet         | Daily.               |           |         |          |          |
+----------------------+----------------------+-----------+---------+----------+----------+
|   Multiple           | Take  by mouth.      |           | 0       |          |          |
| Vitamins-Minerals    |                      |           |         |          |          |
| (CENTRUM SILVER      |                      |           |         |          |          |
| 50+MEN PO)           |                      |           |         |          |          |
+----------------------+----------------------+-----------+---------+----------+----------+
|   simvastatin        | Take 10 mg by mouth  |           | 0       |          |          |
| (ZOCOR) 10 mg tablet | nightly.             |           |         |          |          |
+----------------------+----------------------+-----------+---------+----------+----------+
 documented as of this encounter
 
 Plan of Treatment
 
 
+--------+-------------+------------------+----------------------+-------------+
| Date   | Type        | Specialty        | Care Team            | Description |
+--------+-------------+------------------+----------------------+-------------+
| / | Appointment | Radiology        |   Augustin Lopez DNP      |             |
|    |             |                  |  [address]     |             |
|        |             |                  | [place]  |             |
|        |             |                  | [address]  [phone]  |             |
|        |             |                  |  [phone] (Fax)  |             |
+--------+-------------+------------------+----------------------+-------------+
| / | Office      | Vascular Surgery |   Augustin Lopez DNP      |             |
|    | Visit       |                  | [address]     |             |
|        |             |                  | [address] [place]  |             |
|        |             |                  | [address]  [phone]  |             |
|        |             |                  |  [phone] (Fax)  |             |
+--------+-------------+------------------+----------------------+-------------+
 documented as of this encounter
 
 Procedures
 
 
+------------------+--------+-------------+----------------------+----------------------+
| Procedure Name   | Priori | Date/Time   | Associated Diagnosis | Comments             |
|                  | ty     |             |                      |                      |
+------------------+--------+-------------+----------------------+----------------------+
| XR CHEST PA AND  | Routin | 2019  |   Mesenteric         |   Results for this   |
| LATERAL          | e      | 10:15 AM    | ischemia, chronic    | procedure are in the |
|                  |        | PDT         | ([facility])                |  results section.    |
+------------------+--------+-------------+----------------------+----------------------+
 documented in this encounter
 
 
 Results
 XR Chest PA and Lateral (2019 10:15 AM PDT)
 
+----------+
| Specimen |
+----------+
|          |
+----------+
 
 
 
+------------------------------------------------------------------------+---------------+
| Narrative                                                              | Performed At  |
+------------------------------------------------------------------------+---------------+
|      CHEST PA AND LATERAL     CLINICAL INFORMATION:  Pre operative     |   [facility] |
| evaluation     COMPARISON:  CT ANGIOGRAPHY ABDOMEN AND PELVIS          |               |
| (2019);     FINDINGS:  There is a BB in the posteromedial right   |               |
| lower lobe, better defined on  CT abdomen 2019. the lungs are     |               |
| clear.   The heart size is normal  c..   Mild to moderate degenerative |               |
|  changes of the thoracic spine     IMPRESSION:  No active              |               |
| intrathoracic disease.           Signed by: PADILLA Singh Shawn  Sign   |               |
| Date/Time: 2019 10:27 AM                                         |               |
+------------------------------------------------------------------------+---------------+
 
 
 
+------------------------------------------------------------------------+
| Procedure Note                                                         |
+------------------------------------------------------------------------+
|   Cuong, Rad Results In - 2019 10:31 AM PDT                        |
| CHEST PA AND LATERAL                                                   |
|                                                                        |
| CLINICAL INFORMATION:                                                  |
| Pre operative evaluation                                               |
|                                                                        |
| COMPARISON:                                                            |
| CT ANGIOGRAPHY ABDOMEN AND PELVIS (2019);                         |
|                                                                        |
| FINDINGS:                                                              |
| There is a BB in the posteromedial right lower lobe, better defined on |
| CT abdomen 2019. the lungs are clear.  The heart size is normal   |
| c..  Mild to moderate degenerative changes of the thoracic spine       |
|                                                                        |
| IMPRESSION:                                                            |
| No active intrathoracic disease.                                       |
|                                                                        |
| Signed by: PADILLA Singh, Kenneth                                          |
| Sign Date/Time: 2019 10:27 AM                                    |
+------------------------------------------------------------------------+
 
 
 
+---------------+---------+--------------------+--------------+
| Performing    | Address | City/State/Zipcode | Phone Number |
| Organization  |         |                    |              |
+---------------+---------+--------------------+--------------+
|   [facility] |         |                    |              |
 
+---------------+---------+--------------------+--------------+
 documented in this encounter
 
 Visit Diagnoses
 Not on filedocumented in this encounter

## 2020-01-13 NOTE — XMS
Encounter Summary
  Created on: 2020
 
 Pasquale Nehemias Martinez
 External Reference #: [id number]
 : 43
 Sex: Male
 
 Demographics
 
 
+-----------------------+----------------------+
| Address               | [address]         |
|                       | [place]  [address] |
+-----------------------+----------------------+
| Home Phone            | [phone]      |
+-----------------------+----------------------+
| Preferred Language    | Unknown              |
+-----------------------+----------------------+
| Marital Status        |               |
+-----------------------+----------------------+
| Taoism Affiliation | 1077                 |
+-----------------------+----------------------+
| Race                  | Unknown              |
+-----------------------+----------------------+
| Ethnic Group          | Unknown              |
+-----------------------+----------------------+
 
 
 Author
 
 
+--------------+--------------------------------------------+
| Author       | [facility]  |
|              | and [facility]                                |
+--------------+--------------------------------------------+
| Organization | [facility]  |
|              | and [place]                                |
+--------------+--------------------------------------------+
| Address      | Unknown                                    |
+--------------+--------------------------------------------+
| Phone        | Unavailable                                |
+--------------+--------------------------------------------+
 
 
 
 Support
 
 
+--------------+--------------+---------------------+-----------------+
| Name         | Relationship | Address             | Phone           |
+--------------+--------------+---------------------+-----------------+
| Rupa Giordano | ECON         | [address]             | [phone] |
|              |              | [place]   |                 |
|              |              | [address]               |                 |
+--------------+--------------+---------------------+-----------------+
 
 
 
 
 Care Team Providers
 
 
+-----------------------+------+-------------+
| Care Team Member Name | Role | Phone       |
+-----------------------+------+-------------+
| No, Physician         | PCP  | Unavailable |
+-----------------------+------+-------------+
 
 
 
 Reason for Visit
 
 
+---------+----------+
| Reason  | Comments |
+---------+----------+
| Imaging | CT order |
+---------+----------+
 
 
 
 Encounter Details
 
 
+--------+-----------+----------------------+----------------------+--------------------+
| Date   | Type      | Department           | Care Team            | Description        |
+--------+-----------+----------------------+----------------------+--------------------+
| / | Telephone |   [facility]      |   Augustin Lopez DNP      | Imaging (CT order) |
| 2019   |           | VASCULAR SURGERY     | [address]     |                    |
|        |           | [address] | [address]  [place], [place]  |                    |
|        |           |  [place]  [place]     | [address] [phone]  |                    |
|        |           | [address]           |  [phone] (Fax)  |                    |
|        |           | [phone]         |                      |                    |
+--------+-----------+----------------------+----------------------+--------------------+
 
 
 
 Social History
 
 
+---------------+-------+-----------+--------+------+
| Tobacco Use   | Types | Packs/Day | Years  | Date |
|               |       |           | Used   |      |
+---------------+-------+-----------+--------+------+
| Former Smoker |       |           |        |      |
+---------------+-------+-----------+--------+------+
 
 
 
+---------------------+---+---+---+
| Smokeless Tobacco:  |   |   |   |
| Former User         |   |   |   |
+---------------------+---+---+---+
 
 
 
+------------------------------+
| Comments: quit 50 years ago  |
 
+------------------------------+
 
 
 
+---------------+-------------+---------+----------+
| Alcohol Use   | Drinks/Week | oz/Week | Comments |
+---------------+-------------+---------+----------+
| Not Currently |             |         | 90m days |
+---------------+-------------+---------+----------+
 
 
 
+------------------+---------------+
| Sex Assigned at  | Date Recorded |
| Birth            |               |
+------------------+---------------+
| Not on file      |               |
+------------------+---------------+
 
 
 
+----------------+-------------+-------------+
| Job Start Date | Occupation  | Industry    |
+----------------+-------------+-------------+
| Not on file    | Not on file | Not on file |
+----------------+-------------+-------------+
 
 
 
+----------------+--------------+------------+
| Travel History | Travel Start | Travel End |
+----------------+--------------+------------+
 
 
 
+-------------------------------------+
| No recent travel history available. |
+-------------------------------------+
 documented as of this encounter
 
 Functional Status
 
 
+---------------------------------------------+----------+--------------------+
| Functional Status                           | Response | Date of Assessment |
+---------------------------------------------+----------+--------------------+
| Are you deaf or do you have serious         | No       | 2019         |
| difficulty hearing?                         |          |                    |
+---------------------------------------------+----------+--------------------+
| Are you blind or do you have serious        | No       | 2019         |
| difficulty seeing, even when wearing        |          |                    |
| glasses?                                    |          |                    |
+---------------------------------------------+----------+--------------------+
| Do you have serious difficulty walking or   | No       | 2019         |
| climbing stairs? (5 years old or older)     |          |                    |
+---------------------------------------------+----------+--------------------+
| Do you have difficulty dressing or bathing? | No       | 2019         |
|  (5 years old or older)                     |          |                    |
+---------------------------------------------+----------+--------------------+
| Because of a physical, mental, or emotional | No       | 2019         |
 
|  condition, do you have difficulty doing    |          |                    |
| errands alone such as visiting a doctor's   |          |                    |
| office or shopping? [15 years old or        |          |                    |
| older)]                                     |          |                    |
+---------------------------------------------+----------+--------------------+
 
 
 
+---------------------------------------------+----------+--------------------+
| Cognitive Status                            | Response | Date of Assessment |
+---------------------------------------------+----------+--------------------+
| Because of a physical, mental, or emotional | No       | 2019         |
|  condition, do you have serious difficulty  |          |                    |
| concentrating, remembering, or making       |          |                    |
| decisions? (5 years old or older)           |          |                    |
+---------------------------------------------+----------+--------------------+
 documented as of this encounter
 
 Plan of Treatment
 
 
+--------+-------------+------------------+----------------------+-------------+
| Date   | Type        | Specialty        | Care Team            | Description |
+--------+-------------+------------------+----------------------+-------------+
| / | Appointment | Radiology        |   Augustin Lopez DNP      |             |
|    |             |                  | [address]     |             |
|        |             |                  | [place]  |             |
|        |             |                  | [address]  [phone]  |             |
|        |             |                  |  [phone] (Fax)  |             |
+--------+-------------+------------------+----------------------+-------------+
| / | Office      | Vascular Surgery |   Augustin Lopez DNP      |             |
| 2020   | Visit       |                  | [address]     |             |
|        |             |                  | [place]  |             |
|        |             |                  | [address] [phone]  |             |
|        |             |                  |  [phone] (Fax)  |             |
+--------+-------------+------------------+----------------------+-------------+
 documented as of this encounter
 
 Visit Diagnoses
 Not on filedocumented in this encounter

## 2020-01-13 NOTE — XMS
Encounter Summary
  Created on: 2020
 
 Pasquale Nehemias Martinez
 External Reference #: [id number]
 : 43
 Sex: Male
 
 Demographics
 
 
+-----------------------+----------------------+
| Address               | [address]         |
|                       | [place]  [address] |
+-----------------------+----------------------+
| Home Phone            | [phone]      |
+-----------------------+----------------------+
| Preferred Language    | Unknown              |
+-----------------------+----------------------+
| Marital Status        |               |
+-----------------------+----------------------+
| Roman Catholic Affiliation | 1077                 |
+-----------------------+----------------------+
| Race                  | Unknown              |
+-----------------------+----------------------+
| Ethnic Group          | Unknown              |
+-----------------------+----------------------+
 
 
 Author
 
 
+--------------+--------------------------------------------+
| Author       | [facility] [place]  |
|              | and [facility]                                |
+--------------+--------------------------------------------+
| Organization | [facility] [place]  |
|              | and [place]                                |
+--------------+--------------------------------------------+
| Address      | Unknown                                    |
+--------------+--------------------------------------------+
| Phone        | Unavailable                                |
+--------------+--------------------------------------------+
 
 
 
 Support
 
 
+--------------+--------------+---------------------+-----------------+
| Name         | Relationship | Address             | Phone           |
+--------------+--------------+---------------------+-----------------+
| Rupa Giordano | [employer]         | [address]             | [phone] |
|              |              | [place]   |                 |
|              |              | [address]               |                 |
+--------------+--------------+---------------------+-----------------+
 
 
 
 
 Care Team Providers
 
 
+-----------------------+------+-------------+
| Care Team Member Name | Role | Phone       |
+-----------------------+------+-------------+
| No, Physician         | PCP  | Unavailable |
+-----------------------+------+-------------+
 
 
 
 Reason for Visit
 
 
+----------------------+----------+
| Reason               | Comments |
+----------------------+----------+
| Appointment Question |          |
+----------------------+----------+
 
 
 
 Encounter Details
 
 
+--------+-----------+----------------------+----------------------+----------------------+
| Date   | Type      | Department           | Care Team            | Description          |
+--------+-----------+----------------------+----------------------+----------------------+
| / | Telephone |   [facility]      |   Briana Casper Coates, | Appointment Question |
| 2020   |           | VASCULAR SURGERY     |  RN                  |                      |
|        |           | [address] |                      |                      |
|        |           |  [address]  [place]     |                      |                      |
|        |           | [address]           |                      |                      |
|        |           | [phone]         |                      |                      |
+--------+-----------+----------------------+----------------------+----------------------+
 
 
 
 Social History
 
 
+---------------+-------+-----------+--------+------+
| Tobacco Use   | Types | Packs/Day | Years  | Date |
|               |       |           | Used   |      |
+---------------+-------+-----------+--------+------+
| Former Smoker |       |           |        |      |
+---------------+-------+-----------+--------+------+
 
 
 
+---------------------+---+---+---+
| Smokeless Tobacco:  |   |   |   |
| Former User         |   |   |   |
+---------------------+---+---+---+
 
 
 
+------------------------------+
| Comments: quit 50 years ago  |
 
+------------------------------+
 
 
 
+---------------+-------------+---------+----------+
| Alcohol Use   | Drinks/Week | oz/Week | Comments |
+---------------+-------------+---------+----------+
| Not Currently |             |         | 90m days |
+---------------+-------------+---------+----------+
 
 
 
+------------------+---------------+
| Sex Assigned at  | Date Recorded |
| Birth            |               |
+------------------+---------------+
| Not on file      |               |
+------------------+---------------+
 
 
 
+----------------+-------------+-------------+
| Job Start Date | Occupation  | Industry    |
+----------------+-------------+-------------+
| Not on file    | Not on file | Not on file |
+----------------+-------------+-------------+
 
 
 
+----------------+--------------+------------+
| Travel History | Travel Start | Travel End |
+----------------+--------------+------------+
 
 
 
+-------------------------------------+
| No recent travel history available. |
+-------------------------------------+
 documented as of this encounter
 
 Functional Status
 
 
+---------------------------------------------+----------+--------------------+
| Functional Status                           | Response | Date of Assessment |
+---------------------------------------------+----------+--------------------+
| Are you deaf or do you have serious         | No       | 2019         |
| difficulty hearing?                         |          |                    |
+---------------------------------------------+----------+--------------------+
| Are you blind or do you have serious        | No       | 2019         |
| difficulty seeing, even when wearing        |          |                    |
| glasses?                                    |          |                    |
+---------------------------------------------+----------+--------------------+
| Do you have serious difficulty walking or   | No       | 2019         |
| climbing stairs? (5 years old or older)     |          |                    |
+---------------------------------------------+----------+--------------------+
| Do you have difficulty dressing or bathing? | No       | 2019         |
|  (5 years old or older)                     |          |                    |
+---------------------------------------------+----------+--------------------+
| Because of a physical, mental, or emotional | No       | 2019         |
 
|  condition, do you have difficulty doing    |          |                    |
| errands alone such as visiting a doctor's   |          |                    |
| office or shopping? [15 years old or        |          |                    |
| older)]                                     |          |                    |
+---------------------------------------------+----------+--------------------+
 
 
 
+---------------------------------------------+----------+--------------------+
| Cognitive Status                            | Response | Date of Assessment |
+---------------------------------------------+----------+--------------------+
| Because of a physical, mental, or emotional | No       | 2019         |
|  condition, do you have serious difficulty  |          |                    |
| concentrating, remembering, or making       |          |                    |
| decisions? (5 years old or older)           |          |                    |
+---------------------------------------------+----------+--------------------+
 documented as of this encounter
 
 Plan of Treatment
 
 
+--------+-------------+------------------+----------------------+-------------+
| Date   | Type        | Specialty        | Care Team            | Description |
+--------+-------------+------------------+----------------------+-------------+
| / | Appointment | Radiology        |   Augustin Lopez DNP      |             |
|    |             |                  | [address]     |             |
|        |             |                  | [place]  |             |
|        |             |                  | [address]  [phone]  |             |
|        |             |                  |  [phone] (Fax)  |             |
+--------+-------------+------------------+----------------------+-------------+
| / | Office      | Vascular Surgery |   Augustin Lopez DNP      |             |
|    | Visit       |                  | [address]     |             |
|        |             |                  | [place]  |             |
|        |             |                  | [id number] [phone]  |             |
|        |             |                  |  [phone] (Fax)  |             |
+--------+-------------+------------------+----------------------+-------------+
 documented as of this encounter
 
 Visit Diagnoses
 Not on filedocumented in this encounter

## 2020-01-13 NOTE — XMS
Encounter Summary
  Created on: 2020
 
 Pasquale Nehemias Martinez
 External Reference #: [id number]
 : 43
 Sex: Male
 
 Demographics
 
 
+-----------------------+----------------------+
| Address               | [address]         |
|                       | [place]  [address] |
+-----------------------+----------------------+
| Home Phone            | [phone]      |
+-----------------------+----------------------+
| Preferred Language    | Unknown              |
+-----------------------+----------------------+
| Marital Status        |               |
+-----------------------+----------------------+
| Orthodox Affiliation | [id number]                 |
+-----------------------+----------------------+
| Race                  | Unknown              |
+-----------------------+----------------------+
| Ethnic Group          | Unknown              |
+-----------------------+----------------------+
 
 
 Author
 
 
+--------------+--------------------------------------------+
| Author       | [facility]  |
|              | and [facility]                                |
+--------------+--------------------------------------------+
| Organization | [facility]  |
|              | and [place]                                |
+--------------+--------------------------------------------+
| Address      | Unknown                                    |
+--------------+--------------------------------------------+
| Phone        | Unavailable                                |
+--------------+--------------------------------------------+
 
 
 
 Support
 
 
+--------------+--------------+---------------------+-----------------+
| Name         | Relationship | Address             | Phone           |
+--------------+--------------+---------------------+-----------------+
| Rupa Giordano | ECON         | [address]             | [phone] |
|              |              | [facility] [place]   |                 |
|              |              | [id number]               |                 |
+--------------+--------------+---------------------+-----------------+
 
 
 
 
 Care Team Providers
 
 
+-----------------------+------+-------------+
| Care Team Member Name | Role | Phone       |
+-----------------------+------+-------------+
| No, Physician         | PCP  | Unavailable |
+-----------------------+------+-------------+
 
 
 
 Reason for Referral
 Diagnostic/Screening (Routine)
 
+--------+--------+-----------+--------------+--------------+--------------+
| Status | Reason | Specialty | Diagnoses /  | Referred By  | Referred To  |
|        |        |           | Procedures   | Contact      | Contact      |
+--------+--------+-----------+--------------+--------------+--------------+
| Closed |        | Radiology |   Diagnoses  |   Erasmo         |              |
|        |        |           |  Mesenteric  | Vascular     |              |
|        |        |           | ischemia,    | Surgery      |              |
|        |        |           | chronic      | [address]         |              |
|        |        |           | ([facility]ly [facility])        | [address]  |              |
|        |        |           | Procedures   | [address]        |              |
|        |        |           | IR Angiogram | [place], [place] |              |
|        |        |           |  Mesenteric  |  [address]  |              |
|        |        |           | Visceral     |  Phone:      |              |
|        |        |           |              | [phone] |              |
|        |        |           |              |   Fax:       |              |
|        |        |           |              | [phone] |              |
+--------+--------+-----------+--------------+--------------+--------------+
 
 
 
 
 Reason for Visit
 Auth/Cert
 
+--------+--------+-----------+--------------+--------------+--------------+
| Status | Reason | Specialty | Diagnoses /  | Referred By  | Referred To  |
|        |        |           | Procedures   | Contact      | Contact      |
+--------+--------+-----------+--------------+--------------+--------------+
|        |        |           |   Diagnoses  |              |              |
|        |        |           |  Mesenteric  |              |              |
|        |        |           | ischemia,    |              |              |
|        |        |           | chronic      |              |              |
|        |        |           | (Formerly [facility])        |              |              |
|        |        |           | Procedures   |              |              |
|        |        |           | IR ANGIOGRAM |              |              |
|        |        |           |  VISCERAL    |              |              |
|        |        |           | SELECTIVE    |              |              |
+--------+--------+-----------+--------------+--------------+--------------+
 
 
 
 
 Encounter Details
 
 
 
+--------+-----------+----------------------+----------------------+----------------------+
| Date   | Type      | Department           | Care Team            | Description          |
+--------+-----------+----------------------+----------------------+----------------------+
| / | Hospital  |   [facility]    |   Sukhdeep Pardo MD     | Mesenteric ischemia, |
| 2019   | Encounter [facility]       | [address]     |  chronic (HCC)       |
|        |           | CLINICAL DECISION    | [address]  [facility] [place]  |                      |
|        |           | [address]  [address] | [address]           |                      |
|        |           |   [place]       | [phone]         |                      |
|        |           | [address]           | [phone] (Fax)   |                      |
|        |           | [phone]         |                      |                      |
+--------+-----------+----------------------+----------------------+----------------------+
 
 
 
 Social History
 
 
+---------------+-------+-----------+--------+------+
| Tobacco Use   | Types | Packs/Day | Years  | Date |
|               |       |           | Used   |      |
+---------------+-------+-----------+--------+------+
| Former Smoker |       |           |        |      |
+---------------+-------+-----------+--------+------+
 
 
 
+---------------------+---+---+---+
| Smokeless Tobacco:  |   |   |   |
| Former User         |   |   |   |
+---------------------+---+---+---+
 
 
 
+------------------------------+
| Comments: quit 50 years ago  |
+------------------------------+
 
 
 
+---------------+-------------+---------+----------+
| Alcohol Use   | Drinks/Week | oz/Week | Comments |
+---------------+-------------+---------+----------+
| Not Currently |             |         | 90m days |
+---------------+-------------+---------+----------+
 
 
 
+------------------+---------------+
| Sex Assigned at  | Date Recorded |
| Birth            |               |
+------------------+---------------+
| Not on file      |               |
+------------------+---------------+
 
 
 
+----------------+-------------+-------------+
| Job Start Date | Occupation  | Industry    |
+----------------+-------------+-------------+
| Not on file    | Not on file | Not on file |
 
+----------------+-------------+-------------+
 
 
 
+----------------+--------------+------------+
| Travel History | Travel Start | Travel End |
+----------------+--------------+------------+
 
 
 
+-------------------------------------+
| No recent travel history available. |
+-------------------------------------+
 documented as of this encounter
 
 Last Filed Vital Signs
 
 
+-------------------+----------------------+----------------------+----------+
| Vital Sign        | Reading              | Time Taken           | Comments |
+-------------------+----------------------+----------------------+----------+
| Blood Pressure    | 150/90               | 2019  1:25 PM  |          |
|                   |                      | PST                  |          |
+-------------------+----------------------+----------------------+----------+
| Pulse             | 80                   | 2019  1:25 PM  |          |
|                   |                      | PST                  |          |
+-------------------+----------------------+----------------------+----------+
| Temperature       | 36.6   C (97.9   F)  | 2019 11:27 AM  |          |
|                   |                      | PST                  |          |
+-------------------+----------------------+----------------------+----------+
| Respiratory Rate  | 18                   | 2019 11:27 AM  |          |
|                   |                      | PST                  |          |
+-------------------+----------------------+----------------------+----------+
| Oxygen Saturation | 97%                  | 2019  1:25 PM  |          |
|                   |                      | PST                  |          |
+-------------------+----------------------+----------------------+----------+
| Inhaled Oxygen    | -                    | -                    |          |
| Concentration     |                      |                      |          |
+-------------------+----------------------+----------------------+----------+
| Weight            | 79.3 kg (174 lb 13.2 | 2019  8:01 AM  |          |
|                   |  oz)                 | PST                  |          |
+-------------------+----------------------+----------------------+----------+
| Height            | 167.6 cm (5' 6")     | 2019  8:01 AM  |          |
|                   |                      | PST                  |          |
+-------------------+----------------------+----------------------+----------+
| Body Mass Index   | 28.22                | 2019  8:01 AM  |          |
|                   |                      | PST                  |          |
+-------------------+----------------------+----------------------+----------+
 documented in this encounter
 
 Functional Status
 
 
+---------------------------------------------+----------+--------------------+
| Functional Status                           | Response | Date of Assessment |
+---------------------------------------------+----------+--------------------+
| Are you deaf or do you have serious         | No       | 2019         |
| difficulty hearing?                         |          |                    |
+---------------------------------------------+----------+--------------------+
| Are you blind or do you have serious        | No       | 2019         |
 
| difficulty seeing, even when wearing        |          |                    |
| glasses?                                    |          |                    |
+---------------------------------------------+----------+--------------------+
| Do you have serious difficulty walking or   | No       | 2019         |
| climbing stairs? (5 years old or older)     |          |                    |
+---------------------------------------------+----------+--------------------+
| Do you have difficulty dressing or bathing? | No       | 2019         |
|  (5 years old or older)                     |          |                    |
+---------------------------------------------+----------+--------------------+
| Because of a physical, mental, or emotional | No       | 2019         |
|  condition, do you have difficulty doing    |          |                    |
| errands alone such as visiting a doctor's   |          |                    |
| office or shopping? [15 years old or        |          |                    |
| older)]                                     |          |                    |
+---------------------------------------------+----------+--------------------+
 
 
 
+---------------------------------------------+----------+--------------------+
| Cognitive Status                            | Response | Date of Assessment |
+---------------------------------------------+----------+--------------------+
| Because of a physical, mental, or emotional | No       | 2019         |
|  condition, do you have serious difficulty  |          |                    |
| concentrating, remembering, or making       |          |                    |
| decisions? (5 years old or older)           |          |                    |
+---------------------------------------------+----------+--------------------+
 documented as of this encounter
 
 Discharge Instructions
 Instructions Katie Angelo RN - 2019Formatting of this note might be different fro
m the original.
 
 Peripheral Angioplasty
 Peripheral angioplasty is a procedure that helps open blockages in peripheral arteries. The
se vessels carry blood to your lower body, legs, and arms.
 Talk with your healthcare provider about the risks and complications of angioplasty. 
    
 Balloon is inserted Balloon is inflated Blood flow is improved 
 Before the procedure
 Recommendations of what to do include:
  Tell your healthcare provider about all medicines you take including over-the-counter me
dicines, herbal supplements, and any allergies you may have, especially to iodine.
  Follow any directions you  re given for not eating or drinking before the procedure.
  Arrange for a family member or friend to drive you home.
 During the procedure
  You may get medicine through an IV (intravenous) line to relax you. An injection will nu
mb the site your healthcare provider will use to perform the procedure. The site used is gen
erally an artery in the groin although the wrist or arm may be used. The healthcare provider
 makes a tiny skin cut (incision) near an artery in your groin.
  Your healthcare provider puts a thin, flexible tube (catheter) through the incision. He 
or she then threads the catheter into the affected artery while using X-ray monitoring.
  Contrast   dye  is injected into the catheter. X-rays (angiography) are taken.
  A tiny balloon is pushed through the catheter to the blockage. Your healthcare provider 
inflates and deflates the balloon a few times. This compresses the plaque. A small metal or 
mesh tube (stent) may be put in the artery to help keep it open. The balloon and catheter ar
e then taken out.
 After the procedure
 You  ll be taken to a recovery area. Pressure is put on the insertion site for about 30 to
 45 minutes. Your healthcare provider will tell you how long to lie down and keep the insert
ion site still.You will go home that day or spend the night in the facility. You will be g
 
mariana aftercare instructions for when you go home.
 Call your healthcare provider
 Call your healthcare provider right away or get immediate medical attentionif:
  You notice a lump or bleeding at the site where the catheter was inserted
  You feel increasing pain at the insertion site
  You become lightheaded or dizzy
  You have leg pain or numbness
  You have a leg that turns blue or feels cold
  You develop a fever greater than 101.5F (38.6C).
  You develop a skin rash
  You cannot urinate after the procedure
  You have chest pain or shortness of breath 
 Date Last Reviewed: 2016-2018 [employer]. [address] [place], [place] [id number]. All [facility]
ts reserved. This information is not intended as a substitute for professional medical care.
 Always follow your healthcare professional's instructions.
 
 
 documented in this encounter
 
 Medications at Time of Discharge
 
 
+----------------------+----------------------+-----------+---------+----------+----------+
| Medication           | Sig                  | Dispensed | Refills | Start    | End Date |
|                      |                      |           |         | Date     |          |
+----------------------+----------------------+-----------+---------+----------+----------+
|   ASPIRIN 81 PO      | Take 81 mg by mouth. |           | 0       |          |          |
+----------------------+----------------------+-----------+---------+----------+----------+
|   clopidogrel        | Take 1 tablet by     |   30      | 11      | 20 |          |
| (PLAVIX) 75 mg       | mouth Daily.         | tablet    |         | 19       |          |
| tablet               |                      |           |         |          |          |
+----------------------+----------------------+-----------+---------+----------+----------+
|   docusate sodium    | Take 1 capsule by    |   30      | 0       | 20 |          |
| (COLACE) 100 mg      | mouth 2 times daily. | capsule   |         | 19       |          |
| capsule              |                      |           |         |          |          |
+----------------------+----------------------+-----------+---------+----------+----------+
|                      | Take 25 mg by mouth  |           | 0       |          |          |
| hydroCHLOROthiazide  | Daily.               |           |         |          |          |
| 25 mg tablet         |                      |           |         |          |          |
+----------------------+----------------------+-----------+---------+----------+----------+
|                      | Take 1 tablet by     |   30      | 0       | 20 |          |
| HYDROcodone-acetamin | mouth every 4 hours  | tablet    |         | 19       |          |
| ophen (NORCO) 5-325  | as needed for Pain.  |           |         |          |          |
| mg per tablet        |                      |           |         |          |          |
+----------------------+----------------------+-----------+---------+----------+----------+
|   losartan (COZAAR)  | Take 50 mg by mouth  |           | 0       |          |          |
| 50 mg tablet         | Daily.               |           |         |          |          |
+----------------------+----------------------+-----------+---------+----------+----------+
|   Multiple           | Take  by mouth.      |           | 0       |          |          |
| Vitamins-Minerals    |                      |           |         |          |          |
| (CENTRUM SILVER      |                      |           |         |          |          |
| 50+MEN PO)           |                      |           |         |          |          |
+----------------------+----------------------+-----------+---------+----------+----------+
|   simvastatin        | Take 10 mg by mouth  |           | 0       |          |          |
| (ZOCOR) 10 mg tablet | nightly.             |           |         |          |          |
+----------------------+----------------------+-----------+---------+----------+----------+
 documented as of this encounter
 
 Plan of Treatment
 
 
 
+--------+-------------+------------------+----------------------+-------------+
| Date   | Type        | Specialty        | Care Team            | Description |
+--------+-------------+------------------+----------------------+-------------+
| / | Appointment | Radiology        |   Augustin Lopez DNP      |             |
|    |             |                  | [address]     |             |
|        |             |                  | [place]  |             |
|        |             |                  | [address]  [phone]  |             |
|        |             |                  |  [phone] (Fax)  |             |
+--------+-------------+------------------+----------------------+-------------+
| / | Office      | Vascular Surgery |   Augustin Lopez DNP      |             |
|    | Visit       |                  | [address]     |             |
|        |             |                  | [place]  |             |
|        |             |                  | [address]  [phone]  |             |
|        |             |                  |  [phone] (Fax)  |             |
+--------+-------------+------------------+----------------------+-------------+
 documented as of this encounter
 
 Procedures
 
 
+---------------------+--------+-------------+----------------------+----------------------+
| Procedure Name      | Priori | Date/Time   | Associated Diagnosis | Comments             |
|                     | ty     |             |                      |                      |
+---------------------+--------+-------------+----------------------+----------------------+
| IR ANGIOGRAM        | Routin | 2019  |   Mesenteric         |   Results for this   |
| MESENTERIC VISCERAL | e      | 10:03 AM    | ischemia, chronic    | procedure are in the |
|                     |        | PST         | ([facility])                |  results section.    |
+---------------------+--------+-------------+----------------------+----------------------+
 documented in this encounter
 
 Results
 IR Angiogram Mesenteric Visceral (2019 10:03 AM PST)
 
+----------+
| Specimen |
+----------+
|          |
+----------+
 
 
 
+-----------------------------------------------------------------------+---------------+
| Impressions                                                           | Performed At  |
+-----------------------------------------------------------------------+---------------+
|      1.   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
| around large   branches.  2.   After angioplasty, moderate            |               |
| angiographic results.  3.   Not a good place for stent placement due  |               |
| to multiple large branches.                                           |               |
+-----------------------------------------------------------------------+---------------+
 
 
 
+----------------------------------------------------------------------------+--------------
-+
| Narrative                                                                  | Performed At 
 |
+----------------------------------------------------------------------------+--------------
-+
 
|   This result has an attachment that is not available.  ABDOMINAL          |   [facility]
 |
| AORTOGRAM PREOPERATIVE DIAGNOSIS: Chronic mesenteric ischemia              |              
 |
| POSTOPERATIVE DIAGNOSIS: Same PROCEDURE:   1. Moderate conscious           |              
 |
| sedation2. Ultrasound guided access of the right common femoral            |              
 |
| artery3. Aortogram4. Selective catheterization of superior mesenteric      |              
 |
| artery with mesenteric arteriogram5.   Superior mesenteric artery          |              
 |
| angioplasty using 7 x 40 mm angioplasty balloon SURGEON: Sukhdeep Pardo MD       |              
 |
| ASSISTANT: None ANESTHESIA: Moderate sedation and local anesthesia         |              
 |
|       Informed consent was obtained from the patient. Continuous           |              
 |
| cardiac monitoring was performed throughout the procedure. Conscious       |              
 |
| sedation was provided by the nursing staff during the procedure under      |              
 |
| my supervision.          Sedation time: 60 minutes                         |              
 |
|   Medications: 1 mg Versed IV, 50 Mcg Fentanyl IV ESTIMATED BLOOD          |              
 |
| LOSS: Minimal CONTRAST: 89 mL of Omnipaque 240 INDICATIONS: See            |              
 |
| preoperative history and physical FINDINGS: SMA was nearly occluded at     |              
 |
|  origin.   After angioplasty, there was still a focal area of stenosis     |              
 |
|  but this was not in a good place to stent due to multiple large           |              
 |
| branches around stenosis.   However, there was improved flow               |              
 |
| throughout SMA at end of case. DESCRIPTION OF PROCEDURE:   The patient     |              
 |
|  was properly identified and brought to the Cath Lab. The patient was      |              
 |
| placed supine on the catheter table and patient was given moderate         |              
 |
| sedation by nursing staff under my supervision. Patient's bilateral        |              
 |
| groins were then prepped and draped in the usual sterile fashion.          |              
 |
| Local anesthetic was given to the right groin and the right common         |              
 |
| femoral artery was accessed under ultrasound guidance using a              |              
 |
| micropuncture needle. A micropuncture sheath was inserted over a wire      |              
 |
| and up sized to a 4 French sheath. A Omni flush catheter was then          |              
 |
| inserted into the aorta and aortogram was then performed with the          |              
 |
| findings as described above. Next, an Amplatzer wire was then inserted     |              
 |
|  over the catheter and the 4 French sheath was removed. A 6.5 French       |              
 |
 
| durable sheath was then inserted over the wire with the tip of the         |              
 |
| sheath being placed into the proximal SMA. A vertebral catheter was        |              
 |
| inserted over the wire and the wire was then removed. A Glidewire was      |              
 |
| then placed into the vertebral catheter and used to cross through the      |              
 |
| SMA occlusion.   Next, a 260 fix core wire was then inserted over the      |              
 |
| catheter.   The SMA was then angioplastied using 7 x 40 mm angioplasty     |              
 |
|  balloon.   A final arteriogram was then performed through the sheath.     |              
 |
|  The steerable sheath was then removed and a Mynx closure device was       |              
 |
| then used on the right common femoral artery and hemostasis was            |              
 |
| ensured. The patient was then taken to the observation unit for            |              
 |
| further monitoring.                                                        |              
 |
|patient was given moderate sedation by nursing staff under my supervision.  |              
 |
|Patient's bilateral groins were then prepped and draped in the usual        |              
 |
|sterile fashion. Local anesthetic was given to the right groin and the      |              
 |
|right common femoral artery was accessed under ultrasound guidance using a  |              
 |
|micropuncture needle. A micropuncture sheath was inserted over a wire and   |              
 |
|up sized to a 4 French sheath. A Omni flush catheter was then inserted      |              
 |
|into the aorta and aortogram was then performed with the findings as        |              
 |
|described above. Next, an Amplatzer wire was then inserted over the         |              
 |
|catheter and the 4 French sheath was removed. A 6.5 French durable sheath   |              
 |
|was then inserted over the wire with the tip of the sheath being placed     |              
 |
|into the proximal SMA. A vertebral catheter was inserted over the wire and  |              
 |
|the wire was then removed. A Glidewire was then placed into the vertebral   |              
 |
|catheter and used to cross through the SMA occlusion.   Next, a 260 fix     |              
 |
|core wire was then inserted over the catheter.   The SMA was then           |              
 |
|angioplastied using 7 x 40 mm angioplasty balloon.   A final arteriogram    |              
 |
|was then performed through the sheath. The steerable sheath was then        |              
 |
|removed and a Mynx closure device was then used on the right common         |              
 |
|femoral artery and hemostasis was ensured. The patient was then taken to    |              
 |
|the observation unit for further monitoring.                                |              
 |
 
|                                                                            |              
 |
+----------------------------------------------------------------------------+--------------
-+
 
 
 
+---------------+---------+--------------------+--------------+
| Performing    | Address | City/State/Zipcode | Phone Number |
| Organization  |         |                    |              |
+---------------+---------+--------------------+--------------+
|   [facility] |         |                    |              |
+---------------+---------+--------------------+--------------+
 documented in this encounter
 
 Visit Diagnoses
 
 
+-----------------------------------------------------------------------------------+
| Diagnosis                                                                         |
+-----------------------------------------------------------------------------------+
|   Mesenteric ischemia, chronic (HCC)  Chronic vascular insufficiency of intestine |
+-----------------------------------------------------------------------------------+
 documented in this encounter
 
 Administered Medications
 
 
+----------------------------------+--------+----------+--------+------+------+
| Medication Order                 | MAR    | Action   | Dose   | Rate | Site |
|                                  | Action | Date     |        |      |      |
+----------------------------------+--------+----------+--------+------+------+
|   clopidogrel (PLAVIX) tablet    | Given  | 20 | 300 mg |      |      |
| Oral, PRN, Starting 19  |        | 19  9:45 |        |      |      |
| at 0945                          |        |  AM PST  |        |      |      |
+----------------------------------+--------+----------+--------+------+------+
 
 
 
+-----------------------------------+---+
|                                   |   |
+-----------------------------------+---+
|   fentaNYL (PF) injection   |   |
| mcg   mcg, Intravenous,     |   |
| EVERY 2 HOURS PRN, Pain, Starting |   |
|  19 at 1028              |   |
+-----------------------------------+---+
|                                   |   |
+-----------------------------------+---+
 
 
 
+---------------------------------+-------+----------+--------+---+---+
|   fentaNYL (PF) injection       | Given | 20 | 50 mcg |   |   |
| Intravenous, PRN, Starting Wed  |       | 19  8:46 |        |   |   |
| 19 at 0846                 |       |  AM PST  |        |   |   |
+---------------------------------+-------+----------+--------+---+---+
 
 
 
 
+---+---+
|   |   |
+---+---+
 
 
 
+-------------------------------+-------+----------+--------+---+---+
|   heparin 1,000 units/mL      | Given | 20 | 7,000  |   |   |
| injection  Intravenous, PRN,  |       | 19  9:13 | Units  |   |   |
| Starting Wed 19 at 0913  |       |  AM PST  |        |   |   |
+-------------------------------+-------+----------+--------+---+---+
 
 
 
+---+---+
|   |   |
+---+---+
 
 
 
+-----------------------------------+-------+----------+----------+---+---+
|   HYDROcodone-acetaminophen       | Given | 20 | 1 tablet |   |   |
| (NORCO) 5-325 mg per tablet 1-2   |       | 19 10:51 |          |   |   |
| tablet  1-2 tablet, Oral, EVERY 4 |       |  AM PST  |          |   |   |
|  HOURS PRN, Pain, Starting Wed    |       |          |          |   |   |
| 19 at 1028                   |       |          |          |   |   |
+-----------------------------------+-------+----------+----------+---+---+
 
 
 
+---+---+
|   |   |
+---+---+
 
 
 
+-----------------------------------+-------+----------+--------+---+---+
|   iohexol (OMNIPAQUE 240) 240     | Given | 20 | 89 mLs |   |   |
| mg/mL injection  Intravenous,     |       | 19  9:47 |        |   |   |
| PRN, Starting Wed 19 at 0945 |       |  AM PST  |        |   |   |
+-----------------------------------+-------+----------+--------+---+---+
 
 
 
+-------+----------+--------+---+---+
| Given | 20 | 30 mLs |   |   |
|       | 19  9:47 |        |   |   |
|       |  AM PST  |        |   |   |
+-------+----------+--------+---+---+
| Given | 20 | 59 mLs |   |   |
|       | 19  9:45 |        |   |   |
|       |  AM PST  |        |   |   |
+-------+----------+--------+---+---+
 
 
 
+---+---+
|   |   |
+---+---+
 
 
 
 
+---------------------------------+-------+----------+--------+---+---+
|   lidocaine 1% injection  PRN,  | Given | 20 | 10 mLs |   |   |
| Starting Wed 19 at 0846    |       | 19  8:46 |        |   |   |
|                                 |       |  AM PST  |        |   |   |
+---------------------------------+-------+----------+--------+---+---+
 
 
 
+-----------------------------------+---+
|                                   |   |
+-----------------------------------+---+
|   metoclopramide (REGLAN) 5 mg/mL |   |
|  injection 10 mg  10 mg,          |   |
| Intravenous, EVERY 4 HOURS PRN,   |   |
| Nausea, Vomiting, Starting Wed    |   |
| 19 at 1021, Use if           |   |
| ondansetron and prochlorperazine  |   |
| ineffective after 30min or not    |   |
| ordered. Use PO option unless NPO |   |
|  status or unable to tolerate.    |   |
| Protect from light.,              |   |
| Post-op/Phase II                  |   |
+-----------------------------------+---+
|                                   |   |
+-----------------------------------+---+
|   metoclopramide (REGLAN) tablet  |   |
| 10 mg  10 mg, Oral, EVERY 4 HOURS |   |
|  PRN, Nausea, Vomiting, Starting  |   |
| Wed 19 at 1021, Use if       |   |
| ondansetron and prochlorperazine  |   |
| ineffective after 30min or not    |   |
| ordered, Post-op/Phase II         |   |
+-----------------------------------+---+
|                                   |   |
+-----------------------------------+---+
 
 
 
+-------------------------------+-------+----------+------+---+---+
|   midazolam (VERSED) 1 mg/mL  | Given | 20 | 1 mg |   |   |
| injection  Intravenous, PRN,  |       | 19  8:46 |      |   |   |
| Starting Wed 19 at 0846  |       |  AM PST  |      |   |   |
+-------------------------------+-------+----------+------+---+---+
 
 
 
+---+---+
|   |   |
+---+---+
 
 
 
+---------------------------------+-------+----------+---------+---+---+
|   nitroglycerin injection       | Given | 20 | 300 mcg |   |   |
| Intravenous, PRN, Starting Wed  |       | 19  9:25 |         |   |   |
| 19 at 0925                 |       |  AM PST  |         |   |   |
+---------------------------------+-------+----------+---------+---+---+
 
 
 
 
+---+---+
|   |   |
+---+---+
 
 
 
+-----------------------------------+-------+----------+------+---+---+
|   ondansetron (ZOFRAN ODT)        | Given | 20 | 4 mg |   |   |
| disintegrating tablet 4 mg  4 mg, |       | 19 12:11 |      |   |   |
|  Oral, EVERY 6 HOURS PRN, Nausea, |       |  PM PST  |      |   |   |
|  Vomiting, Starting 19   |       |          |      |   |   |
| at 1021, First line agent,        |       |          |      |   |   |
| Post-op/Phase II                  |       |          |      |   |   |
+-----------------------------------+-------+----------+------+---+---+
 
 
 
+-----------------------------------+---+
|                                   |   |
+-----------------------------------+---+
|   ondansetron (ZOFRAN) injection  |   |
| 4 mg  4 mg, Intravenous, EVERY 6  |   |
| HOURS PRN, Nausea, Vomiting,      |   |
| Starting Wed 19 at 1021,     |   |
| First line agent. Use PO option   |   |
| unless NPO status or unable to    |   |
| tolerate., Post-op/Phase II       |   |
+-----------------------------------+---+
|                                   |   |
+-----------------------------------+---+
 documented in this encounter

## 2020-01-13 NOTE — XMS
Encounter Summary
  Created on: 2020
 
 Pasquale Nehemias Martinez
 External Reference #: [id number]
 : 43
 Sex: Male
 
 Demographics
 
 
+-----------------------+----------------------+
| Address               | [address]         |
|                       | [place]  [address] |
+-----------------------+----------------------+
| Home Phone            | [phone]      |
+-----------------------+----------------------+
| Preferred Language    | Unknown              |
+-----------------------+----------------------+
| Marital Status        |               |
+-----------------------+----------------------+
| Sabianist Affiliation | [id number]                 |
+-----------------------+----------------------+
| Race                  | Unknown              |
+-----------------------+----------------------+
| Ethnic Group          | Unknown              |
+-----------------------+----------------------+
 
 
 Author
 
 
+--------------+--------------------------------------------+
| Author       | [facility]  |
|              | and [facility]                                |
+--------------+--------------------------------------------+
| Organization | [facility]  |
|              | and [place]                                |
+--------------+--------------------------------------------+
| Address      | Unknown                                    |
+--------------+--------------------------------------------+
| Phone        | Unavailable                                |
+--------------+--------------------------------------------+
 
 
 
 Support
 
 
+--------------+--------------+---------------------+-----------------+
| Name         | Relationship | Address             | Phone           |
+--------------+--------------+---------------------+-----------------+
| Rupa Giordano | ECON         | [address]             | [phone] |
|              |              | [place], [place]   |                 |
|              |              | [address]               |                 |
+--------------+--------------+---------------------+-----------------+
 
 
 
 
 Care Team Providers
 
 
+-----------------------+------+-------------+
| Care Team Member Name | Role | Phone       |
+-----------------------+------+-------------+
| No, Physician         | PCP  | Unavailable |
+-----------------------+------+-------------+
 
 
 
 Encounter Details
 
 
+--------+-----------+---------------------+----------------------+----------------------+
| Date   | Type      | Department          | Care Team            | Description          |
+--------+-----------+---------------------+----------------------+----------------------+
| / | Preadmit  |   [facility]    |   Sukhdeep Pardo MD     | Mesenteric ischemia, |
| 2019   | Visit     | [facility]     | [address]     |  chronic (HCC)       |
|        |           | CLINIC  [address]   | [address]  [place], [place]  |                      |
|        |           | [address]  [place]  | [address]           |                      |
|        |           | [address]          | [phone]         |                      |
|        |           | [phone]        | [phone] (Fax)   |                      |
+--------+-----------+---------------------+----------------------+----------------------+
 
 
 
 Social History
 
 
+---------------+-------+-----------+--------+------+
| Tobacco Use   | Types | Packs/Day | Years  | Date |
|               |       |           | Used   |      |
+---------------+-------+-----------+--------+------+
| Former Smoker |       |           |        |      |
+---------------+-------+-----------+--------+------+
 
 
 
+---------------------+---+---+---+
| Smokeless Tobacco:  |   |   |   |
| Former User         |   |   |   |
+---------------------+---+---+---+
 
 
 
+------------------------------+
| Comments: quit 50 years ago  |
+------------------------------+
 
 
 
+---------------+-------------+---------+----------+
| Alcohol Use   | Drinks/Week | oz/Week | Comments |
+---------------+-------------+---------+----------+
| Not Currently |             |         | 90m days |
+---------------+-------------+---------+----------+
 
 
 
 
+------------------+---------------+
| Sex Assigned at  | Date Recorded |
| Birth            |               |
+------------------+---------------+
| Not on file      |               |
+------------------+---------------+
 
 
 
+----------------+-------------+-------------+
| Job Start Date | Occupation  | Industry    |
+----------------+-------------+-------------+
| Not on file    | Not on file | Not on file |
+----------------+-------------+-------------+
 
 
 
+----------------+--------------+------------+
| Travel History | Travel Start | Travel End |
+----------------+--------------+------------+
 
 
 
+-------------------------------------+
| No recent travel history available. |
+-------------------------------------+
 documented as of this encounter
 
 Last Filed Vital Signs
 
 
+-------------------+----------------------+----------------------+----------+
| Vital Sign        | Reading              | Time Taken           | Comments |
+-------------------+----------------------+----------------------+----------+
| Blood Pressure    | 122/56               | 2019  9:24 AM  |          |
|                   |                      | PDT                  |          |
+-------------------+----------------------+----------------------+----------+
| Pulse             | 62                   | 2019  9:24 AM  |          |
|                   |                      | PDT                  |          |
+-------------------+----------------------+----------------------+----------+
| Temperature       | -                    | -                    |          |
+-------------------+----------------------+----------------------+----------+
| Respiratory Rate  | -                    | -                    |          |
+-------------------+----------------------+----------------------+----------+
| Oxygen Saturation | 97%                  | 2019  9:24 AM  |          |
|                   |                      | PDT                  |          |
+-------------------+----------------------+----------------------+----------+
| Inhaled Oxygen    | -                    | -                    |          |
| Concentration     |                      |                      |          |
+-------------------+----------------------+----------------------+----------+
| Weight            | 80.6 kg (177 lb 11.1 | 2019  9:24 AM  |          |
|                   |  oz)                 | PDT                  |          |
+-------------------+----------------------+----------------------+----------+
| Height            | 167.6 cm (5' 6")     | 2019  9:24 AM  |          |
|                   |                      | PDT                  |          |
+-------------------+----------------------+----------------------+----------+
| Body Mass Index   | 28.68                | 2019  9:24 AM  |          |
|                   |                      | PDT                  |          |
+-------------------+----------------------+----------------------+----------+
 
 documented in this encounter
 
 Plan of Treatment
 
 
+--------+-------------+------------------+----------------------+-------------+
| Date   | Type        | Specialty        | Care Team            | Description |
+--------+-------------+------------------+----------------------+-------------+
| / | Appointment | Radiology        |   uAgustin Lopez DNP      |             |
|    |             |                  | [address]     |             |
|        |             |                  | [place]  |             |
|        |             |                  | [address]  [phone]  |             |
|        |             |                  |  [phone] (Fax)  |             |
+--------+-------------+------------------+----------------------+-------------+
| / | Office      | Vascular Surgery |   Augustin Lopez DNP      |             |
|    | Visit       |                  | [address]     |             |
|        |             |                  | [address]  [place]  |             |
|        |             |                  | [address]  [phone]  |             |
|        |             |                  |  [phone] (Fax)  |             |
+--------+-------------+------------------+----------------------+-------------+
 documented as of this encounter
 
 Procedures
 
 
+------------------+--------+-------------+----------------------+----------------------+
| Procedure Name   | Priori | Date/Time   | Associated Diagnosis | Comments             |
|                  | ty     |             |                      |                      |
+------------------+--------+-------------+----------------------+----------------------+
| XR CHEST PA AND  | Routin | 2019  |   Mesenteric         |   Results for this   |
| LATERAL          | e      | 10:15 AM    | ischemia, chronic    | procedure are in the |
|                  |        | PDT         | ([facility])                |  results section.    |
+------------------+--------+-------------+----------------------+----------------------+
| TYPE AND SCREEN  | ASAP   | 2019  |                      |   Results for this   |
|                  |        |  9:15 AM    |                      | procedure are in the |
|                  |        | PDT         |                      |  results section.    |
+------------------+--------+-------------+----------------------+----------------------+
| CBC WITH         | Timed  | 2019  |                      |   Results for this   |
| DIFFERENTIAL     |        |  9:14 AM    |                      | procedure are in the |
|                  |        | PDT         |                      |  results section.    |
+------------------+--------+-------------+----------------------+----------------------+
| COMPREHENSIVE    | Timed  | 2019  |                      |   Results for this   |
| METABOLIC PANEL  |        |  9:14 AM    |                      | procedure are in the |
|                  |        | PDT         |                      |  results section.    |
+------------------+--------+-------------+----------------------+----------------------+
| ECG 12 LEAD      | Timed  | 2019  |                      |   Results for this   |
|                  |        |  9:12 AM    |                      | procedure are in the |
|                  |        | PDT         |                      |  results section.    |
+------------------+--------+-------------+----------------------+----------------------+
 documented in this encounter
 
 Results
 XR Chest PA and Lateral (2019 10:15 AM PDT)
 
+----------+
| Specimen |
+----------+
|          |
+----------+
 
 
 
 
+------------------------------------------------------------------------+---------------+
| Narrative                                                              | Performed At  |
+------------------------------------------------------------------------+---------------+
|      CHEST PA AND LATERAL     CLINICAL INFORMATION:  Pre operative     |   [facility] |
| evaluation     COMPARISON:  CT ANGIOGRAPHY ABDOMEN AND PELVIS          |               |
| (2019);     FINDINGS:  There is a BB in the posteromedial right   |               |
| lower lobe, better defined on  CT abdomen 2019. the lungs are     |               |
| clear.   The heart size is normal  c..   Mild to moderate degenerative |               |
|  changes of the thoracic spine     IMPRESSION:  No active              |               |
| intrathoracic disease.           Signed by: PADILLA Singh Shawn  Sign   |               |
| Date/Time: 2019 10:27 AM                                         |               |
+------------------------------------------------------------------------+---------------+
 
 
 
+------------------------------------------------------------------------+
| Procedure Note                                                         |
+------------------------------------------------------------------------+
|   Cuong, Rad Results In - 2019 10:31 AM PDT                        |
| CHEST PA AND LATERAL                                                   |
|                                                                        |
| CLINICAL INFORMATION:                                                  |
| Pre operative evaluation                                               |
|                                                                        |
| COMPARISON:                                                            |
| CT ANGIOGRAPHY ABDOMEN AND PELVIS (2019);                         |
|                                                                        |
| FINDINGS:                                                              |
| There is a BB in the posteromedial right lower lobe, better defined on |
| CT abdomen 2019. the lungs are clear.  The heart size is normal   |
| c..  Mild to moderate degenerative changes of the thoracic spine       |
|                                                                        |
| IMPRESSION:                                                            |
| No active intrathoracic disease.                                       |
|                                                                        |
| Signed by: PADILLA Singh Shawn                                          |
| Sign Date/Time: 2019 10:27 AM                                    |
+------------------------------------------------------------------------+
 
 
 
+---------------+---------+--------------------+--------------+
| Performing    | Address | City/State/Zipcode | Phone Number |
| Organization  |         |                    |              |
+---------------+---------+--------------------+--------------+
|   [facility] |         |                    |              |
+---------------+---------+--------------------+--------------+
 Type and Screen (2019  9:15 AM PDT)
 
+-----------+------------------------+-----------+------------+--------------+
| Component | Value                  | Ref Range | Performed  | Pathologist  |
|           |                        |           | At         | Signature    |
+-----------+------------------------+-----------+------------+--------------+
| ABO Rh    | O POSITIVE             |           | [facility]       |              |
|           |                        |           | LABORATORY |              |
+-----------+------------------------+-----------+------------+--------------+
 
| Antibody  | NEGATIVE               |           | [facility]       |              |
| Screen    |                        |           | LABORATORY |              |
+-----------+------------------------+-----------+------------+--------------+
| Antibody  | Testing performed at   |           | [facility]       |              |
| Screen    | [facility];[address]          |           | LABORATORY |              |
|           | [address];[place] [address] |           |            |              |
+-----------+------------------------+-----------+------------+--------------+
 
 
 
+----------+
| Specimen |
+----------+
| Blood    |
+----------+
 
 
 
+-------------------+------------------+--------------------+----------------+
| Performing        | Address          | City/State/Zipcode | Phone Number   |
| Organization      |                  |                    |                |
+-------------------+------------------+--------------------+----------------+
|   [facility] |   [address] | [place] [address] |   [phone] |
+-------------------+------------------+--------------------+----------------+
 CBC with Differential (2019  9:14 AM PDT)
 
+-------------+-------------------------+-----------------+------------+--------------+
| Component   | Value                   | Ref Range       | Performed  | Pathologist  |
|             |                         |                 | At         | Signature    |
+-------------+-------------------------+-----------------+------------+--------------+
| WBC         | 4.96                    | 3.80 - 11.00    | [facility]       |              |
|             |                         | K/uL            | LABORATORY |              |
+-------------+-------------------------+-----------------+------------+--------------+
| RBC         | 4.20                    | 4.20 - 5.70     | [facility]       |              |
|             |                         | M/uL            | LABORATORY |              |
+-------------+-------------------------+-----------------+------------+--------------+
| Hemoglobin  | 13.9                    | 13.2 - 17.0     | [facility]       |              |
|             |                         | g/dL            | LABORATORY |              |
+-------------+-------------------------+-----------------+------------+--------------+
| Hematocrit  | 41.3                    | 39.0 - 50.0 %   | [facility]       |              |
|             |                         |                 | LABORATORY |              |
+-------------+-------------------------+-----------------+------------+--------------+
| MCV         | 98.4                    | 80.0 - 100.0 fl | [facility]       |              |
|             |                         |                 | LABORATORY |              |
+-------------+-------------------------+-----------------+------------+--------------+
| MCH         | 33.2                    | 27.0 - 34.0 pg  | [facility]       |              |
|             |                         |                 | LABORATORY |              |
+-------------+-------------------------+-----------------+------------+--------------+
| MCHC        | 33.7                    | 32.0 - 35.5     | [facility]       |              |
|             |                         | g/dL            | LABORATORY |              |
+-------------+-------------------------+-----------------+------------+--------------+
| RDW-SD      | 46.8                    | 37 - 53 fl      | [facility]       |              |
|             |                         |                 | LABORATORY |              |
+-------------+-------------------------+-----------------+------------+--------------+
| Platelet    | 152                     | 150 - 400 K/uL  | [facility]       |              |
| Count       |                         |                 | LABORATORY |              |
+-------------+-------------------------+-----------------+------------+--------------+
| MPV         | 8.0                     | fl              | [facility]       |              |
|             |                         |                 | LABORATORY |              |
+-------------+-------------------------+-----------------+------------+--------------+
 
| Diff Type   | MANUAL                  |                 | [facility]       |              |
|             |                         |                 | LABORATORY |              |
+-------------+-------------------------+-----------------+------------+--------------+
| % Segmented | 35                      | %               | [facility]       |              |
|             |                         |                 | LABORATORY |              |
| Neutrophils |                         |                 |            |              |
+-------------+-------------------------+-----------------+------------+--------------+
| %           | 56                      | %               | [facility]       |              |
| Lymphocytes |                         |                 | LABORATORY |              |
+-------------+-------------------------+-----------------+------------+--------------+
| % Reactive  | 4                       | %               | [facility]       |              |
| Lymphocytes |                         |                 | LABORATORY |              |
+-------------+-------------------------+-----------------+------------+--------------+
| % Monocytes | 5                       | %               | [facility]       |              |
|             |                         |                 | LABORATORY |              |
+-------------+-------------------------+-----------------+------------+--------------+
| Neutrophils | 1.74 (L)                | 1.90 - 7.40     | [facility]       |              |
| , Absolute  |                         | K/uL            | LABORATORY |              |
+-------------+-------------------------+-----------------+------------+--------------+
| Absolute    | 2.77                    | 1.00 - 3.90     | [facility]       |              |
| Lymphocytes |                         | K/uL            | LABORATORY |              |
+-------------+-------------------------+-----------------+------------+--------------+
| Absolute    | 0.20 (H)                | 0.00 K/uL       | [facility]       |              |
| Variant     |                         |                 | LABORATORY |              |
| Lymphocytes |                         |                 |            |              |
+-------------+-------------------------+-----------------+------------+--------------+
| Absolute    | 0.25                    | 0.00 - 0.80     | [facility]       |              |
| Monocytes   |                         | K/uL            | LABORATORY |              |
+-------------+-------------------------+-----------------+------------+--------------+
| RBC         | RBC AND PLT MORPHOLOGY  |                 | [facility]       |              |
| Morphology  | APPEAR NORMALComment:   |                 | LABORATORY |              |
|             | Testing performed at    |                 |            |              |
|             | [facility], [address]  |                 |            |              |
|             | [place]     |                 |            |              |
|             | [address]                   |                 |            |              |
+-------------+-------------------------+-----------------+------------+--------------+
 
 
 
+----------+
| Specimen |
+----------+
| Blood    |
+----------+
 
 
 
+-------------------+------------------+--------------------+----------------+
| Performing        | Address          | City/State/Zipcode | Phone Number   |
| Organization      |                  |                    |                |
+-------------------+------------------+--------------------+----------------+
|   [facility] |   [address] | [place] [address] |   [phone] |
+-------------------+------------------+--------------------+----------------+
 Comprehensive Metabolic Panel (2019  9:14 AM PDT)
 
+-------------+--------------------------+-----------------+------------+--------------+
| Component   | Value                    | Ref Range       | Performed  | Pathologist  |
|             |                          |                 | At         | Signature    |
+-------------+--------------------------+-----------------+------------+--------------+
| Na          | 138                      | 135 - 145       | [facility]       |              |
 
|             |                          | mmol/L          | LABORATORY |              |
+-------------+--------------------------+-----------------+------------+--------------+
| K           | 3.8                      | 3.5 - 4.9       | [facility]       |              |
|             |                          | mmol/L          | LABORATORY |              |
+-------------+--------------------------+-----------------+------------+--------------+
| Cl          | 107                      | 99 - 109 mmol/L | [facility]       |              |
|             |                          |                 | LABORATORY |              |
+-------------+--------------------------+-----------------+------------+--------------+
| CO2         | 28                       | 23 - 32 mmol/L  | [facility]       |              |
|             |                          |                 | LABORATORY |              |
+-------------+--------------------------+-----------------+------------+--------------+
| Anion Gap   | 7                        | 5 - 20 mmol/L   | [facility]       |              |
|             |                          |                 | LABORATORY |              |
+-------------+--------------------------+-----------------+------------+--------------+
| Glucose     | 104 (H)                  | 65 - 99 mg/dL   | [facility]       |              |
|             |                          |                 | LABORATORY |              |
+-------------+--------------------------+-----------------+------------+--------------+
| BUN         | 16                       | 8 - 25 mg/dL    | [facility]       |              |
|             |                          |                 | LABORATORY |              |
+-------------+--------------------------+-----------------+------------+--------------+
| Creatinine  | 1.0                      | 0.70 - 1.30     | [facility]       |              |
|             |                          | mg/dL           | LABORATORY |              |
+-------------+--------------------------+-----------------+------------+--------------+
| BUN/Creatin | 16                       |                 | [facility]       |              |
| ine Ratio   |                          |                 | LABORATORY |              |
+-------------+--------------------------+-----------------+------------+--------------+
| Calcium     | 9.6                      | 8.5 - 10.5      | [facility]       |              |
|             |                          | mg/dL           | LABORATORY |              |
+-------------+--------------------------+-----------------+------------+--------------+
| Protein,    | 7.3                      | 6.3 - 8.2 g/dL  | [facility]       |              |
| Total       |                          |                 | LABORATORY |              |
+-------------+--------------------------+-----------------+------------+--------------+
| Albumin     | 4.1                      | 3.3 - 4.8 g/dL  | [facility]       |              |
|             |                          |                 | LABORATORY |              |
+-------------+--------------------------+-----------------+------------+--------------+
| Globulin    | 3.2                      | 1.3 - 4.9 g/dL  | [facility]       |              |
|             |                          |                 | LABORATORY |              |
+-------------+--------------------------+-----------------+------------+--------------+
| A/G Ratio   | 1.3                      | 1.0 - 2.4       | [facility]       |              |
|             |                          |                 | LABORATORY |              |
+-------------+--------------------------+-----------------+------------+--------------+
| BILIRUBIN,  | 0.5                      | 0.1 - 1.5 mg/dL | [facility]       |              |
| TOTAL       |                          |                 | LABORATORY |              |
+-------------+--------------------------+-----------------+------------+--------------+
| ALK PHOS    | 70                       | 35 - 115 U/L    | [facility]       |              |
|             |                          |                 | LABORATORY |              |
+-------------+--------------------------+-----------------+------------+--------------+
| AST         | 14                       | 10 - 45 U/L     | [facility]       |              |
|             |                          |                 | LABORATORY |              |
+-------------+--------------------------+-----------------+------------+--------------+
| ALT         | 22                       | 10 - 65 U/L     | [facility]       |              |
|             |                          |                 | LABORATORY |              |
+-------------+--------------------------+-----------------+------------+--------------+
| Estimated   | >60Comment: GFR <60:     | >60             | [facility]       |              |
| GFR         | CHRONIC KIDNEY DISEASE,  | mL/min/1.73m2   | LABORATORY |              |
|             | IF FOUND OVER A 3 MONTH  |                 |            |              |
|             | PERIOD.GFR <15: KIDNEY   |                 |            |              |
|             | FAILURE.FOR       |                 |            |              |
|             | [employer], MULTIPLY THE  |                 |            |              |
|             | CALCULATED GFR BY        |                 |            |              |
 
|             | 1.210.This eGFR is       |                 |            |              |
|             | calculated using the     |                 |            |              |
|             | MDRD IDMS traceable      |                 |            |              |
|             | equation.Testing         |                 |            |              |
|             | performed at [facility], [address] |                 |            |              |
|             |  [facility],        |                 |            |              |
|             | [place]   [address]    |                 |            |              |
+-------------+--------------------------+-----------------+------------+--------------+
 
 
 
+----------+
| Specimen |
+----------+
| Blood    |
+----------+
 
 
 
+-------------------+------------------+--------------------+----------------+
| Performing        | Address          | City/State/Zipcode | Phone Number   |
| Organization      |                  |                    |                |
+-------------------+------------------+--------------------+----------------+
|   [facility] LABORATORY |   [address] | [place] [address] |   [phone] |
+-------------------+------------------+--------------------+----------------+
 ECG 12 lead (2019  9:12 AM PDT)
 
+-------------+------------------------+-----------+------------+--------------+
| Component   | Value                  | Ref Range | Performed  | Pathologist  |
|             |                        |           | At         | Signature    |
+-------------+------------------------+-----------+------------+--------------+
| VENTRICULAR | 61                     | BPM       | [facility]  |              |
|  RATE EKG   |                        |           |            |              |
+-------------+------------------------+-----------+------------+--------------+
| ATRIAL RATE | 61                     | BPM       | [facility]  |              |
+-------------+------------------------+-----------+------------+--------------+
| P-R         | 200                    | ms        | [facility]  |              |
| INTERVAL    |                        |           |            |              |
+-------------+------------------------+-----------+------------+--------------+
| QRS         | 78                     | ms        | [facility]  |              |
| DURATION    |                        |           |            |              |
+-------------+------------------------+-----------+------------+--------------+
| Q-T         | 448                    | ms        | [facility]  |              |
| INTERVAL    |                        |           |            |              |
+-------------+------------------------+-----------+------------+--------------+
| Q-T         | 450                    | ms        | [facility]  |              |
| INTERVAL    |                        |           |            |              |
| (CORRECTED) |                        |           |            |              |
+-------------+------------------------+-----------+------------+--------------+
| P WAVE AXIS | 28                     | degrees   | [facility]  |              |
+-------------+------------------------+-----------+------------+--------------+
| QRS AXIS    | -5                     | degrees   | [facility]  |              |
+-------------+------------------------+-----------+------------+--------------+
| T AXIS      | 0                      | degrees   | [facility]  |              |
+-------------+------------------------+-----------+------------+--------------+
| INTERPRETAT | Normal sinus           |           | [facility]  |              |
| ION TEXT    | rhythmNormal ECGNo     |           |            |              |
|             | previous ECGs          |           |            |              |
|             | availableConfirmed by  |           |            |              |
|             | Blake Benedict MD  |           |            |              |
 
|             | (69) on 2019      |           |            |              |
|             | 8:32:02 PM             |           |            |              |
+-------------+------------------------+-----------+------------+--------------+
 
 
 
+----------+
| Specimen |
+----------+
|          |
+----------+
 
 
 
+----------------------------------------------------------+--------------+
| Narrative                                                | Performed At |
+----------------------------------------------------------+--------------+
|   This result has an attachment that is not available.   |              |
+----------------------------------------------------------+--------------+
 
 
 
+--------------+---------+--------------------+--------------+
| Performing   | Address | City/State/Zipcode | Phone Number |
| Organization |         |                    |              |
+--------------+---------+--------------------+--------------+
|   [facility]  |         |                    |              |
+--------------+---------+--------------------+--------------+
 documented in this encounter
 
 Visit Diagnoses
 
 
+-----------------------------------------------------------------------------------+
| Diagnosis                                                                         |
+-----------------------------------------------------------------------------------+
|   Mesenteric ischemia, chronic (HCC)  Chronic vascular insufficiency of intestine |
+-----------------------------------------------------------------------------------+
 documented in this encounter

## 2020-01-13 NOTE — XMS
Encounter Summary
  Created on: 2020
 
 Pasquale Nehemias Martinez
 External Reference #: [id number]
 : 43
 Sex: Male
 
 Demographics
 
 
+-----------------------+----------------------+
| Address               | [address]         |
|                       | [place]  [address] |
+-----------------------+----------------------+
| Home Phone            | [phone]      |
+-----------------------+----------------------+
| Preferred Language    | Unknown              |
+-----------------------+----------------------+
| Marital Status        |               |
+-----------------------+----------------------+
| [employer] Affiliation | [id number]                 |
+-----------------------+----------------------+
| Race                  | Unknown              |
+-----------------------+----------------------+
| Ethnic Group          | Unknown              |
+-----------------------+----------------------+
 
 
 Author
 
 
+--------------+--------------------------------------------+
| Author       | [facility]  |
|              | and [facility]                                |
+--------------+--------------------------------------------+
| Organization | [facility]  |
|              | and [place]                                |
+--------------+--------------------------------------------+
| Address      | Unknown                                    |
+--------------+--------------------------------------------+
| Phone        | Unavailable                                |
+--------------+--------------------------------------------+
 
 
 
 Support
 
 
+--------------+--------------+---------------------+-----------------+
| Name         | Relationship | Address             | Phone           |
+--------------+--------------+---------------------+-----------------+
| Rupa Giordano | ECON         | [address]             | [phone] |
|              |              | [place], [place]   |                 |
|              |              | [address]               |                 |
+--------------+--------------+---------------------+-----------------+
 
 
 
 
 Care Team Providers
 
 
+-----------------------+------+-------------+
| Care Team Member Name | Role | Phone       |
+-----------------------+------+-------------+
| No, Physician         | PCP  | Unavailable |
+-----------------------+------+-------------+
 
 
 
 Reason for Visit
 
 
+-----------+----------+
| Reason    | Comments |
+-----------+----------+
| Follow-up |          |
+-----------+----------+
 
 
 
 Encounter Details
 
 
+--------+-----------+----------------------+----------------------+-------------+
| Date   | Type      | Department           | Care Team            | Description |
+--------+-----------+----------------------+----------------------+-------------+
| 10/02/ | Telephone |   [facility]      |   Augustin Lopez DNP      | Follow-up   |
| 2019   |           | VASCULAR SURGERY     | [address]     |             |
|        |           | [address] | [address] [address]  [place], [place]  |             |
|        |           |  E  [place], [place]     | [address]  [phone]  |             |
|        |           | [address]           |  [phone] (Fax)  |             |
|        |           | [phone]         |                      |             |
+--------+-----------+----------------------+----------------------+-------------+
 
 
 
 Social History
 
 
+---------------+-------+-----------+--------+------+
| Tobacco Use   | Types | Packs/Day | Years  | Date |
|               |       |           | Used   |      |
+---------------+-------+-----------+--------+------+
| Former Smoker |       |           |        |      |
+---------------+-------+-----------+--------+------+
 
 
 
+---------------------+---+---+---+
| Smokeless Tobacco:  |   |   |   |
| Former User         |   |   |   |
+---------------------+---+---+---+
 
 
 
+------------------------------+
| Comments: quit 50 years ago  |
 
+------------------------------+
 
 
 
+---------------+-------------+---------+----------+
| Alcohol Use   | Drinks/Week | oz/Week | Comments |
+---------------+-------------+---------+----------+
| Not Currently |             |         | 90m days |
+---------------+-------------+---------+----------+
 
 
 
+------------------+---------------+
| Sex Assigned at  | Date Recorded |
| Birth            |               |
+------------------+---------------+
| Not on file      |               |
+------------------+---------------+
 
 
 
+----------------+-------------+-------------+
| Job Start Date | Occupation  | Industry    |
+----------------+-------------+-------------+
| Not on file    | Not on file | Not on file |
+----------------+-------------+-------------+
 
 
 
+----------------+--------------+------------+
| Travel History | Travel Start | Travel End |
+----------------+--------------+------------+
 
 
 
+-------------------------------------+
| No recent travel history available. |
+-------------------------------------+
 documented as of this encounter
 
 Functional Status
 
 
+---------------------------------------------+----------+--------------------+
| Functional Status                           | Response | Date of Assessment |
+---------------------------------------------+----------+--------------------+
| Are you deaf or do you have serious         | No       | 2019         |
| difficulty hearing?                         |          |                    |
+---------------------------------------------+----------+--------------------+
| Are you blind or do you have serious        | No       | 2019         |
| difficulty seeing, even when wearing        |          |                    |
| glasses?                                    |          |                    |
+---------------------------------------------+----------+--------------------+
| Do you have serious difficulty walking or   | No       | 2019         |
| climbing stairs? (5 years old or older)     |          |                    |
+---------------------------------------------+----------+--------------------+
| Do you have difficulty dressing or bathing? | No       | 2019         |
|  (5 years old or older)                     |          |                    |
+---------------------------------------------+----------+--------------------+
| Because of a physical, mental, or emotional | No       | 2019         |
 
|  condition, do you have difficulty doing    |          |                    |
| errands alone such as visiting a doctor's   |          |                    |
| office or shopping? [15 years old or        |          |                    |
| older)]                                     |          |                    |
+---------------------------------------------+----------+--------------------+
 
 
 
+---------------------------------------------+----------+--------------------+
| Cognitive Status                            | Response | Date of Assessment |
+---------------------------------------------+----------+--------------------+
| Because of a physical, mental, or emotional | No       | 2019         |
|  condition, do you have serious difficulty  |          |                    |
| concentrating, remembering, or making       |          |                    |
| decisions? (5 years old or older)           |          |                    |
+---------------------------------------------+----------+--------------------+
 documented as of this encounter
 
 Plan of Treatment
 
 
+--------+-------------+------------------+----------------------+-------------+
| Date   | Type        | Specialty        | Care Team            | Description |
+--------+-------------+------------------+----------------------+-------------+
| / | Appointment | Radiology        |   Augustin Lopez DNP      |             |
| 2020   |             |                  | [address]     |             |
|        |             |                  | [place]  |             |
|        |             |                  | [address]  [phone]  |             |
|        |             |                  |  [phone] (Fax)  |             |
+--------+-------------+------------------+----------------------+-------------+
| / | Office      | Vascular Surgery |   Augustin Lopez DNP      |             |
|    | Visit       |                  | [address]     |             |
|        |             |                  | [address]  [place]  |             |
|        |             |                  | [address]  [phone]  |             |
|        |             |                  |  [phone] (Fax)  |             |
+--------+-------------+------------------+----------------------+-------------+
 documented as of this encounter
 
 Visit Diagnoses
 
 
+------------------------------------------------------------------------------------+
| Diagnosis                                                                          |
+------------------------------------------------------------------------------------+
|   Mesenteric ischemia, chronic (HCC) - Primary  Chronic vascular insufficiency of  |
| intestine                                                                          |
+------------------------------------------------------------------------------------+
 documented in this encounter

## 2020-01-13 NOTE — XMS
Encounter Summary
  Created on: 2020
 
 Pasquale Nehemias Martinez
 External Reference #: [id number]
 : 43
 Sex: Male
 
 Demographics
 
 
+-----------------------+----------------------+
| Address               | [address]         |
|                       | [place]  [address] |
+-----------------------+----------------------+
| Home Phone            | [phone]      |
+-----------------------+----------------------+
| Preferred Language    | Unknown              |
+-----------------------+----------------------+
| Marital Status        |               |
+-----------------------+----------------------+
| Jainism Affiliation | [id number]                 |
+-----------------------+----------------------+
| Race                  | Unknown              |
+-----------------------+----------------------+
| Ethnic Group          | Unknown              |
+-----------------------+----------------------+
 
 
 Author
 
 
+--------------+--------------------------------------------+
| Author       | [facility]  |
|              | and [facility]                                |
+--------------+--------------------------------------------+
| Organization | [facility]  |
|              | and [place]                                |
+--------------+--------------------------------------------+
| Address      | Unknown                                    |
+--------------+--------------------------------------------+
| Phone        | Unavailable                                |
+--------------+--------------------------------------------+
 
 
 
 Support
 
 
+--------------+--------------+---------------------+-----------------+
| Name         | Relationship | Address             | Phone           |
+--------------+--------------+---------------------+-----------------+
| Rupa Giordano | ECON         | [address]             | [phone] |
|              |              | [place]   |                 |
|              |              | [address]               |                 |
+--------------+--------------+---------------------+-----------------+
 
 
 
 
 Care Team Providers
 
 
+-----------------------+------+-------------+
| Care Team Member Name | Role | Phone       |
+-----------------------+------+-------------+
| No, Physician         | PCP  | Unavailable |
+-----------------------+------+-------------+
 
 
 
 Reason for Visit
 
 
+---------+----------+
| Reason  | Comments |
+---------+----------+
| Imaging | CT order |
+---------+----------+
 
 
 
 Encounter Details
 
 
+--------+-----------+----------------------+----------------------+--------------------+
| Date   | Type      | Department           | Care Team            | Description        |
+--------+-----------+----------------------+----------------------+--------------------+
| / | Telephone |   [facility]      |   Augustin Lopez DNP      | Imaging (CT order) |
| 2019   |           | VASCULAR SURGERY     | [address]     |                    |
|        |           | [address] | [address]  [place], [place]  |                    |
|        |           |  [place]  [place]     | [address] [phone]  |                    |
|        |           | [address]           |  [phone] (Fax)  |                    |
|        |           | [phone]         |                      |                    |
+--------+-----------+----------------------+----------------------+--------------------+
 
 
 
 Social History
 
 
+---------------+-------+-----------+--------+------+
| Tobacco Use   | Types | Packs/Day | Years  | Date |
|               |       |           | Used   |      |
+---------------+-------+-----------+--------+------+
| Former Smoker |       |           |        |      |
+---------------+-------+-----------+--------+------+
 
 
 
+---------------------+---+---+---+
| Smokeless Tobacco:  |   |   |   |
| Former User         |   |   |   |
+---------------------+---+---+---+
 
 
 
+------------------------------+
| Comments: quit 50 years ago  |
 
+------------------------------+
 
 
 
+---------------+-------------+---------+----------+
| Alcohol Use   | Drinks/Week | oz/Week | Comments |
+---------------+-------------+---------+----------+
| Not Currently |             |         | 90m days |
+---------------+-------------+---------+----------+
 
 
 
+------------------+---------------+
| Sex Assigned at  | Date Recorded |
| Birth            |               |
+------------------+---------------+
| Not on file      |               |
+------------------+---------------+
 
 
 
+----------------+-------------+-------------+
| Job Start Date | Occupation  | Industry    |
+----------------+-------------+-------------+
| Not on file    | Not on file | Not on file |
+----------------+-------------+-------------+
 
 
 
+----------------+--------------+------------+
| Travel History | Travel Start | Travel End |
+----------------+--------------+------------+
 
 
 
+-------------------------------------+
| No recent travel history available. |
+-------------------------------------+
 documented as of this encounter
 
 Functional Status
 
 
+---------------------------------------------+----------+--------------------+
| Functional Status                           | Response | Date of Assessment |
+---------------------------------------------+----------+--------------------+
| Are you deaf or do you have serious         | No       | 2019         |
| difficulty hearing?                         |          |                    |
+---------------------------------------------+----------+--------------------+
| Are you blind or do you have serious        | No       | 2019         |
| difficulty seeing, even when wearing        |          |                    |
| glasses?                                    |          |                    |
+---------------------------------------------+----------+--------------------+
| Do you have serious difficulty walking or   | No       | 2019         |
| climbing stairs? (5 years old or older)     |          |                    |
+---------------------------------------------+----------+--------------------+
| Do you have difficulty dressing or bathing? | No       | 2019         |
|  (5 years old or older)                     |          |                    |
+---------------------------------------------+----------+--------------------+
| Because of a physical, mental, or emotional | No       | 2019         |
 
|  condition, do you have difficulty doing    |          |                    |
| errands alone such as visiting a doctor's   |          |                    |
| office or shopping? [15 years old or        |          |                    |
| older)]                                     |          |                    |
+---------------------------------------------+----------+--------------------+
 
 
 
+---------------------------------------------+----------+--------------------+
| Cognitive Status                            | Response | Date of Assessment |
+---------------------------------------------+----------+--------------------+
| Because of a physical, mental, or emotional | No       | 2019         |
|  condition, do you have serious difficulty  |          |                    |
| concentrating, remembering, or making       |          |                    |
| decisions? (5 years old or older)           |          |                    |
+---------------------------------------------+----------+--------------------+
 documented as of this encounter
 
 Plan of Treatment
 
 
+--------+-------------+------------------+----------------------+-------------+
| Date   | Type        | Specialty        | Care Team            | Description |
+--------+-------------+------------------+----------------------+-------------+
| / | Appointment | Radiology        |   Augustin Lopez DNP      |             |
|    |             |                  | [address]     |             |
|        |             |                  | [place]  |             |
|        |             |                  | [address]  [phone]  |             |
|        |             |                  |  [phone] (Fax)  |             |
+--------+-------------+------------------+----------------------+-------------+
| / | Office      | Vascular Surgery |   Augustin Lopez DNP      |             |
| 2020   | Visit       |                  | [address]     |             |
|        |             |                  | [place]  |             |
|        |             |                  | [address] [phone]  |             |
|        |             |                  |  [phone] (Fax)  |             |
+--------+-------------+------------------+----------------------+-------------+
 documented as of this encounter
 
 Visit Diagnoses
 Not on filedocumented in this encounter

## 2020-01-13 NOTE — XMS
Clinical Summary
  Created on: 2020
 
 Lashanda Ramhan
 External Reference #: [id number]
 : 43
 Sex: Male
 
 Demographics
 
 
+-----------------------+----------------------+
| Address               | [address]         |
|                       | [place]  [address] |
+-----------------------+----------------------+
| Home Phone            | [phone]      |
+-----------------------+----------------------+
| Preferred Language    | Unknown              |
+-----------------------+----------------------+
| Marital Status        | Unknown              |
+-----------------------+----------------------+
| Zoroastrianism Affiliation | Unknown              |
+-----------------------+----------------------+
| Race                  | Unknown              |
+-----------------------+----------------------+
| Ethnic Group          | Unknown              |
+-----------------------+----------------------+
 
 
 Author
 
 
+--------------+------------------------------------------+
| Author       | [facility]  |
|              | [facility])                                 |
+--------------+------------------------------------------+
| Organization | [facility]  |
|              | [facility])                                 |
+--------------+------------------------------------------+
| Address      | Unknown                                  |
+--------------+------------------------------------------+
| Phone        | Unavailable                              |
+--------------+------------------------------------------+
 
 
 
 Care Team Providers
 
 
+-----------------------+------+-------------+
| Care Team Member Name | Role | Phone       |
+-----------------------+------+-------------+
 PP   | Unavailable |
+-----------------------+------+-------------+
 
 
 
 Allergies
 
 Not on File
 
 Current Medications
 Not on file
 
 Active Problems
 Not on file
 
 Social History
 
 
+----------------+-------+-----------+--------+------+
| Tobacco Use    | Types | Packs/Day | Years  | Date |
|                |       |           | Used   |      |
+----------------+-------+-----------+--------+------+
| Never Assessed |       |           |        |      |
+----------------+-------+-----------+--------+------+
 
 
 
+------------------+---------------+
| Sex Assigned at  | Date Recorded |
| Birth            |               |
+------------------+---------------+
| Not on file      |               |
+------------------+---------------+
 
 
 
 Plan of Treatment
 
 
+---------------------+-----------+-----------+----------+
| Health Maintenance  | Due Date  | Last Done | Comments |
+---------------------+-----------+-----------+----------+
| Vaccine:            |  |           |          |
| Dtap/Tdap/Td (1 -   | 3         |           |          |
| Tdap)               |           |           |          |
+---------------------+-----------+-----------+----------+
| Colon Cancer        |  |           |          |
| Screening           | 4         |           |          |
| (Colonoscopy)       |           |           |          |
+---------------------+-----------+-----------+----------+
| Vaccine: Zoster (1  |  |           |          |
| of 2)               | 4         |           |          |
+---------------------+-----------+-----------+----------+
| Vaccine:            |  |           |          |
| Pneumococcal 65+    | 9         |           |          |
| Low/Medium Risk (1  |           |           |          |
| of 2 - PCV13)       |           |           |          |
+---------------------+-----------+-----------+----------+
| Vaccine: Influenza  |  |           |          |
| (#1)                | 9         |           |          |
+---------------------+-----------+-----------+----------+
 
 
 
 Results
 Not on filefrom Last 3 Months
 
 
 Insurance
 
 
+----------------+--------+-------------+------+-------------+----------------------+
| Payer          | Benefi | Subscriber  | Type | Phone       | Address              |
|                | t Plan | ID          |      |             |                      |
|                |  /     |             |      |             |                      |
|                | Group  |             |      |             |                      |
+----------------+--------+-------------+------+-------------+----------------------+
| [employer]       | [employer] | [id number]   |      | [phone] |   [employer] [id number]  |
| ADMINISTRATION | [place]     |             |      | [id number]        | FEE  [address]   |
|                | [address]I |             |      |             | [address]IVE  [place]    |
|                | [place] |             |      |             | [address]                |
|                | ON     |             |      |             |                      |
|                | GENERI |             |      |             |                      |
|                | C      |             |      |             |                      |
+----------------+--------+-------------+------+-------------+----------------------+
 
 
 
+----------------+--------+-------------+--------+-------------+---------------------+
| Guarantor Name | [employer] | Relation to | Date   | Phone       | Billing Address     |
|                | t Type |  Patient    | of     |             |                     |
|                |        |             | Birth  |             |                     |
+----------------+--------+-------------+--------+-------------+---------------------+
| LASHANDA RAHMAN   | Vetera | Self        | / |   Home:     |   [address]      |
|                | ns     |             | 1945   | [phone] | [place] [address] |
|                | Admini |             |        | [address]        |                     |
|                | [address] |             |        |             |                     |
|                | on     |             |        |             |                     |
+----------------+--------+-------------+--------+-------------+---------------------+
| LASHANDA RAHMAN   | Person | Self        | / |             |                     |
|                | al/Fam |             |    |             |                     |
|                | deidre    |             |        |             |                     |
+----------------+--------+-------------+--------+-------------+---------------------+

## 2020-01-13 NOTE — XMS
Clinical Summary
  Created on: 2020
 
 Lashanda Rahman
 External Reference #: [id number]
 : 43
 Sex: Male
 
 Demographics
 
 
+-----------------------+----------------------+
| Address               | [address]         |
|                       | [place]  [address] |
+-----------------------+----------------------+
| Home Phone            | [phone]      |
+-----------------------+----------------------+
| Preferred Language    | Unknown              |
+-----------------------+----------------------+
| Marital Status        | Unknown              |
+-----------------------+----------------------+
| Jewish Affiliation | Unknown              |
+-----------------------+----------------------+
| Race                  | Unknown              |
+-----------------------+----------------------+
| Ethnic Group          | Unknown              |
+-----------------------+----------------------+
 
 
 Author
 
 
+--------------+------------------------------------------+
| Author       | [facility] ([facility] as [facility]  |
|              | 19)                                 |
+--------------+------------------------------------------+
| Organization | [facility]  |
|              | [facility])                                 |
+--------------+------------------------------------------+
| Address      | Unknown                                  |
+--------------+------------------------------------------+
| Phone        | Unavailable                              |
+--------------+------------------------------------------+
 
 
 
 Care Team Providers
 
 
+-----------------------+------+-------------+
| Care Team Member Name | Role | Phone       |
+-----------------------+------+-------------+
 PP   | Unavailable |
+-----------------------+------+-------------+
 
 
 
 Allergies
 
 Not on File
 
 Current Medications
 Not on file
 
 Active Problems
 Not on file
 
 Social History
 
 
+----------------+-------+-----------+--------+------+
| Tobacco Use    | Types | Packs/Day | Years  | Date |
|                |       |           | Used   |      |
+----------------+-------+-----------+--------+------+
| Never Assessed |       |           |        |      |
+----------------+-------+-----------+--------+------+
 
 
 
+------------------+---------------+
| Sex Assigned at  | Date Recorded |
| Birth            |               |
+------------------+---------------+
| Not on file      |               |
+------------------+---------------+
 
 
 
 Plan of Treatment
 
 
+---------------------+-----------+-----------+----------+
| Health Maintenance  | Due Date  | Last Done | Comments |
+---------------------+-----------+-----------+----------+
| Vaccine:            |  |           |          |
| Dtap/Tdap/Td (1 -   | 3         |           |          |
| Tdap)               |           |           |          |
+---------------------+-----------+-----------+----------+
| Colon Cancer        |  |           |          |
| Screening           | 4         |           |          |
| (Colonoscopy)       |           |           |          |
+---------------------+-----------+-----------+----------+
| Vaccine: Zoster (1  |  |           |          |
| of 2)               | 4         |           |          |
+---------------------+-----------+-----------+----------+
| Vaccine:            |  |           |          |
| Pneumococcal 65+    | 9         |           |          |
| Low/Medium Risk (1  |           |           |          |
| of 2 - PCV13)       |           |           |          |
+---------------------+-----------+-----------+----------+
| Vaccine: Influenza  |  |           |          |
| (#1)                | 9         |           |          |
+---------------------+-----------+-----------+----------+
 
 
 
 Results
 Not on filefrom Last 3 Months
 
 
 Insurance
 
 
+----------------+--------+-------------+------+-------------+----------------------+
| Payer          | Benefi | Subscriber  | Type | Phone       | Address              |
|                | t Plan | ID          |      |             |                      |
|                |  /     |             |      |             |                      |
|                | Group  |             |      |             |                      |
+----------------+--------+-------------+------+-------------+----------------------+
| [employer]       | [employer] | [id number]   |      | [phone] |   [employer] [id number]  |
| ADMINISTRATION | [place]     |             |      | [address]        | FEE  [address]   |
|                | [address]I |             |      |             | [address]IVE  [place]    |
|                | [place] |             |      |             | [address]                |
|                | ON     |             |      |             |                      |
|                | GENERI |             |      |             |                      |
|                | C      |             |      |             |                      |
+----------------+--------+-------------+------+-------------+----------------------+
 
 
 
+----------------+--------+-------------+--------+-------------+---------------------+
| Guarantor Name | [employer] | Relation to | Date   | Phone       | Billing Address     |
|                | t Type |  Patient    | of     |             |                     |
|                |        |             | Birth  |             |                     |
+----------------+--------+-------------+--------+-------------+---------------------+
| LASHANDA RAHMAN   | Vetera | Self        | / |   Home:     |   [address]      |
|                | ns     |             | 1945   | [phone] | [place] [address] |
|                | Admini |             |        | [address]        |                     |
|                | str[address] |             |        |             |                     |
|                | on     |             |        |             |                     |
+----------------+--------+-------------+--------+-------------+---------------------+
| LASHANDA RAHMAN   | Person | Self        | / |             |                     |
|                | al/Fam |             |    |             |                     |
|                | deidre    |             |        |             |                     |
+----------------+--------+-------------+--------+-------------+---------------------+

## 2020-01-13 NOTE — XMS
Encounter Summary
  Created on: 2020
 
 Pasquale Nehemias Martinez
 External Reference #: [id number]
 : 43
 Sex: Male
 
 Demographics
 
 
+-----------------------+----------------------+
| Address               | [address]         |
|                       | [place]  [address] |
+-----------------------+----------------------+
| Home Phone            | [phone]      |
+-----------------------+----------------------+
| Preferred Language    | Unknown              |
+-----------------------+----------------------+
| Marital Status        |               |
+-----------------------+----------------------+
| Adventism Affiliation | [id number]                 |
+-----------------------+----------------------+
| Race                  | Unknown              |
+-----------------------+----------------------+
| Ethnic Group          | Unknown              |
+-----------------------+----------------------+
 
 
 Author
 
 
+--------------+--------------------------------------------+
| Author       | [facility]  |
|              | and [facility]                                |
+--------------+--------------------------------------------+
| Organization | [facility]  |
|              | and [place]                                |
+--------------+--------------------------------------------+
| Address      | Unknown                                    |
+--------------+--------------------------------------------+
| Phone        | Unavailable                                |
+--------------+--------------------------------------------+
 
 
 
 Support
 
 
+--------------+--------------+---------------------+-----------------+
| Name         | Relationship | Address             | Phone           |
+--------------+--------------+---------------------+-----------------+
| Rupa Giordano | ECON         | [address]             | [phone] |
|              |              | [facility] [place]   |                 |
|              |              | [address]               |                 |
+--------------+--------------+---------------------+-----------------+
 
 
 
 
 Care Team Providers
 
 
+-----------------------+------+-------------+
| Care Team Member Name | Role | Phone       |
+-----------------------+------+-------------+
| No, Physician         | PCP  | Unavailable |
+-----------------------+------+-------------+
 
 
 
 Encounter Details
 
 
+--------+-----------+----------------------+----------------------+-------------+
| Date   | Type      | Department           | Care Team            | Description |
+--------+-----------+----------------------+----------------------+-------------+
| / | Telephone |   [facility]     |   Sukhdeep Pardo MD     |             |
| 2019   |           | CENTER [facility]   | [address]     |             |
|        |           | [address]       | [address]  [place]  |             |
|        |           | [place]         | [address]           |             |
|        |           | [address]           | [phone]         |             |
|        |           | [phone]         | [phone] (Fax)   |             |
+--------+-----------+----------------------+----------------------+-------------+
 
 
 
 Social History
 
 
+---------------+-------+-----------+--------+------+
| Tobacco Use   | Types | Packs/Day | Years  | Date |
|               |       |           | Used   |      |
+---------------+-------+-----------+--------+------+
| Former Smoker |       |           |        |      |
+---------------+-------+-----------+--------+------+
 
 
 
+---------------------+---+---+---+
| Smokeless Tobacco:  |   |   |   |
| Former User         |   |   |   |
+---------------------+---+---+---+
 
 
 
+------------------------------+
| Comments: quit 50 years ago  |
+------------------------------+
 
 
 
+---------------+-------------+---------+----------+
| Alcohol Use   | Drinks/Week | oz/Week | Comments |
+---------------+-------------+---------+----------+
| Not Currently |             |         | 90m days |
+---------------+-------------+---------+----------+
 
 
 
 
+------------------+---------------+
| Sex Assigned at  | Date Recorded |
| Birth            |               |
+------------------+---------------+
| Not on file      |               |
+------------------+---------------+
 
 
 
+----------------+-------------+-------------+
| Job Start Date | Occupation  | Industry    |
+----------------+-------------+-------------+
| Not on file    | Not on file | Not on file |
+----------------+-------------+-------------+
 
 
 
+----------------+--------------+------------+
| Travel History | Travel Start | Travel End |
+----------------+--------------+------------+
 
 
 
+-------------------------------------+
| No recent travel history available. |
+-------------------------------------+
 documented as of this encounter
 
 Functional Status
 
 
+---------------------------------------------+----------+--------------------+
| Functional Status                           | Response | Date of Assessment |
+---------------------------------------------+----------+--------------------+
| Are you deaf or do you have serious         | No       | 2019         |
| difficulty hearing?                         |          |                    |
+---------------------------------------------+----------+--------------------+
| Are you blind or do you have serious        | No       | 2019         |
| difficulty seeing, even when wearing        |          |                    |
| glasses?                                    |          |                    |
+---------------------------------------------+----------+--------------------+
| Do you have serious difficulty walking or   | No       | 2019         |
| climbing stairs? (5 years old or older)     |          |                    |
+---------------------------------------------+----------+--------------------+
| Do you have difficulty dressing or bathing? | No       | 2019         |
|  (5 years old or older)                     |          |                    |
+---------------------------------------------+----------+--------------------+
| Because of a physical, mental, or emotional | No       | 2019         |
|  condition, do you have difficulty doing    |          |                    |
| errands alone such as visiting a doctor's   |          |                    |
| office or shopping? [15 years old or        |          |                    |
| older)]                                     |          |                    |
+---------------------------------------------+----------+--------------------+
 
 
 
+---------------------------------------------+----------+--------------------+
| Cognitive Status                            | Response | Date of Assessment |
+---------------------------------------------+----------+--------------------+
 
| Because of a physical, mental, or emotional | No       | 2019         |
|  condition, do you have serious difficulty  |          |                    |
| concentrating, remembering, or making       |          |                    |
| decisions? (5 years old or older)           |          |                    |
+---------------------------------------------+----------+--------------------+
 documented as of this encounter
 
 Plan of Treatment
 
 
+--------+-------------+------------------+----------------------+-------------+
| Date   | Type        | Specialty        | Care Team            | Description |
+--------+-------------+------------------+----------------------+-------------+
| / | Appointment | Radiology        |   Augustin Lopez DNP      |             |
|    |             |                  |  [address]     |             |
|        |             |                  | [place]  |             |
|        |             |                  | [address]  [phone]  |             |
|        |             |                  |  [phone] (Fax)  |             |
+--------+-------------+------------------+----------------------+-------------+
| / | Office      | Vascular Surgery |   Augustin Lopez DNP      |             |
|    | Visit       |                  | [address]     |             |
|        |             |                  | [place]  |             |
|        |             |                  | [address]  [phone]  |             |
|        |             |                  |  [phone] (Fax)  |             |
+--------+-------------+------------------+----------------------+-------------+
 documented as of this encounter
 
 Visit Diagnoses
 Not on filedocumented in this encounter

## 2020-01-13 NOTE — XMS
Clinical Summary
  Created on: 2020
 
 Nehemias Giordano
 External Reference #: [id number]
 : 43
 Sex: Male
 
 Demographics
 
 
+-----------------------+----------------------+
| Address               | [address]         |
|                       | [place]  [address] |
+-----------------------+----------------------+
| Home Phone            | [phone]      |
+-----------------------+----------------------+
| Preferred Language    | Unknown              |
+-----------------------+----------------------+
| Marital Status        |               |
+-----------------------+----------------------+
| Pentecostalism Affiliation | 1077                 |
+-----------------------+----------------------+
| Race                  | Unknown              |
+-----------------------+----------------------+
| Ethnic Group          | Unknown              |
+-----------------------+----------------------+
 
 
 Author
 
 
+--------------+--------------------------------------------+
| Author       | [facility]  |
|              | and [facility]                                |
+--------------+--------------------------------------------+
| Organization | [facility]  |
|              | and [place]                                |
+--------------+--------------------------------------------+
| Address      | Unknown                                    |
+--------------+--------------------------------------------+
| Phone        | Unavailable                                |
+--------------+--------------------------------------------+
 
 
 
 Support
 
 
+--------------+--------------+---------------------+-----------------+
| Name         | Relationship | Address             | Phone           |
+--------------+--------------+---------------------+-----------------+
| Rupa Giordano | ECON         | [address]             | [phone] |
|              |              | [facility] [place]   |                 |
|              |              | [address]               |                 |
+--------------+--------------+---------------------+-----------------+
 
 
 
 
 Care Team Providers
 
 
+-----------------------+------+-------------+
| Care Team Member Name | Role | Phone       |
+-----------------------+------+-------------+
| No, Physician         | PCP  | Unavailable |
+-----------------------+------+-------------+
 
 
 
 Allergies
 No Known Allergies
 
 Medications
 
 
+----------------------+----------------------+-----------+---------+------+------+-------+
| Medication           | Sig                  | Dispensed | Refills | Star | End  | Statu |
|                      |                      |           |         | t    | Date | s     |
|                      |                      |           |         | Date |      |       |
+----------------------+----------------------+-----------+---------+------+------+-------+
|   simvastatin        | Take 10 mg by mouth  |           | 0       |      |      | Activ |
| (ZOCOR) 10 mg tablet | nightly.             |           |         |      |      | e     |
+----------------------+----------------------+-----------+---------+------+------+-------+
|                      | Take 25 mg by mouth  |           | 0       |      |      | Activ |
| hydroCHLOROthiazide  | Daily.               |           |         |      |      | e     |
| 25 mg tablet         |                      |           |         |      |      |       |
+----------------------+----------------------+-----------+---------+------+------+-------+
|   Multiple           | Take  by mouth.      |           | 0       |      |      | Activ |
| Vitamins-Minerals    |                      |           |         |      |      | e     |
| (CENTRUM SILVER      |                      |           |         |      |      |       |
| 50+MEN PO)           |                      |           |         |      |      |       |
+----------------------+----------------------+-----------+---------+------+------+-------+
|   ASPIRIN 81 PO      | Take 81 mg by mouth. |           | 0       |      |      | Activ |
|                      |                      |           |         |      |      | e     |
+----------------------+----------------------+-----------+---------+------+------+-------+
|   losartan (COZAAR)  | Take 50 mg by mouth  |           | 0       |      |      | Activ |
| 50 mg tablet         | Daily.               |           |         |      |      | e     |
+----------------------+----------------------+-----------+---------+------+------+-------+
|                      | Take 1 tablet by     |   30      | 0       | 09/3 |      | Activ |
| HYDROcodone-acetamin | mouth every 4 hours  | tablet    |         | 0/20 |      | e     |
| ophen (NORCO) 5-325  | as needed for Pain.  |           |         | 19   |      |       |
| mg per tablet        |                      |           |         |      |      |       |
+----------------------+----------------------+-----------+---------+------+------+-------+
|   docusate sodium    | Take 1 capsule by    |   30      | 0       | 09/3 |      | Activ |
| (COLACE) 100 mg      | mouth 2 times daily. | capsule   |         | 0/20 |      | e     |
| capsule              |                      |           |         | 19   |      |       |
+----------------------+----------------------+-----------+---------+------+------+-------+
|   clopidogrel        | Take 1 tablet by     |   30      | 11      | 12/0 |      | Activ |
| (PLAVIX) 75 mg       | mouth Daily.         | tablet    |         | 4/20 |      | e     |
| tablet               |                      |           |         | 19   |      |       |
+----------------------+----------------------+-----------+---------+------+------+-------+
 
 
 
 Active Problems
 
 
 
+------------------------------+------------+
| Problem                      | Noted Date |
+------------------------------+------------+
| H/O endarterectomy           | 2020 |
+------------------------------+------------+
| S/P angioplasty              | 2020 |
+------------------------------+------------+
| Hypertension                 | 2019 |
+------------------------------+------------+
| Hyperlipidemia               | 2019 |
+------------------------------+------------+
| Mesenteric ischemia, chronic | 2019 |
+------------------------------+------------+
 
 
 
+-------------------------------------------------------------+
|   Overview:   Added automatically from request for surgery  |
| [id number]                                                     |
+-------------------------------------------------------------+
 
 
 
 Resolved Problems
 
 
+-------------------+----------+-----------+
| Problem           | Noted    | Resolved  |
|                   | Date     | Date      |
+-------------------+----------+-----------+
| Diabetes mellitus | 20 |  |
|                   | 19       | 9         |
+-------------------+----------+-----------+
 
 
 
 Encounters
 
 
+--------+-----------+------------------+----------------------+----------------------+
| Date   | Type      | Specialty        | Care Team            | Description          |
+--------+-----------+------------------+----------------------+----------------------+
| / | Telephone | Vascular Surgery |   Briana Casper, | Appointment Question |
| 2020   |           |                  |  RN                  |                      |
+--------+-----------+------------------+----------------------+----------------------+
| / | Office    | Vascular Surgery |   Augustin Lopez DNP      | Mesenteric ischemia, |
|    | Visit     |                  |                      |  chronic (HCC)       |
|        |           |                  |                      | (Primary Dx); H/O    |
|        |           |                  |                      | endarterectomy; S/P  |
|        |           |                  |                      | angioplasty          |
+--------+-----------+------------------+----------------------+----------------------+
| / | Telephone | Vascular Surgery |   Briana Casper, | Appointment          |
|    |           |                  |  RN                  |                      |
+--------+-----------+------------------+----------------------+----------------------+
| / | Telephone | Vascular Surgery |   Briana Casper, | Follow-up; Other     |
|    |           |                  |  RN                  | (speak with nurse)   |
+--------+-----------+------------------+----------------------+----------------------+
| / | Telephone | Vascular Surgery |   Yudi Mckeon,  | Follow-up            |
2019   |           |                  | Medical Assistant    |                      |
+--------+-----------+------------------+----------------------+----------------------+
 
| / | Hospital  |                  |   Sukhdeep Pardo MD     | Mesenteric ischemia, |
|    | Encounter |                  |                      |  chronic (HCC)       |
+--------+-----------+------------------+----------------------+----------------------+
| / | Telephone | Radiology        |   Sukhdeep Pardo MD     |                      |
|    |           |                  |                      |                      |
+--------+-----------+------------------+----------------------+----------------------+
| / | Telephone | Vascular Surgery |   Briana Casper, | Results              |
|    |           |                  |  RN                  |                      |
+--------+-----------+------------------+----------------------+----------------------+
| / | Telephone | Vascular Surgery |   Genie Saxena,  | Diagnostic Order     |
|    |           |                  | RN                   |                      |
+--------+-----------+------------------+----------------------+----------------------+
| / | Telephone | Vascular Surgery |   Augustin Lopez DNP      | Imaging (CT order)   |
|    |           |                  |                      |                      |
+--------+-----------+------------------+----------------------+----------------------+
| 10/29/ | Telephone | Vascular Surgery |   Sukhdeep Pardo MD     | Other (Labs and      |
|    |           |                  |                      | Imaging)             |
+--------+-----------+------------------+----------------------+----------------------+
| 10/23/ | Office    | Vascular Surgery |   Augustin Lopez DNP      | Mesenteric ischemia, |
|    | Visit     |                  |                      |  chronic (HCC)       |
|        |           |                  |                      | (Primary Dx); H/O    |
|        |           |                  |                      | endarterectomy       |
+--------+-----------+------------------+----------------------+----------------------+
| 10/23/ | Hospital  | Radiology        |   Augustin Lopez DNP      | Mesenteric ischemia, |
|    | Encounter |                  |                      |  chronic (HCC)       |
+--------+-----------+------------------+----------------------+----------------------+
| 10/22/ | Telephone | Vascular Surgery |   Augustin Lopez DNP      | Other (instructions) |
|    |           |                  |                      |                      |
+--------+-----------+------------------+----------------------+----------------------+
 from Last 3 Months
 
 Family History
 
 
+-----------------+----------+------+----------+
| Medical History | Relation | Name | Comments |
+-----------------+----------+------+----------+
| Qiana hyperten  | Neg Hx   |      |          |
+-----------------+----------+------+----------+
 
 
 
 Social History
 
 
+---------------+-------+-----------+--------+------+
| Tobacco Use   | Types | Packs/Day | Years  | Date |
|               |       |           | Used   |      |
+---------------+-------+-----------+--------+------+
| Former Smoker |       |           |        |      |
+---------------+-------+-----------+--------+------+
 
 
 
+---------------------+---+---+---+
| Smokeless Tobacco:  |   |   |   |
| Former User         |   |   |   |
+---------------------+---+---+---+
 
 
 
 
+------------------------------+
| Comments: quit 50 years ago  |
+------------------------------+
 
 
 
+---------------+-------------+---------+----------+
| Alcohol Use   | Drinks/Week | oz/Week | Comments |
+---------------+-------------+---------+----------+
| Not Currently |             |         | 90m days |
+---------------+-------------+---------+----------+
 
 
 
+------------------+---------------+
| Sex Assigned at  | Date Recorded |
| Birth            |               |
+------------------+---------------+
| Not on file      |               |
+------------------+---------------+
 
 
 
+----------------+-------------+-------------+
| Job Start Date | Occupation  | Industry    |
+----------------+-------------+-------------+
| Not on file    | Not on file | Not on file |
+----------------+-------------+-------------+
 
 
 
+----------------+--------------+------------+
| Travel History | Travel Start | Travel End |
+----------------+--------------+------------+
 
 
 
+-------------------------------------+
| No recent travel history available. |
+-------------------------------------+
 
 
 
 Last Filed Vital Signs
 
 
+-------------------+----------------------+----------------------+----------+
| Vital Sign        | Reading              | Time Taken           | Comments |
+-------------------+----------------------+----------------------+----------+
| Blood Pressure    | 126/79               | 2020 10:14 AM  |          |
|                   |                      | PST                  |          |
+-------------------+----------------------+----------------------+----------+
| Pulse             | 64                   | 2020 10:14 AM  |          |
|                   |                      | PST                  |          |
+-------------------+----------------------+----------------------+----------+
| Temperature       | 36.6   C (97.9   F)  | 2019 11:27 AM  |          |
|                   |                      | PST                  |          |
+-------------------+----------------------+----------------------+----------+
| Respiratory Rate  | 18                   | 2019 11:27 AM  |          |
 
|                   |                      | PST                  |          |
+-------------------+----------------------+----------------------+----------+
| Oxygen Saturation | 100%                 | 2020 10:14 AM  |          |
|                   |                      | PST                  |          |
+-------------------+----------------------+----------------------+----------+
| Inhaled Oxygen    | -                    | -                    |          |
| Concentration     |                      |                      |          |
+-------------------+----------------------+----------------------+----------+
| Weight            | 79.3 kg (174 lb 13.2 | 2019  8:01 AM  |          |
|                   |  oz)                 | PST                  |          |
+-------------------+----------------------+----------------------+----------+
| Height            | 167.6 cm (5' 6")     | 2019  8:01 AM  |          |
|                   |                      | PST                  |          |
+-------------------+----------------------+----------------------+----------+
| Body Mass Index   | 28.22                | 2019  8:01 AM  |          |
|                   |                      | PST                  |          |
+-------------------+----------------------+----------------------+----------+
 
 
 
 Plan of Treatment
 
 
+--------+-------------+------------------+----------------------+-------------+
| Date   | Type        | Specialty        | Care Team            | Description |
+--------+-------------+------------------+----------------------+-------------+
| / | Appointment | Radiology        |   Augustin Lopez DNP      |             |
|    |             |                  | [address]     |             |
|        |             |                  | [place]  |             |
|        |             |                  | [address]  [phone]  |             |
|        |             |                  |  [phone] (Fax)  |             |
+--------+-------------+------------------+----------------------+-------------+
| / | Office      | Vascular Surgery |   Augustin Lopez DNP      |             |
|    | Visit       |                  | [address]     |             |
|        |             |                  | [place]  |             |
|        |             |                  | [address]  [phone]  |             |
|        |             |                  |  [phone] (Fax)  |             |
+--------+-------------+------------------+----------------------+-------------+
 
 
 
+----------------------+-----------+------------+----------+
| Health Maintenance   | Due Date  | Last Done  | Comments |
+----------------------+-----------+------------+----------+
| Vaccine:             |  |            |          |
| Dtap/Tdap/Td (1 -    | 5         |            |          |
| Tdap)                |           |            |          |
+----------------------+-----------+------------+----------+
| Colorectal Cancer    |  |            |          |
| Screening            | 4         |            |          |
| (Colonoscopy)        |           |            |          |
+----------------------+-----------+------------+----------+
| AAA Screening        |  |            |          |
|                      | 9         |            |          |
+----------------------+-----------+------------+----------+
| Vaccine:             |  |            |          |
| Pneumococcal 65+ (1  | 9         |            |          |
| of 2 - PCV13)        |           |            |          |
+----------------------+-----------+------------+----------+
| Vaccine: Zoster (2   | 04/15/200 | 2009 |          |
 
| of 3)                | 9         |            |          |
+----------------------+-----------+------------+----------+
| Adult Annual         |  |            |          |
| Wellness Visit       | 9         |            |          |
+----------------------+-----------+------------+----------+
| Vaccine: Influenza   |  | 2016 |          |
| (#1)                 | 9         |            |          |
+----------------------+-----------+------------+----------+
 
 
 
 Implants
 
 
+-------------------------------+-------+------+-------------+--------+--------+--------+
| Implanted                     | Type  | Area | Manufacture | Device | Shelf  | Model  |
|                               |       |      | r           |        | Expira | /      |
|                               |       |      |             | Identi | tion   | Serial |
|                               |       |      |             | fier   | Date   |  / Lot |
+-------------------------------+-------+------+-------------+--------+--------+--------+
| Xgrft Vascu-Guard Ptch 0.8x8  | Graft |      | BOBBI      |        | / | VG-010 |
| - SnaImplanted: Qty: 1 on     |       |      | BIOSCIENCE  |        |    | 8N /NA |
| 2019 by Sukhdeep Pardo MD  |       |      | - [place]      |        |        |        |
| at [facility]        |       |      |             |        |        | /[id number] |
| [facility]                |       |      |             |        |        |  |
|                               |       |      |             |        |        | 1520   |
+-------------------------------+-------+------+-------------+--------+--------+--------+
 
 
 
 Procedures
 
 
+---------------------+--------+-------------+----------------------+----------------------+
| Procedure Name      | Priori | Date/Time   | Associated Diagnosis | Comments             |
|                     | ty     |             |                      |                      |
+---------------------+--------+-------------+----------------------+----------------------+
| IR ANGIOGRAM        | Routin | 2019  |   Mesenteric         |   Results for this   |
| MESENTERIC VISCERAL | e      | 10:03 AM    | ischemia, chronic    | procedure are in the |
|                     |        | PST         | ([facility])                |  results section.    |
+---------------------+--------+-------------+----------------------+----------------------+
| IMAGING REPORT -    |        | 11/15/2019  |                      |   Results for this   |
| EXTERNAL SCAN       |        | 12:00 AM    |                      | procedure are in the |
|                     |        | PST         |                      |  results section.    |
+---------------------+--------+-------------+----------------------+----------------------+
 from Last 3 Months
 
 Results
 IR Angiogram Mesenteric Visceral (2019 10:03 AM PST)
 
+----------+
| Specimen |
+----------+
|          |
+----------+
 
 
 
+-----------------------------------------------------------------------+---------------+
| Impressions                                                           | Performed At  |
 
+-----------------------------------------------------------------------+---------------+
|      1.   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
| around large   branches.  2.   After angioplasty, moderate            |               |
| angiographic results.  3.   Not a good place for stent placement due  |               |
| to multiple large branches.                                           |               |
+-----------------------------------------------------------------------+---------------+
 
 
 
+----------------------------------------------------------------------------+--------------
-+
| Narrative                                                                  | Performed At 
 |
+----------------------------------------------------------------------------+--------------
-+
|   This result has an attachment that is not available.  ABDOMINAL          |   [facility]
 |
| AORTOGRAM PREOPERATIVE DIAGNOSIS: Chronic mesenteric ischemia              |              
 |
| POSTOPERATIVE DIAGNOSIS: Same PROCEDURE:   1. Moderate conscious           |              
 |
| sedation2. Ultrasound guided access of the right common femoral            |              
 |
| artery3. Aortogram4. Selective catheterization of superior mesenteric      |              
 |
| artery with mesenteric arteriogram5.   Superior mesenteric artery          |              
 |
| angioplasty using 7 x 40 mm angioplasty balloon SURGEON: Sukhdeep Pardo MD       |              
 |
| ASSISTANT: None ANESTHESIA: Moderate sedation and local anesthesia         |              
 |
|       Informed consent was obtained from the patient. Continuous           |              
 |
| cardiac monitoring was performed throughout the procedure. Conscious       |              
 |
| sedation was provided by the nursing staff during the procedure under      |              
 |
| my supervision.          Sedation time: 60 minutes                         |              
 |
|   Medications: 1 mg Versed IV, 50 Mcg Fentanyl IV ESTIMATED BLOOD          |              
 |
| LOSS: Minimal CONTRAST: 89 mL of Omnipaque 240 INDICATIONS: See            |              
 |
| preoperative history and physical FINDINGS: SMA was nearly occluded at     |              
 |
|  origin.   After angioplasty, there was still a focal area of stenosis     |              
 |
|  but this was not in a good place to stent due to multiple large           |              
 |
| branches around stenosis.   However, there was improved flow               |              
 |
| throughout SMA at end of case. DESCRIPTION OF PROCEDURE:   The patient     |              
 |
|  was properly identified and brought to the Cath Lab. The patient was      |              
 |
| placed supine on the catheter table and patient was given moderate         |              
 |
| sedation by nursing staff under my supervision. Patient's bilateral        |              
 |
| groins were then prepped and draped in the usual sterile fashion.          |              
 
 |
| Local anesthetic was given to the right groin and the right common         |              
 |
| femoral artery was accessed under ultrasound guidance using a              |              
 |
| micropuncture needle. A micropuncture sheath was inserted over a wire      |              
 |
| and up sized to a 4 French sheath. A Omni flush catheter was then          |              
 |
| inserted into the aorta and aortogram was then performed with the          |              
 |
| findings as described above. Next, an Amplatzer wire was then inserted     |              
 |
|  over the catheter and the 4 French sheath was removed. A 6.5 French       |              
 |
| durable sheath was then inserted over the wire with the tip of the         |              
 |
| sheath being placed into the proximal SMA. A vertebral catheter was        |              
 |
| inserted over the wire and the wire was then removed. A Glidewire was      |              
 |
| then placed into the vertebral catheter and used to cross through the      |              
 |
| SMA occlusion.   Next, a 260 fix core wire was then inserted over the      |              
 |
| catheter.   The SMA was then angioplastied using 7 x 40 mm angioplasty     |              
 |
|  balloon.   A final arteriogram was then performed through the sheath.     |              
 |
|  The steerable sheath was then removed and a Mynx closure device was       |              
 |
| then used on the right common femoral artery and hemostasis was            |              
 |
| ensured. The patient was then taken to the observation unit for            |              
 |
| further monitoring.                                                        |              
 |
|patient was given moderate sedation by nursing staff under my supervision.  |              
 |
|Patient's bilateral groins were then prepped and draped in the usual        |              
 |
|sterile fashion. Local anesthetic was given to the right groin and the      |              
 |
|right common femoral artery was accessed under ultrasound guidance using a  |              
 |
|micropuncture needle. A micropuncture sheath was inserted over a wire and   |              
 |
|up sized to a 4 French sheath. A Omni flush catheter was then inserted      |              
 |
|into the aorta and aortogram was then performed with the findings as        |              
 |
|described above. Next, an Amplatzer wire was then inserted over the         |              
 |
|catheter and the 4 French sheath was removed. A 6.5 French durable sheath   |              
 |
|was then inserted over the wire with the tip of the sheath being placed     |              
 |
|into the proximal SMA. A vertebral catheter was inserted over the wire and  |              
 |
|the wire was then removed. A Glidewire was then placed into the vertebral   |              
 
 |
|catheter and used to cross through the SMA occlusion.   Next, a 260 fix     |              
 |
|core wire was then inserted over the catheter.   The SMA was then           |              
 |
|angioplastied using 7 x 40 mm angioplasty balloon.   A final arteriogram    |              
 |
|was then performed through the sheath. The steerable sheath was then        |              
 |
|removed and a Mynx closure device was then used on the right common         |              
 |
|femoral artery and hemostasis was ensured. The patient was then taken to    |              
 |
|the observation unit for further monitoring.                                |              
 |
|                                                                            |              
 |
+----------------------------------------------------------------------------+--------------
-+
 
 
 
+---------------+---------+--------------------+--------------+
| Performing    | Address | City/State/Zipcode | Phone Number |
| Organization  |         |                    |              |
+---------------+---------+--------------------+--------------+
|   [facility] |         |                    |              |
+---------------+---------+--------------------+--------------+
 IMAGING REPORT - EXTERNAL SCAN (11/15/2019 12:00 AM PST)
 
+------------------------------------------------------------------------+--------------+
| Narrative                                                              | Performed At |
+------------------------------------------------------------------------+--------------+
|   This result has an attachment that is not available.  Ordered by an  |              |
| unspecified provider.                                                  |              |
+------------------------------------------------------------------------+--------------+
 from Last 3 Months
 
 Insurance
 
 
+----------------+--------+-------------+--------+-------+---------+--------+
| Payer          | Benefi | Subscriber  | Effect | Phone | Address | Type   |
|                | t Plan | ID          | dave    |       |         |        |
|                |  /     |             | Dates  |       |         |        |
|                | Group  |             |        |       |         |        |
+----------------+--------+-------------+--------+-------+---------+--------+
| [employer] [place]     | [id number]   | 20 |       |         | Indemn |
|                | CHOICE |             | 18-Pre |       |         | ity    |
|                |  PC3   |             | sent   |       |         |        |
+----------------+--------+-------------+--------+-------+---------+--------+
 
 
 
+--------------------+--------+-------------+--------+-------------+---------------------+
| Guarantor Name     | Accoun | Relation to | Date   | Phone       | Billing Address     |
|                    | t Type |  Patient    | of     |             |                     |
|                    |        |             | Birth  |             |                     |
+--------------------+--------+-------------+--------+-------------+---------------------+
| Nehemias Giordano | Person | Self        | / |             |   [address]      |
 
|                    | al/Ray |             | 1945   | [phone] | [place] [id number] |
|                    | [place]re    |             |        | 4 (Home)    |                     |
+--------------------+--------+-------------+--------+-------------+---------------------+
 
 
 
 Advance Directives
 
 
+-----------+-----------------+----------------+-------------+
| Type      | Date Recorded   | Patient        | Explanation |
|           |                 | Representative |             |
+-----------+-----------------+----------------+-------------+
| Power of  |                 |                |             |
|   |                 |                |             |
+-----------+-----------------+----------------+-------------+
| Advance   | 2019  8:41 |                | no          |
| Directive |  AM             |                |             |
+-----------+-----------------+----------------+-------------+
 
 
 
+-------------+-------------+-------------+----------+
| Code Status | Date        | Date        | Comments |
|             | Activated   | Inactivated |          |
+-------------+-------------+-------------+----------+
| Full Code   | 2019   | 2019   |          |
|             | 3:07 PM     | 1:49 PM     |          |
+-------------+-------------+-------------+----------+

## 2020-01-13 NOTE — XMS
Encounter Summary
  Created on: 2020
 
 Pasquale Nehemias Martinez
 External Reference #: [id number]
 : 43
 Sex: Male
 
 Demographics
 
 
+-----------------------+----------------------+
| Address               | [address]         |
|                       | [place]  [id number] |
+-----------------------+----------------------+
| Home Phone            | [phone]      |
+-----------------------+----------------------+
| Preferred Language    | Unknown              |
+-----------------------+----------------------+
| Marital Status        |               |
+-----------------------+----------------------+
| Jain Affiliation | [id number]                 |
+-----------------------+----------------------+
| Race                  | Unknown              |
+-----------------------+----------------------+
| Ethnic Group          | Unknown              |
+-----------------------+----------------------+
 
 
 Author
 
 
+--------------+--------------------------------------------+
| Author       | [facility]  |
|              | and [facility]                                |
+--------------+--------------------------------------------+
| Organization | [facility]  |
|              | and [place]                                |
+--------------+--------------------------------------------+
| Address      | Unknown                                    |
+--------------+--------------------------------------------+
| Phone        | Unavailable                                |
+--------------+--------------------------------------------+
 
 
 
 Support
 
 
+--------------+--------------+---------------------+-----------------+
| Name         | Relationship | Address             | Phone           |
+--------------+--------------+---------------------+-----------------+
| Rupa Giordano | ECON         | [address]             | [phone] |
|              |              | [place], [place]   |                 |
|              |              | [address]               |                 |
+--------------+--------------+---------------------+-----------------+
 
 
 
 
 Care Team Providers
 
 
+-----------------------+------+-------------+
| Care Team Member Name | Role | Phone       |
+-----------------------+------+-------------+
 PCP  | Unavailable |
+-----------------------+------+-------------+
 
 
 
 Reason for Visit
 
 
+-------------+----------+
| Reason      | Comments |
+-------------+----------+
| Advice Only |          |
+-------------+----------+
 
 
 
 Encounter Details
 
 
+--------+-----------+----------------------+----------------------+-------------+
| Date   | Type      | Department           | Care Team            | Description |
+--------+-----------+----------------------+----------------------+-------------+
| / | Telephone |   [facility]      |   Sukhdeep Pardo MD     | Advice Only |
| 2019   |           | VASCULAR SURGERY     | [address]     |             |
|        |           | [address] | [address]  [place], [place]  |             |
|        |           |  E  [place], [place]     | [address]           |             |
|        |           | [address]           | [phone]         |             |
|        |           | [phone]         | [phone] (Fax)   |             |
+--------+-----------+----------------------+----------------------+-------------+
 
 
 
 Social History
 
 
+---------------+-------+-----------+--------+------+
| Tobacco Use   | Types | Packs/Day | Years  | Date |
|               |       |           | Used   |      |
+---------------+-------+-----------+--------+------+
| Former Smoker |       |           |        |      |
+---------------+-------+-----------+--------+------+
 
 
 
+---------------------+---+---+---+
| Smokeless Tobacco:  |   |   |   |
| Never Used          |   |   |   |
+---------------------+---+---+---+
 
 
 
+------------------------------+
| Comments: quit 50 years ago  |
 
+------------------------------+
 
 
 
+------------------+---------------+
| Sex Assigned at  | Date Recorded |
| Birth            |               |
+------------------+---------------+
| Not on file      |               |
+------------------+---------------+
 
 
 
+----------------+-------------+-------------+
| Job Start Date | Occupation  | Industry    |
+----------------+-------------+-------------+
| Not on file    | Not on file | Not on file |
+----------------+-------------+-------------+
 
 
 
+----------------+--------------+------------+
| Travel History | Travel Start | Travel End |
+----------------+--------------+------------+
 
 
 
+-------------------------------------+
| No recent travel history available. |
+-------------------------------------+
 documented as of this encounter
 
 Plan of Treatment
 
 
+--------+-------------+------------------+----------------------+-------------+
| Date   | Type        | Specialty        | Care Team            | Description |
+--------+-------------+------------------+----------------------+-------------+
| / | Appointment | Radiology        |   Augustin Lopez DNP      |             |
|    |             |                  | [address]     |             |
|        |             |                  | [place]  |             |
|        |             |                  | [address]  [phone]  |             |
|        |             |                  |  [phone] (Fax)  |             |
+--------+-------------+------------------+----------------------+-------------+
| / | Office      | Vascular Surgery |   Augustin Lopez DNP      |             |
|    | Visit       |                  | [address]     |             |
|        |             |                  | [place]  |             |
|        |             |                  | [address]  [phone]  |             |
|        |             |                  |  [phone] (Fax)  |             |
+--------+-------------+------------------+----------------------+-------------+
 documented as of this encounter
 
 Visit Diagnoses
 Not on filedocumented in this encounter

## 2020-01-13 NOTE — XMS
Encounter Summary
  Created on: 2020
 
 Pasquale Nehemias Martinez
 External Reference #: [id number]
 : 43
 Sex: Male
 
 Demographics
 
 
+-----------------------+----------------------+
| Address               | [address]         |
|                       | [place]  [address] |
+-----------------------+----------------------+
| Home Phone            | [phone]      |
+-----------------------+----------------------+
| Preferred Language    | Unknown              |
+-----------------------+----------------------+
| Marital Status        |               |
+-----------------------+----------------------+
| Church Affiliation | [id number]                 |
+-----------------------+----------------------+
| Race                  | Unknown              |
+-----------------------+----------------------+
| Ethnic Group          | Unknown              |
+-----------------------+----------------------+
 
 
 Author
 
 
+--------------+--------------------------------------------+
| Author       | [facility]  |
|              | and [facility]                                |
+--------------+--------------------------------------------+
| Organization | [facility]  |
|              | and [place]                                |
+--------------+--------------------------------------------+
| Address      | Unknown                                    |
+--------------+--------------------------------------------+
| Phone        | Unavailable                                |
+--------------+--------------------------------------------+
 
 
 
 Support
 
 
+--------------+--------------+---------------------+-----------------+
| Name         | Relationship | Address             | Phone           |
+--------------+--------------+---------------------+-----------------+
| Rupa Giordano | ECON         | [address]             | [phone] |
|              |              | [facility] [place]   |                 |
|              |              | [address]               |                 |
+--------------+--------------+---------------------+-----------------+
 
 
 
 
 Care Team Providers
 
 
+-----------------------+------+-------------+
| Care Team Member Name | Role | Phone       |
+-----------------------+------+-------------+
| No, Physician         | PCP  | Unavailable |
+-----------------------+------+-------------+
 
 
 
 Reason for Referral
 Diagnostic/Screening (Routine)
 
+--------+--------+-----------+--------------+--------------+--------------+
| Status | Reason | Specialty | Diagnoses /  | Referred By  | Referred To  |
|        |        |           | Procedures   | Contact      | Contact      |
+--------+--------+-----------+--------------+--------------+--------------+
| Closed |        | Radiology |   Diagnoses  |   Erasmo         |              |
|        |        |           |  Mesenteric  | Vascular     |              |
|        |        |           | ischemia,    | Surgery      |              |
|        |        |           | chronic      | [address]         |              |
|        |        |           | ([facility])        | [place]  |              |
|        |        |           | Procedures   | [address] [address]        |              |
|        |        |           | IR Angiogram | [place] |              |
|        |        |           |  Mesenteric  |  [address]  |              |
|        |        |           | Visceral     |  Phone:      |              |
|        |        |           |              | [phone] |              |
|        |        |           |              |   Fax:       |              |
|        |        |           |              | [phone] |              |
+--------+--------+-----------+--------------+--------------+--------------+
 
 
 
 
 Reason for Visit
 
 
+---------+----------+
| Reason  | Comments |
+---------+----------+
| Results |          |
+---------+----------+
 
 
 
 Encounter Details
 
 
+--------+-----------+----------------------+----------------------+-------------+
| Date   | Type      | Department           | Care Team            | Description |
+--------+-----------+----------------------+----------------------+-------------+
| / | Telephone |   [facility]      |   Briana Casper, | Results     |
| 2019   |           | VASCULAR SURGERY     |  RN                  |             |
|        |           | [address] |                      |             |
|        |           |  [place]  [place]     |                      |             |
|        |           | [address]           |                      |             |
|        |           | [phone]         |                      |             |
+--------+-----------+----------------------+----------------------+-------------+
 
 
 
 
 Social History
 
 
+---------------+-------+-----------+--------+------+
| Tobacco Use   | Types | Packs/Day | Years  | Date |
|               |       |           | Used   |      |
+---------------+-------+-----------+--------+------+
| Former Smoker |       |           |        |      |
+---------------+-------+-----------+--------+------+
 
 
 
+---------------------+---+---+---+
| Smokeless Tobacco:  |   |   |   |
| Former User         |   |   |   |
+---------------------+---+---+---+
 
 
 
+------------------------------+
| Comments: quit 50 years ago  |
+------------------------------+
 
 
 
+---------------+-------------+---------+----------+
| Alcohol Use   | Drinks/Week | oz/Week | Comments |
+---------------+-------------+---------+----------+
| Not Currently |             |         | 90m days |
+---------------+-------------+---------+----------+
 
 
 
+------------------+---------------+
| Sex Assigned at  | Date Recorded |
| Birth            |               |
+------------------+---------------+
| Not on file      |               |
+------------------+---------------+
 
 
 
+----------------+-------------+-------------+
| Job Start Date | Occupation  | Industry    |
+----------------+-------------+-------------+
| Not on file    | Not on file | Not on file |
+----------------+-------------+-------------+
 
 
 
+----------------+--------------+------------+
| Travel History | Travel Start | Travel End |
+----------------+--------------+------------+
 
 
 
+-------------------------------------+
 
| No recent travel history available. |
+-------------------------------------+
 documented as of this encounter
 
 Functional Status
 
 
+---------------------------------------------+----------+--------------------+
| Functional Status                           | Response | Date of Assessment |
+---------------------------------------------+----------+--------------------+
| Are you deaf or do you have serious         | No       | 2019         |
| difficulty hearing?                         |          |                    |
+---------------------------------------------+----------+--------------------+
| Are you blind or do you have serious        | No       | 2019         |
| difficulty seeing, even when wearing        |          |                    |
| glasses?                                    |          |                    |
+---------------------------------------------+----------+--------------------+
| Do you have serious difficulty walking or   | No       | 2019         |
| climbing stairs? (5 years old or older)     |          |                    |
+---------------------------------------------+----------+--------------------+
| Do you have difficulty dressing or bathing? | No       | 2019         |
|  (5 years old or older)                     |          |                    |
+---------------------------------------------+----------+--------------------+
| Because of a physical, mental, or emotional | No       | 2019         |
|  condition, do you have difficulty doing    |          |                    |
| errands alone such as visiting a doctor's   |          |                    |
| office or shopping? [15 years old or        |          |                    |
| older)]                                     |          |                    |
+---------------------------------------------+----------+--------------------+
 
 
 
+---------------------------------------------+----------+--------------------+
| Cognitive Status                            | Response | Date of Assessment |
+---------------------------------------------+----------+--------------------+
| Because of a physical, mental, or emotional | No       | 2019         |
|  condition, do you have serious difficulty  |          |                    |
| concentrating, remembering, or making       |          |                    |
| decisions? (5 years old or older)           |          |                    |
+---------------------------------------------+----------+--------------------+
 documented as of this encounter
 
 Plan of Treatment
 
 
+--------+-------------+------------------+----------------------+-------------+
| Date   | Type        | Specialty        | Care Team            | Description |
+--------+-------------+------------------+----------------------+-------------+
| / | Appointment | Radiology        |   Augustin Lopez DNP      |             |
|    |             |                  |  [place]     |             |
|        |             |                  | [place]  |             |
|        |             |                  | [address]  [phone]  |             |
|        |             |                  |  [phone] (Fax)  |             |
+--------+-------------+------------------+----------------------+-------------+
| / | Office      | Vascular Surgery |   Augustin Lopez DNP      |             |
|    | Visit       |                  | [address] [place] [address]     |             |
|        |             |                  | [place]  |             |
|        |             |                  | [address]  [phone]  |             |
|        |             |                  |  [phone] (Fax)  |             |
+--------+-------------+------------------+----------------------+-------------+
 
 documented as of this encounter
 
 Results
 IR Angiogram Mesenteric Visceral (2019 10:03 AM PST)
 
+----------+
| Specimen |
+----------+
|          |
+----------+
 
 
 
+-----------------------------------------------------------------------+---------------+
| Impressions                                                           | Performed At  |
+-----------------------------------------------------------------------+---------------+
|      1.   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
| around large   branches.  2.   After angioplasty, moderate            |               |
| angiographic results.  3.   Not a good place for stent placement due  |               |
| to multiple large branches.                                           |               |
+-----------------------------------------------------------------------+---------------+
 
 
 
+----------------------------------------------------------------------------+--------------
-+
| Narrative                                                                  | Performed At 
 |
+----------------------------------------------------------------------------+--------------
-+
|   This result has an attachment that is not available.  [facility]          |   [facility]
 |
| AORTOGRAM PREOPERATIVE DIAGNOSIS: Chronic mesenteric ischemia              |              
 |
| POSTOPERATIVE DIAGNOSIS: Same PROCEDURE:   1. Moderate conscious           |              
 |
| sedation2. Ultrasound guided access of the right common femoral            |              
 |
| artery3. Aortogram4. Selective catheterization of superior mesenteric      |              
 |
| artery with mesenteric arteriogram5.   Superior mesenteric artery          |              
 |
| angioplasty using 7 x 40 mm angioplasty balloon SURGEON: Sukhdeep Pardo MD       |              
 |
| ASSISTANT: None ANESTHESIA: Moderate sedation and local anesthesia         |              
 |
|       Informed consent was obtained from the patient. Continuous           |              
 |
| cardiac monitoring was performed throughout the procedure. Conscious       |              
 |
| sedation was provided by the nursing staff during the procedure under      |              
 |
| my supervision.          Sedation time: 60 minutes                         |              
 |
|   Medications: 1 mg Versed IV, 50 Mcg Fentanyl IV ESTIMATED BLOOD          |              
 |
| LOSS: Minimal CONTRAST: 89 mL of Omnipaque 240 INDICATIONS: See            |              
 |
| preoperative history and physical FINDINGS: SMA was nearly occluded at     |              
 |
 
|  origin.   After angioplasty, there was still a focal area of stenosis     |              
 |
|  but this was not in a good place to stent due to multiple large           |              
 |
| branches around stenosis.   However, there was improved flow               |              
 |
| throughout SMA at end of case. DESCRIPTION OF PROCEDURE:   The patient     |              
 |
|  was properly identified and brought to the Cath Lab. The patient was      |              
 |
| placed supine on the catheter table and patient was given moderate         |              
 |
| sedation by nursing staff under my supervision. Patient's bilateral        |              
 |
| groins were then prepped and draped in the usual sterile fashion.          |              
 |
| Local anesthetic was given to the right groin and the right common         |              
 |
| femoral artery was accessed under ultrasound guidance using a              |              
 |
| micropuncture needle. A micropuncture sheath was inserted over a wire      |              
 |
| and up sized to a 4 Lebanese sheath. A Omni flush catheter was then          |              
 |
| inserted into the aorta and aortogram was then performed with the          |              
 |
| findings as described above. Next, an Amplatzer wire was then inserted     |              
 |
|  over the catheter and the 4 Lebanese sheath was removed. A 6.5 Lebanese       |              
 |
| durable sheath was then inserted over the wire with the tip of the         |              
 |
| sheath being placed into the proximal SMA. A vertebral catheter was        |              
 |
| inserted over the wire and the wire was then removed. A Glidewire was      |              
 |
| then placed into the vertebral catheter and used to cross through the      |              
 |
| SMA occlusion.   Next, a 260 fix core wire was then inserted over the      |              
 |
| catheter.   The SMA was then angioplastied using 7 x 40 mm angioplasty     |              
 |
|  balloon.   A final arteriogram was then performed through the sheath.     |              
 |
|  The steerable sheath was then removed and a Mynx closure device was       |              
 |
| then used on the right common femoral artery and hemostasis was            |              
 |
| ensured. The patient was then taken to the observation unit for            |              
 |
| further monitoring.                                                        |              
 |
|patient was given moderate sedation by nursing staff under my supervision.  |              
 |
|Patient's bilateral groins were then prepped and draped in the usual        |              
 |
|sterile fashion. Local anesthetic was given to the right groin and the      |              
 |
|right common femoral artery was accessed under ultrasound guidance using a  |              
 |
 
|micropuncture needle. A micropuncture sheath was inserted over a wire and   |              
 |
|up sized to a 4 Lebanese sheath. A Omni flush catheter was then inserted      |              
 |
|into the aorta and aortogram was then performed with the findings as        |              
 |
|described above. Next, an Amplatzer wire was then inserted over the         |              
 |
|catheter and the 4 Lebanese sheath was removed. A 6.5 Lebanese durable sheath   |              
 |
|was then inserted over the wire with the tip of the sheath being placed     |              
 |
|into the proximal SMA. A vertebral catheter was inserted over the wire and  |              
 |
|the wire was then removed. A Glidewire was then placed into the vertebral   |              
 |
|catheter and used to cross through the SMA occlusion.   Next, a 260 fix     |              
 |
|core wire was then inserted over the catheter.   The SMA was then           |              
 |
|angioplastied using 7 x 40 mm angioplasty balloon.   A final arteriogram    |              
 |
|was then performed through the sheath. The steerable sheath was then        |              
 |
|removed and a Mynx closure device was then used on the right common         |              
 |
|femoral artery and hemostasis was ensured. The patient was then taken to    |              
 |
|the observation unit for further monitoring.                                |              
 |
|                                                                            |              
 |
+----------------------------------------------------------------------------+--------------
-+
 
 
 
+---------------+---------+--------------------+--------------+
| Performing    | Address | [facility] | Phone Number |
| Organization  |         |                    |              |
+---------------+---------+--------------------+--------------+
|   [facility] |         |                    |              |
+---------------+---------+--------------------+--------------+
 documented in this encounter
 
 Visit Diagnoses
 
 
+------------------------------------------------------------------------------------+
| Diagnosis                                                                          |
+------------------------------------------------------------------------------------+
|   Mesenteric ischemia, chronic (HCC) - Primary  Chronic vascular insufficiency of  |
| intestine                                                                          |
+------------------------------------------------------------------------------------+
 documented in this encounter

## 2020-01-13 NOTE — XMS
Encounter Summary
  Created on: 2020
 
 Pasquale Nehemias Martinez
 External Reference #: [id number]
 : 43
 Sex: Male
 
 Demographics
 
 
+-----------------------+----------------------+
| Address               | [address]         |
|                       | [place]  [address] |
+-----------------------+----------------------+
| Home Phone            | [phone]      |
+-----------------------+----------------------+
| Preferred Language    | Unknown              |
+-----------------------+----------------------+
| Marital Status        |               |
+-----------------------+----------------------+
| Evangelical Affiliation | [id number]                 |
+-----------------------+----------------------+
| Race                  | Unknown              |
+-----------------------+----------------------+
| Ethnic Group          | Unknown              |
+-----------------------+----------------------+
 
 
 Author
 
 
+--------------+--------------------------------------------+
| Author       | [facility]  |
|              | and [facility]                                |
+--------------+--------------------------------------------+
| Organization | [facility]  |
|              | and [place]                                |
+--------------+--------------------------------------------+
| Address      | Unknown                                    |
+--------------+--------------------------------------------+
| Phone        | Unavailable                                |
+--------------+--------------------------------------------+
 
 
 
 Support
 
 
+--------------+--------------+---------------------+-----------------+
| Name         | Relationship | Address             | Phone           |
+--------------+--------------+---------------------+-----------------+
| Rupa Giordano | E[employer]         | [address]             | [phone] |
|              |              | [place]   |                 |
|              |              | [address]               |                 |
+--------------+--------------+---------------------+-----------------+
 
 
 
 
 Care Team Providers
 
 
+-----------------------+------+-------------+
| Care Team Member Name | Role | Phone       |
+-----------------------+------+-------------+
| No, Physician         | PCP  | Unavailable |
+-----------------------+------+-------------+
 
 
 
 Reason for Visit
 
 
+---------+----------+
| Reason  | Comments |
+---------+----------+
| Imaging | CT order |
+---------+----------+
 
 
 
 Encounter Details
 
 
+--------+-----------+----------------------+----------------------+--------------------+
| Date   | Type      | Department           | Care Team            | Description        |
+--------+-----------+----------------------+----------------------+--------------------+
| / | Telephone |   [facility]      |   Augustin Lopez DNP      | Imaging (CT order) |
| 2019   |           | VASCULAR SURGERY     | [address]     |                    |
|        |           | [address] | [address]  [place], [place]  |                    |
|        |           |  [place]  [place]     | [address] [phone]  |                    |
|        |           | [address]           |  [phone] (Fax)  |                    |
|        |           | [phone]         |                      |                    |
+--------+-----------+----------------------+----------------------+--------------------+
 
 
 
 Social History
 
 
+---------------+-------+-----------+--------+------+
| Tobacco Use   | Types | Packs/Day | Years  | Date |
|               |       |           | Used   |      |
+---------------+-------+-----------+--------+------+
| Former Smoker |       |           |        |      |
+---------------+-------+-----------+--------+------+
 
 
 
+---------------------+---+---+---+
| Smokeless Tobacco:  |   |   |   |
| Former User         |   |   |   |
+---------------------+---+---+---+
 
 
 
+------------------------------+
| Comments: quit 50 years ago  |
 
+------------------------------+
 
 
 
+---------------+-------------+---------+----------+
| Alcohol Use   | Drinks/Week | oz/Week | Comments |
+---------------+-------------+---------+----------+
| Not Currently |             |         | 90m days |
+---------------+-------------+---------+----------+
 
 
 
+------------------+---------------+
| Sex Assigned at  | Date Recorded |
| Birth            |               |
+------------------+---------------+
| Not on file      |               |
+------------------+---------------+
 
 
 
+----------------+-------------+-------------+
| Job Start Date | Occupation  | Industry    |
+----------------+-------------+-------------+
| Not on file    | Not on file | Not on file |
+----------------+-------------+-------------+
 
 
 
+----------------+--------------+------------+
| Travel History | Travel Start | Travel End |
+----------------+--------------+------------+
 
 
 
+-------------------------------------+
| No recent travel history available. |
+-------------------------------------+
 documented as of this encounter
 
 Functional Status
 
 
+---------------------------------------------+----------+--------------------+
| Functional Status                           | Response | Date of Assessment |
+---------------------------------------------+----------+--------------------+
| Are you deaf or do you have serious         | No       | 2019         |
| difficulty hearing?                         |          |                    |
+---------------------------------------------+----------+--------------------+
| Are you blind or do you have serious        | No       | 2019         |
| difficulty seeing, even when wearing        |          |                    |
| glasses?                                    |          |                    |
+---------------------------------------------+----------+--------------------+
| Do you have serious difficulty walking or   | No       | 2019         |
| climbing stairs? (5 years old or older)     |          |                    |
+---------------------------------------------+----------+--------------------+
| Do you have difficulty dressing or bathing? | No       | 2019         |
|  (5 years old or older)                     |          |                    |
+---------------------------------------------+----------+--------------------+
| Because of a physical, mental, or emotional | No       | 2019         |
 
|  condition, do you have difficulty doing    |          |                    |
| errands alone such as visiting a doctor's   |          |                    |
| office or shopping? [15 years old or        |          |                    |
| older)]                                     |          |                    |
+---------------------------------------------+----------+--------------------+
 
 
 
+---------------------------------------------+----------+--------------------+
| Cognitive Status                            | Response | Date of Assessment |
+---------------------------------------------+----------+--------------------+
| Because of a physical, mental, or emotional | No       | 2019         |
|  condition, do you have serious difficulty  |          |                    |
| concentrating, remembering, or making       |          |                    |
| decisions? (5 years old or older)           |          |                    |
+---------------------------------------------+----------+--------------------+
 documented as of this encounter
 
 Plan of Treatment
 
 
+--------+-------------+------------------+----------------------+-------------+
| Date   | Type        | Specialty        | Care Team            | Description |
+--------+-------------+------------------+----------------------+-------------+
| / | Appointment | Radiology        |   Augustin Lopez DNP      |             |
|    |             |                  | [address]     |             |
|        |             |                  | [place]  |             |
|        |             |                  | [address]  [phone]  |             |
|        |             |                  |  [phone] (Fax)  |             |
+--------+-------------+------------------+----------------------+-------------+
| / | Office      | Vascular Surgery |   Augustin Lopez DNP      |             |
| 2020   | Visit       |                  | [address]     |             |
|        |             |                  | [place]  |             |
|        |             |                  | [address] [phone]  |             |
|        |             |                  |  [phone] (Fax)  |             |
+--------+-------------+------------------+----------------------+-------------+
 documented as of this encounter
 
 Visit Diagnoses
 Not on filedocumented in this encounter

## 2020-01-13 NOTE — XMS
Encounter Summary
  Created on: 2020
 
 Pasquale Nehemias Martinez
 External Reference #: [id number]
 : 43
 Sex: Male
 
 Demographics
 
 
+-----------------------+----------------------+
| Address               | [address]         |
|                       | [place]  [address] |
+-----------------------+----------------------+
| Home Phone            | [phone]      |
+-----------------------+----------------------+
| Preferred Language    | Unknown              |
+-----------------------+----------------------+
| Marital Status        |               |
+-----------------------+----------------------+
| Mandaen Affiliation | [id number]                 |
+-----------------------+----------------------+
| Race                  | Unknown              |
+-----------------------+----------------------+
| Ethnic Group          | Unknown              |
+-----------------------+----------------------+
 
 
 Author
 
 
+--------------+--------------------------------------------+
| Author       | [facility]  |
|              | and [facility]                                |
+--------------+--------------------------------------------+
| Organization | [facility]  |
|              | and [place]                                |
+--------------+--------------------------------------------+
| Address      | Unknown                                    |
+--------------+--------------------------------------------+
| Phone        | Unavailable                                |
+--------------+--------------------------------------------+
 
 
 
 Support
 
 
+--------------+--------------+---------------------+-----------------+
| Name         | Relationship | Address             | Phone           |
+--------------+--------------+---------------------+-----------------+
| Rupa Giordano | ECON         | [address]             | [phone] |
|              |              | [address] [place] [address], [place]   |                 |
|              |              | [address]               |                 |
+--------------+--------------+---------------------+-----------------+
 
 
 
 
 Care Team Providers
 
 
+-----------------------+------+-------------+
| Care Team Member Name | Role | Phone       |
+-----------------------+------+-------------+
| No, Physician         | PCP  | Unavailable |
+-----------------------+------+-------------+
 
 
 
 Reason for Visit
 Auth/Cert
 
+--------+--------+-----------+--------------+--------------+--------------+
| Status | Reason | Specialty | Diagnoses /  | Referred By  | Referred To  |
|        |        |           | Procedures   | Contact      | Contact      |
+--------+--------+-----------+--------------+--------------+--------------+
|        |        |           |   Diagnoses  |              |              |
|        |        |           |  Mesenteric  |              |              |
|        |        |           | ischemia,    |              |              |
|        |        |           | chronic      |              |              |
|        |        |           | ([facility])        |              |              |
|        |        |           | Procedures   |              |              |
|        |        |           | REVASCULARIZ |              |              |
|        |        |           | ATION        |              |              |
|        |        |           | MESENTERIC   |              |              |
+--------+--------+-----------+--------------+--------------+--------------+
 
 
 
 
 Encounter Details
 
 
+--------+-------------+----------------------+----------------------+-------------+
| Date   | Type        | Department           | Care Team            | Description |
+--------+-------------+----------------------+----------------------+-------------+
| / | Anesthesia  |   [facility]    |   Alex Maxwell MD  |             |
| 2019   | [facility]       |  [address]      |             |
|        |             | OPERATING ROOM  [address]  | [place] [address]   |             |
|        |             | [address]           | [phone]         |             |
|        |             | [place], [place]         | [phone] (Fax)   |             |
|        |             | [address]           | aClvin Aguayo MD  [address]  |             |
|        |             | [phone]         | [address]           |             |
|        |             |                      | [place] [address]   |             |
+--------+-------------+----------------------+----------------------+-------------+
 
 
 
 Anesthesia Record
 
 
+--------------------+------------------+-------------------+----------------------+
| Procedure Name     | Responsible      | Anesthesia Start  | Anesthesia Stop Time |
|                    | Anesthesiologist | Time              |                      |
+--------------------+------------------+-------------------+----------------------+
| REVASCULARIZATION  | Alex Maxwell MD | 19 1127     | 19 1508        |
| MESENTERIC (N/A    |                  |                   |                      |
 
| Abdomen)           |                  |                   |                      |
+--------------------+------------------+-------------------+----------------------+
 
 
 
+----+---+-------------+-------------------------------------------------------------------+
| Da | T | Event       | Comment                                                           |
| te | i |             |                                                                   |
|    | m |             |                                                                   |
|    | e |             |                                                                   |
+----+---+-------------+-------------------------------------------------------------------+
| 09 | 1 | An Start    | Reassessment prior to anesthesia induction/procedure.             |
| /2 | 1 |             |                                                                   |
| 4/ | 2 |             |                                                                   |
| 20 | 7 |             |                                                                   |
| 19 |   |             |                                                                   |
+----+---+-------------+-------------------------------------------------------------------+
|    | 1 | An          |                                                                   |
|    | 1 | Induction   |                                                                   |
|    | 3 |             |                                                                   |
|    | 2 |             |                                                                   |
+----+---+-------------+-------------------------------------------------------------------+
|    | 1 | An          |                                                                   |
|    | 1 | Intubation  |                                                                   |
|    | 3 |             |                                                                   |
|    | 5 |             |                                                                   |
+----+---+-------------+-------------------------------------------------------------------+
|    | 1 | Anesthesia  |                                                                   |
|    | 1 | Ready       |                                                                   |
|    | 4 |             |                                                                   |
|    | 3 |             |                                                                   |
+----+---+-------------+-------------------------------------------------------------------+
|    | 1 | First       |                                                                   |
|    | 1 | Inc/Proc St |                                                                   |
|    | 5 |             |                                                                   |
|    | 1 |             |                                                                   |
+----+---+-------------+-------------------------------------------------------------------+
|    | 1 | Quick Note  | incision                                                          |
|    | 1 |             |                                                                   |
|    | 5 |             |                                                                   |
|    | 3 |             |                                                                   |
+----+---+-------------+-------------------------------------------------------------------+
|    | 1 | [place]     |                                                                   |
|    | 1 | 43-degrees  |                                                                   |
|    | 5 |             |                                                                   |
|    | 8 |             |                                                                   |
+----+---+-------------+-------------------------------------------------------------------+
|    | 1 | Quick Note  | Surgical stop                                                     |
|    | 4 |             |                                                                   |
|    | 3 |             |                                                                   |
|    | 4 |             |                                                                   |
+----+---+-------------+-------------------------------------------------------------------+
|    | 1 | Extubation/ |                                                                   |
|    | 4 | Airway LDA  |                                                                   |
|    | 4 | Removal     |                                                                   |
|    | 6 |             |                                                                   |
+----+---+-------------+-------------------------------------------------------------------+
|    | 1 | Quick Note  | SBP 190s right now-->pressure transducer fell on floor falsely    |
|    | 4 |             | elevating pressure                                                |
|    | 4 |             |                                                                   |
 
|    | 7 |             |                                                                   |
+----+---+-------------+-------------------------------------------------------------------+
|    | 1 | an stop     |                                                                   |
|    | 4 | data        |                                                                   |
|    | 5 |             |                                                                   |
|    | 2 |             |                                                                   |
+----+---+-------------+-------------------------------------------------------------------+
|    | 1 | An Stop     | Patient handed off to recovery nurse.  Electronically signed by:  |
|    | 5 |             | Alex Maxwell MD at 2019 15:08                               |
|    | 0 |             |                                                                   |
|    | 8 |             |                                                                   |
+----+---+-------------+-------------------------------------------------------------------+
 
 
 
+------+
| Meds |
+------+
 
 
 
+--------------------------------+-------------+
| Name                           | Total       |
+--------------------------------+-------------+
| fentaNYL                       | 200 mcg     |
+--------------------------------+-------------+
| lidocaine 2%                   | 50 mg       |
+--------------------------------+-------------+
| propofol                       | 140 mg      |
+--------------------------------+-------------+
| rocuronium                     | 50 mg       |
+--------------------------------+-------------+
| dexamethasone                  | 4 mg        |
+--------------------------------+-------------+
| ondansetron                    | 4 mg        |
+--------------------------------+-------------+
| neostigmine                    | 4 mg        |
+--------------------------------+-------------+
| glycopyrrolate                 | 0.4 mg      |
+--------------------------------+-------------+
| HYDROmorphone                  | 2 mg        |
+--------------------------------+-------------+
| hydrALAZINE                    | 30 mg       |
+--------------------------------+-------------+
| remifentanil                   | 602.18 mcg  |
+--------------------------------+-------------+
| heparin                        | 6,000 Units |
+--------------------------------+-------------+
| protamine                      | 30 mg       |
+--------------------------------+-------------+
| labetalol (TRANDATE) injection | 10 mg       |
+--------------------------------+-------------+
| Plasmalyte                     | 1,750 mL    |
+--------------------------------+-------------+
| sodium chloride 0.9% (NS)      | 1,000 mL    |
| infusion                       |             |
+--------------------------------+-------------+
 
 
 
 
+-----------------------+
| Name                  |
+-----------------------+
| N2O Flow Rate (L/Min) |
+-----------------------+
| O2 Flow Rate (L/Min)  |
+-----------------------+
| Insp O2               |
+-----------------------+
| Exp N2O               |
+-----------------------+
| Exp CHRISTINA               |
+-----------------------+
| Air Flow Rate (L/Min) |
+-----------------------+
 
 
 
+-----------------------------------+
| No blood administrations on file. |
+-----------------------------------+
 
 
 
+--------+-----------------------------------+----------------------+----------------------+
| Type   | Details                           | Placement            | Removal              |
+--------+-----------------------------------+----------------------+----------------------+
| Periph | 19; 1107; Left; Anterior    | 19 1107 by     | 19 0821 by     |
| eral   | (palmar), Distal; Forearm;        | Yolanda Trujillo RN  | Araceli Bartholomew RN   |
| IV     | over-the-needle catheter system;  |                      |                      |
|        | 18 gauge; site care per           |                      |                      |
|        | policy/procedure; 19; 0821  |                      |                      |
+--------+-----------------------------------+----------------------+----------------------+
| Airway | Placement Date: 19;         | 19 1135 by Calvin | 19 1446 by     |
|        | Placement Time: 1135; Mask        |  JOANN Aguayo MD           | Alex Maxwell MD     |
|        | Ventilation: EZ; Airway Grade: 4; |                      |                      |
|        |  Successful Technique: video      |                      |                      |
|        | scope; Laryngoscope Blade Size:   |                      |                      |
|        | 3; Airway Type: endotracheal;     |                      |                      |
|        | Size: 7.5; Position: Right;       |                      |                      |
|        | Airway Tube Secured At: 23; Tube  |                      |                      |
|        | Reference Point: lip; Removal     |                      |                      |
|        | Date: 19; Removal Time:     |                      |                      |
|        | 1446                              |                      |                      |
+--------+-----------------------------------+----------------------+----------------------+
| Arteri | 19; 1139; Alcohol; No;      | 19 1139 by Calvin | 19 0420 by     |
| al     | Left; radial artery; 20 gauge;    |  JOANN Aguayo MD           | Octavio Henry,  |
| Line   | continuous blood pressure         |                      | RN                   |
|        | monitoring, frequent blood gas    |                      |                      |
|        | measurement; catheter not patent; |                      |                      |
|        |  19; 0420                   |                      |                      |
+--------+-----------------------------------+----------------------+----------------------+
| Urethr | 19; 1140; indicated due to  | 19 1140 by     | 19 1545 by     |
| al     | specific surgical procedure; All  | Airam Gould RN     | Kaylie Hess V,  |
| Cathet | elements; All elements; All       |                      | RN                   |
| er     | elements; indwelling single lumen |                      |                      |
|        |  catheter; latex; 16; None; 1; 5; |                      |                      |
|        |  10; other (see comments) (Under  |                      |                      |
|        | general anesthesia); drainage bag |                      |                      |
|        |  to dependent drainage; 19; |                      |                      |
 
|        |  1545                             |                      |                      |
+--------+-----------------------------------+----------------------+----------------------+
| Periph | 19; 1148; Right; Proximal;  | 19 1148 by Calvin | 19 220 by     |
| eral   | Forearm; over-the-needle catheter |  JOANN Aguayo MD           | Eliseo Campbell RN  |
| IV     |  system; 18 gauge; 1; other (see  |                      |                      |
|        | comments); infiltrated; 19; |                      |                      |
|        |  2200                             |                      |                      |
+--------+-----------------------------------+----------------------+----------------------+
| Wound  | 19; 1512; Incision;         | 19 1512 by     | 19 1124 by     |
|        | abdomen; 19; 1124           | Shadi Quigley RN  | Araceli Bartholomew RN   |
+--------+-----------------------------------+----------------------+----------------------+
 documented in this encounter
 
 Social History
 
 
+---------------+-------+-----------+--------+------+
| Tobacco Use   | Types | Packs/Day | Years  | Date |
|               |       |           | Used   |      |
+---------------+-------+-----------+--------+------+
| Former Smoker |       |           |        |      |
+---------------+-------+-----------+--------+------+
 
 
 
+---------------------+---+---+---+
| Smokeless Tobacco:  |   |   |   |
| Former User         |   |   |   |
+---------------------+---+---+---+
 
 
 
+------------------------------+
| Comments: quit 50 years ago  |
+------------------------------+
 
 
 
+---------------+-------------+---------+----------+
| Alcohol Use   | Drinks/Week | oz/Week | Comments |
+---------------+-------------+---------+----------+
| Not Currently |             |         | 90m days |
+---------------+-------------+---------+----------+
 
 
 
+------------------+---------------+
| Sex Assigned at  | Date Recorded |
| Birth            |               |
+------------------+---------------+
| Not on file      |               |
+------------------+---------------+
 
 
 
+----------------+-------------+-------------+
| Job Start Date | Occupation  | Industry    |
+----------------+-------------+-------------+
| Not on file    | Not on file | Not on file |
+----------------+-------------+-------------+
 
 
 
 
+----------------+--------------+------------+
| Travel History | Travel Start | Travel End |
+----------------+--------------+------------+
 
 
 
+-------------------------------------+
| No recent travel history available. |
+-------------------------------------+
 documented as of this encounter
 
 Plan of Treatment
 
 
+--------+-------------+------------------+----------------------+-------------+
| Date   | Type        | Specialty        | Care Team            | Description |
+--------+-------------+------------------+----------------------+-------------+
| / | Appointment | Radiology        |   Augustin Lopez DNP      |             |
|    |             |                  | [address]     |             |
|        |             |                  | [place]  |             |
|        |             |                  | [address]  [phone]  |             |
|        |             |                  |  [phone] (Fax)  |             |
+--------+-------------+------------------+----------------------+-------------+
| / | Office      | Vascular Surgery |   Augustin Lopez DNP      |             |
|    | Visit       |                  | [address]     |             |
|        |             |                  | [place]  |             |
|        |             |                  | [address]  [phone]  |             |
|        |             |                  |  [phone] (Fax)  |             |
+--------+-------------+------------------+----------------------+-------------+
 documented as of this encounter
 
 Visit Diagnoses
 Not on filedocumented in this encounter
 
 Administered Medications
 
 
+-----------------------------------+---------+----------+------+------+------+
| Medication Order                  | MAR     | Action   | Dose | Rate | Site |
|                                   | Action  | Date     |      |      |      |
+-----------------------------------+---------+----------+------+------+------+
|   balanced electrolytes in water  | New Bag | 20 |      |      |      |
| (PLASMALYTE-148/NORMOSOL-R)       |         | 19 11:32 |      |      |      |
| infusion  Intravenous, CONTINUOUS |         |  AM PDT  |      |      |      |
|  PRN, Starting 19 at     |         |          |      |      |      |
| 1132, Anesthesia Intra-op         |         |          |      |      |      |
+-----------------------------------+---------+----------+------+------+------+
 
 
 
+---+---+
|   |   |
+---+---+
 
 
 
+--------------------------------+-------+----------+------+---+---+
 
|   dexamethasone (DECADRON) 4   | Given | 20 | 4 mg |   |   |
| mg/mL injection  Intravenous,  |       | 19 11:32 |      |   |   |
| PRN, Starting 19 at   |       |  AM PDT  |      |   |   |
| 1132, Anesthesia Intra-op      |       |          |      |   |   |
+--------------------------------+-------+----------+------+---+---+
 
 
 
+---+---+
|   |   |
+---+---+
 
 
 
+---------------------------------+-------+----------+---------+---+---+
|   fentaNYL (PF) injection       | Given | 20 | 100 mcg |   |   |
| Intravenous, PRN, Starting Tue  |       | 19 12:02 |         |   |   |
| 19 at 1132, Anesthesia     |       |  PM PDT  |         |   |   |
| Intra-op                        |       |          |         |   |   |
+---------------------------------+-------+----------+---------+---+---+
 
 
 
+-------+----------+---------+---+---+
| Given | 20 | 100 mcg |   |   |
|       | 19 11:32 |         |   |   |
|       |  AM PDT  |         |   |   |
+-------+----------+---------+---+---+
 
 
 
+---+---+
|   |   |
+---+---+
 
 
 
+--------------------------------+-------+----------+--------+---+---+
|   glycopyrrolate (ROBINUL)     | Given | 20 | 0.4 mg |   |   |
| injection  Intravenous, PRN,   |       | 19  2:33 |        |   |   |
| Starting Tue 19 at 1433,  |       |  PM PDT  |        |   |   |
| Anesthesia Intra-op            |       |          |        |   |   |
+--------------------------------+-------+----------+--------+---+---+
 
 
 
+---+---+
|   |   |
+---+---+
 
 
 
+--------------------------------+-------+----------+--------+---+---+
|   heparin 1,000 units/mL       | Given | 20 | 6,000  |   |   |
| injection  Intravenous, PRN,   |       | 19  1:32 | Units  |   |   |
| Starting Tue 19 at 1332,  |       |  PM PDT  |        |   |   |
| Anesthesia Intra-op            |       |          |        |   |   |
+--------------------------------+-------+----------+--------+---+---+
 
 
 
 
+---+---+
|   |   |
+---+---+
 
 
 
+--------------------------------+-------+----------+-------+---+---+
|   hydrALAZINE (APRESOLINE)     | Given | 20 | 10 mg |   |   |
| injection  Intravenous, PRN,   |       | 19  2:48 |       |   |   |
| Starting Tue 19 at 1210,  |       |  PM PDT  |       |   |   |
| Anesthesia Intra-op            |       |          |       |   |   |
+--------------------------------+-------+----------+-------+---+---+
 
 
 
+-------+----------+-------+---+---+
| Given | 20 | 10 mg |   |   |
|       | 19  2:43 |       |   |   |
|       |  PM PDT  |       |   |   |
+-------+----------+-------+---+---+
| Given | 20 | 10 mg |   |   |
|       | 19 12:10 |       |   |   |
|       |  PM PDT  |       |   |   |
+-------+----------+-------+---+---+
 
 
 
+---+---+
|   |   |
+---+---+
 
 
 
+--------------------------------+-------+----------+------+---+---+
|   HYDROmorphone (DILAUDID) 2   | Given | 20 | 1 mg |   |   |
| mg/mL injection  Intravenous,  |       | 19 12:07 |      |   |   |
| PRN, Starting e 19 at   |       |  PM PDT  |      |   |   |
| 1158, Anesthesia Intra-op      |       |          |      |   |   |
+--------------------------------+-------+----------+------+---+---+
 
 
 
+-------+----------+------+---+---+
| Given | 20 | 1 mg |   |   |
|       | 19 11:58 |      |   |   |
|       |  AM PDT  |      |   |   |
+-------+----------+------+---+---+
 
 
 
+---+---+
|   |   |
+---+---+
 
 
 
+---------------------------------+-------+----------+------+---+---+
|   labetalol (TRANDATE) 5 mg/mL  | Given | 20 | 5 mg |   |   |
| injection  PRN, Starting Tue    |       | 19  3:12 |      |   |   |
 
| 19 at 1502, Anesthesia     |       |  PM PDT  |      |   |   |
| Intra-op                        |       |          |      |   |   |
+---------------------------------+-------+----------+------+---+---+
 
 
 
+-------+----------+------+---+---+
| Given | 20 | 5 mg |   |   |
|       | 19  3:02 |      |   |   |
|       |  PM PDT  |      |   |   |
+-------+----------+------+---+---+
 
 
 
+---+---+
|   |   |
+---+---+
 
 
 
+---------------------------------+-------+----------+-------+---+---+
|   lidocaine (PF) 2% injection   | Given | 20 | 50 mg |   |   |
| Intravenous, PRN, Starting Tue  |       | 19 11:32 |       |   |   |
| 19 at 1132, Anesthesia     |       |  AM PDT  |       |   |   |
| Intra-op                        |       |          |       |   |   |
+---------------------------------+-------+----------+-------+---+---+
 
 
 
+---+---+
|   |   |
+---+---+
 
 
 
+-----------------------------------+-------+----------+------+---+---+
|   neostigmine 1 mg/mL injection   | Given | 20 | 4 mg |   |   |
| Intravenous, PRN, Starting Tue    |       | 19  2:33 |      |   |   |
| 19 at 1433, Anesthesia       |       |  PM PDT  |      |   |   |
| Intra-op                          |       |          |      |   |   |
+-----------------------------------+-------+----------+------+---+---+
 
 
 
+---+---+
|   |   |
+---+---+
 
 
 
+-----------------------------------+-------+----------+------+---+---+
|   ondansetron (ZOFRAN) injection  | Given | 20 | 4 mg |   |   |
|  Intravenous, PRN, Starting Tue   |       | 19 11:32 |      |   |   |
| 19 at 1132, Anesthesia       |       |  AM PDT  |      |   |   |
| Intra-op                          |       |          |      |   |   |
+-----------------------------------+-------+----------+------+---+---+
 
 
 
+---+---+
 
|   |   |
+---+---+
 
 
 
+-----------------------------------+-------+----------+--------+---+---+
|   propofol (DIPRIVAN) injection   | Given | 20 | 140 mg |   |   |
| Intravenous, PRN, Starting Tue    |       | 19 11:32 |        |   |   |
| 19 at 1132, Anesthesia       |       |  AM PDT  |        |   |   |
| Intra-op                          |       |          |        |   |   |
+-----------------------------------+-------+----------+--------+---+---+
 
 
 
+---+---+
|   |   |
+---+---+
 
 
 
+---------------------------------+-------+----------+-------+---+---+
|   protamine injection           | Given | 20 | 30 mg |   |   |
| Intravenous, PRN, Starting Tue  |       | 19  2:09 |       |   |   |
| 19 at 1409, Anesthesia     |       |  PM PDT  |       |   |   |
| Intra-op                        |       |          |       |   |   |
+---------------------------------+-------+----------+-------+---+---+
 
 
 
+---+---+
|   |   |
+---+---+
 
 
 
+-----------------------------------+----------+----------+----------+-------+---+
|   remifentanil (ULTIVA) injection | Rate/Dos | 20 | 0.075    | 0.3   |   |
|   Intravenous, CONTINUOUS PRN,    | e Change | 19  1:19 | mcg/kg/m | mL/hr |   |
| Starting Tue 19 at 1229,     |          |  PM PDT  | in       |       |   |
| Anesthesia Intra-op               |          |          |          |       |   |
+-----------------------------------+----------+----------+----------+-------+---+
 
 
 
+---------+----------+----------+-------+---+
| New Bag | 20 | 0.05     | 0.2   |   |
|         | 19 12:29 | mcg/kg/m | mL/hr |   |
|         |  PM PDT  | in       |       |   |
+---------+----------+----------+-------+---+
 
 
 
+---+---+
|   |   |
+---+---+
 
 
 
+-----------------------------------+-------+----------+-------+---+---+
|   rocuronium (ZEMURON) injection  | Given | 20 | 10 mg |   |   |
 
|  Intravenous, PRN, Starting Tue   |       | 19  2:17 |       |   |   |
| 19 at 1132, Anesthesia       |       |  PM PDT  |       |   |   |
| Intra-op                          |       |          |       |   |   |
+-----------------------------------+-------+----------+-------+---+---+
 
 
 
+-------+----------+-------+---+---+
| Given | 20 | 40 mg |   |   |
|       | 19 11:32 |       |   |   |
|       |  AM PDT  |       |   |   |
+-------+----------+-------+---+---+
 
 
 
+---+---+
|   |   |
+---+---+
 
 
 
+-----------------------------------+---------+----------+---+----------+---+
|   sodium chloride 0.9% (NS)       | New Bag | 20 |   | 30 mL/hr |   |
| infusion  at 30 mL/hr,            |         | 19 11:34 |   |          |   |
| Intravenous, CONTINUOUS, Starting |         |  AM PDT  |   |          |   |
|  Tue 19 at 1200,             |         |          |   |          |   |
| Recovery/Phase I                  |         |          |   |          |   |
+-----------------------------------+---------+----------+---+----------+---+
 
 
 
+-------------------------+----------+---+---+---+
| Continued by Anesthesia | 20 |   |   |   |
|                         | 19 11:27 |   |   |   |
|                         |  AM PDT  |   |   |   |
+-------------------------+----------+---+---+---+
 
 
 
+---+---+
|   |   |
+---+---+
 documented in this encounter

## 2020-01-13 NOTE — XMS
Encounter Summary
  Created on: 2020
 
 Pasquale Nehemias Martinez
 External Reference #: [id number]
 : 43
 Sex: Male
 
 Demographics
 
 
+-----------------------+----------------------+
| Address               | [address]         |
|                       | [place]  [address] |
+-----------------------+----------------------+
| Home Phone            | [phone]      |
+-----------------------+----------------------+
| Preferred Language    | Unknown              |
+-----------------------+----------------------+
| Marital Status        |               |
+-----------------------+----------------------+
| Pentec[employer]ism Affiliation | 1077                 |
+-----------------------+----------------------+
| Race                  | Unknown              |
+-----------------------+----------------------+
| Ethnic Group          | Unknown              |
+-----------------------+----------------------+
 
 
 Author
 
 
+--------------+--------------------------------------------+
| Author       | [facility]  |
|              | and [facility]                                |
+--------------+--------------------------------------------+
| Organization | [facility]  |
|              | and [place]                                |
+--------------+--------------------------------------------+
| Address      | Unknown                                    |
+--------------+--------------------------------------------+
| Phone        | Unavailable                                |
+--------------+--------------------------------------------+
 
 
 
 Support
 
 
+--------------+--------------+---------------------+-----------------+
| Name         | Relationship | Address             | Phone           |
+--------------+--------------+---------------------+-----------------+
| Rupa Giordano | ECON         | [address]             | [phone] |
|              |              | [place] [place]   |                 |
|              |              | [address]               |                 |
+--------------+--------------+---------------------+-----------------+
 
 
 
 
 Care Team Providers
 
 
+-----------------------+------+-------------+
| Care Team Member Name | Role | Phone       |
+-----------------------+------+-------------+
| No, Physician         | PCP  | Unavailable |
+-----------------------+------+-------------+
 
 
 
 Reason for Visit
 
 
+-----------+------------------+
| Reason    | Comments         |
+-----------+------------------+
| Follow-up |                  |
+-----------+------------------+
| Other     | speak with nurse |
+-----------+------------------+
 
 
 
 Encounter Details
 
 
+--------+-----------+----------------------+----------------------+--------------------+
| Date   | Type      | Department           | Care Team            | Description        |
+--------+-----------+----------------------+----------------------+--------------------+
| / | Telephone |   [facility]      |   Briana Casper, | Follow-up; Other   |
| 2019   |           | VASCULAR SURGERY     |  RN                  | (speak with nurse) |
|        |           | [address] |                      |                    |
|        |           |  E  [place]     |                      |                    |
|        |           | [address]           |                      |                    |
|        |           | [phone]         |                      |                    |
+--------+-----------+----------------------+----------------------+--------------------+
 
 
 
 Social History
 
 
+---------------+-------+-----------+--------+------+
| Tobacco Use   | Types | Packs/Day | Years  | Date |
|               |       |           | Used   |      |
+---------------+-------+-----------+--------+------+
| Former Smoker |       |           |        |      |
+---------------+-------+-----------+--------+------+
 
 
 
+---------------------+---+---+---+
| Smokeless Tobacco:  |   |   |   |
| Former User         |   |   |   |
+---------------------+---+---+---+
 
 
 
 
+------------------------------+
| Comments: quit 50 years ago  |
+------------------------------+
 
 
 
+---------------+-------------+---------+----------+
| Alcohol Use   | Drinks/Week | oz/Week | Comments |
+---------------+-------------+---------+----------+
| Not Currently |             |         | 90m days |
+---------------+-------------+---------+----------+
 
 
 
+------------------+---------------+
| Sex Assigned at  | Date Recorded |
| Birth            |               |
+------------------+---------------+
| Not on file      |               |
+------------------+---------------+
 
 
 
+----------------+-------------+-------------+
| Job Start Date | Occupation  | Industry    |
+----------------+-------------+-------------+
| Not on file    | Not on file | Not on file |
+----------------+-------------+-------------+
 
 
 
+----------------+--------------+------------+
| Travel History | Travel Start | Travel End |
+----------------+--------------+------------+
 
 
 
+-------------------------------------+
| No recent travel history available. |
+-------------------------------------+
 documented as of this encounter
 
 Functional Status
 
 
+---------------------------------------------+----------+--------------------+
| Functional Status                           | Response | Date of Assessment |
+---------------------------------------------+----------+--------------------+
| Are you deaf or do you have serious         | No       | 2019         |
| difficulty hearing?                         |          |                    |
+---------------------------------------------+----------+--------------------+
| Are you blind or do you have serious        | No       | 2019         |
| difficulty seeing, even when wearing        |          |                    |
| glasses?                                    |          |                    |
+---------------------------------------------+----------+--------------------+
| Do you have serious difficulty walking or   | No       | 2019         |
| climbing stairs? (5 years old or older)     |          |                    |
+---------------------------------------------+----------+--------------------+
| Do you have difficulty dressing or bathing? | No       | 2019         |
|  (5 years old or older)                     |          |                    |
 
+---------------------------------------------+----------+--------------------+
| Because of a physical, mental, or emotional | No       | 2019         |
|  condition, do you have difficulty doing    |          |                    |
| errands alone such as visiting a doctor's   |          |                    |
| office or shopping? [15 years old or        |          |                    |
| older)]                                     |          |                    |
+---------------------------------------------+----------+--------------------+
 
 
 
+---------------------------------------------+----------+--------------------+
| Cognitive Status                            | Response | Date of Assessment |
+---------------------------------------------+----------+--------------------+
| Because of a physical, mental, or emotional | No       | 2019         |
|  condition, do you have serious difficulty  |          |                    |
| concentrating, remembering, or making       |          |                    |
| decisions? (5 years old or older)           |          |                    |
+---------------------------------------------+----------+--------------------+
 documented as of this encounter
 
 Plan of Treatment
 
 
+--------+-------------+------------------+----------------------+-------------+
| Date   | Type        | Specialty        | Care Team            | Description |
+--------+-------------+------------------+----------------------+-------------+
| / | Appointment | Radiology        |   Augustin Lopez DNP      |             |
|    |             |                  | [address]     |             |
|        |             |                  | [place]  |             |
|        |             |                  | [address]  [phone]  |             |
|        |             |                  |  [phone] (Fax)  |             |
+--------+-------------+------------------+----------------------+-------------+
| / | Office      | Vascular Surgery |   Augustin Lopez DNP      |             |
|    | Visit       |                  | [address]     |             |
|        |             |                  | [place]  |             |
|        |             |                  | [address]  [phone]  |             |
|        |             |                  |  [phone] (Fax)  |             |
+--------+-------------+------------------+----------------------+-------------+
 documented as of this encounter
 
 Visit Diagnoses
 Not on filedocumented in this encounter

## 2020-01-13 NOTE — XMS
Encounter Summary
  Created on: 2020
 
 Pasquale Nehemias Martinez
 External Reference #: [id number]
 : 43
 Sex: Male
 
 Demographics
 
 
+-----------------------+----------------------+
| Address               | [address]         |
|                       | [place]  [address] |
+-----------------------+----------------------+
| Home Phone            | [phone]      |
+-----------------------+----------------------+
| Preferred Language    | Unknown              |
+-----------------------+----------------------+
| Marital Status        |               |
+-----------------------+----------------------+
| Buddhism Affiliation | 1077                 |
+-----------------------+----------------------+
| Race                  | Unknown              |
+-----------------------+----------------------+
| Ethnic Group          | Unknown              |
+-----------------------+----------------------+
 
 
 Author
 
 
+--------------+--------------------------------------------+
| Author       | [facility]  |
|              | and [facility]                                |
+--------------+--------------------------------------------+
| Organization | [facility] [place]  |
|              | and [place]                                |
+--------------+--------------------------------------------+
| Address      | Unknown                                    |
+--------------+--------------------------------------------+
| Phone        | Unavailable                                |
+--------------+--------------------------------------------+
 
 
 
 Support
 
 
+--------------+--------------+---------------------+-----------------+
| Name         | Relationship | Address             | Phone           |
+--------------+--------------+---------------------+-----------------+
| Rupa Giordano | ECON         | [address]             | [phone] |
|              |              | [place], [place]   |                 |
|              |              | [address]               |                 |
+--------------+--------------+---------------------+-----------------+
 
 
 
 
 Care Team Providers
 
 
+-----------------------+------+-------------+
| Care Team Member Name | Role | Phone       |
+-----------------------+------+-------------+
| No, Physician         | PCP  | Unavailable |
+-----------------------+------+-------------+
 
 
 
 Reason for Visit
 
 
+-----------+----------+
| Reason    | Comments |
+-----------+----------+
| Follow-up |          |
+-----------+----------+
 
 
 
 Encounter Details
 
 
+--------+-----------+----------------------+----------------------+-------------+
| Date   | Type      | Department           | Care Team            | Description |
+--------+-----------+----------------------+----------------------+-------------+
| 10/02/ | Telephone |   [facility]      |   Augustin Lopez DNP      | Follow-up   |
| 2019   |           | VASCULAR SURGERY     | [address]     |             |
|        |           | [address] | [address]  [place], [place]  |             |
|        |           |  E  [place]     | [address]  [phone]  |             |
|        |           | [address]           |  [phone] (Fax)  |             |
|        |           | [phone]         |                      |             |
+--------+-----------+----------------------+----------------------+-------------+
 
 
 
 Social History
 
 
+---------------+-------+-----------+--------+------+
| Tobacco Use   | Types | Packs/Day | Years  | Date |
|               |       |           | Used   |      |
+---------------+-------+-----------+--------+------+
| Former Smoker |       |           |        |      |
+---------------+-------+-----------+--------+------+
 
 
 
+---------------------+---+---+---+
| Smokeless Tobacco:  |   |   |   |
| Former User         |   |   |   |
+---------------------+---+---+---+
 
 
 
+------------------------------+
| Comments: quit 50 years ago  |
 
+------------------------------+
 
 
 
+---------------+-------------+---------+----------+
| Alcohol Use   | Drinks/Week | oz/Week | Comments |
+---------------+-------------+---------+----------+
| Not Currently |             |         | 90m days |
+---------------+-------------+---------+----------+
 
 
 
+------------------+---------------+
| Sex Assigned at  | Date Recorded |
| Birth            |               |
+------------------+---------------+
| Not on file      |               |
+------------------+---------------+
 
 
 
+----------------+-------------+-------------+
| Job Start Date | Occupation  | Industry    |
+----------------+-------------+-------------+
| Not on file    | Not on file | Not on file |
+----------------+-------------+-------------+
 
 
 
+----------------+--------------+------------+
| Travel History | Travel Start | Travel End |
+----------------+--------------+------------+
 
 
 
+-------------------------------------+
| No recent travel history available. |
+-------------------------------------+
 documented as of this encounter
 
 Functional Status
 
 
+---------------------------------------------+----------+--------------------+
| Functional Status                           | Response | Date of Assessment |
+---------------------------------------------+----------+--------------------+
| Are you deaf or do you have serious         | No       | 2019         |
| difficulty hearing?                         |          |                    |
+---------------------------------------------+----------+--------------------+
| Are you blind or do you have serious        | No       | 2019         |
| difficulty seeing, even when wearing        |          |                    |
| glasses?                                    |          |                    |
+---------------------------------------------+----------+--------------------+
| Do you have serious difficulty walking or   | No       | 2019         |
| climbing stairs? (5 years old or older)     |          |                    |
+---------------------------------------------+----------+--------------------+
| Do you have difficulty dressing or bathing? | No       | 2019         |
|  (5 years old or older)                     |          |                    |
+---------------------------------------------+----------+--------------------+
| Because of a physical, mental, or emotional | No       | 2019         |
 
|  condition, do you have difficulty doing    |          |                    |
| errands alone such as visiting a doctor's   |          |                    |
| office or shopping? [15 years old or        |          |                    |
| older)]                                     |          |                    |
+---------------------------------------------+----------+--------------------+
 
 
 
+---------------------------------------------+----------+--------------------+
| Cognitive Status                            | Response | Date of Assessment |
+---------------------------------------------+----------+--------------------+
| Because of a physical, mental, or emotional | No       | 2019         |
|  condition, do you have serious difficulty  |          |                    |
| concentrating, remembering, or making       |          |                    |
| decisions? (5 years old or older)           |          |                    |
+---------------------------------------------+----------+--------------------+
 documented as of this encounter
 
 Plan of Treatment
 
 
+--------+-------------+------------------+----------------------+-------------+
| Date   | Type        | Specialty        | Care Team            | Description |
+--------+-------------+------------------+----------------------+-------------+
| / | Appointment | Radiology        |   Augustin Lopez DNP      |             |
| 2020   |             |                  | [address]     |             |
|        |             |                  | [place]  |             |
|        |             |                  | [address]  [phone]  |             |
|        |             |                  |  [phone] (Fax)  |             |
+--------+-------------+------------------+----------------------+-------------+
| / | Office      | Vascular Surgery |   Augustin Lopez DNP      |             |
|    | Visit       |                  | [address]     |             |
|        |             |                  | [address]  [place]  |             |
|        |             |                  | [address]  [phone]  |             |
|        |             |                  |  [phone] (Fax)  |             |
+--------+-------------+------------------+----------------------+-------------+
 documented as of this encounter
 
 Visit Diagnoses
 
 
+------------------------------------------------------------------------------------+
| Diagnosis                                                                          |
+------------------------------------------------------------------------------------+
|   Mesenteric ischemia, chronic (HCC) - Primary  Chronic vascular insufficiency of  |
| intestine                                                                          |
+------------------------------------------------------------------------------------+
 documented in this encounter

## 2020-01-13 NOTE — XMS
Encounter Summary
  Created on: 2020
 
 Pasquale Nehemias Martinez
 External Reference #: [id number]
 : 43
 Sex: Male
 
 Demographics
 
 
+-----------------------+----------------------+
| Address               | [address]         |
|                       | [place]  [address] |
+-----------------------+----------------------+
| Home Phone            | [phone]      |
+-----------------------+----------------------+
| Preferred Language    | Unknown              |
+-----------------------+----------------------+
| Marital Status        |               |
+-----------------------+----------------------+
| Advent Affiliation | 1077                 |
+-----------------------+----------------------+
| Race                  | Unknown              |
+-----------------------+----------------------+
| Ethnic Group          | Unknown              |
+-----------------------+----------------------+
 
 
 Author
 
 
+--------------+--------------------------------------------+
| Author       | [facility] [place]  |
|              | and [facility]                                |
+--------------+--------------------------------------------+
| Organization | [facility] [place]  |
|              | and [place]                                |
+--------------+--------------------------------------------+
| Address      | Unknown                                    |
+--------------+--------------------------------------------+
| Phone        | Unavailable                                |
+--------------+--------------------------------------------+
 
 
 
 Support
 
 
+--------------+--------------+---------------------+-----------------+
| Name         | Relationship | Address             | Phone           |
+--------------+--------------+---------------------+-----------------+
| Rupa Giordano | ECON         | [address]             | [phone] |
|              |              | [place], [place]   |                 |
|              |              | [address]               |                 |
+--------------+--------------+---------------------+-----------------+
 
 
 
 
 Care Team Providers
 
 
+-----------------------+------+-------------+
| Care Team Member Name | Role | Phone       |
+-----------------------+------+-------------+
| No, Physician         | PCP  | Unavailable |
+-----------------------+------+-------------+
 
 
 
 Encounter Details
 
 
+--------+-----------+---------------------+----------------------+----------------------+
| Date   | Type      | Department          | Care Team            | Description          |
+--------+-----------+---------------------+----------------------+----------------------+
| 10/23/ | Hospital  |   [facility]     |   Augustin Lopez DNP      | Mesenteric ischemia, |
| 2019   | Encounter | VASCULAR SURGERY    | [address]     |  chronic (HCC)       |
|        |           | ULTRASOUND  [address]    | [place]  |                      |
|        |           | [place]   | [address] [phone]  |                      |
|        |           | [place]        |  [phone] (Fax)  |                      |
|        |           | [address]          |                      |                      |
|        |           | [phone]        |                      |                      |
+--------+-----------+---------------------+----------------------+----------------------+
 
 
 
 Social History
 
 
+---------------+-------+-----------+--------+------+
| Tobacco Use   | Types | Packs/Day | Years  | Date |
|               |       |           | Used   |      |
+---------------+-------+-----------+--------+------+
| Former Smoker |       |           |        |      |
+---------------+-------+-----------+--------+------+
 
 
 
+---------------------+---+---+---+
| Smokeless Tobacco:  |   |   |   |
| Former User         |   |   |   |
+---------------------+---+---+---+
 
 
 
+------------------------------+
| Comments: quit 50 years ago  |
+------------------------------+
 
 
 
+---------------+-------------+---------+----------+
| Alcohol Use   | Drinks/Week | oz/Week | Comments |
+---------------+-------------+---------+----------+
| Not Currently |             |         | 90m days |
+---------------+-------------+---------+----------+
 
 
 
 
+------------------+---------------+
| Sex Assigned at  | Date Recorded |
| Birth            |               |
+------------------+---------------+
| Not on file      |               |
+------------------+---------------+
 
 
 
+----------------+-------------+-------------+
| Job Start Date | Occupation  | Industry    |
+----------------+-------------+-------------+
| Not on file    | Not on file | Not on file |
+----------------+-------------+-------------+
 
 
 
+----------------+--------------+------------+
| Travel History | Travel Start | Travel End |
+----------------+--------------+------------+
 
 
 
+-------------------------------------+
| No recent travel history available. |
+-------------------------------------+
 documented as of this encounter
 
 Functional Status
 
 
+---------------------------------------------+----------+--------------------+
| Functional Status                           | Response | Date of Assessment |
+---------------------------------------------+----------+--------------------+
| Are you deaf or do you have serious         | No       | 2019         |
| difficulty hearing?                         |          |                    |
+---------------------------------------------+----------+--------------------+
| Are you blind or do you have serious        | No       | 2019         |
| difficulty seeing, even when wearing        |          |                    |
| glasses?                                    |          |                    |
+---------------------------------------------+----------+--------------------+
| Do you have serious difficulty walking or   | No       | 2019         |
| climbing stairs? (5 years old or older)     |          |                    |
+---------------------------------------------+----------+--------------------+
| Do you have difficulty dressing or bathing? | No       | 2019         |
|  (5 years old or older)                     |          |                    |
+---------------------------------------------+----------+--------------------+
| Because of a physical, mental, or emotional | No       | 2019         |
|  condition, do you have difficulty doing    |          |                    |
| errands alone such as visiting a doctor's   |          |                    |
| office or shopping? [15 years old or        |          |                    |
| older)]                                     |          |                    |
+---------------------------------------------+----------+--------------------+
 
 
 
+---------------------------------------------+----------+--------------------+
| Cognitive Status                            | Response | Date of Assessment |
 
+---------------------------------------------+----------+--------------------+
| Because of a physical, mental, or emotional | No       | 2019         |
|  condition, do you have serious difficulty  |          |                    |
| concentrating, remembering, or making       |          |                    |
| decisions? (5 years old or older)           |          |                    |
+---------------------------------------------+----------+--------------------+
 documented as of this encounter
 
 Medications at Time of Discharge
 
 
+----------------------+----------------------+-----------+---------+----------+----------+
| Medication           | Sig                  | Dispensed | Refills | Start    | End Date |
|                      |                      |           |         | Date     |          |
+----------------------+----------------------+-----------+---------+----------+----------+
|   ASPIRIN 81 PO      | Take 81 mg by mouth. |           | 0       |          |          |
+----------------------+----------------------+-----------+---------+----------+----------+
|   docusate sodium    | Take 1 capsule by    |   30      | 0       | 20 |          |
| (COLACE) 100 mg      | mouth 2 times daily. | capsule   |         | 19       |          |
| capsule              |                      |           |         |          |          |
+----------------------+----------------------+-----------+---------+----------+----------+
|                      | Take 25 mg by mouth  |           | 0       |          |          |
| hydroCHLOROthiazide  | Daily.               |           |         |          |          |
| 25 mg tablet         |                      |           |         |          |          |
+----------------------+----------------------+-----------+---------+----------+----------+
|                      | Take 1 tablet by     |   30      | 0       | 20 |          |
| HYDROcodone-acetamin | mouth every 4 hours  | tablet    |         | 19       |          |
| ophen (NORCO) 5-325  | as needed for Pain.  |           |         |          |          |
| mg per tablet        |                      |           |         |          |          |
+----------------------+----------------------+-----------+---------+----------+----------+
|   losartan (COZAAR)  | Take 50 mg by mouth  |           | 0       |          |          |
| 50 mg tablet         | Daily.               |           |         |          |          |
+----------------------+----------------------+-----------+---------+----------+----------+
|   Multiple           | Take  by mouth.      |           | 0       |          |          |
| Vitamins-Minerals    |                      |           |         |          |          |
| (CENTRUM SILVER      |                      |           |         |          |          |
| 50+MEN PO)           |                      |           |         |          |          |
+----------------------+----------------------+-----------+---------+----------+----------+
|   simvastatin        | Take 10 mg by mouth  |           | 0       |          |          |
| (ZOCOR) 10 mg tablet | nightly.             |           |         |          |          |
+----------------------+----------------------+-----------+---------+----------+----------+
 documented as of this encounter
 
 Plan of Treatment
 
 
+--------+-------------+------------------+----------------------+-------------+
| Date   | Type        | Specialty        | Care Team            | Description |
+--------+-------------+------------------+----------------------+-------------+
| / | Appointment | Radiology        |   Augustin Lopez DNP      |             |
| 2020   |             |                  | [address]     |             |
|        |             |                  | [place]  |             |
|        |             |                  | [address]  [phone]  |             |
|        |             |                  |  [phone] (Fax)  |             |
+--------+-------------+------------------+----------------------+-------------+
| / | Office      | Vascular Surgery |   Augustin Lopez DNP      |             |
|    | Visit       |                  | [address]     |             |
|        |             |                  | [place]  |             |
|        |             |                  | [address]  [phone]  |             |
|        |             |                  |  [phone] (Fax)  |             |
 
+--------+-------------+------------------+----------------------+-------------+
 documented as of this encounter
 
 Visit Diagnoses
 
 
+-----------------------------------------------------------------------------------+
| Diagnosis                                                                         |
+-----------------------------------------------------------------------------------+
|   Mesenteric ischemia, chronic (HCC)  Chronic vascular insufficiency of intestine |
+-----------------------------------------------------------------------------------+
 documented in this encounter

## 2020-01-13 NOTE — XMS
Encounter Summary
  Created on: 2020
 
 Pasquale Nehemias Martinez
 External Reference #: [id number]
 : 43
 Sex: Male
 
 Demographics
 
 
+-----------------------+----------------------+
| Address               | [address]         |
|                       | [place]  [address] |
+-----------------------+----------------------+
| Home Phone            | [phone]      |
+-----------------------+----------------------+
| Preferred Language    | Unknown              |
+-----------------------+----------------------+
| Marital Status        |               |
+-----------------------+----------------------+
| Episcopal Affiliation | 1077                 |
+-----------------------+----------------------+
| Race                  | Unknown              |
+-----------------------+----------------------+
| Ethnic Group          | Unknown              |
+-----------------------+----------------------+
 
 
 Author
 
 
+--------------+--------------------------------------------+
| Author       | [facility]  |
|              | and [place]                                |
+--------------+--------------------------------------------+
| Organization | [facility]  |
|              | and [place]                                |
+--------------+--------------------------------------------+
| Address      | Unknown                                    |
+--------------+--------------------------------------------+
| Phone        | Unavailable                                |
+--------------+--------------------------------------------+
 
 
 
 Support
 
 
+--------------+--------------+---------------------+-----------------+
| Name         | Relationship | Address             | Phone           |
+--------------+--------------+---------------------+-----------------+
| Rupa Giordnao | E[employer]         | [address]             | [phone] |
|              |              | [place]   |                 |
|              |              | [address]               |                 |
+--------------+--------------+---------------------+-----------------+
 
 
 
 
 Care Team Providers
 
 
+-----------------------+------+-------------+
| Care Team Member Name | Role | Phone       |
+-----------------------+------+-------------+
| No, Physician         | PCP  | Unavailable |
+-----------------------+------+-------------+
 
 
 
 Reason for Visit
 
 
+-------------+----------+
| Reason      | Comments |
+-------------+----------+
| Appointment |          |
+-------------+----------+
 
 
 
 Encounter Details
 
 
+--------+-----------+----------------------+----------------------+-------------+
| Date   | Type      | Department           | Care Team            | Description |
+--------+-----------+----------------------+----------------------+-------------+
| / | Telephone |   [facility]      |   Briana Casper Coates, | Appointment |
| 2019   |           | VASCULAR SURGERY     |  RN                  |             |
|        |           | [address] |                      |             |
|        |           |  [address]  [place]     |                      |             |
|        |           | [address]           |                      |             |
|        |           | [phone]         |                      |             |
+--------+-----------+----------------------+----------------------+-------------+
 
 
 
 Social History
 
 
+---------------+-------+-----------+--------+------+
| Tobacco Use   | Types | Packs/Day | Years  | Date |
|               |       |           | Used   |      |
+---------------+-------+-----------+--------+------+
| Former Smoker |       |           |        |      |
+---------------+-------+-----------+--------+------+
 
 
 
+---------------------+---+---+---+
| Smokeless Tobacco:  |   |   |   |
| Former User         |   |   |   |
+---------------------+---+---+---+
 
 
 
+------------------------------+
| Comments: quit 50 years ago  |
 
+------------------------------+
 
 
 
+---------------+-------------+---------+----------+
| Alcohol Use   | Drinks/Week | oz/Week | Comments |
+---------------+-------------+---------+----------+
| Not Currently |             |         | 90m days |
+---------------+-------------+---------+----------+
 
 
 
+------------------+---------------+
| Sex Assigned at  | Date Recorded |
| Birth            |               |
+------------------+---------------+
| Not on file      |               |
+------------------+---------------+
 
 
 
+----------------+-------------+-------------+
| Job Start Date | Occupation  | Industry    |
+----------------+-------------+-------------+
| Not on file    | Not on file | Not on file |
+----------------+-------------+-------------+
 
 
 
+----------------+--------------+------------+
| Travel History | Travel Start | Travel End |
+----------------+--------------+------------+
 
 
 
+-------------------------------------+
| No recent travel history available. |
+-------------------------------------+
 documented as of this encounter
 
 Functional Status
 
 
+---------------------------------------------+----------+--------------------+
| Functional Status                           | Response | Date of Assessment |
+---------------------------------------------+----------+--------------------+
| Are you deaf or do you have serious         | No       | 2019         |
| difficulty hearing?                         |          |                    |
+---------------------------------------------+----------+--------------------+
| Are you blind or do you have serious        | No       | 2019         |
| difficulty seeing, even when wearing        |          |                    |
| glasses?                                    |          |                    |
+---------------------------------------------+----------+--------------------+
| Do you have serious difficulty walking or   | No       | 2019         |
| climbing stairs? (5 years old or older)     |          |                    |
+---------------------------------------------+----------+--------------------+
| Do you have difficulty dressing or bathing? | No       | 2019         |
|  (5 years old or older)                     |          |                    |
+---------------------------------------------+----------+--------------------+
| Because of a physical, mental, or emotional | No       | 2019         |
 
|  condition, do you have difficulty doing    |          |                    |
| errands alone such as visiting a doctor's   |          |                    |
| office or shopping? [15 years old or        |          |                    |
| older)]                                     |          |                    |
+---------------------------------------------+----------+--------------------+
 
 
 
+---------------------------------------------+----------+--------------------+
| Cognitive Status                            | Response | Date of Assessment |
+---------------------------------------------+----------+--------------------+
| Because of a physical, mental, or emotional | No       | 2019         |
|  condition, do you have serious difficulty  |          |                    |
| concentrating, remembering, or making       |          |                    |
| decisions? (5 years old or older)           |          |                    |
+---------------------------------------------+----------+--------------------+
 documented as of this encounter
 
 Plan of Treatment
 
 
+--------+-------------+------------------+----------------------+-------------+
| Date   | Type        | Specialty        | Care Team            | Description |
+--------+-------------+------------------+----------------------+-------------+
| / | Appointment | Radiology        |   Augustin Lopez DNP      |             |
|    |             |                  | [address]     |             |
|        |             |                  | [place]  |             |
|        |             |                  | [id number] [phone]  |             |
|        |             |                  |  [phone] (Fax)  |             |
+--------+-------------+------------------+----------------------+-------------+
| / | Office      | Vascular Surgery |   Augustin Lopez DNP      |             |
| 2020   | Visit       |                  | [address]     |             |
|        |             |                  | [place]  |             |
|        |             |                  | [address]  [phone]  |             |
|        |             |                  |  [phone] (Fax)  |             |
+--------+-------------+------------------+----------------------+-------------+
 documented as of this encounter
 
 Visit Diagnoses
 Not on filedocumented in this encounter

## 2020-01-13 NOTE — XMS
Encounter Summary
  Created on: 2020
 
 Pasquale Nehemias Martinez
 External Reference #: [id number]
 : 43
 Sex: Male
 
 Demographics
 
 
+-----------------------+----------------------+
| Address               | [address]         |
|                       | [place]  [address] |
+-----------------------+----------------------+
| Home Phone            | [phone]      |
+-----------------------+----------------------+
| Preferred Language    | Unknown              |
+-----------------------+----------------------+
| Marital Status        |               |
+-----------------------+----------------------+
| Druze Affiliation | [id number]                 |
+-----------------------+----------------------+
| Race                  | Unknown              |
+-----------------------+----------------------+
| Ethnic Group          | Unknown              |
+-----------------------+----------------------+
 
 
 Author
 
 
+--------------+--------------------------------------------+
| Author       | [facility]  |
|              | and [facility]                                |
+--------------+--------------------------------------------+
| Organization | [facility]  |
|              | and [place]                                |
+--------------+--------------------------------------------+
| Address      | Unknown                                    |
+--------------+--------------------------------------------+
| Phone        | Unavailable                                |
+--------------+--------------------------------------------+
 
 
 
 Support
 
 
+--------------+--------------+---------------------+-----------------+
| Name         | Relationship | Address             | Phone           |
+--------------+--------------+---------------------+-----------------+
| Rupa Giordano | ECON         | [address]             | [phone] |
|              |              | [address] [place] [address], [place]   |                 |
|              |              | [address]               |                 |
+--------------+--------------+---------------------+-----------------+
 
 
 
 
 Care Team Providers
 
 
+-----------------------+------+-------------+
| Care Team Member Name | Role | Phone       |
+-----------------------+------+-------------+
| No, Physician         | PCP  | Unavailable |
+-----------------------+------+-------------+
 
 
 
 Reason for Visit
 Auth/Cert
 
+--------+--------+-----------+--------------+--------------+--------------+
| Status | Reason | Specialty | Diagnoses /  | Referred By  | Referred To  |
|        |        |           | Procedures   | Contact      | Contact      |
+--------+--------+-----------+--------------+--------------+--------------+
|        |        |           |   Diagnoses  |              |              |
|        |        |           |  Mesenteric  |              |              |
|        |        |           | ischemia,    |              |              |
|        |        |           | chronic      |              |              |
|        |        |           | ([facility])        |              |              |
|        |        |           | Procedures   |              |              |
|        |        |           | REVASCULARIZ |              |              |
|        |        |           | ATION        |              |              |
|        |        |           | MESENTERIC   |              |              |
+--------+--------+-----------+--------------+--------------+--------------+
 
 
 
 
 Encounter Details
 
 
+--------+---------+----------------------+----------------------+--------------------+
| Date   | Type    | Department           | Care Team            | Description        |
+--------+---------+----------------------+----------------------+--------------------+
| / | Surgery |   [facility]    |   Sukhdeep Pardo MD     | REVASCULARIZATION  |
| 2019   |         | [facility]       | [address]     | MESENTERIC         |
|        |         | OPERATING ROOM  [address]  | [address]  [place], [place]  |                    |
|        |         | [place]           | [address]           |                    |
|        |         | [place], [place]         | [phone]         |                    |
|        |         | [address]           | [phone] (Fax)   |                    |
|        |         | [phone]         |                      |                    |
+--------+---------+----------------------+----------------------+--------------------+
 
 
 
 Social History
 
 
+---------------+-------+-----------+--------+------+
| Tobacco Use   | Types | Packs/Day | Years  | Date |
|               |       |           | Used   |      |
+---------------+-------+-----------+--------+------+
| Former Smoker |       |           |        |      |
+---------------+-------+-----------+--------+------+
 
 
 
 
+---------------------+---+---+---+
| Smokeless Tobacco:  |   |   |   |
| Former User         |   |   |   |
+---------------------+---+---+---+
 
 
 
+------------------------------+
| Comments: quit 50 years ago  |
+------------------------------+
 
 
 
+---------------+-------------+---------+----------+
| Alcohol Use   | Drinks/Week | oz/Week | Comments |
+---------------+-------------+---------+----------+
| Not Currently |             |         | 90m days |
+---------------+-------------+---------+----------+
 
 
 
+------------------+---------------+
| Sex Assigned at  | Date Recorded |
| Birth            |               |
+------------------+---------------+
| Not on file      |               |
+------------------+---------------+
 
 
 
+----------------+-------------+-------------+
| Job Start Date | Occupation  | Industry    |
+----------------+-------------+-------------+
| Not on file    | Not on file | Not on file |
+----------------+-------------+-------------+
 
 
 
+----------------+--------------+------------+
| Travel History | Travel Start | Travel End |
+----------------+--------------+------------+
 
 
 
+-------------------------------------+
| No recent travel history available. |
+-------------------------------------+
 documented as of this encounter
 
 Last Filed Vital Signs
 
 
+-------------------+----------------------+----------------------+----------+
| Vital Sign        | Reading              | Time Taken           | Comments |
+-------------------+----------------------+----------------------+----------+
| Blood Pressure    | 141/66               | 2019 10:42 AM  |          |
|                   |                      | PDT                  |          |
+-------------------+----------------------+----------------------+----------+
 
| Pulse             | 76                   | 2019  7:57 AM  |          |
|                   |                      | PDT                  |          |
+-------------------+----------------------+----------------------+----------+
| Temperature       | 36.9   C (98.5   F)  | 2019  7:57 AM  |          |
|                   |                      | PDT                  |          |
+-------------------+----------------------+----------------------+----------+
| Respiratory Rate  | 18                   | 2019  7:57 AM  |          |
|                   |                      | PDT                  |          |
+-------------------+----------------------+----------------------+----------+
| Oxygen Saturation | 98%                  | 2019  7:57 AM  |          |
|                   |                      | PDT                  |          |
+-------------------+----------------------+----------------------+----------+
| Inhaled Oxygen    | -                    | -                    |          |
| Concentration     |                      |                      |          |
+-------------------+----------------------+----------------------+----------+
| Weight            | 78.3 kg (172 lb 9.9  | 2019  3:50 AM  |          |
|                   | oz)                  | PDT                  |          |
+-------------------+----------------------+----------------------+----------+
| Height            | 168.9 cm (5' 6.5")   | 2019 10:43 AM  |          |
|                   |                      | PDT                  |          |
+-------------------+----------------------+----------------------+----------+
| Body Mass Index   | 27.44                | 2019 10:43 AM  |          |
|                   |                      | PDT                  |          |
+-------------------+----------------------+----------------------+----------+
 documented in this encounter
 
 Functional Status
 
 
+---------------------------------------------+----------+--------------------+
| Functional Status                           | Response | Date of Assessment |
+---------------------------------------------+----------+--------------------+
| Are you deaf or do you have serious         | No       | 2019         |
| difficulty hearing?                         |          |                    |
+---------------------------------------------+----------+--------------------+
| Are you blind or do you have serious        | No       | 2019         |
| difficulty seeing, even when wearing        |          |                    |
| glasses?                                    |          |                    |
+---------------------------------------------+----------+--------------------+
| Do you have serious difficulty walking or   | No       | 2019         |
| climbing stairs? (5 years old or older)     |          |                    |
+---------------------------------------------+----------+--------------------+
| Do you have difficulty dressing or bathing? | No       | 2019         |
|  (5 years old or older)                     |          |                    |
+---------------------------------------------+----------+--------------------+
| Because of a physical, mental, or emotional | No       | 2019         |
|  condition, do you have difficulty doing    |          |                    |
| errands alone such as visiting a doctor's   |          |                    |
| office or shopping? [15 years old or        |          |                    |
| older)]                                     |          |                    |
+---------------------------------------------+----------+--------------------+
 
 
 
+---------------------------------------------+----------+--------------------+
| Cognitive Status                            | Response | Date of Assessment |
+---------------------------------------------+----------+--------------------+
| Because of a physical, mental, or emotional | No       | 2019         |
|  condition, do you have serious difficulty  |          |                    |
| concentrating, remembering, or making       |          |                    |
 
| decisions? (5 years old or older)           |          |                    |
+---------------------------------------------+----------+--------------------+
 documented as of this encounter
 
 Discharge Summaries
 Billy Gutierres PA-C - 2019  8:36 AM PDTFormatting of this note might be different f
rom the original.
 Physician Discharge Summary 
 
 Patient ID:
 Nehemias Giordano
 [id number]
 74 y.o.
 1945
 
 Admit date: 2019
 
 Discharge date and time: 2019
 
 Admitting Physician: Sukhdeep Pardo MD 
 
 Discharge Physician: Billy Gutierres PA-C
 
 Admission Diagnoses: Mesenteric ischemia, chronic (HCC) [K55.1]
 
 Discharge Diagnoses: Same; Status post mesenteric artery revascularization
 
 Admission Condition: poor
 
 Discharged Condition: stable
 
 Indication for Admission: Symptomatic SMA stenosis
 
 Hospital Course: Patient was admitted on 2019 and underwent open SMA endarterectomy an
d patch angioplasty. Post operative course has been uneventful. Began passing flatus two day
s ago and diet has been advanced. Stable for discharge home.
 
 Consults: none
 
 Treatments: surgery: SMA endart and patch angioplasty
 
 Discharge Exam:
 Vitals:reviewed
 CONSTITUTIONAL:   Conversant, well developed, NAD
 EYES: Anicteric sclerae, no lid drag, no proptosis
 RESP: Normal effort, regular, even, unlabored rate
 CV: No peripheral edema, rate regular
 ABD:  Appropriate tenderness.  Hypoactive bowel tones
 SKIN: Pink, warm, dry without rash/lesion
 MS: ROM not limited, no digital cyanosis, normal gait
 NEURO: Conversant, A&O times 3
 PSYCH: appropriate affect, speech and tone, judgement and insight intact
 Vascular:  Palpable brachial pulses bilaterally. Abdominal binder in place. Staples intact.
 
 Disposition: home
 
 Patient Instructions: Keep wound clean and dry, ice to area for comfort and remove dressing
 in two days. Leave open to air. Monitor for signs of infection. 
  
 Discharge Medications 
 
  
 New Medications  
   Details 
 docusate sodium 100 mg capsule
  Take 1 capsule by mouth 2 times daily.
 aka:  COLACE
  
 HYDROcodone-acetaminophen 5-325 mg per tablet
  Take 1 tablet by mouth every 4 hours as needed for Pain.
 aka:  NORCO
  
  
 Unchanged Medications  
   Details 
 ASPIRIN 81 PO
  Take 81 mg by mouth.
  
 CENTRUM SILVER 50+MEN PO
  Take  by mouth.
  
 hydroCHLOROthiazide 25 mg tablet
  Take 25 mg by mouth Daily.
  
 losartan 50 mg tablet
  Take 50 mg by mouth Daily.
 aka:  COZAAR
  
 simvastatin 10 mg tablet
  Take 10 mg by mouth nightly.
 aka:  ZOCOR
  
  
 
 Plan: POD 6. Stable for discharge. Continue aspirin and statin therapy. Continue ambulation
 and pain management. Getting more aggressive with constipation. Patient understands to come
 back in with worsening abdominal pain and failure of bowel movement. 
 Activity: activity as tolerated, ambulate in house and no lifting, Driving, or Strenuous ex
ercise for 6 weeks
 Diet: cardiac diet
 Wound Care: keep wound clean and dry, ice to area for comfort and remove dressing in two da
ys. Leave open to air. Monitor for signs of infection. 
 
 Follow-up with Vascular clinic in 2 weeks.
 
 Signed:
 Billy Gutierres PA-C
 2019
 8:36
 Electronically signed by Billy Gutierres PA-C at 2019  8:42 AM PDTdocumented in this 
encounter
 
 Discharge Instructions
 Instructions Araceli Bartholomew RN - 2019Formatting of this note might be different fro
m the original.
 
 Constipation (Adult)
 Constipation means that you have bowel movements that are less frequent than usual. Stools 
often become very hard and difficult to pass.
 Constipation is very common. At some point in life, it affects almost everyone. Since every
one's bowel habits are different, what is constipation to one person may not be to another. 
 
Your healthcare provider may do tests to diagnose constipation. It depends on whathe or sh
efinds when evaluating you.
 
 Symptoms of constipation include:
  Abdominal pain
  Bloating
  Vomiting
  Painful bowel movements
  Itching, swelling, bleeding, or pain around the anus
 Causes
 Constipation can have many causes. These include:
  Diet low in fiber
  Too much dairy
  Not drinking enough liquids
  Lack of exercise or physical activity (especially true for older adults)
  Changes in lifestyle or daily routine, including pregnancy, aging, work, and travel
  Frequent use or misuse of laxatives
  Ignoring the urge to have a bowel movement or delaying it until later
  Medicines, such as certain prescription pain medicines, iron supplements, antacids, cert
ain antidepressants, and calcium supplements
  Diseases like irritable bowel syndrome, bowel obstructions, stroke, diabetes, thyroid di
sease, Parkinson disease, hemorrhoids, and colon cancer
 Complications
 Potential complications of constipation can include:
  Hemorrhoids
  Rectal bleeding from hemorrhoids or anal fissures(skin tears)
  Hernias
  Dependency on laxatives
  Chronic constipation
  Fecal impaction, a severe form of constipation in which a large amount of hard stool is 
in your rectum that you can't pass
  Bowel obstruction or perforation
 Home care
 All treatment should be done after talking with your healthcare provider. This is especiall
y true if you have another medical problems, are taking prescription medicines, or are an ol
deandre adult. Treatment most often involves lifestyle changes. You may also need medicines. You
r healthcare provider will tell you which will work best for you. Follow the advice below to
 help avoid this problem in the future.
 Lifestyle changes
 These lifestyle changes can help prevent constipation:
  Diet. Eat a high-fiber diet, with fresh fruit and vegetables, and reduce dairy intake, m
eats, and processed foods
  Fluids. It's important to get enough fluids each day. Drink plenty of water when you eat
 more fiber. If you are on diet that limits the amount of fluid you can have, talk about thi
s with your healthcare provider.
  Regular exercise. Check with your healthcare provider first.
 Medicines
 Take any medicines as directed. Some laxatives are safe to use only every now and then. Oth
ers can be taken on a regular basis. While laxatives don't cause bowel dependence, they are 
treating the symptoms. So your constipation may return if you don't make other changes. Talk
 with your healthcare provider or pharmacist if you have questions.
 Prescription pain medicines can cause constipation. If you are taking this kind of medicine
, ask your healthcare provider if you should also take a stool softener.
 Medicines you may take to treat constipation include:
  Fiber supplements
  Stool softeners
  Laxatives
  Enemas
  Rectal suppositories
 Follow-up care
 
 Follow up with your healthcare provider if symptoms don't get better in the next few days. 
You may need to have more tests or see a specialist.
 Call 911
 Call 911 if any of these occur:
  Trouble breathing
  Stiff, rigid abdomen that is severely painful to touch
  Confusion
  Fainting or loss of consciousness
  Rapid heart rate
  Chest pain
 When to seek medical advice
 Call your healthcare provider right away if any of these occur:
  Fever of 100.4F (38C) or higher, or as directed by your healthcare provider
  Failure to resume normal bowel movements
  Pain in your abdomen or back gets worse
  Nausea or vomiting
  Swelling in your abdomen
  Blood in the stool
  Black, tarry stool
  Involuntary weight loss
  Weakness
 Date Last Reviewed: 2018 [employer]. [address] [place] [id number]. All righ
ts reserved. This information is not intended as a substitute for professional medical care.
 Always follow your healthcare professional's instructions.
 
 After Open Abdominal Superior Mesenteric Artery Surgery
 You have hadsurgery to repair SMA stenosis.
 Home care
 Recommendations for taking care of yourself at home include the following:
  Avoid strenuous activity for 4 to 6 weeks after your surgery.
  Ask your healthcare provider how long it will be before you can return to work.
  Gradually increase your activity. It may take some time for you to return to your normal
 activity level.
  Don  t drive for 2 weeks after surgery or while you are taking opioid pain medicine. As
k someone to take you to any appointments.
  Check your incision every day for signs of infection (swelling, redness, drainage, or wa
rmth).
  Keep your incision clean. Wash it gently with soap and water when you shower.
  Don  t lift anything heavier than 5 pounds for 2 weeks after surgery.
  Avoid sitting or standing for long periods without moving your legs and feet.
  Keep your feet up when you sit in a chair.
  Take your medicines exactly as directed.
 
 When to call your healthcare provider
 Call your healthcare provider right away if you have any of the following:
  Redness, pain, swelling, or drainage from your incision
  Fever of 100.4F (38C) or higher, or as directed by your healthcare provider
  Sudden coldness, pain, or paleness in your leg
  Loss of feeling in your legs
  Severe or sudden pain in your stomach
  Fail to pass gas
  Bloody bowel movements
  Prolonged constipation
  Nausea or vomiting
  Trouble breathing
  Pain or heaviness in your chest or arms 
 Date Last Reviewed: 2016 [employer]. [address] [place] [id number]. All righ
ts reserved. This information is not intended as a substitute for professional medical care.
 
 Always follow your healthcare professional's instructions.
 
 
 documented in this encounter
 
 Medications at Time of Discharge
 
 
+----------------------+----------------------+-----------+---------+----------+----------+
| Medication           | Sig                  | Dispensed | Refills | Start    | End Date |
|                      |                      |           |         | Date     |          |
+----------------------+----------------------+-----------+---------+----------+----------+
|   ASPIRIN 81 PO      | Take 81 mg by mouth. |           | 0       |          |          |
+----------------------+----------------------+-----------+---------+----------+----------+
|   docusate sodium    | Take 1 capsule by    |   30      | 0       | / |          |
| (COLACE) 100 mg      | mouth 2 times daily. | capsule   |         | 19       |          |
| capsule              |                      |           |         |          |          |
+----------------------+----------------------+-----------+---------+----------+----------+
|                      | Take 25 mg by mouth  |           | 0       |          |          |
| hydroCHLOROthiazide  | Daily.               |           |         |          |          |
| 25 mg tablet         |                      |           |         |          |          |
+----------------------+----------------------+-----------+---------+----------+----------+
|                      | Take 1 tablet by     |   30      | 0       | 20 |          |
| HYDROcodone-acetamin | mouth every 4 hours  | tablet    |         | 19       |          |
| ophen (NORCO) 5-325  | as needed for Pain.  |           |         |          |          |
| mg per tablet        |                      |           |         |          |          |
+----------------------+----------------------+-----------+---------+----------+----------+
|   losartan (COZAAR)  | Take 50 mg by mouth  |           | 0       |          |          |
| 50 mg tablet         | Daily.               |           |         |          |          |
+----------------------+----------------------+-----------+---------+----------+----------+
|   Multiple           | Take  by mouth.      |           | 0       |          |          |
| Vitamins-Minerals    |                      |           |         |          |          |
| (CENTRUM SILVER      |                      |           |         |          |          |
| 50+MEN PO)           |                      |           |         |          |          |
+----------------------+----------------------+-----------+---------+----------+----------+
|   simvastatin        | Take 10 mg by mouth  |           | 0       |          |          |
| (ZOCOR) 10 mg tablet | nightly.             |           |         |          |          |
+----------------------+----------------------+-----------+---------+----------+----------+
 documented as of this encounter
 
 Progress Notes
 Araceli Bartholomew RN - 2019 11:37 AM PDTPatient discharged to home via private car. Ve
rbal and written instructions provided to patient, all questions addressed. Rx and all belon
gings gathered. IVs removed and bandages applied. Patient wheeled to car and discharged in s
table condition with family. 
 Electronically signed by Araceli Bartholomew RN at 2019 11:37 AM Gracy Mary RN 
- 2019  6:58 AM PDTPt passing gas but still no BM. Bowel tones: normoactive to hypoact
dave in upper quadrants, hypoactive in both lower quadrants.  Pt declined suppository twice, 
stating, "If I still haven't had a BM by the morning, I might do the suppository around 8."
 
 Pt set to dc home with wife @ 11 (lives in [place]).  
 
 Abdominal hydrocolloid dressing C/D/I, with no new drainage.  Pt splints appropriately when
 coughing.
 
 End of shift review complete.Electronically signed by Gracy Joe RN at 2019  8:
04 AM Beatrice Man RN - 2019  6:31 PM PDTPt advanced to full liquids, continues 
to pass gas but no BM yet. Bowel tones present. Will continue to monitor.
 
 Beatrice Goel RN Electronically signed by Beatrice Goel RN at 2019  6:32 PM Christopher hCo MD - 2019 12:46 PM PDTVascular Surgery
  still with no BM.  Just took 1 tab of Norco.  Seems to be controlling the pain.  He wants 
to wait till tomorrow. Discussed his care with his wife by phone.  Questions answered about 
diet, climbing 13 stairs to get into one level home. Wife will arrive by 11am with friend to
 help him home and into house.  Wants a walker for home as well. Ok to shower.Electronically
 signed by Christopher Man MD at 2019 12:48 PM Christopher Prince MD - 2019  8:46 AM PDTFormatting of this note might be different from the original.
 [facility]
 Service: Vascular Surgery
 Progress Note
 
 Hospital Day:   LOS: 5 days 
 Post-Op Day:  #5
 SUBJECTIVE
 
      Patient Summary:  S/p SMA endarterectomy for chronic mesenteric ischemia
 
      Events Overnight:    Abdominal binder in place. Passing gas, no stool.  No N/V. Thirst
y.
 
 Scheduled Medications 
   aspirin  81 mg Oral Daily 
   hydroCHLOROthiazide  25 mg Oral Daily 
   losartan  50 mg Oral Daily 
   pantoprazole  40 mg Intravenous Daily 
 
 Continuous Infusions 
   niCARdipine Stopped (19 1628) 
 
 PRN Medications
 acetaminophen, bisacodyl, HYDROcodone-acetaminophen, HYDROmorphone, insulin lispro, morphin
e, sodium chloride 0.9%
 
 OBJECTIVE
 Vital Signs:
 Vitals: 
  19 0715 
 BP: 155/74 
 Pulse: 71 
 Resp: 18 
 Temp: 36.7 C (98 F) 
 
 Physical Exam
 Vitals:reviewed
 CONSTITUTIONAL:  Alert/appropriate
 CV: No peripheral edema, rate regular
 ABD:  Incisional tenderness only. normal bowel tones
 DRSNG: dry, spotty strikethru.
 SKIN: Pink, warm, well hydrated
 LE: w/out calf tenderness. No edema. 
 
 DATA
 Recent Results (from the past 24 hour(s)) 
 Basic Metabolic Panel 
  Collection Time: 19  4:17 
 Result Value Ref Range 
  Na 135 135 - 145 mmol/L 
  K 3.8 3.5 - 4.9 mmol/L 
  Cl 102 99 - 109 mmol/L 
  CO2 26 23 - 32 mmol/L 
 
  Anion Gap 11 5 - 20 mmol/L 
  Glucose 140 (H) 65 - 99 mg/dL 
  BUN 8 8 - 25 mg/dL 
  Creatinine 0.9 0.70 - 1.30 mg/dL 
  BUN/Creatinine Ratio 9  
  Calcium 8.5 8.5 - 10.5 mg/dL 
  Estimated GFR >60 >60 mL/min/1.73m2 
 CBC with Differential 
  Collection Time: 19  4:17 
 Result Value Ref Range 
  WBC 5.40 3.80 - 11.00 K/uL 
  RBC 3.57 (L) 4.20 - 5.70 M/uL 
  Hemoglobin 12.1 (L) 13.2 - 17.0 g/dL 
  Hematocrit 34.5 (L) 39.0 - 50.0 % 
  MCV 96.7 80.0 - 100.0 fl 
  MCH 33.8 27.0 - 34.0 pg 
  MCHC 35.0 32.0 - 35.5 g/dL 
  RDW-SD 45.5 37 - 53 fl 
  Platelet Count 158 150 - 400 K/uL 
  MPV 7.5 fl 
  Diff Type AUTOMATED  
  % Neutrophils 55.42 % 
  % Lymphocytes 36.41 % 
  Monocyte % 6.61 % 
  Eosinophils % 1.23 % 
  Basophils % 0.33 % 
  Neutrophils, Absolute 3.00 1.90 - 7.40 K/uL 
  Absolute Lymphocytes 1.97 1.00 - 3.90 K/uL 
  Absolute Monocytes 0.36 0.00 - 0.80 K/uL 
  Eosinophils, Absolute 0.07 0.00 - 0.50 K/uL 
  Basophils, Absolute 0.02 0.00 - 0.10 K/uL 
 
 PROBLEM LIST
 
 Principal Problem:
   Mesenteric ischemia, chronic 
 Active Problems:
   Hypertension
   Hyperlipidemia
 
 ASSESSMENT & PLAN
 
 S/p SMA endarterectomy for chronic mesenteric ischemia.  POD5- ileus resolving. Will begin 
advance diet.  Continue ambulation.  Oral pain med, hold IV dilaudid. Home afternoon if tole
rates diet and pain controlled.   
 
 Disposition:  [facility]
 Code Status:  Full Code
 
 Christopher Man MD
 2019                                           [phone] signed by Mi
luciana Man MD at 2019 12:46 PM Gracy Mary RN - 2019  6:54
 AM PDTHourly rounding performed.  Pt passing gas but has not had BM yet. Ambulated 1 lap ar
ound unit. Cares & concerns will be passed on to dayshift RN.
 End of shift review complete.Electronically signed by Gracy Joe RN at 2019  7:
58 AM Natasha Ace DAVID RN - 2019  6:28 PM PDTA/O, up with supervision, passing gas t
his shift, still no BM, requesting dilaudid q2, plan to transition to PO pain meds tomorrow,
 advanced to clear liquids today, tolerating well
 
 Chart check complete
 
 
 Electronically signed by: Ace Mas RN 2019 18:30
 Electronically signed by Ace Mas RN at 2019  6:30 PM Christopher Prince MD - 2019  9:46 AM PDTFormatting of this note might be different from the [facility]
 Service: Vascular Surgery
 Progress Note
 
 Hospital Day:   LOS: 4 days 
 Post-Op Day:  3
 SUBJECTIVE
 
      Patient Summary:  S/p SMA endarterectomy for chronic mesenteric ischemia
 
      Events Overnight:  Transferred from ICU to floor. Abdominal binder in place. Passing g
as, no stool.  No N/V. Thirsty.
 
 Scheduled Medications 
   pantoprazole  40 mg Intravenous Daily 
 
 Continuous Infusions 
   dextrose 5% and sodium chloride 0.45% with KCl 20 mEq/L 125 mL/hr at 19 0219 
   niCARdipine Stopped (19 1628) 
 
 PRN Medications
 acetaminophen, bisacodyl, HYDROmorphone, insulin lispro, morphine, sodium chloride 0.9%
 
 OBJECTIVE
 Vital Signs:
 Vitals: 
  19 0807 
 BP: 174/85 
 Pulse: 83 
 Resp: 18 
 Temp: 37.9 C (100.2 F) 
 
 Physical Exam
 Vitals:reviewed
 CONSTITUTIONAL:  Alert/appropriate
 CV: No peripheral edema, rate regular
 ABD:  Incisional tenderness only. normal bowel tones
 DRSNG: dry, spotty strikethru.
 SKIN: Pink, warm, well hydrated
 LE: w/out calf tenderness. No edema. 
 
 DATA
 Recent Results (from the past 24 hour(s)) 
 Basic Metabolic Panel 
  Collection Time: 19  4:29 
 Result Value Ref Range 
  Na 135 135 - 145 mmol/L 
  K 4.0 3.5 - 4.9 mmol/L 
  Cl 103 99 - 109 mmol/L 
  CO2 25 23 - 32 mmol/L 
  Anion Gap 11 5 - 20 mmol/L 
  Glucose 140 (H) 65 - 99 mg/dL 
  BUN 9 8 - 25 mg/dL 
  Creatinine 0.8 0.70 - 1.30 mg/dL 
  BUN/Creatinine Ratio 11  
 
  Calcium 8.7 8.5 - 10.5 mg/dL 
  Estimated GFR >60 >60 mL/min/1.73m2 
 CBC with Differential 
  Collection Time: 19  4:29 
 Result Value Ref Range 
  WBC 5.54 3.80 - 11.00 K/uL 
  RBC 3.72 (L) 4.20 - 5.70 M/uL 
  Hemoglobin 12.3 (L) 13.2 - 17.0 g/dL 
  Hematocrit 36.0 (L) 39.0 - 50.0 % 
  MCV 96.8 80.0 - 100.0 fl 
  MCH 33.1 27.0 - 34.0 pg 
  MCHC 34.2 32.0 - 35.5 g/dL 
  RDW-SD 46.8 37 - 53 fl 
  Platelet Count 140 (L) 150 - 400 K/uL 
  MPV 7.4 fl 
  Diff Type AUTOMATED  
  % Neutrophils 57.00 % 
  % Lymphocytes 36.25 % 
  Monocyte % 5.29 % 
  Eosinophils % 1.03 % 
  Basophils % 0.43 % 
  Neutrophils, Absolute 3.16 1.90 - 7.40 K/uL 
  Absolute Lymphocytes 2.01 1.00 - 3.90 K/uL 
  Absolute Monocytes 0.29 0.00 - 0.80 K/uL 
  Eosinophils, Absolute 0.06 0.00 - 0.50 K/uL 
  Basophils, Absolute 0.02 0.00 - 0.10 K/uL 
 
 PROBLEM LIST
 
 Principal Problem:
   Mesenteric ischemia, chronic 
 Active Problems:
   Hypertension
   Hyperlipidemia
 
 ASSESSMENT & PLAN
 
 S/p SMA endarterectomy for chronic mesenteric ischemia.  POD4- ileus resolving. Will begin 
clear liquids.  Continue ambulation.  Resume po BP meds for sBP climbing. Wait on po pain me
d.  
 
 Disposition:  [facility]
 Code Status:  Full Code
 
 Christopher Man MD
 2019                                           [phone] signed by Mi
luciana Man MD at 2019  9:52 AM Lizzy Navarro RN - 2019  5:57
 PM PDTPt transferred to 9114, report received from LUZ MARIA Godfrey. Medicated for pain, see MAR
. Pt up walking this shift with FWW. Bowel tones hypoactive, but gurgling.
 
 Chart check complete.
 
 Lizzy Orantes RN
 Electronically signed by Lizzy Orantes RN at 2019  6:00 PM Billy Briceno PA- C - 2019  3:56 PM PDTFormatting of this note might be different from the original.
 [facility]
 Service: Vascular Surgery
 Progress Note
 
 Hospital Day:   LOS: 3 days 
 
 Post-Op Day:  3
 SUBJECTIVE
 
      Patient Summary:  S/p SMA endarterectomy for chronic mesenteric ischemia
 
      Events Overnight:  No events overnight. Abdominal binder in place. 
 
 Scheduled Medications 
   pantoprazole  40 mg Intravenous Daily 
 
 Continuous Infusions 
   dextrose 5% and sodium chloride 0.45% with KCl 20 mEq/L 125 mL/hr at 19 0819 
   niCARdipine Stopped (19 1628) 
 
 PRN Medications
 acetaminophen, bisacodyl, HYDROmorphone, insulin lispro, morphine, sodium chloride 0.9%
 
 OBJECTIVE
 Vital Signs:
 Vitals: 
  19 1456 
 BP: (!) 166/91 
 Pulse: 81 
 Resp: 16 
 Temp: 36.8 C (98.2 F) 
 
 Physical Exam
 Vitals:reviewed
 CONSTITUTIONAL:  Conversant, well developed, NAD
 EYES: Anicteric sclerae, no lid drag, no proptosis
 RESP: Normal effort, regular, even, unlabored rate
 CV: No peripheral edema, rate regular
 ABD:  Appropriate tenderness.  Hypoactive bowel tones
 SKIN: Pink, warm, dry without rash/lesion
 MS: ROM not limited, no digital cyanosis, normal gait
 NEURO: Conversant, A&O times 3
 PSYCH: appropriate affect, speech and tone, judgement and insight intact
 Vascular:  Palpable brachial pulses bilaterally. Abdominal binder in place. Staples intact.
 
 DATA
 Recent Results (from the past 24 hour(s)) 
 Basic Metabolic Panel 
  Collection Time: 19  4:23 
 Result Value Ref Range 
  Na 137 135 - 145 mmol/L 
  K 4.0 3.5 - 4.9 mmol/L 
  Cl 104 99 - 109 mmol/L 
  CO2 26 23 - 32 mmol/L 
  Anion Gap 11 5 - 20 mmol/L 
  Glucose 140 (H) 65 - 99 mg/dL 
  BUN 8 8 - 25 mg/dL 
  Creatinine 0.9 0.70 - 1.30 mg/dL 
  BUN/Creatinine Ratio 9  
  Calcium 8.4 (L) 8.5 - 10.5 mg/dL 
  Estimated GFR >60 >60 mL/min/1.73m2 
 CBC with Differential 
  Collection Time: 19  4:23 
 Result Value Ref Range 
  WBC 9.31 3.80 - 11.00 K/uL 
  RBC 3.81 (L) 4.20 - 5.70 M/uL 
 
  Hemoglobin 12.7 (L) 13.2 - 17.0 g/dL 
  Hematocrit 37.1 (L) 39.0 - 50.0 % 
  MCV 97.5 80.0 - 100.0 fl 
  MCH 33.5 27.0 - 34.0 pg 
  MCHC 34.3 32.0 - 35.5 g/dL 
  RDW-SD 46.8 37 - 53 fl 
  Platelet Count 162 150 - 400 K/uL 
  MPV 7.5 fl 
  Diff Type AUTOMATED  
  % Neutrophils 54.94 % 
  % Lymphocytes 39.44 % 
  Monocyte % 4.62 % 
  Eosinophils % 0.69 % 
  Basophils % 0.31 % 
  Neutrophils, Absolute 5.12 1.90 - 7.40 K/uL 
  Absolute Lymphocytes 3.67 1.00 - 3.90 K/uL 
  Absolute Monocytes 0.43 0.00 - 0.80 K/uL 
  Eosinophils, Absolute 0.06 0.00 - 0.50 K/uL 
  Basophils, Absolute 0.03 0.00 - 0.10 K/uL 
 
 PROBLEM LIST
 
 Principal Problem:
   Mesenteric ischemia, chronic 
 Active Problems:
   Hypertension
   Hyperlipidemia
 
 ASSESSMENT & PLAN
 
 S/p SMA endarterectomy for chronic mesenteric ischemia.  POD3.  Denies flatus still. Contin
ue strict NPO with swabs for comfort until return of bowel function.  Continue ambulation wi
th PT.  
 
 Disposition:  [facility]
 Code Status:  Full Code
 
 Billy Gutierres PA-C
 2019Electronically signed by Billy Gutierres PA-C at 2019  3:58 PM Bekah Carmona RN - 2019  2:51 PM PDTPt transferred to 9RP. Electronically signed by Hina Lord RN at 2019  2:52 PM Bekah Carmona RN - 2019  2:33 PM PD
TReport called to Lizett on 9RP. Will transfer pt at this time. Electronically signed by Nino Lord RN at 2019  2:33 PM Maye Hawkins RN - 2019  7:31 PM PDT
Patient worked with PT this morning and has sat in chair the remainder of the shift. Pt c/o 
occasional pain, worse with ambulation, coughing, or laughing. Medicated with 1mg Dilaudid x
 3 with relief. Patient made several trips to bathroom with use of 2WW and CGA. Abdominal bi
nder in place per JAILYN Sierra. Pt denies passing flatus this shift, continues to be NPO.
 Maye Guevara RN
 Electronically signed by Maye Guevara RN at 2019  7:33 PM Sukhdeep Castro MD -   6:56 AM PDTFormatting of this note might be different from the original.
 [facility]
 Service: Vascular Surgery
 Progress Note
 
 Hospital Day:   LOS: 2 days 
 Post-Op Day:  2 Days Post-Op
 SUBJECTIVE
 
      Patient Summary:  S/p SMA endarterectomy for chronic mesenteric ischemia
 
 
      Events Overnight:  No events overnight.  Pain well controlled with pain meds.  Ambulat
ed yesterday with assistance.  
 
 Scheduled Medications 
   pantoprazole  40 mg Intravenous Daily 
 
 Continuous Infusions 
   dextrose 5% and sodium chloride 0.45% with KCl 20 mEq/L 125 mL/hr at 19 2228 
   niCARdipine Stopped (19 1628) 
 
 PRN Medications
 acetaminophen, bisacodyl, HYDROmorphone, insulin lispro, morphine, sodium chloride 0.9%
 
 OBJECTIVE
 Vital Signs:
 Vitals: 
  19 0421 
 BP: 149/80 
 Pulse: 63 
 Resp: 16 
 Temp: 37.2 C (99 F) 
 
 Physical Exam
 Vitals:reviewed
 CONSTITUTIONAL:  Conversant, well developed, NAD
 EYES: Anicteric sclerae, no lid drag, no proptosis
 RESP: Normal effort, regular, even, unlabored rate
 CV: No peripheral edema, rate regular
 ABD:  Appropriate tenderness.  Hypoactive bowel tones
 SKIN: Pink, warm, dry without rash/lesion
 MS: ROM not limited, no digital cyanosis, normal gait
 NEURO: Conversant, A&O times 3
 PSYCH: appropriate affect, speech and tone, judgement and insight intact
 Vascular:  Palpable brachial pulses bilaterally. 
 
 DATA
 Recent Results (from the past 24 hour(s)) 
 Lactic Acid 
  Collection Time: 19  9:09 
 Result Value Ref Range 
  Lactate, Serum 1.6 0.4 - 2.0 mmol/L 
 Basic Metabolic Panel 
  Collection Time: 19  4:06 
 Result Value Ref Range 
  Na 138 135 - 145 mmol/L 
  K 4.4 3.5 - 4.9 mmol/L 
  Cl 106 99 - 109 mmol/L 
  CO2 25 23 - 32 mmol/L 
  Anion Gap 11 5 - 20 mmol/L 
  Glucose 144 (H) 65 - 99 mg/dL 
  BUN 16 8 - 25 mg/dL 
  Creatinine 0.9 0.70 - 1.30 mg/dL 
  BUN/Creatinine Ratio 18  
  Calcium 8.6 8.5 - 10.5 mg/dL 
  Estimated GFR >60 >60 mL/min/1.73m2 
 CBC with Differential 
  Collection Time: 19  4:06 
 Result Value Ref Range 
  WBC 10.14 3.80 - 11.00 K/uL 
  RBC 3.79 (L) 4.20 - 5.70 M/uL 
 
  Hemoglobin 12.6 (L) 13.2 - 17.0 g/dL 
  Hematocrit 37.1 (L) 39.0 - 50.0 % 
  MCV 97.8 80.0 - 100.0 fl 
  MCH 33.3 27.0 - 34.0 pg 
  MCHC 34.1 32.0 - 35.5 g/dL 
  RDW-SD 47.3 37 - 53 fl 
  Platelet Count 149 (L) 150 - 400 K/uL 
  MPV 7.4 fl 
  Diff Type AUTOMATED  
  % Neutrophils 63.73 % 
  % Lymphocytes 31.45 % 
  Monocyte % 4.45 % 
  Eosinophils % 0.24 % 
  Basophils % 0.13 % 
  Neutrophils, Absolute 6.46 1.90 - 7.40 K/uL 
  Absolute Lymphocytes 3.19 1.00 - 3.90 K/uL 
  Absolute Monocytes 0.45 0.00 - 0.80 K/uL 
  Eosinophils, Absolute 0.02 0.00 - 0.50 K/uL 
  Basophils, Absolute 0.01 0.00 - 0.10 K/uL 
 
 PROBLEM LIST
 
 Principal Problem:
   Mesenteric ischemia, chronic 
 Active Problems:
   Hypertension
   Hyperlipidemia
 
 ASSESSMENT & PLAN
 
 S/p SMA endarterectomy for chronic mesenteric ischemia.  Doing well.  Denies flatus. Contin
ue strict NPO with swabs for comfort until return of bowel function.  Continue ambulation wi
th PT.  
 
 Disposition:  [facility]
 Code Status:  Full Code
 
 Sukhdeep Pardo MD
 2019Electronically signed by Sukhdeep Pardo MD at 2019  6:59 AM Sukhdeep Castro MD - 
2019  9:10 AM PDTFormatting of this note might be different from the original.
 [facility]
 Service: Vascular Surgery
 Progress Note
 
 Hospital Day:   LOS: 1 day 
 Post-Op Day:  1 Day Post-Op
 SUBJECTIVE
 
      Patient Summary:  S/p SMA endarterectomy for chronic mesenteric ischemia
 
      Events Overnight:  No events overnight.  Pain well controlled with pain meds.  
 
 Scheduled Medications 
 
 Continuous Infusions 
   dextrose 5% and sodium chloride 0.45% with KCl 20 mEq/L 125 mL/hr at 19 0724 
   niCARdipine Stopped (19 1628) 
 
 PRN Medications
 acetaminophen, aluminum & magnesium hydroxide-simethicone, bisacodyl, calcium carbonate, do
 
cusate sodium, HYDROmorphone, insulin lispro, morphine, sodium chloride 0.9%
 
 OBJECTIVE
 Vital Signs:
 Vitals: 
  19 0800 
 BP: 128/68 
 Pulse: 78 
 Resp: 12 
 Temp: 37 C (98.6 F) 
 
 Physical Exam
 Vitals:reviewed
 CONSTITUTIONAL:  Conversant, well developed, NAD
 EYES: Anicteric sclerae, no lid drag, no proptosis
 RESP: Normal effort, regular, even, unlabored rate
 CV: No peripheral edema, rate regular
 SKIN: Pink, warm, dry without rash/lesion
 ABD:  Soft, appropriate tenderness, hypoactive bowel tones
 MS: ROM not limited, no digital cyanosis
 NEURO: Conversant, A&O times 3
 PSYCH: appropriate affect, speech and tone, judgement and insight intact
 
 DATA
 Recent Results (from the past 24 hour(s)) 
 Type and Screen 
  Collection Time: 19 11:03 
 Result Value Ref Range 
  ABO Rh O POSITIVE  
  Antibody Screen NEGATIVE  
  BB BAND [id number]  
  BB BAND   
   Testing performed at [facility];[address] [place] [address];[place] [address] 
 Basic Metabolic Panel 
  Collection Time: 19  4:40 
 Result Value Ref Range 
  Na 140 135 - 145 mmol/L 
  K 4.5 3.5 - 4.9 mmol/L 
  Cl 107 99 - 109 mmol/L 
  CO2 23 23 - 32 mmol/L 
  Anion Gap 15 5 - 20 mmol/L 
  Glucose 192 (H) 65 - 99 mg/dL 
  BUN 17 8 - 25 mg/dL 
  Creatinine 1.02 0.70 - 1.30 mg/dL 
  BUN/Creatinine Ratio 17  
  Calcium 8.6 8.5 - 10.5 mg/dL 
  Estimated GFR >60 >60 mL/min/1.73m2 
 CBC with Differential 
  Collection Time: 19  4:40 
 Result Value Ref Range 
  WBC 13.98 (H) 3.80 - 11.00 K/uL 
  RBC 4.07 (L) 4.20 - 5.70 M/uL 
  Hemoglobin 13.3 13.2 - 17.0 g/dL 
  Hematocrit 39.6 39.0 - 50.0 % 
  MCV 97.4 80.0 - 100.0 fl 
  MCH 32.8 27.0 - 34.0 pg 
  MCHC 33.7 32.0 - 35.5 g/dL 
  RDW-SD 47.3 37 - 53 fl 
  Platelet Count 185 150 - 400 K/uL 
  MPV 7.3 fl 
 
  Diff Type AUTOMATED  
  % Neutrophils 55.68 % 
  % Lymphocytes 39.54 % 
  Monocyte % 4.55 % 
  Eosinophils % 0.05 % 
  Basophils % 0.18 % 
  Neutrophils, Absolute 7.79 (H) 1.90 - 7.40 K/uL 
  Absolute Lymphocytes 5.53 (H) 1.00 - 3.90 K/uL 
  Absolute Monocytes 0.64 0.00 - 0.80 K/uL 
  Eosinophils, Absolute 0.01 0.00 - 0.50 K/uL 
  Basophils, Absolute 0.03 0.00 - 0.10 K/uL 
  RBC Morphology RBC AND PLT MORPHOLOGY APPEAR NORMAL  
  Platelet Estimate ADEQUATE  
  Comment SLIDE SCANNED, AGREES WITH AUTOMATED RESULTS.  
 ECG 12 lead 
  Collection Time: 19  6:38 
 Result Value Ref Range 
  INTERPRETATION TEXT Not Confirmed  
 
 PROBLEM LIST
 
 Principal Problem:
   Mesenteric ischemia, chronic 
 Active Problems:
   Hypertension
   Hyperlipidemia
 
 ASSESSMENT & PLAN
 
 S/p SMA endarterectomy for chronic mesenteric ischemia.  Doing well.  Transfer to cardiac u
nit today. Up out of bed with PT and ambulate with assist.  Keep NPO until return of bowel f
unction.  Discontinue yi catheter today.  
 
 Disposition:  [facility]
 Code Status:  Full Code
 
 Sukhdeep Pardo MD
 2019Electronically signed by Sukhdeep Pardo MD at 2019  9:15 AM Maye Forrester RN
 - 2019  6:30 PM PDTPt received after OR procedure with Dr Pardo.  His main complaint wa
s of abdominal pain.  He was also hypertensive.  Goal BP <160.  Once pain was better control
led, he no longer met the criteria for the nicardipene gtt.  He will be NPO until he passes 
gas which is anticipated to be 3-4 days.
 
 No blood, restraints, protocols, or signed and held orders.
 
 Morphine given x1 per parameters with no effect.
 Dilaudid given x3 per parameters with moderate effect.
 
 End of shift review complete. Electronically signed by Maye Amin RN at 2019  6:32
 PM PDTdocumented in this encounter
 
 Plan of Treatment
 
 
+--------+-------------+------------------+----------------------+-------------+
| Date   | Type        | Specialty        | Care Team            | Description |
+--------+-------------+------------------+----------------------+-------------+
| / | Appointment | Radiology        |   Augustin Lopez DNP      |             |
|    |             |                  | [address]     |             |
|        |             |                  | [place]  |             |
 
|        |             |                  | [id number] [phone]  |             |
|        |             |                  |  [phone] (Fax)  |             |
+--------+-------------+------------------+----------------------+-------------+
| / | Office      | Vascular Surgery |   Augustin Lopez DNP      |             |
|    | Visit       |                  | [address]     |             |
|        |             |                  | [place]  |             |
|        |             |                  | [id number]  [phone]  |             |
|        |             |                  |  [phone] (Fax)  |             |
+--------+-------------+------------------+----------------------+-------------+
 documented as of this encounter
 
 Procedures
 
 
+---------------------+--------+-------------+----------------------+----------------------+
| Procedure Name      | Priori | Date/Time   | Associated Diagnosis | Comments             |
|                     | ty     |             |                      |                      |
+---------------------+--------+-------------+----------------------+----------------------+
| CBC WITH            | Routin | 2019  |                      |   Results for this   |
| DIFFERENTIAL        | e      |  4:17 AM    |                      | procedure are in the |
|                     |        | PDT         |                      |  results section.    |
+---------------------+--------+-------------+----------------------+----------------------+
| BASIC METABOLIC     | Routin | 2019  |                      |   Results for this   |
| PANEL               | e      |  4:17 AM    |                      | procedure are in the |
|                     |        | PDT         |                      |  results section.    |
+---------------------+--------+-------------+----------------------+----------------------+
| CBC WITH            | Routin | 2019  |                      |   Results for this   |
| DIFFERENTIAL        | e      |  4:29 AM    |                      | procedure are in the |
|                     |        | PDT         |                      |  results section.    |
+---------------------+--------+-------------+----------------------+----------------------+
| BASIC METABOLIC     | Routin | 2019  |                      |   Results for this   |
| PANEL               | e      |  4:29 AM    |                      | procedure are in the |
|                     |        | PDT         |                      |  results section.    |
+---------------------+--------+-------------+----------------------+----------------------+
| CBC WITH            | Routin | 2019  |                      |   Results for this   |
| DIFFERENTIAL        | e      |  4:23 AM    |                      | procedure are in the |
|                     |        | PDT         |                      |  results section.    |
+---------------------+--------+-------------+----------------------+----------------------+
| BASIC METABOLIC     | Routin | 2019  |                      |   Results for this   |
| PANEL               | e      |  4:23 AM    |                      | procedure are in the |
|                     |        | PDT         |                      |  results section.    |
+---------------------+--------+-------------+----------------------+----------------------+
| CBC WITH            | Routin | 2019  |                      |   Results for this   |
| DIFFERENTIAL        | e      |  4:06 AM    |                      | procedure are in the |
|                     |        | PDT         |                      |  results section.    |
+---------------------+--------+-------------+----------------------+----------------------+
| BASIC METABOLIC     | Routin | 2019  |                      |   Results for this   |
| PANEL               | e      |  4:06 AM    |                      | procedure are in the |
|                     |        | PDT         |                      |  results section.    |
+---------------------+--------+-------------+----------------------+----------------------+
| LACTIC ACID         | STAT   | 2019  |                      |   Results for this   |
|                     |        |  9:09 AM    |                      | procedure are in the |
|                     |        | PDT         |                      |  results section.    |
+---------------------+--------+-------------+----------------------+----------------------+
| ECG 12 LEAD         | Routin | 2019  |                      |   Results for this   |
|                     | e      |  5:37 AM    |                      | procedure are in the |
|                     |        | PDT         |                      |  results section.    |
+---------------------+--------+-------------+----------------------+----------------------+
| CBC WITH            | Routin | 2019  |                      |   Results for this   |
| DIFFERENTIAL        | e      |  4:40 AM    |                      | procedure are in the |
 
|                     |        | PDT         |                      |  results section.    |
+---------------------+--------+-------------+----------------------+----------------------+
| BASIC METABOLIC     | Routin | 2019  |                      |   Results for this   |
| PANEL               | e      |  4:40 AM    |                      | procedure are in the |
|                     |        | PDT         |                      |  results section.    |
+---------------------+--------+-------------+----------------------+----------------------+
| SURGICAL PATHOLOGY  | Routin | 2019  |   Mesenteric         |   Results for this   |
| EXAM                | e      |  1:50 PM    | ischemia, chronic    | procedure are in the |
|                     |        | PDT         | ([facility])                |  results section.    |
+---------------------+--------+-------------+----------------------+----------------------+
| REVASCULARIZATION   |        | 2019  |   Mesenteric         |                      |
| MESENTERIC          |        | 11:12 AM    | ischemia, chronic    |                      |
|                     |        | PDT         | ([facility])                |                      |
+---------------------+--------+-------------+----------------------+----------------------+
| TYPE AND SCREEN     | STAT   | 2019  |                      |   Results for this   |
|                     |        | 11:03 AM    |                      | procedure are in the |
|                     |        | PDT         |                      |  results section.    |
+---------------------+--------+-------------+----------------------+----------------------+
 documented in this encounter
 
 Results
 CBC with Differential (2019  4:17 AM PDT)
 
+-------------+--------------------------+-----------------+------------+--------------+
| Component   | Value                    | Ref Range       | Performed  | Pathologist  |
|             |                          |                 | At         | Signature    |
+-------------+--------------------------+-----------------+------------+--------------+
| WBC         | 5.40                     | 3.80 - 11.00    | [facility]       |              |
|             |                          | K/uL            | LABORATORY |              |
+-------------+--------------------------+-----------------+------------+--------------+
| RBC         | 3.57 (L)                 | 4.20 - 5.70     | [facility]       |              |
|             |                          | M/uL            | LABORATORY |              |
+-------------+--------------------------+-----------------+------------+--------------+
| Hemoglobin  | 12.1 (L)                 | 13.2 - 17.0     | [facility]       |              |
|             |                          | g/dL            | LABORATORY |              |
+-------------+--------------------------+-----------------+------------+--------------+
| Hematocrit  | 34.5 (L)                 | 39.0 - 50.0 %   | [facility]       |              |
|             |                          |                 | LABORATORY |              |
+-------------+--------------------------+-----------------+------------+--------------+
| MCV         | 96.7                     | 80.0 - 100.0 fl | [facility]       |              |
|             |                          |                 | LABORATORY |              |
+-------------+--------------------------+-----------------+------------+--------------+
| MCH         | 33.8                     | 27.0 - 34.0 pg  | [facility]       |              |
|             |                          |                 | LABORATORY |              |
+-------------+--------------------------+-----------------+------------+--------------+
| MCHC        | 35.0                     | 32.0 - 35.5     | [facility]       |              |
|             |                          | g/dL            | LABORATORY |              |
+-------------+--------------------------+-----------------+------------+--------------+
| RDW-SD      | 45.5                     | 37 - 53 fl      | [facility]       |              |
|             |                          |                 | LABORATORY |              |
+-------------+--------------------------+-----------------+------------+--------------+
| Platelet    | 158                      | 150 - 400 K/uL  | [facility]       |              |
| Count       |                          |                 | LABORATORY |              |
+-------------+--------------------------+-----------------+------------+--------------+
| MPV         | 7.5                      | fl              | [facility]       |              |
|             |                          |                 | LABORATORY |              |
+-------------+--------------------------+-----------------+------------+--------------+
| Diff Type   | AUTOMATED                |                 | [facility]       |              |
|             |                          |                 | LABORATORY |              |
+-------------+--------------------------+-----------------+------------+--------------+
 
| %           | 55.42                    | %               | [facility]       |              |
| Neutrophils |                          |                 | LABORATORY |              |
+-------------+--------------------------+-----------------+------------+--------------+
| %           | 36.41                    | %               | [facility]       |              |
| Lymphocytes |                          |                 | LABORATORY |              |
+-------------+--------------------------+-----------------+------------+--------------+
| Monocyte %  | 6.61                     | %               | [facility]       |              |
|             |                          |                 | LABORATORY |              |
+-------------+--------------------------+-----------------+------------+--------------+
| Eosinophils | 1.23                     | %               | [facility]       |              |
|  %          |                          |                 | LABORATORY |              |
+-------------+--------------------------+-----------------+------------+--------------+
| Basophils % | 0.33                     | %               | [facility]       |              |
|             |                          |                 | LABORATORY |              |
+-------------+--------------------------+-----------------+------------+--------------+
| Neutrophils | 3.00                     | 1.90 - 7.40     | [facility]       |              |
| , Absolute  |                          | K/uL            | LABORATORY |              |
+-------------+--------------------------+-----------------+------------+--------------+
| Absolute    | 1.97                     | 1.00 - 3.90     | [facility]       |              |
| Lymphocytes |                          | K/uL            | LABORATORY |              |
+-------------+--------------------------+-----------------+------------+--------------+
| Absolute    | 0.36                     | 0.00 - 0.80     | [facility]       |              |
| Monocytes   |                          | K/uL            | LABORATORY |              |
+-------------+--------------------------+-----------------+------------+--------------+
| Eosinophils | 0.07                     | 0.00 - 0.50     | [facility]       |              |
| , Absolute  |                          | K/uL            | LABORATORY |              |
+-------------+--------------------------+-----------------+------------+--------------+
| Basophils,  | 0.02Comment: Testing     | 0.00 - 0.10     | [facility]       |              |
| Absolute    | performed at [facility], [address] | K/uL            | LABORATORY |              |
|             |  [address],        |                 |            |              |
|             | [place]  [id number]     |                 |            |              |
+-------------+--------------------------+-----------------+------------+--------------+
 
 
 
+----------+
| Specimen |
+----------+
| Blood    |
+----------+
 
 
 
+-------------------+------------------+--------------------+----------------+
| Performing        | Address          | City/State/Zipcode | Phone Number   |
| Organization      |                  |                    |                |
+-------------------+------------------+--------------------+----------------+
|   [facility] |   [address] | [place] [address] |   [phone] |
+-------------------+------------------+--------------------+----------------+
 Basic Metabolic Panel (2019  4:17 AM PDT)
 
+-------------+--------------------------+-----------------+------------+--------------+
| Component   | Value                    | Ref Range       | Performed  | Pathologist  |
|             |                          |                 | At         | Signature    |
+-------------+--------------------------+-----------------+------------+--------------+
| Na          | 135                      | 135 - 145       | [facility]       |              |
|             |                          | mmol/L          | LABORATORY |              |
+-------------+--------------------------+-----------------+------------+--------------+
| K           | 3.8                      | 3.5 - 4.9       | [facility]       |              |
|             |                          | mmol/L          | LABORATORY |              |
 
+-------------+--------------------------+-----------------+------------+--------------+
| Cl          | 102                      | 99 - 109 mmol/L | [facility]       |              |
|             |                          |                 | LABORATORY |              |
+-------------+--------------------------+-----------------+------------+--------------+
| CO2         | 26                       | 23 - 32 mmol/L  | [facility]       |              |
|             |                          |                 | LABORATORY |              |
+-------------+--------------------------+-----------------+------------+--------------+
| Anion Gap   | 11                       | 5 - 20 mmol/L   | [facility]       |              |
|             |                          |                 | LABORATORY |              |
+-------------+--------------------------+-----------------+------------+--------------+
| Glucose     | 140 (H)                  | 65 - 99 mg/dL   | [facility]       |              |
|             |                          |                 | LABORATORY |              |
+-------------+--------------------------+-----------------+------------+--------------+
| BUN         | 8                        | 8 - 25 mg/dL    | [facility]       |              |
|             |                          |                 | LABORATORY |              |
+-------------+--------------------------+-----------------+------------+--------------+
| Creatinine  | 0.9                      | 0.70 - 1.30     | [facility]       |              |
|             |                          | mg/dL           | LABORATORY |              |
+-------------+--------------------------+-----------------+------------+--------------+
| BUN/Creatin | 9                        |                 | [facility]       |              |
| ine Ratio   |                          |                 | LABORATORY |              |
+-------------+--------------------------+-----------------+------------+--------------+
| Calcium     | 8.5                      | 8.5 - 10.5      | [facility]       |              |
|             |                          | mg/dL           | LABORATORY |              |
+-------------+--------------------------+-----------------+------------+--------------+
| Estimated   | >60Comment: GFR <60:     | >60             | [facility]       |              |
| GFR         | CHRONIC KIDNEY DISEASE,  | mL/min/1.73m2   | LABORATORY |              |
|             | IF FOUND OVER A 3 MONTH  |                 |            |              |
|             | PERIOD.GFR <15: KIDNEY   |                 |            |              |
|             | FAILURE.FOR       |                 |            |              |
|             | [employer], MULTIPLY THE  |                 |            |              |
|             | CALCULATED GFR BY        |                 |            |              |
|             | 1.210.This eGFR is       |                 |            |              |
|             | calculated using the     |                 |            |              |
|             | MDRD IDMS traceable      |                 |            |              |
|             | equation.Testing         |                 |            |              |
|             | performed at [facility], [address] |                 |            |              |
|             |  [facility],        |                 |            |              |
|             | [place]   [address]    |                 |            |              |
+-------------+--------------------------+-----------------+------------+--------------+
 
 
 
+----------+
| Specimen |
+----------+
| Blood    |
+----------+
 
 
 
+-------------------+------------------+--------------------+----------------+
| Performing        | Address          | City/State/Zipcode | Phone Number   |
| Organization      |                  |                    |                |
+-------------------+------------------+--------------------+----------------+
|   [facility] |   [address] | [place] [address] |   [phone] |
+-------------------+------------------+--------------------+----------------+
 CBC with Differential (2019  4:29 AM PDT)
 
+-------------+--------------------------+-----------------+------------+--------------+
 
| Component   | Value                    | Ref Range       | Performed  | Pathologist  |
|             |                          |                 | At         | Signature    |
+-------------+--------------------------+-----------------+------------+--------------+
| WBC         | 5.54                     | 3.80 - 11.00    | [facility]       |              |
|             |                          | K/uL            | LABORATORY |              |
+-------------+--------------------------+-----------------+------------+--------------+
| RBC         | 3.72 (L)                 | 4.20 - 5.70     | [facility]       |              |
|             |                          | M/uL            | LABORATORY |              |
+-------------+--------------------------+-----------------+------------+--------------+
| Hemoglobin  | 12.3 (L)                 | 13.2 - 17.0     | [facility]       |              |
|             |                          | g/dL            | LABORATORY |              |
+-------------+--------------------------+-----------------+------------+--------------+
| Hematocrit  | 36.0 (L)                 | 39.0 - 50.0 %   | [facility]       |              |
|             |                          |                 | LABORATORY |              |
+-------------+--------------------------+-----------------+------------+--------------+
| MCV         | 96.8                     | 80.0 - 100.0 fl | [facility]       |              |
|             |                          |                 | LABORATORY |              |
+-------------+--------------------------+-----------------+------------+--------------+
| MCH         | 33.1                     | 27.0 - 34.0 pg  | [facility]       |              |
|             |                          |                 | LABORATORY |              |
+-------------+--------------------------+-----------------+------------+--------------+
| MCHC        | 34.2                     | 32.0 - 35.5     | [facility]       |              |
|             |                          | g/dL            | LABORATORY |              |
+-------------+--------------------------+-----------------+------------+--------------+
| RDW-SD      | 46.8                     | 37 - 53 fl      | [facility]       |              |
|             |                          |                 | LABORATORY |              |
+-------------+--------------------------+-----------------+------------+--------------+
| Platelet    | 140 (L)                  | 150 - 400 K/uL  | [facility]       |              |
| Count       |                          |                 | LABORATORY |              |
+-------------+--------------------------+-----------------+------------+--------------+
| MPV         | 7.4                      | fl              | [facility]       |              |
|             |                          |                 | LABORATORY |              |
+-------------+--------------------------+-----------------+------------+--------------+
| Diff Type   | AUTOMATED                |                 | [facility]       |              |
|             |                          |                 | LABORATORY |              |
+-------------+--------------------------+-----------------+------------+--------------+
| %           | 57.00                    | %               | [facility]       |              |
| Neutrophils |                          |                 | LABORATORY |              |
+-------------+--------------------------+-----------------+------------+--------------+
| %           | 36.25                    | %               | [facility]       |              |
| Lymphocytes |                          |                 | LABORATORY |              |
+-------------+--------------------------+-----------------+------------+--------------+
| Monocyte %  | 5.29                     | %               | [facility]       |              |
|             |                          |                 | LABORATORY |              |
+-------------+--------------------------+-----------------+------------+--------------+
| Eosinophils | 1.03                     | %               | [facility]       |              |
|  %          |                          |                 | LABORATORY |              |
+-------------+--------------------------+-----------------+------------+--------------+
| Basophils % | 0.43                     | %               | [facility]       |              |
|             |                          |                 | LABORATORY |              |
+-------------+--------------------------+-----------------+------------+--------------+
| Neutrophils | 3.16                     | 1.90 - 7.40     | [facility]       |              |
| , Absolute  |                          | K/uL            | LABORATORY |              |
+-------------+--------------------------+-----------------+------------+--------------+
| Absolute    | 2.01                     | 1.00 - 3.90     | [facility]       |              |
| Lymphocytes |                          | K/uL            | LABORATORY |              |
+-------------+--------------------------+-----------------+------------+--------------+
| Absolute    | 0.29                     | 0.00 - 0.80     | [facility]       |              |
| Monocytes   |                          | K/uL            | LABORATORY |              |
+-------------+--------------------------+-----------------+------------+--------------+
 
| Eosinophils | 0.06                     | 0.00 - 0.50     | [facility]       |              |
| , Absolute  |                          | K/uL            | LABORATORY |              |
+-------------+--------------------------+-----------------+------------+--------------+
| Basophils,  | 0.02Comment: Testing     | 0.00 - 0.10     | [facility]       |              |
| Absolute    | performed at [facility], [address] | K/uL            | LABORATORY |              |
|             |  [facility],        |                 |            |              |
|             | [place]  [id number]     |                 |            |              |
+-------------+--------------------------+-----------------+------------+--------------+
 
 
 
+----------+
| Specimen |
+----------+
| Blood    |
+----------+
 
 
 
+-------------------+------------------+--------------------+----------------+
| Performing        | Address          | City/State/Zipcode | Phone Number   |
| Organization      |                  |                    |                |
+-------------------+------------------+--------------------+----------------+
|   [facility] |   [address] | [place] [address] |   [phone] |
+-------------------+------------------+--------------------+----------------+
 Basic Metabolic Panel (2019  4:29 AM PDT)
 
+-------------+--------------------------+-----------------+------------+--------------+
| Component   | Value                    | Ref Range       | Performed  | Pathologist  |
|             |                          |                 | At         | Signature    |
+-------------+--------------------------+-----------------+------------+--------------+
| Na          | 135                      | 135 - 145       | [facility]       |              |
|             |                          | mmol/L          | LABORATORY |              |
+-------------+--------------------------+-----------------+------------+--------------+
| K           | 4.0                      | 3.5 - 4.9       | [facility]       |              |
|             |                          | mmol/L          | LABORATORY |              |
+-------------+--------------------------+-----------------+------------+--------------+
| Cl          | 103                      | 99 - 109 mmol/L | [facility]       |              |
|             |                          |                 | LABORATORY |              |
+-------------+--------------------------+-----------------+------------+--------------+
| CO2         | 25                       | 23 - 32 mmol/L  | [facility]       |              |
|             |                          |                 | LABORATORY |              |
+-------------+--------------------------+-----------------+------------+--------------+
| Anion Gap   | 11                       | 5 - 20 mmol/L   | [facility]       |              |
|             |                          |                 | LABORATORY |              |
+-------------+--------------------------+-----------------+------------+--------------+
| Glucose     | 140 (H)                  | 65 - 99 mg/dL   | [facility]       |              |
|             |                          |                 | LABORATORY |              |
+-------------+--------------------------+-----------------+------------+--------------+
| BUN         | 9                        | 8 - 25 mg/dL    | [facility]       |              |
|             |                          |                 | LABORATORY |              |
+-------------+--------------------------+-----------------+------------+--------------+
| Creatinine  | 0.8                      | 0.70 - 1.30     | [facility]       |              |
|             |                          | mg/dL           | LABORATORY |              |
+-------------+--------------------------+-----------------+------------+--------------+
| BUN/Creatin | 11                       |                 | [facility]       |              |
| ine Ratio   |                          |                 | LABORATORY |              |
+-------------+--------------------------+-----------------+------------+--------------+
| Calcium     | 8.7                      | 8.5 - 10.5      | [facility]       |              |
|             |                          | mg/dL           | LABORATORY |              |
 
+-------------+--------------------------+-----------------+------------+--------------+
| Estimated   | >60Comment: GFR <60:     | >60             | [facility]       |              |
| GFR         | CHRONIC KIDNEY DISEASE,  | mL/min/1.73m2   | LABORATORY |              |
|             | IF FOUND OVER A 3 MONTH  |                 |            |              |
|             | PERIOD.GFR <15: KIDNEY   |                 |            |              |
|             | FAILURE.FOR       |                 |            |              |
|             | AMERICANS, MULTIPLY THE  |                 |            |              |
|             | CALCULATED GFR BY        |                 |            |              |
|             | 1.210.This eGFR is       |                 |            |              |
|             | calculated using the     |                 |            |              |
|             | MDRD IDMS traceable      |                 |            |              |
|             | equation.Testing         |                 |            |              |
|             | performed at [facility], [address] |                 |            |              |
|             |  [facility],        |                 |            |              |
|             | [place]   [address]    |                 |            |              |
+-------------+--------------------------+-----------------+------------+--------------+
 
 
 
+----------+
| Specimen |
+----------+
| Blood    |
+----------+
 
 
 
+-------------------+------------------+--------------------+----------------+
| Performing        | Address          | City/State/Zipcode | Phone Number   |
| Organization      |                  |                    |                |
+-------------------+------------------+--------------------+----------------+
|   [facility] |   [address] | [place] [address] |   [phone] |
+-------------------+------------------+--------------------+----------------+
 CBC with Differential (2019  4:23 AM PDT)
 
+-------------+--------------------------+-----------------+------------+--------------+
| Component   | Value                    | Ref Range       | Performed  | Pathologist  |
|             |                          |                 | At         | Signature    |
+-------------+--------------------------+-----------------+------------+--------------+
| WBC         | 9.31                     | 3.80 - 11.00    | [facility]       |              |
|             |                          | K/uL            | LABORATORY |              |
+-------------+--------------------------+-----------------+------------+--------------+
| RBC         | 3.81 (L)                 | 4.20 - 5.70     | [facility]       |              |
|             |                          | M/uL            | LABORATORY |              |
+-------------+--------------------------+-----------------+------------+--------------+
| Hemoglobin  | 12.7 (L)                 | 13.2 - 17.0     | [facility]       |              |
|             |                          | g/dL            | LABORATORY |              |
+-------------+--------------------------+-----------------+------------+--------------+
| Hematocrit  | 37.1 (L)                 | 39.0 - 50.0 %   | [facility]       |              |
|             |                          |                 | LABORATORY |              |
+-------------+--------------------------+-----------------+------------+--------------+
| MCV         | 97.5                     | 80.0 - 100.0 fl | [facility]       |              |
|             |                          |                 | LABORATORY |              |
+-------------+--------------------------+-----------------+------------+--------------+
| MCH         | 33.5                     | 27.0 - 34.0 pg  | [facility]       |              |
|             |                          |                 | LABORATORY |              |
+-------------+--------------------------+-----------------+------------+--------------+
| MCHC        | 34.3                     | 32.0 - 35.5     | [facility]       |              |
|             |                          | g/dL            | LABORATORY |              |
+-------------+--------------------------+-----------------+------------+--------------+
 
| RDW-SD      | 46.8                     | 37 - 53 fl      | [facility]       |              |
|             |                          |                 | LABORATORY |              |
+-------------+--------------------------+-----------------+------------+--------------+
| Platelet    | 162                      | 150 - 400 K/uL  | [facility]       |              |
| Count       |                          |                 | LABORATORY |              |
+-------------+--------------------------+-----------------+------------+--------------+
| MPV         | 7.5                      | fl              | [facility]       |              |
|             |                          |                 | LABORATORY |              |
+-------------+--------------------------+-----------------+------------+--------------+
| Diff Type   | AUTOMATED                |                 | [facility]       |              |
|             |                          |                 | LABORATORY |              |
+-------------+--------------------------+-----------------+------------+--------------+
| %           | 54.94                    | %               | [facility]       |              |
| Neutrophils |                          |                 | LABORATORY |              |
+-------------+--------------------------+-----------------+------------+--------------+
| %           | 39.44                    | %               | [facility]       |              |
| Lymphocytes |                          |                 | LABORATORY |              |
+-------------+--------------------------+-----------------+------------+--------------+
| Monocyte %  | 4.62                     | %               | [facility]       |              |
|             |                          |                 | LABORATORY |              |
+-------------+--------------------------+-----------------+------------+--------------+
| Eosinophils | 0.69                     | %               | [facility]       |              |
|  %          |                          |                 | LABORATORY |              |
+-------------+--------------------------+-----------------+------------+--------------+
| Basophils % | 0.31                     | %               | [facility]       |              |
|             |                          |                 | LABORATORY |              |
+-------------+--------------------------+-----------------+------------+--------------+
| Neutrophils | 5.12                     | 1.90 - 7.40     | [facility]       |              |
| , Absolute  |                          | K/uL            | LABORATORY |              |
+-------------+--------------------------+-----------------+------------+--------------+
| Absolute    | 3.67                     | 1.00 - 3.90     | [facility]       |              |
| Lymphocytes |                          | K/uL            | LABORATORY |              |
+-------------+--------------------------+-----------------+------------+--------------+
| Absolute    | 0.43                     | 0.00 - 0.80     | [facility]       |              |
| Monocytes   |                          | K/uL            | LABORATORY |              |
+-------------+--------------------------+-----------------+------------+--------------+
| Eosinophils | 0.06                     | 0.00 - 0.50     | [facility]       |              |
| , Absolute  |                          | K/uL            | LABORATORY |              |
+-------------+--------------------------+-----------------+------------+--------------+
| Basophils,  | 0.03Comment: Testing     | 0.00 - 0.10     | [facility]       |              |
| Absolute    | performed at [facility], [address] | K/uL            | LABORATORY |              |
|             |  [address],        |                 |            |              |
|             | [place]  [id number]     |                 |            |              |
+-------------+--------------------------+-----------------+------------+--------------+
 
 
 
+----------+
| Specimen |
+----------+
| Blood    |
+----------+
 
 
 
+-------------------+------------------+--------------------+----------------+
| Performing        | Address          | City/State/Zipcode | Phone Number   |
| Organization      |                  |                    |                |
+-------------------+------------------+--------------------+----------------+
|   [facility] |   [address] | [place] [address] |   [phone] |
 
+-------------------+------------------+--------------------+----------------+
 Basic Metabolic Panel (2019  4:23 AM PDT)
 
+-------------+--------------------------+-----------------+------------+--------------+
| Component   | Value                    | Ref Range       | Performed  | Pathologist  |
|             |                          |                 | At         | Signature    |
+-------------+--------------------------+-----------------+------------+--------------+
| Na          | 137                      | 135 - 145       | [facility]       |              |
|             |                          | mmol/L          | LABORATORY |              |
+-------------+--------------------------+-----------------+------------+--------------+
| K           | 4.0                      | 3.5 - 4.9       | [facility]       |              |
|             |                          | mmol/L          | LABORATORY |              |
+-------------+--------------------------+-----------------+------------+--------------+
| Cl          | 104                      | 99 - 109 mmol/L | [facility]       |              |
|             |                          |                 | LABORATORY |              |
+-------------+--------------------------+-----------------+------------+--------------+
| CO2         | 26                       | 23 - 32 mmol/L  | [facility]       |              |
|             |                          |                 | LABORATORY |              |
+-------------+--------------------------+-----------------+------------+--------------+
| Anion Gap   | 11                       | 5 - 20 mmol/L   | [facility]       |              |
|             |                          |                 | LABORATORY |              |
+-------------+--------------------------+-----------------+------------+--------------+
| Glucose     | 140 (H)                  | 65 - 99 mg/dL   | [facility]       |              |
|             |                          |                 | LABORATORY |              |
+-------------+--------------------------+-----------------+------------+--------------+
| BUN         | 8                        | 8 - 25 mg/dL    | [facility]       |              |
|             |                          |                 | LABORATORY |              |
+-------------+--------------------------+-----------------+------------+--------------+
| Creatinine  | 0.9                      | 0.70 - 1.30     | [facility]       |              |
|             |                          | mg/dL           | LABORATORY |              |
+-------------+--------------------------+-----------------+------------+--------------+
| BUN/Creatin | 9                        |                 | [facility]       |              |
| ine Ratio   |                          |                 | LABORATORY |              |
+-------------+--------------------------+-----------------+------------+--------------+
| Calcium     | 8.4 (L)                  | 8.5 - 10.5      | [facility]       |              |
|             |                          | mg/dL           | LABORATORY |              |
+-------------+--------------------------+-----------------+------------+--------------+
| Estimated   | >60Comment: GFR <60:     | >60             | [facility]       |              |
| GFR         | CHRONIC KIDNEY DISEASE,  | mL/min/1.73m2   | LABORATORY |              |
|             | IF FOUND OVER A 3 MONTH  |                 |            |              |
|             | PERIOD.GFR <15: KIDNEY   |                 |            |              |
|             | FAILURE.FOR       |                 |            |              |
|             | AMERICANS, MULTIPLY THE  |                 |            |              |
|             | CALCULATED GFR BY        |                 |            |              |
|             | 1.210.This eGFR is       |                 |            |              |
|             | calculated using the     |                 |            |              |
|             | MDRD IDMS traceable      |                 |            |              |
|             | equation.Testing         |                 |            |              |
|             | performed at [facility], [address] |                 |            |              |
|             |  [facility],        |                 |            |              |
|             | [place]   [address]    |                 |            |              |
+-------------+--------------------------+-----------------+------------+--------------+
 
 
 
+----------+
| Specimen |
+----------+
| Blood    |
+----------+
 
 
 
 
+-------------------+------------------+--------------------+----------------+
| Performing        | Address          | City/State/Zipcode | Phone Number   |
| Organization      |                  |                    |                |
+-------------------+------------------+--------------------+----------------+
|   [facility] |   [address] | [place] [address] |   [phone] |
+-------------------+------------------+--------------------+----------------+
 CBC with Differential (2019  4:06 AM PDT)
 
+-------------+--------------------------+-----------------+------------+--------------+
| Component   | Value                    | Ref Range       | Performed  | Pathologist  |
|             |                          |                 | At         | Signature    |
+-------------+--------------------------+-----------------+------------+--------------+
| WBC         | 10.14                    | 3.80 - 11.00    | [facility]       |              |
|             |                          | K/uL            | LABORATORY |              |
+-------------+--------------------------+-----------------+------------+--------------+
| RBC         | 3.79 (L)                 | 4.20 - 5.70     | [facility]       |              |
|             |                          | M/uL            | LABORATORY |              |
+-------------+--------------------------+-----------------+------------+--------------+
| Hemoglobin  | 12.6 (L)                 | 13.2 - 17.0     | [facility]       |              |
|             |                          | g/dL            | LABORATORY |              |
+-------------+--------------------------+-----------------+------------+--------------+
| Hematocrit  | 37.1 (L)                 | 39.0 - 50.0 %   | [facility]       |              |
|             |                          |                 | LABORATORY |              |
+-------------+--------------------------+-----------------+------------+--------------+
| MCV         | 97.8                     | 80.0 - 100.0 fl | [facility]       |              |
|             |                          |                 | LABORATORY |              |
+-------------+--------------------------+-----------------+------------+--------------+
| MCH         | 33.3                     | 27.0 - 34.0 pg  | [facility]       |              |
|             |                          |                 | LABORATORY |              |
+-------------+--------------------------+-----------------+------------+--------------+
| MCHC        | 34.1                     | 32.0 - 35.5     | [facility]       |              |
|             |                          | g/dL            | LABORATORY |              |
+-------------+--------------------------+-----------------+------------+--------------+
| RDW-SD      | 47.3                     | 37 - 53 fl      | [facility]       |              |
|             |                          |                 | LABORATORY |              |
+-------------+--------------------------+-----------------+------------+--------------+
| Platelet    | 149 (L)                  | 150 - 400 K/uL  | [facility]       |              |
| Count       |                          |                 | LABORATORY |              |
+-------------+--------------------------+-----------------+------------+--------------+
| MPV         | 7.4                      | fl              | [facility]       |              |
|             |                          |                 | LABORATORY |              |
+-------------+--------------------------+-----------------+------------+--------------+
| Diff Type   | AUTOMATED                |                 | [facility]       |              |
|             |                          |                 | LABORATORY |              |
+-------------+--------------------------+-----------------+------------+--------------+
| %           | 63.73                    | %               | [facility]       |              |
| Neutrophils |                          |                 | LABORATORY |              |
+-------------+--------------------------+-----------------+------------+--------------+
| %           | 31.45                    | %               | [facility]       |              |
| Lymphocytes |                          |                 | LABORATORY |              |
+-------------+--------------------------+-----------------+------------+--------------+
| Monocyte %  | 4.45                     | %               | [facility]       |              |
|             |                          |                 | LABORATORY |              |
+-------------+--------------------------+-----------------+------------+--------------+
| Eosinophils | 0.24                     | %               | [facility]       |              |
|  %          |                          |                 | LABORATORY |              |
+-------------+--------------------------+-----------------+------------+--------------+
 
| Basophils % | 0.13                     | %               | [facility]       |              |
|             |                          |                 | LABORATORY |              |
+-------------+--------------------------+-----------------+------------+--------------+
| Neutrophils | 6.46                     | 1.90 - 7.40     | [facility]       |              |
| , Absolute  |                          | K/uL            | LABORATORY |              |
+-------------+--------------------------+-----------------+------------+--------------+
| Absolute    | 3.19                     | 1.00 - 3.90     | [facility]       |              |
| Lymphocytes |                          | K/uL            | LABORATORY |              |
+-------------+--------------------------+-----------------+------------+--------------+
| Absolute    | 0.45                     | 0.00 - 0.80     | [facility]       |              |
| Monocytes   |                          | K/uL            | LABORATORY |              |
+-------------+--------------------------+-----------------+------------+--------------+
| Eosinophils | 0.02                     | 0.00 - 0.50     | [facility]       |              |
| , Absolute  |                          | K/uL            | LABORATORY |              |
+-------------+--------------------------+-----------------+------------+--------------+
| Basophils,  | 0.01Comment: Testing     | 0.00 - 0.10     | [facility]       |              |
| Absolute    | performed at [facility], [address] | K/uL            | LABORATORY |              |
|             |  [address],        |                 |            |              |
|             | [place]  [id number]     |                 |            |              |
+-------------+--------------------------+-----------------+------------+--------------+
 
 
 
+----------+
| Specimen |
+----------+
| Blood    |
+----------+
 
 
 
+-------------------+------------------+--------------------+----------------+
| Performing        | Address          | City/State/Zipcode | Phone Number   |
| Organization      |                  |                    |                |
+-------------------+------------------+--------------------+----------------+
|   [facility] |   [address] | [place] [address] |   [phone] |
+-------------------+------------------+--------------------+----------------+
 Basic Metabolic Panel (2019  4:06 AM PDT)
 
+-------------+--------------------------+-----------------+------------+--------------+
| Component   | Value                    | Ref Range       | Performed  | Pathologist  |
|             |                          |                 | At         | Signature    |
+-------------+--------------------------+-----------------+------------+--------------+
| Na          | 138                      | 135 - 145       | [facility]       |              |
|             |                          | mmol/L          | LABORATORY |              |
+-------------+--------------------------+-----------------+------------+--------------+
| K           | 4.4                      | 3.5 - 4.9       | [facility]       |              |
|             |                          | mmol/L          | LABORATORY |              |
+-------------+--------------------------+-----------------+------------+--------------+
| Cl          | 106                      | 99 - 109 mmol/L | [facility]       |              |
|             |                          |                 | LABORATORY |              |
+-------------+--------------------------+-----------------+------------+--------------+
| CO2         | 25                       | 23 - 32 mmol/L  | [facility]       |              |
|             |                          |                 | LABORATORY |              |
+-------------+--------------------------+-----------------+------------+--------------+
| Anion Gap   | 11                       | 5 - 20 mmol/L   | [facility]       |              |
|             |                          |                 | LABORATORY |              |
+-------------+--------------------------+-----------------+------------+--------------+
| Glucose     | 144 (H)                  | 65 - 99 mg/dL   | [facility]       |              |
|             |                          |                 | LABORATORY |              |
 
+-------------+--------------------------+-----------------+------------+--------------+
| BUN         | 16                       | 8 - 25 mg/dL    | [facility]       |              |
|             |                          |                 | LABORATORY |              |
+-------------+--------------------------+-----------------+------------+--------------+
| Creatinine  | 0.9                      | 0.70 - 1.30     | [facility]       |              |
|             |                          | mg/dL           | LABORATORY |              |
+-------------+--------------------------+-----------------+------------+--------------+
| BUN/Creatin | 18                       |                 | [facility]       |              |
| ine Ratio   |                          |                 | LABORATORY |              |
+-------------+--------------------------+-----------------+------------+--------------+
| Calcium     | 8.6                      | 8.5 - 10.5      | [facility]       |              |
|             |                          | mg/dL           | LABORATORY |              |
+-------------+--------------------------+-----------------+------------+--------------+
| Estimated   | >60Comment: GFR <60:     | >60             | [facility]       |              |
| GFR         | CHRONIC KIDNEY DISEASE,  | mL/min/1.73m2   | LABORATORY |              |
|             | IF FOUND OVER A 3 MONTH  |                 |            |              |
|             | PERIOD.GFR <15: KIDNEY   |                 |            |              |
|             | FAILURE.FOR       |                 |            |              |
|             | AMERICANS, MULTIPLY THE  |                 |            |              |
|             | CALCULATED GFR BY        |                 |            |              |
|             | 1.210.This eGFR is       |                 |            |              |
|             | calculated using the     |                 |            |              |
|             | MDRD IDMS traceable      |                 |            |              |
|             | equation.Testing         |                 |            |              |
|             | performed at [facility], [address] |                 |            |              |
|             |  [address],        |                 |            |              |
|             | [place]   [id number]    |                 |            |              |
+-------------+--------------------------+-----------------+------------+--------------+
 
 
 
+----------+
| Specimen |
+----------+
| Blood    |
+----------+
 
 
 
+-------------------+------------------+--------------------+----------------+
| Performing        | Address          | City/State/Zipcode | Phone Number   |
| Organization      |                  |                    |                |
+-------------------+------------------+--------------------+----------------+
|   [facility] |   [address] | [place] [address] |   [phone] |
+-------------------+------------------+--------------------+----------------+
 Lactic Acid (2019  9:09 AM PDT)
 
+-----------+-------------------------+------------+------------+--------------+
| Component | Value                   | Ref Range  | Performed  | Pathologist  |
|           |                         |            | At         | Signature    |
+-----------+-------------------------+------------+------------+--------------+
| Lactate,  | 1.6Comment: Testing     | 0.4 - 2.0  | [facility]       |              |
| Serum     | performed at [facility];[address]    | mmol/L     | LABORATORY |              |
|           | [address];[place], [place]  |            |            |              |
|           | [id number]                   |            |            |              |
+-----------+-------------------------+------------+------------+--------------+
 
 
 
+----------+
 
| Specimen |
+----------+
| Blood    |
+----------+
 
 
 
+-------------------+------------------+--------------------+----------------+
| Performing        | Address          | City/State/Zipcode | Phone Number   |
| Organization      |                  |                    |                |
+-------------------+------------------+--------------------+----------------+
|   [facility] |   [address] | [place] [address] |   [phone] |
+-------------------+------------------+--------------------+----------------+
 ECG 12 lead (2019  5:37 AM PDT)
 
+-------------+--------------------------+-----------+------------+--------------+
| Component   | Value                    | Ref Range | Performed  | Pathologist  |
|             |                          |           | At         | Signature    |
+-------------+--------------------------+-----------+------------+--------------+
| VENTRICULAR | 74                       | BPM       | [facility]  |              |
|  RATE EKG   |                          |           |            |              |
+-------------+--------------------------+-----------+------------+--------------+
| ATRIAL RATE | 74                       | BPM       | [facility]  |              |
+-------------+--------------------------+-----------+------------+--------------+
| P-R         | 214                      | ms        | [facility]  |              |
| INTERVAL    |                          |           |            |              |
+-------------+--------------------------+-----------+------------+--------------+
| QRS         | 90                       | ms        | [facility]  |              |
| DURATION    |                          |           |            |              |
+-------------+--------------------------+-----------+------------+--------------+
| Q-T         | 412                      | ms        | [facility]  |              |
| INTERVAL    |                          |           |            |              |
+-------------+--------------------------+-----------+------------+--------------+
| Q-T         | 458                      | ms        | [facility]  |              |
| INTERVAL    |                          |           |            |              |
| (CORRECTED) |                          |           |            |              |
+-------------+--------------------------+-----------+------------+--------------+
| P WAVE AXIS | 38                       | degrees   | [facility]  |              |
+-------------+--------------------------+-----------+------------+--------------+
| QRS AXIS    | -8                       | degrees   | [facility]  |              |
+-------------+--------------------------+-----------+------------+--------------+
| T AXIS      | 5                        | degrees   | [facility]  |              |
+-------------+--------------------------+-----------+------------+--------------+
| INTERPRETAT | Sinus rhythm with 1st    |           | [facility]  |              |
| ION TEXT    | degree A-V blockAbnormal |           |            |              |
|             |  ECGWhen compared with   |           |            |              |
|             | ECG of 17-SEP-2019       |           |            |              |
|             | 09:12,No significant     |           |            |              |
|             | change was               |           |            |              |
|             | foundConfirmed by        |           |            |              |
|             | Geovanni Bermudez MD     |           |            |              |
|             | (127) on 2019       |           |            |              |
|             | 5:46:12 PM               |           |            |              |
+-------------+--------------------------+-----------+------------+--------------+
 
 
 
+----------+
| Specimen |
+----------+
 
|          |
+----------+
 
 
 
+----------------------------------------------------------+--------------+
| Narrative                                                | Performed At |
+----------------------------------------------------------+--------------+
|   This result has an attachment that is not available.   |              |
+----------------------------------------------------------+--------------+
 
 
 
+--------------+---------+--------------------+--------------+
| Performing   | Address | City/State/Zipcode | Phone Number |
| Organization |         |                    |              |
+--------------+---------+--------------------+--------------+
|   [facility]  |         |                    |              |
+--------------+---------+--------------------+--------------+
 CBC with Differential (2019  4:40 AM PDT)
 
+-------------+--------------------------+-----------------+------------+--------------+
| Component   | Value                    | Ref Range       | Performed  | Pathologist  |
|             |                          |                 | At         | Signature    |
+-------------+--------------------------+-----------------+------------+--------------+
| WBC         | 13.98 (H)                | 3.80 - 11.00    | [facility]       |              |
|             |                          | K/uL            | LABORATORY |              |
+-------------+--------------------------+-----------------+------------+--------------+
| RBC         | 4.07 (L)                 | 4.20 - 5.70     | [facility]       |              |
|             |                          | M/uL            | LABORATORY |              |
+-------------+--------------------------+-----------------+------------+--------------+
| Hemoglobin  | 13.3                     | 13.2 - 17.0     | [facility]       |              |
|             |                          | g/dL            | LABORATORY |              |
+-------------+--------------------------+-----------------+------------+--------------+
| Hematocrit  | 39.6                     | 39.0 - 50.0 %   | [facility]       |              |
|             |                          |                 | LABORATORY |              |
+-------------+--------------------------+-----------------+------------+--------------+
| MCV         | 97.4                     | 80.0 - 100.0 fl | [facility]       |              |
|             |                          |                 | LABORATORY |              |
+-------------+--------------------------+-----------------+------------+--------------+
| MCH         | 32.8                     | 27.0 - 34.0 pg  | [facility]       |              |
|             |                          |                 | LABORATORY |              |
+-------------+--------------------------+-----------------+------------+--------------+
| MCHC        | 33.7                     | 32.0 - 35.5     | [facility]       |              |
|             |                          | g/dL            | LABORATORY |              |
+-------------+--------------------------+-----------------+------------+--------------+
| RDW-SD      | 47.3                     | 37 - 53 fl      | [facility]       |              |
|             |                          |                 | LABORATORY |              |
+-------------+--------------------------+-----------------+------------+--------------+
| Platelet    | 185                      | 150 - 400 K/uL  | [facility]       |              |
| Count       |                          |                 | LABORATORY |              |
+-------------+--------------------------+-----------------+------------+--------------+
| MPV         | 7.3                      | fl              | [facility]       |              |
|             |                          |                 | LABORATORY |              |
+-------------+--------------------------+-----------------+------------+--------------+
| Diff Type   | AUTOMATED                |                 | [facility]       |              |
|             |                          |                 | LABORATORY |              |
+-------------+--------------------------+-----------------+------------+--------------+
| %           | 55.68                    | %               | [facility]       |              |
| Neutrophils |                          |                 | LABORATORY |              |
 
+-------------+--------------------------+-----------------+------------+--------------+
| %           | 39.54                    | %               | [facility]       |              |
| Lymphocytes |                          |                 | LABORATORY |              |
+-------------+--------------------------+-----------------+------------+--------------+
| Monocyte %  | 4.55                     | %               | [facility]       |              |
|             |                          |                 | LABORATORY |              |
+-------------+--------------------------+-----------------+------------+--------------+
| Eosinophils | 0.05                     | %               | [facility]       |              |
|  %          |                          |                 | LABORATORY |              |
+-------------+--------------------------+-----------------+------------+--------------+
| Basophils % | 0.18                     | %               | [facility]       |              |
|             |                          |                 | LABORATORY |              |
+-------------+--------------------------+-----------------+------------+--------------+
| Neutrophils | 7.79 (H)                 | 1.90 - 7.40     | [facility]       |              |
| , Absolute  |                          | K/uL            | LABORATORY |              |
+-------------+--------------------------+-----------------+------------+--------------+
| Absolute    | 5.53 (H)                 | 1.00 - 3.90     | [facility]       |              |
| Lymphocytes |                          | K/uL            | LABORATORY |              |
+-------------+--------------------------+-----------------+------------+--------------+
| Absolute    | 0.64                     | 0.00 - 0.80     | [facility]       |              |
| Monocytes   |                          | K/uL            | LABORATORY |              |
+-------------+--------------------------+-----------------+------------+--------------+
| Eosinophils | 0.01                     | 0.00 - 0.50     | [facility]       |              |
| , Absolute  |                          | K/uL            | LABORATORY |              |
+-------------+--------------------------+-----------------+------------+--------------+
| Basophils,  | 0.03                     | 0.00 - 0.10     | [facility]       |              |
| Absolute    |                          | K/uL            | LABORATORY |              |
+-------------+--------------------------+-----------------+------------+--------------+
| RBC         | RBC AND PLT MORPHOLOGY   |                 | [facility]       |              |
| Morphology  | APPEAR NORMAL            |                 | LABORATORY |              |
+-------------+--------------------------+-----------------+------------+--------------+
| Platelet    | ADEQUATE                 |                 | [facility]       |              |
| Estimate    |                          |                 | LABORATORY |              |
+-------------+--------------------------+-----------------+------------+--------------+
| Comment     | SLIDE SCANNED, AGREES    |                 | [facility]       |              |
|             | WITH AUTOMATED           |                 | LABORATORY |              |
|             | RESULTS.Comment: Testing |                 |            |              |
|             |  performed at [facility]    |                 |            |              |
|             | [facility];[place], [place]   |                 |            |              |
|             | [id number]                    |                 |            |              |
+-------------+--------------------------+-----------------+------------+--------------+
 
 
 
+----------+
| Specimen |
+----------+
| Blood    |
+----------+
 
 
 
+-------------------+------------------+--------------------+----------------+
| Performing        | Address          | City/State/Zipcode | Phone Number   |
| Organization      |                  |                    |                |
+-------------------+------------------+--------------------+----------------+
|   [facility] |   [address] | [place] [address] |   [phone] |
+-------------------+------------------+--------------------+----------------+
 Basic Metabolic Panel (2019  4:40 AM PDT)
 
 
+-------------+--------------------------+-----------------+------------+--------------+
| Component   | Value                    | Ref Range       | Performed  | Pathologist  |
|             |                          |                 | At         | Signature    |
+-------------+--------------------------+-----------------+------------+--------------+
| Na          | 140                      | 135 - 145       | [facility]       |              |
|             |                          | mmol/L          | LABORATORY |              |
+-------------+--------------------------+-----------------+------------+--------------+
| K           | 4.5                      | 3.5 - 4.9       | [facility]       |              |
|             |                          | mmol/L          | LABORATORY |              |
+-------------+--------------------------+-----------------+------------+--------------+
| Cl          | 107                      | 99 - 109 mmol/L | [facility]       |              |
|             |                          |                 | LABORATORY |              |
+-------------+--------------------------+-----------------+------------+--------------+
| CO2         | 23                       | 23 - 32 mmol/L  | [facility]       |              |
|             |                          |                 | LABORATORY |              |
+-------------+--------------------------+-----------------+------------+--------------+
| Anion Gap   | 15                       | 5 - 20 mmol/L   | [facility]       |              |
|             |                          |                 | LABORATORY |              |
+-------------+--------------------------+-----------------+------------+--------------+
| Glucose     | 192 (H)                  | 65 - 99 mg/dL   | [facility]       |              |
|             |                          |                 | LABORATORY |              |
+-------------+--------------------------+-----------------+------------+--------------+
| BUN         | 17                       | 8 - 25 mg/dL    | [facility]       |              |
|             |                          |                 | LABORATORY |              |
+-------------+--------------------------+-----------------+------------+--------------+
| Creatinine  | 1.02                     | 0.70 - 1.30     | [facility]       |              |
|             |                          | mg/dL           | LABORATORY |              |
+-------------+--------------------------+-----------------+------------+--------------+
| BUN/Creatin | 17                       |                 | [facility]       |              |
| ine Ratio   |                          |                 | LABORATORY |              |
+-------------+--------------------------+-----------------+------------+--------------+
| Calcium     | 8.6                      | 8.5 - 10.5      | [facility]       |              |
|             |                          | mg/dL           | LABORATORY |              |
+-------------+--------------------------+-----------------+------------+--------------+
| Estimated   | >60Comment: GFR <60:     | >60             | [facility]       |              |
| GFR         | CHRONIC KIDNEY DISEASE,  | mL/min/1.73m2   | LABORATORY |              |
|             | IF FOUND OVER A 3 MONTH  |                 |            |              |
|             | PERIOD.GFR <15: KIDNEY   |                 |            |              |
|             | FAILURE.FOR       |                 |            |              |
|             | [employer], MULTIPLY THE  |                 |            |              |
|             | CALCULATED GFR BY        |                 |            |              |
|             | 1.210.This eGFR is       |                 |            |              |
|             | calculated using the     |                 |            |              |
|             | MDRD [facility] traceable      |                 |            |              |
|             | equation.Testing         |                 |            |              |
|             | performed at [facility]     |                 |            |              |
|             | [facility];[place], [place]   |                 |            |              |
|             | [id number]                    |                 |            |              |
+-------------+--------------------------+-----------------+------------+--------------+
 
 
 
+----------+
| Specimen |
+----------+
| Blood    |
+----------+
 
 
 
 
+-------------------+------------------+--------------------+----------------+
| Performing        | Address          | City/State/Zipcode | Phone Number   |
| Organization      |                  |                    |                |
+-------------------+------------------+--------------------+----------------+
|   [facility] |   [address] | [place] [address] |   [phone] |
+-------------------+------------------+--------------------+----------------+
 Surgical Pathology Exam (2019  1:50 PM PDT)
 
+--------------------+
| Specimen           |
+--------------------+
| Tissue - Arterial  |
| part (body         |
| structure)         |
+--------------------+
 
 
 
+-------------------------------------------------------------------------------------------
--------------------------------------------------------------------------------------------
----------------+-----------------+
| Narrative                                                                                 
                                                                                            
                | Performed At    |
+-------------------------------------------------------------------------------------------
--------------------------------------------------------------------------------------------
----------------+-----------------+
|   This result has an attachment that is not available.  SPECIMEN(S): A                    
                                                                                            
                |   [facility]  |
|  SUPERIOR MESENTERIC ARTERY PLAQUE SPECIMEN SOURCE:A. SUPERIOR                            
                                                                                            
                | INCYTE          |
| MESENTERIC ARTERY PLAQUE CLINICAL HISTORY:K55.1 (mesenteric ischemia,                     
                                                                                            
                |                 |
| chronic). FINAL PATHOLOGIC DIAGNOSIS:Superior mesenteric artery                           
                                                                                            
                |                 |
| plaque:-   Benign atherosclerotic plaque and focal fragments of benign                    
                                                                                            
                |                 |
|  fibrous tissue and smooth muscle. JVR:[facility]:C2NR MICROSCOPIC                               
                                                                                            
                |                 |
| EXAMINATION:Histologic sections of all submitted blocks are examined                      
                                                                                            
                |                 |
| by light microscopy.   These findings, together with the gross                            
                                                                                            
                |                 |
| examination, support the pathologic diagnosis. GROSS DESCRIPTION:The                      
                                                                                            
                |                 |
| specimen, labeled "JH, superior mesenteric artery plaque," is received                    
                                                                                            
                |                 |
|  in formalin and consists of three tan-pink and focally calcified                         
                                                                                            
                |                 |
 
| portions of tissue ranging from 1.0-2.5 cm in greatestdimension.                          
                                                                                            
                |                 |
|   Sectioning shows tan-pink tan-yellow and calcified cut surfaces with                    
                                                                                            
                |                 |
|  no discrete lesions identified.   Representative sections are                            
                                                                                            
                |                 |
| submitted in cassette (A1), following decalcification inDecal [email or web address]                     
                                                                                            
                |                 |
| (under the direct supervision of a pathologist) The Gross Description                     
                                                                                            
                |                 |
| was prepared using a voice recognition system.   The report was                           
                                                                                            
                |                 |
| reviewed for accuracy; however, sound-alike word errors, addition                         
                                                                                            
                |                 |
| and/or deletions may occur.   If there is anyquestion about this                          
                                                                                            
                |                 |
| report, please contact Client Services. PERFORMING LABORATORY:The                         
                                                                                            
                |                 |
| technical component was performed by [facility], [address] [place] [address]                     
                                                                                            
                |                 |
| [place] [address] (Medical Director: Liset Naidu MD; CLIA#                           
                                                                                            
                |                 |
| [id number]).    Professional interpretation was performed by[facility]                        
                                                                                            
                |                 |
| [facility], [facility][note 3] [address]                     
                                                                                            
                |                 |
[place] [address] (Medical Director:   Liam GUZMAN                            
                                                                                            
                |                 |
| PADILLA Clarke; CLIA#:   [id number]). Diagnostician:   Mike Ryan                    
                                                                                            
                |                 |
|  MDPathologistElectronically Signed 2019                                            
                                                                                            
                |                 |
|The technical component was performed by [facility], [address] [place] [address] (Medical Director: Liset Naidu MD; CLIA# [id number]).    Professional interpretation w
as performed by |                 |
|[facility], [facility], [facility][note 3] [place] [address] (Medical Director:   Liam Clarke M.D.; CLIA#:   [id number]).       
                |                 |
|                                                                                           
                                                                                            
                |                 |
|Diagnostician:   Mike Ryan MD                                                       
                                                                                            
                |                 |
 
|Pathologist                                                                                
                                                                                            
               |                 |
|Electronically Signed 2019                                                           
                                                                                            
                |                 |
|                                                                                           
                                                                                            
                |                 |
|                                                                                           
                                                                                            
                |                 |
+-------------------------------------------------------------------------------------------
--------------------------------------------------------------------------------------------
----------------+-----------------+
 
 
 
+-----------------+---------+--------------------+--------------+
| Performing      | Address | City/State/Zipcode | Phone Number |
| Organization    |         |                    |              |
+-----------------+---------+--------------------+--------------+
|   [facility]  |         |                    |              |
| [facility]          |         |                    |              |
+-----------------+---------+--------------------+--------------+
 Type and Screen (2019 11:03 AM PDT)
 
+-----------+------------------------+-----------+------------+--------------+
| Component | Value                  | Ref Range | Performed  | Pathologist  |
|           |                        |           | At         | Signature    |
+-----------+------------------------+-----------+------------+--------------+
| ABO Rh    | O POSITIVE             |           | [facility]       |              |
|           |                        |           | LABORATORY |              |
+-----------+------------------------+-----------+------------+--------------+
| Antibody  | NEGATIVE               |           | [facility]       |              |
| Screen    |                        |           | LABORATORY |              |
+-----------+------------------------+-----------+------------+--------------+
| BB BAND   | [id number]               |           | [facility]       |              |
|           |                        |           | LABORATORY |              |
+-----------+------------------------+-----------+------------+--------------+
| BB BAND   | Testing performed at   |           | [facility]       |              |
|           | [facility];[address]          |           | LABORATORY |              |
|           | [address]vd;[place] [address] |           |            |              |
+-----------+------------------------+-----------+------------+--------------+
 
 
 
+----------+
| Specimen |
+----------+
| Blood    |
+----------+
 
 
 
+-------------------+------------------+--------------------+----------------+
| Performing        | Address          | City/State/Zipcode | Phone Number   |
| Organization      |                  |                    |                |
+-------------------+------------------+--------------------+----------------+
|   [facility] |   [address] | [place] [address] |   [phone] |
 
+-------------------+------------------+--------------------+----------------+
 documented in this encounter
 
 Visit Diagnoses
 
 
+-----------------------------------------------------------------------------------+
| Diagnosis                                                                         |
+-----------------------------------------------------------------------------------+
|   Mesenteric ischemia, chronic (HCC)  Chronic vascular insufficiency of intestine |
+-----------------------------------------------------------------------------------+
 documented in this encounter
 
 Admitting Diagnoses
 
 
+-----------------------------------------------------------------------------------+
| Diagnosis                                                                         |
+-----------------------------------------------------------------------------------+
|   Mesenteric ischemia, chronic (HCC)  Chronic vascular insufficiency of intestine |
+-----------------------------------------------------------------------------------+
 documented in this encounter
 
 Administered Medications
 
 
+-----------------------------------+--------+----------+-------+------+------+
| Medication Order                  | MAR    | Action   | Dose  | Rate | Site |
|                                   | Action | Date     |       |      |      |
+-----------------------------------+--------+----------+-------+------+------+
|   aspirin EC tablet 81 mg  81 mg, | Given  | 20 | 81 mg |      |      |
|  Oral, DAILY, First dose on Sat   |        | 19  8:09 |       |      |      |
| 19 at 1100                   |        |  AM PDT  |       |      |      |
+-----------------------------------+--------+----------+-------+------+------+
 
 
 
+-------+----------+-------+---+---+
| Given | 20 | 81 mg |   |   |
|       | 19  8:29 |       |   |   |
|       |  AM PDT  |       |   |   |
+-------+----------+-------+---+---+
| Given | 20 | 81 mg |   |   |
|       | 19 10:23 |       |   |   |
|       |  AM PDT  |       |   |   |
+-------+----------+-------+---+---+
 
 
 
+---+---+
|   |   |
+---+---+
 
 
 
+-----------------------------------+-------+----------+-------+---+---+
|   bisacodyl (DULCOLAX)            | Given | 20 | 10 mg |   |   |
| suppository 10 mg  10 mg, Rectal, |       | 19  8:11 |       |   |   |
|  DAILY PRN, Constipation,         |       |  AM PDT  |       |   |   |
| Starting Tue 19 at 1507, If  |       |          |       |   |   |
 
| all other bowel medications       |       |          |       |   |   |
| ineffective x 24 hours or not     |       |          |       |   |   |
| ordered,                          |       |          |       |   |   |
+-----------------------------------+-------+----------+-------+---+---+
 
 
 
+---+---+
|   |   |
+---+---+
 
 
 
+-------------------------------+-------+----------+---------+---+---+
|   heparin 5,000 units/mL      | Given | 20 | 10,000  |   |   |
| injection  PRN, Starting Tue  |       | 19 12:00 | Units   |   |   |
| 19 at 1200, Intra-op     |       |  PM PDT  |         |   |   |
+-------------------------------+-------+----------+---------+---+---+
 
 
 
+---+---+
|   |   |
+---+---+
 
 
 
+----------------------------------+-------+----------+-------+---+---+
|   hydroCHLOROthiazide tablet 25  | Given | 20 | 25 mg |   |   |
| mg  25 mg, Oral, DAILY, First    |       | 19  8:10 |       |   |   |
| dose on Sat 19 at 1100      |       |  AM PDT  |       |   |   |
+----------------------------------+-------+----------+-------+---+---+
 
 
 
+-------+----------+-------+---+---+
| Given | 20 | 25 mg |   |   |
|       | 19  8:30 |       |   |   |
|       |  AM PDT  |       |   |   |
+-------+----------+-------+---+---+
| Given | 20 | 25 mg |   |   |
|       | 19 10:23 |       |   |   |
|       |  AM PDT  |       |   |   |
+-------+----------+-------+---+---+
 
 
 
+---+---+
|   |   |
+---+---+
 
 
 
+-----------------------------------+-------+----------+---------+---+---+
|   HYDROcodone-acetaminophen       | Given | 20 | 2       |   |   |
| (NORCO) 5-325 mg per tablet 1-2   |       | 19 11:14 | tablets |   |   |
| tablet  1-2 tablet, Oral, EVERY 4 |       |  AM PDT  |         |   |   |
|  HOURS PRN, Pain, Starting Sun    |       |          |         |   |   |
| 19 at 0843                   |       |          |         |   |   |
+-----------------------------------+-------+----------+---------+---+---+
 
 
 
 
+-------+----------+---------+---+---+
| Given | 20 | 2       |   |   |
|       | 19  7:30 | tablets |   |   |
|       |  AM PDT  |         |   |   |
+-------+----------+---------+---+---+
| Given | 20 | 2       |   |   |
|       | 19 10:22 | tablets |   |   |
|       |  PM PDT  |         |   |   |
+-------+----------+---------+---+---+
 
 
 
+---+---+
|   |   |
+---+---+
 
 
 
+---------------------------------+-------+----------+------+---+---+
|   HYDROmorphone (DILAUDID)      | Given | 20 | 1 mg |   |   |
| injection 0.5-1 mg  0.5-1 mg,   |       | 19  8:30 |      |   |   |
| Intravenous, EVERY 1 HOUR PRN,  |       |  AM PDT  |      |   |   |
| Pain, Starting Tue 19 at   |       |          |      |   |   |
| 1535                            |       |          |      |   |   |
+---------------------------------+-------+----------+------+---+---+
 
 
 
+-------+----------+------+---+---+
| Given | 20 | 1 mg |   |   |
|       | 19  3:35 |      |   |   |
|       |  AM PDT  |      |   |   |
+-------+----------+------+---+---+
| Given | 20 | 1 mg |   |   |
|       | 19 10:05 |      |   |   |
|       |  PM PDT  |      |   |   |
+-------+----------+------+---+---+
 
 
 
+---+---+
|   |   |
+---+---+
 
 
 
+-----------------------------------+-------+----------+-------+---+---+
|   losartan (COZAAR) tablet 50 mg  | Given | 20 | 50 mg |   |   |
|  50 mg, Oral, DAILY, First dose   |       | 19  8:09 |       |   |   |
| on Sat 19 at 1100            |       |  AM PDT  |       |   |   |
+-----------------------------------+-------+----------+-------+---+---+
 
 
 
+-------+----------+-------+---+---+
| Given | 20 | 50 mg |   |   |
|       | 19  8:30 |       |   |   |
 
|       |  AM PDT  |       |   |   |
+-------+----------+-------+---+---+
| Given | 20 | 50 mg |   |   |
|       | 19 10:23 |       |   |   |
|       |  AM PDT  |       |   |   |
+-------+----------+-------+---+---+
 
 
 
+---+---+
|   |   |
+---+---+
 
 
 
+-----------------------------------+-------+----------+------+---+---+
|   morphine injection 1-2 mg  1-2  | Given | 20 | 2 mg |   |   |
| mg, Intravenous, EVERY 1 HOUR     |       | 19  3:14 |      |   |   |
| PRN, Pain, Starting Tue 19   |       |  PM PDT  |      |   |   |
| at 1507, If oral route not an     |       |          |      |   |   |
| option.  Slow IV push, not faster |       |          |      |   |   |
|  than 2mg/minute. First dose must |       |          |      |   |   |
|  be lowest dose, titrate to       |       |          |      |   |   |
| effective dose by repeat of       |       |          |      |   |   |
| lowest dose every 30 minutes prn  |       |          |      |   |   |
| pain, may not exceed maximum dose |       |          |      |   |   |
|  ordered per interval.  Use       |       |          |      |   |   |
| Pasero Sedation Scale.,           |       |          |      |   |   |
+-----------------------------------+-------+----------+------+---+---+
 
 
 
+---+---+
|   |   |
+---+---+
 
 
 
+-----------------------------------+-------+----------+-------+---+---+
|   pantoprazole (PROTONIX)         | Given | 20 | 40 mg |   |   |
| injection 40 mg  40 mg,           |       | 19  8:10 |       |   |   |
| Intravenous, DAILY, First dose on |       |  AM PDT  |       |   |   |
|  Wed 19 at 1100, If          |       |          |       |   |   |
| reconstituting, mix each 40 mg    |       |          |       |   |   |
| vial with 10 mL NS to make 4      |       |          |       |   |   |
| mg/mL., Indication: GERD          |       |          |       |   |   |
+-----------------------------------+-------+----------+-------+---+---+
 
 
 
+-------+----------+-------+---+---+
| Given | 20 | 40 mg |   |   |
|       | 19  8:30 |       |   |   |
|       |  AM PDT  |       |   |   |
+-------+----------+-------+---+---+
| Given | 20 | 40 mg |   |   |
|       | 19  8:00 |       |   |   |
|       |  AM PDT  |       |   |   |
+-------+----------+-------+---+---+
 
 
 
 
+---+---+
|   |   |
+---+---+
 
 
 
+--------------------------------+-------+----------+--------+---+----------+
|   thrombin (recombinant)       | Given | 20 | 5,000  |   | Surgical |
| (RECOTHROM) solution  PRN,     |       | 19 12:00 | Units  |   |  Site    |
| Starting Tue 19 at 1200,  |       |  PM PDT  |        |   |          |
| Intra-op                       |       |          |        |   |          |
+--------------------------------+-------+----------+--------+---+----------+
 
 
 
+---+---+
|   |   |
+---+---+
 documented in this encounter

## 2020-01-13 NOTE — XMS
Encounter Summary
  Created on: 2020
 
 Pasquale Nehemias Martinez
 External Reference #: [id number]
 : 43
 Sex: Male
 
 Demographics
 
 
+-----------------------+----------------------+
| Address               | [address]         |
|                       | [place]  [address] |
+-----------------------+----------------------+
| Home Phone            | [phone]      |
+-----------------------+----------------------+
| Preferred Language    | Unknown              |
+-----------------------+----------------------+
| Marital Status        |               |
+-----------------------+----------------------+
| Denominational Affiliation | [id number]                 |
+-----------------------+----------------------+
| Race                  | Unknown              |
+-----------------------+----------------------+
| Ethnic Group          | Unknown              |
+-----------------------+----------------------+
 
 
 Author
 
 
+--------------+--------------------------------------------+
| Author       | [facility]  |
|              | and [facility]                                |
+--------------+--------------------------------------------+
| Organization | [facility]  |
|              | and [place]                                |
+--------------+--------------------------------------------+
| Address      | Unknown                                    |
+--------------+--------------------------------------------+
| Phone        | Unavailable                                |
+--------------+--------------------------------------------+
 
 
 
 Support
 
 
+--------------+--------------+---------------------+-----------------+
| Name         | Relationship | Address             | Phone           |
+--------------+--------------+---------------------+-----------------+
| Rupa Giordano | ECON         | [address]             | [phone] |
|              |              | [facility] [place]   |                 |
|              |              | [address]               |                 |
+--------------+--------------+---------------------+-----------------+
 
 
 
 
 Care Team Providers
 
 
+-----------------------+------+-------------+
| Care Team Member Name | Role | Phone       |
+-----------------------+------+-------------+
| No, Physician         | PCP  | Unavailable |
+-----------------------+------+-------------+
 
 
 
 Reason for Visit
 Diagnostic/Screening (Routine)
 
+--------+--------+-----------+--------------+--------------+---------------+
| Status | Reason | Specialty | Diagnoses /  | Referred By  | Referred To   |
|        |        |           | Procedures   | Contact      | Contact       |
+--------+--------+-----------+--------------+--------------+---------------+
| Closed |        | Radiology |   Diagnoses  |   Sukhdeep Pardo   |   [facility]    |
|        |        |           |  Chronic     | MD [address]  | [address]     |
|        |        |           | vascular     | [place]  | [address]          |
|        |        |           | disorders of |  [address]       | [place]  |
|        |        |           |  intestine   | [place] | [address]    |
|        |        |           | ([facility])        |  [address]  | Phone:        |
|        |        |           | Procedures   |  Phone:      | [phone]  |
|        |        |           | CHG          | [phone] |  Fax:         |
|        |        |           | RADIOLOGIC   |   Fax:       | [phone]  |
|        |        |           | EXAM CHEST 2 | [phone] |               |
|        |        |           |  VIEWS  [facility]  |              |               |
|        |        |           | XRAY         |              |               |
+--------+--------+-----------+--------------+--------------+---------------+
 
 
 
 
 Encounter Details
 
 
+--------+-----------+----------------------+----------------------+-------------+
| Date   | Type      | Department           | Care Team            | Description |
+--------+-----------+----------------------+----------------------+-------------+
| / | Hospital  |   [facility]    |   Sukhdeep Pardo MD     |             |
| 2019   | Encounter | [facility] XRAY  | [address]     |             |
|        |           |  [address]      | [address]  [place]  |             |
|        |           | [place]         | [address]           |             |
|        |           | [address]           | [phone]         |             |
|        |           | [phone]         | [phone] (Fax)   |             |
+--------+-----------+----------------------+----------------------+-------------+
 
 
 
 Social History
 
 
+---------------+-------+-----------+--------+------+
| Tobacco Use   | Types | Packs/Day | Years  | Date |
|               |       |           | Used   |      |
+---------------+-------+-----------+--------+------+
| Former Smoker |       |           |        |      |
 
+---------------+-------+-----------+--------+------+
 
 
 
+---------------------+---+---+---+
| Smokeless Tobacco:  |   |   |   |
| Former User         |   |   |   |
+---------------------+---+---+---+
 
 
 
+------------------------------+
| Comments: quit 50 years ago  |
+------------------------------+
 
 
 
+---------------+-------------+---------+----------+
| Alcohol Use   | Drinks/Week | oz/Week | Comments |
+---------------+-------------+---------+----------+
| Not Currently |             |         | 90m days |
+---------------+-------------+---------+----------+
 
 
 
+------------------+---------------+
| Sex Assigned at  | Date Recorded |
| Birth            |               |
+------------------+---------------+
| Not on file      |               |
+------------------+---------------+
 
 
 
+----------------+-------------+-------------+
| Job Start Date | Occupation  | Industry    |
+----------------+-------------+-------------+
| Not on file    | Not on file | Not on file |
+----------------+-------------+-------------+
 
 
 
+----------------+--------------+------------+
| Travel History | Travel Start | Travel End |
+----------------+--------------+------------+
 
 
 
+-------------------------------------+
| No recent travel history available. |
+-------------------------------------+
 documented as of this encounter
 
 Medications at Time of Discharge
 
 
+----------------------+----------------------+-----------+---------+----------+----------+
| Medication           | Sig                  | Dispensed | Refills | Start    | End Date |
|                      |                      |           |         | Date     |          |
+----------------------+----------------------+-----------+---------+----------+----------+
 
|   ASPIRIN 81 PO      | Take 81 mg by mouth. |           | 0       |          |          |
+----------------------+----------------------+-----------+---------+----------+----------+
|   docusate sodium    | Take 1 capsule by    |   30      | 0       | 20 |          |
| (COLACE) 100 mg      | mouth 2 times daily. | capsule   |         | 19       |          |
| capsule              |                      |           |         |          |          |
+----------------------+----------------------+-----------+---------+----------+----------+
|                      | Take 25 mg by mouth  |           | 0       |          |          |
| hydroCHLOROthiazide  | Daily.               |           |         |          |          |
| 25 mg tablet         |                      |           |         |          |          |
+----------------------+----------------------+-----------+---------+----------+----------+
|                      | Take 1 tablet by     |   30      | 0       | 20 |          |
| HYDROcodone-acetamin | mouth every 4 hours  | tablet    |         | 19       |          |
| ophen (NORCO) 5-325  | as needed for Pain.  |           |         |          |          |
| mg per tablet        |                      |           |         |          |          |
+----------------------+----------------------+-----------+---------+----------+----------+
|   losartan (COZAAR)  | Take 50 mg by mouth  |           | 0       |          |          |
| 50 mg tablet         | Daily.               |           |         |          |          |
+----------------------+----------------------+-----------+---------+----------+----------+
|   Multiple           | Take  by mouth.      |           | 0       |          |          |
| Vitamins-Minerals    |                      |           |         |          |          |
| (CENTRUM SILVER      |                      |           |         |          |          |
| 50+MEN PO)           |                      |           |         |          |          |
+----------------------+----------------------+-----------+---------+----------+----------+
|   simvastatin        | Take 10 mg by mouth  |           | 0       |          |          |
| (ZOCOR) 10 mg tablet | nightly.             |           |         |          |          |
+----------------------+----------------------+-----------+---------+----------+----------+
 documented as of this encounter
 
 Plan of Treatment
 
 
+--------+-------------+------------------+----------------------+-------------+
| Date   | Type        | Specialty        | Care Team            | Description |
+--------+-------------+------------------+----------------------+-------------+
| / | Appointment | Radiology        |   Augustin Lopez DNP      |             |
|    |             |                  |  [place]     |             |
|        |             |                  | [place]  |             |
|        |             |                  | [address]  [phone]  |             |
|        |             |                  |  [phone] (Fax)  |             |
+--------+-------------+------------------+----------------------+-------------+
| / | Office      | Vascular Surgery |   Augustin Lopez DNP      |             |
|    | Visit       |                  | [address]     |             |
|        |             |                  | [place]  |             |
|        |             |                  | [address]  [phone]  |             |
|        |             |                  |  [phone] (Fax)  |             |
+--------+-------------+------------------+----------------------+-------------+
 documented as of this encounter
 
 Procedures
 
 
+------------------+--------+-------------+----------------------+----------------------+
| Procedure Name   | Priori | Date/Time   | Associated Diagnosis | Comments             |
|                  | ty     |             |                      |                      |
+------------------+--------+-------------+----------------------+----------------------+
| XR CHEST PA AND  | Routin | 2019  |   Mesenteric         |   Results for this   |
| LATERAL          | e      | 10:15 AM    | ischemia, chronic    | procedure are in the |
|                  |        | PDT         | ([facility])                |  results section.    |
+------------------+--------+-------------+----------------------+----------------------+
 documented in this encounter
 
 
 Results
 XR Chest PA and Lateral (2019 10:15 AM PDT)
 
+----------+
| Specimen |
+----------+
|          |
+----------+
 
 
 
+------------------------------------------------------------------------+---------------+
| Narrative                                                              | Performed At  |
+------------------------------------------------------------------------+---------------+
|      CHEST PA AND LATERAL     CLINICAL INFORMATION:  Pre operative     |   [facility] |
| evaluation     COMPARISON:  CT ANGIOGRAPHY ABDOMEN AND PELVIS          |               |
| (2019);     FINDINGS:  There is a BB in the posteromedial right   |               |
| lower lobe, better defined on  CT abdomen 2019. the lungs are     |               |
| clear.   The heart size is normal  c..   Mild to moderate degenerative |               |
|  changes of the thoracic spine     IMPRESSION:  No active              |               |
| intrathoracic disease.           Signed by: PADILLA Singh Shawn  Sign   |               |
| Date/Time: 2019 10:27 AM                                         |               |
+------------------------------------------------------------------------+---------------+
 
 
 
+------------------------------------------------------------------------+
| Procedure Note                                                         |
+------------------------------------------------------------------------+
|   [place], Rad Results In - 2019 10:31 AM PDT                        |
| CHEST PA AND LATERAL                                                   |
|                                                                        |
| CLINICAL INFORMATION:                                                  |
| Pre operative evaluation                                               |
|                                                                        |
| COMPARISON:                                                            |
| CT ANGIOGRAPHY ABDOMEN AND PELVIS (2019);                         |
|                                                                        |
| FINDINGS:                                                              |
| There is a BB in the posteromedial right lower lobe, better defined on |
| CT abdomen 2019. the lungs are clear.  The heart size is normal   |
| c..  Mild to moderate degenerative changes of the thoracic spine       |
|                                                                        |
| IMPRESSION:                                                            |
| No active intrathoracic disease.                                       |
|                                                                        |
| Signed by: PADILLA Singh, Kenneth                                          |
| Sign Date/Time: 2019 10:27 AM                                    |
+------------------------------------------------------------------------+
 
 
 
+---------------+---------+--------------------+--------------+
| Performing    | Address | City/State/Zipcode | Phone Number |
| Organization  |         |                    |              |
+---------------+---------+--------------------+--------------+
|   [facility] |         |                    |              |
 
+---------------+---------+--------------------+--------------+
 documented in this encounter
 
 Visit Diagnoses
 Not on filedocumented in this encounter

## 2020-01-13 NOTE — XMS
Encounter Summary
  Created on: 2020
 
 Pasquale Nehemias Martinez
 External Reference #: [id number]
 : 43
 Sex: Male
 
 Demographics
 
 
+-----------------------+----------------------+
| Address               | [address]         |
|                       | [place]  [address] |
+-----------------------+----------------------+
| Home Phone            | [phone]      |
+-----------------------+----------------------+
| Preferred Language    | Unknown              |
+-----------------------+----------------------+
| Marital Status        |               |
+-----------------------+----------------------+
| Tenriism Affiliation | 1077                 |
+-----------------------+----------------------+
| Race                  | Unknown              |
+-----------------------+----------------------+
| Ethnic Group          | Unknown              |
+-----------------------+----------------------+
 
 
 Author
 
 
+--------------+--------------------------------------------+
| Author       | [facility]  |
|              | and [place]                                |
+--------------+--------------------------------------------+
| Organization | [facility] [place]  |
|              | and [place]                                |
+--------------+--------------------------------------------+
| Address      | Unknown                                    |
+--------------+--------------------------------------------+
| Phone        | Unavailable                                |
+--------------+--------------------------------------------+
 
 
 
 Support
 
 
+--------------+--------------+---------------------+-----------------+
| Name         | Relationship | Address             | Phone           |
+--------------+--------------+---------------------+-----------------+
| Rupa Giordano | ECON         | [address]             | [phone] |
|              |              | [address] [place] [place]   |                 |
|              |              | [address]               |                 |
+--------------+--------------+---------------------+-----------------+
 
 
 
 
 Care Team Providers
 
 
+-----------------------+------+-------------+
| Care Team Member Name | Role | Phone       |
+-----------------------+------+-------------+
| No, Physician         | PCP  | Unavailable |
+-----------------------+------+-------------+
 
 
 
 Encounter Details
 
 
+--------+-----------+----------------------+----------------------+-------------+
| Date   | Type      | Department           | Care Team            | Description |
+--------+-----------+----------------------+----------------------+-------------+
| / | Telephone |   [facility]     |   Sukhdeep Pardo MD     |             |
| 2019   |           | CENTER CV INTRA OP   | [address]     |             |
|        |           | [address]       | [address]  [place]  |             |
|        |           | [place]         | [address]           |             |
|        |           | [address]           | [phone]         |             |
|        |           | [phone]         | [phone] (Fax)   |             |
+--------+-----------+----------------------+----------------------+-------------+
 
 
 
 Social History
 
 
+---------------+-------+-----------+--------+------+
| Tobacco Use   | Types | Packs/Day | Years  | Date |
|               |       |           | Used   |      |
+---------------+-------+-----------+--------+------+
| Former Smoker |       |           |        |      |
+---------------+-------+-----------+--------+------+
 
 
 
+---------------------+---+---+---+
| Smokeless Tobacco:  |   |   |   |
| Former User         |   |   |   |
+---------------------+---+---+---+
 
 
 
+------------------------------+
| Comments: quit 50 years ago  |
+------------------------------+
 
 
 
+---------------+-------------+---------+----------+
| Alcohol Use   | Drinks/Week | oz/Week | Comments |
+---------------+-------------+---------+----------+
| Not Currently |             |         | 90m days |
+---------------+-------------+---------+----------+
 
 
 
 
+------------------+---------------+
| Sex Assigned at  | Date Recorded |
| Birth            |               |
+------------------+---------------+
| Not on file      |               |
+------------------+---------------+
 
 
 
+----------------+-------------+-------------+
| Job Start Date | Occupation  | Industry    |
+----------------+-------------+-------------+
| Not on file    | Not on file | Not on file |
+----------------+-------------+-------------+
 
 
 
+----------------+--------------+------------+
| Travel History | Travel Start | Travel End |
+----------------+--------------+------------+
 
 
 
+-------------------------------------+
| No recent travel history available. |
+-------------------------------------+
 documented as of this encounter
 
 Functional Status
 
 
+---------------------------------------------+----------+--------------------+
| Functional Status                           | Response | Date of Assessment |
+---------------------------------------------+----------+--------------------+
| Are you deaf or do you have serious         | No       | 2019         |
| difficulty hearing?                         |          |                    |
+---------------------------------------------+----------+--------------------+
| Are you blind or do you have serious        | No       | 2019         |
| difficulty seeing, even when wearing        |          |                    |
| glasses?                                    |          |                    |
+---------------------------------------------+----------+--------------------+
| Do you have serious difficulty walking or   | No       | 2019         |
| climbing stairs? (5 years old or older)     |          |                    |
+---------------------------------------------+----------+--------------------+
| Do you have difficulty dressing or bathing? | No       | 2019         |
|  (5 years old or older)                     |          |                    |
+---------------------------------------------+----------+--------------------+
| Because of a physical, mental, or emotional | No       | 2019         |
|  condition, do you have difficulty doing    |          |                    |
| errands alone such as visiting a doctor's   |          |                    |
| office or shopping? [15 years old or        |          |                    |
| older)]                                     |          |                    |
+---------------------------------------------+----------+--------------------+
 
 
 
+---------------------------------------------+----------+--------------------+
| Cognitive Status                            | Response | Date of Assessment |
+---------------------------------------------+----------+--------------------+
 
| Because of a physical, mental, or emotional | No       | 2019         |
|  condition, do you have serious difficulty  |          |                    |
| concentrating, remembering, or making       |          |                    |
| decisions? (5 years old or older)           |          |                    |
+---------------------------------------------+----------+--------------------+
 documented as of this encounter
 
 Plan of Treatment
 
 
+--------+-------------+------------------+----------------------+-------------+
| Date   | Type        | Specialty        | Care Team            | Description |
+--------+-------------+------------------+----------------------+-------------+
| / | Appointment | Radiology        |   Augustin Lopez DNP      |             |
|    |             |                  |  [address]     |             |
|        |             |                  | [place]  |             |
|        |             |                  | [address]  [phone]  |             |
|        |             |                  |  [phone] (Fax)  |             |
+--------+-------------+------------------+----------------------+-------------+
| / | Office      | Vascular Surgery |   Augustin Lopez DNP      |             |
|    | Visit       |                  | [address]     |             |
|        |             |                  | [place]  |             |
|        |             |                  | [address]  [phone]  |             |
|        |             |                  |  [phone] (Fax)  |             |
+--------+-------------+------------------+----------------------+-------------+
 documented as of this encounter
 
 Visit Diagnoses
 Not on filedocumented in this encounter

## 2020-01-13 NOTE — XMS
Encounter Summary
  Created on: 2020
 
 Pasquale Nehemias Martinez
 External Reference #: [id number]
 : 43
 Sex: Male
 
 Demographics
 
 
+-----------------------+----------------------+
| Address               | [address]         |
|                       | [place]  [address] |
+-----------------------+----------------------+
| Home Phone            | [phone]      |
+-----------------------+----------------------+
| Preferred Language    | Unknown              |
+-----------------------+----------------------+
| Marital Status        |               |
+-----------------------+----------------------+
| Christianity Affiliation | 1077                 |
+-----------------------+----------------------+
| Race                  | Unknown              |
+-----------------------+----------------------+
| Ethnic Group          | Unknown              |
+-----------------------+----------------------+
 
 
 Author
 
 
+--------------+--------------------------------------------+
| Author       | [facility] [place]  |
|              | and [facility]                                |
+--------------+--------------------------------------------+
| Organization | [facility] [place]  |
|              | and [place]                                |
+--------------+--------------------------------------------+
| Address      | Unknown                                    |
+--------------+--------------------------------------------+
| Phone        | Unavailable                                |
+--------------+--------------------------------------------+
 
 
 
 Support
 
 
+--------------+--------------+---------------------+-----------------+
| Name         | Relationship | Address             | Phone           |
+--------------+--------------+---------------------+-----------------+
| Rupa Giordano | ECON         | [address]             | [phone] |
|              |              | [place], [place]   |                 |
|              |              | [address]               |                 |
+--------------+--------------+---------------------+-----------------+
 
 
 
 
 Care Team Providers
 
 
+-----------------------+------+-------------+
| Care Team Member Name | Role | Phone       |
+-----------------------+------+-------------+
| No, Physician         | PCP  | Unavailable |
+-----------------------+------+-------------+
 
 
 
 Reason for Visit
 
 
+-----------+----------+
| Reason    | Comments |
+-----------+----------+
| Follow-up |          |
+-----------+----------+
 
 
 
 Encounter Details
 
 
+--------+-----------+----------------------+----------------------+-------------+
| Date   | Type      | Department           | Care Team            | Description |
+--------+-----------+----------------------+----------------------+-------------+
| / | Telephone |   [facility]      |   Yudi Mckeon,  | Follow-up   |
| 2019   |           | VASCULAR SURGERY     | Medical Assistant    |             |
|        |           | [address] |                      |             |
|        |           |  [place]  [place], [place]     |                      |             |
|        |           | [address]           |                      |             |
|        |           | [phone]         |                      |             |
+--------+-----------+----------------------+----------------------+-------------+
 
 
 
 Social History
 
 
+---------------+-------+-----------+--------+------+
| Tobacco Use   | Types | Packs/Day | Years  | Date |
|               |       |           | Used   |      |
+---------------+-------+-----------+--------+------+
| Former Smoker |       |           |        |      |
+---------------+-------+-----------+--------+------+
 
 
 
+---------------------+---+---+---+
| Smokeless Tobacco:  |   |   |   |
| Former User         |   |   |   |
+---------------------+---+---+---+
 
 
 
+------------------------------+
| Comments: quit 50 years ago  |
 
+------------------------------+
 
 
 
+---------------+-------------+---------+----------+
| Alcohol Use   | Drinks/Week | oz/Week | Comments |
+---------------+-------------+---------+----------+
| Not Currently |             |         | 90m days |
+---------------+-------------+---------+----------+
 
 
 
+------------------+---------------+
| Sex Assigned at  | Date Recorded |
| Birth            |               |
+------------------+---------------+
| Not on file      |               |
+------------------+---------------+
 
 
 
+----------------+-------------+-------------+
| Job Start Date | Occupation  | Industry    |
+----------------+-------------+-------------+
| Not on file    | Not on file | Not on file |
+----------------+-------------+-------------+
 
 
 
+----------------+--------------+------------+
| Travel History | Travel Start | Travel End |
+----------------+--------------+------------+
 
 
 
+-------------------------------------+
| No recent travel history available. |
+-------------------------------------+
 documented as of this encounter
 
 Functional Status
 
 
+---------------------------------------------+----------+--------------------+
| Functional Status                           | Response | Date of Assessment |
+---------------------------------------------+----------+--------------------+
| Are you deaf or do you have serious         | No       | 2019         |
| difficulty hearing?                         |          |                    |
+---------------------------------------------+----------+--------------------+
| Are you blind or do you have serious        | No       | 2019         |
| difficulty seeing, even when wearing        |          |                    |
| glasses?                                    |          |                    |
+---------------------------------------------+----------+--------------------+
| Do you have serious difficulty walking or   | No       | 2019         |
| climbing stairs? (5 years old or older)     |          |                    |
+---------------------------------------------+----------+--------------------+
| Do you have difficulty dressing or bathing? | No       | 2019         |
|  (5 years old or older)                     |          |                    |
+---------------------------------------------+----------+--------------------+
| Because of a physical, mental, or emotional | No       | 2019         |
 
|  condition, do you have difficulty doing    |          |                    |
| errands alone such as visiting a doctor's   |          |                    |
| office or shopping? [15 years old or        |          |                    |
| older)]                                     |          |                    |
+---------------------------------------------+----------+--------------------+
 
 
 
+---------------------------------------------+----------+--------------------+
| Cognitive Status                            | Response | Date of Assessment |
+---------------------------------------------+----------+--------------------+
| Because of a physical, mental, or emotional | No       | 2019         |
|  condition, do you have serious difficulty  |          |                    |
| concentrating, remembering, or making       |          |                    |
| decisions? (5 years old or older)           |          |                    |
+---------------------------------------------+----------+--------------------+
 documented as of this encounter
 
 Plan of Treatment
 
 
+--------+-------------+------------------+----------------------+-------------+
| Date   | Type        | Specialty        | Care Team            | Description |
+--------+-------------+------------------+----------------------+-------------+
| / | Appointment | Radiology        |   Augustin Lopez DNP      |             |
|    |             |                  | [address]     |             |
|        |             |                  | [place]  |             |
|        |             |                  | [id number] [phone]  |             |
|        |             |                  |  [phone] (Fax)  |             |
+--------+-------------+------------------+----------------------+-------------+
| / | Office      | Vascular Surgery |   Augustin Lopez DNP      |             |
| 2020   | Visit       |                  | [address]     |             |
|        |             |                  | [place]  |             |
|        |             |                  | [address]  [phone]  |             |
|        |             |                  |  [phone] (Fax)  |             |
+--------+-------------+------------------+----------------------+-------------+
 documented as of this encounter
 
 Visit Diagnoses
 Not on filedocumented in this encounter

## 2020-01-13 NOTE — XMS
Encounter Summary
  Created on: 2020
 
 Pasquale Nehemias Martinez
 External Reference #: [id number]
 : 43
 Sex: Male
 
 Demographics
 
 
+-----------------------+----------------------+
| Address               | [address]         |
|                       | [place]  [address] |
+-----------------------+----------------------+
| Home Phone            | [phone]      |
+-----------------------+----------------------+
| Preferred Language    | Unknown              |
+-----------------------+----------------------+
| Marital Status        |               |
+-----------------------+----------------------+
| Yazidi Affiliation | 1077                 |
+-----------------------+----------------------+
| Race                  | Unknown              |
+-----------------------+----------------------+
| Ethnic Group          | Unknown              |
+-----------------------+----------------------+
 
 
 Author
 
 
+--------------+--------------------------------------------+
| Author       | [facility] [place]  |
|              | and [facility]                                |
+--------------+--------------------------------------------+
| Organization | [facility] [place]  |
|              | and [place]                                |
+--------------+--------------------------------------------+
| Address      | Unknown                                    |
+--------------+--------------------------------------------+
| Phone        | Unavailable                                |
+--------------+--------------------------------------------+
 
 
 
 Support
 
 
+--------------+--------------+---------------------+-----------------+
| Name         | Relationship | Address             | Phone           |
+--------------+--------------+---------------------+-----------------+
| Rupa Giordano | [employer]         | [address]             | [phone] |
|              |              | [place]   |                 |
|              |              | [address]               |                 |
+--------------+--------------+---------------------+-----------------+
 
 
 
 
 Care Team Providers
 
 
+-----------------------+------+-------------+
| Care Team Member Name | Role | Phone       |
+-----------------------+------+-------------+
| No, Physician         | PCP  | Unavailable |
+-----------------------+------+-------------+
 
 
 
 Reason for Visit
 
 
+-------------------+----------+
| Reason            | Comments |
+-------------------+----------+
| Medication Refill |          |
+-------------------+----------+
 
 
 
 Encounter Details
 
 
+--------+--------+----------------------+----------------------+-------------------+
| Date   | Type   | Department           | Care Team            | Description       |
+--------+--------+----------------------+----------------------+-------------------+
| 10/09/ | Refill |   [facility]      |   Yudi Mckeon,  | Medication Refill |
|    |        | VASCULAR SURGERY     | Medical Assistant    |                   |
|        |        | [address] |                      |                   |
|        |        |  E  [place]     |                      |                   |
|        |        | [address]           |                      |                   |
|        |        | [phone]         |                      |                   |
+--------+--------+----------------------+----------------------+-------------------+
 
 
 
 Social History
 
 
+---------------+-------+-----------+--------+------+
| Tobacco Use   | Types | Packs/Day | Years  | Date |
|               |       |           | Used   |      |
+---------------+-------+-----------+--------+------+
| Former Smoker |       |           |        |      |
+---------------+-------+-----------+--------+------+
 
 
 
+---------------------+---+---+---+
| Smokeless Tobacco:  |   |   |   |
| Former User         |   |   |   |
+---------------------+---+---+---+
 
 
 
+------------------------------+
| Comments: quit 50 years ago  |
 
+------------------------------+
 
 
 
+---------------+-------------+---------+----------+
| Alcohol Use   | Drinks/Week | oz/Week | Comments |
+---------------+-------------+---------+----------+
| Not Currently |             |         | 90m days |
+---------------+-------------+---------+----------+
 
 
 
+------------------+---------------+
| Sex Assigned at  | Date Recorded |
| Birth            |               |
+------------------+---------------+
| Not on file      |               |
+------------------+---------------+
 
 
 
+----------------+-------------+-------------+
| Job Start Date | Occupation  | Industry    |
+----------------+-------------+-------------+
| Not on file    | Not on file | Not on file |
+----------------+-------------+-------------+
 
 
 
+----------------+--------------+------------+
| Travel History | Travel Start | Travel End |
+----------------+--------------+------------+
 
 
 
+-------------------------------------+
| No recent travel history available. |
+-------------------------------------+
 documented as of this encounter
 
 Functional Status
 
 
+---------------------------------------------+----------+--------------------+
| Functional Status                           | Response | Date of Assessment |
+---------------------------------------------+----------+--------------------+
| Are you deaf or do you have serious         | No       | 2019         |
| difficulty hearing?                         |          |                    |
+---------------------------------------------+----------+--------------------+
| Are you blind or do you have serious        | No       | 2019         |
| difficulty seeing, even when wearing        |          |                    |
| glasses?                                    |          |                    |
+---------------------------------------------+----------+--------------------+
| Do you have serious difficulty walking or   | No       | 2019         |
| climbing stairs? (5 years old or older)     |          |                    |
+---------------------------------------------+----------+--------------------+
| Do you have difficulty dressing or bathing? | No       | 2019         |
|  (5 years old or older)                     |          |                    |
+---------------------------------------------+----------+--------------------+
| Because of a physical, mental, or emotional | No       | 2019         |
 
|  condition, do you have difficulty doing    |          |                    |
| errands alone such as visiting a doctor's   |          |                    |
| office or shopping? [15 years old or        |          |                    |
| older)]                                     |          |                    |
+---------------------------------------------+----------+--------------------+
 
 
 
+---------------------------------------------+----------+--------------------+
| Cognitive Status                            | Response | Date of Assessment |
+---------------------------------------------+----------+--------------------+
| Because of a physical, mental, or emotional | No       | 2019         |
|  condition, do you have serious difficulty  |          |                    |
| concentrating, remembering, or making       |          |                    |
| decisions? (5 years old or older)           |          |                    |
+---------------------------------------------+----------+--------------------+
 documented as of this encounter
 
 Plan of Treatment
 
 
+--------+-------------+------------------+----------------------+-------------+
| Date   | Type        | Specialty        | Care Team            | Description |
+--------+-------------+------------------+----------------------+-------------+
| / | Appointment | Radiology        |   Augustin Lopez DNP      |             |
|    |             |                  | [address]     |             |
|        |             |                  | [place]  |             |
|        |             |                  | [address]  [phone]  |             |
|        |             |                  |  [phone] (Fax)  |             |
+--------+-------------+------------------+----------------------+-------------+
| / | Office      | Vascular Surgery |   Augustin Lopez DNP      |             |
| 2020   | Visit       |                  | [address]     |             |
|        |             |                  | [place]  |             |
|        |             |                  | [address] [phone]  |             |
|        |             |                  |  [phone] (Fax)  |             |
+--------+-------------+------------------+----------------------+-------------+
 documented as of this encounter
 
 Visit Diagnoses
 Not on filedocumented in this encounter

## 2020-01-13 NOTE — XMS
Encounter Summary
  Created on: 2020
 
 Pasquale Nehemias Martinez
 External Reference #: [id number]
 : 43
 Sex: Male
 
 Demographics
 
 
+-----------------------+----------------------+
| Address               | [address]         |
|                       | [place]  [address] |
+-----------------------+----------------------+
| Home Phone            | [phone]      |
+-----------------------+----------------------+
| Preferred Language    | Unknown              |
+-----------------------+----------------------+
| Marital Status        |               |
+-----------------------+----------------------+
| [employer] Affiliation | [id number]                 |
+-----------------------+----------------------+
| Race                  | Unknown              |
+-----------------------+----------------------+
| Ethnic Group          | Unknown              |
+-----------------------+----------------------+
 
 
 Author
 
 
+--------------+--------------------------------------------+
| Author       | [facility]  |
|              | and [facility]                                |
+--------------+--------------------------------------------+
| Organization | [facility]  |
|              | and [place]                                |
+--------------+--------------------------------------------+
| Address      | Unknown                                    |
+--------------+--------------------------------------------+
| Phone        | Unavailable                                |
+--------------+--------------------------------------------+
 
 
 
 Support
 
 
+--------------+--------------+---------------------+-----------------+
| Name         | Relationship | Address             | Phone           |
+--------------+--------------+---------------------+-----------------+
| Rupa Giordano | ECON         | [address]             | [phone] |
|              |              | [place], [place]   |                 |
|              |              | [address]               |                 |
+--------------+--------------+---------------------+-----------------+
 
 
 
 
 Care Team Providers
 
 
+-----------------------+------+-------------+
| Care Team Member Name | Role | Phone       |
+-----------------------+------+-------------+
| No, Physician         | PCP  | Unavailable |
+-----------------------+------+-------------+
 
 
 
 Encounter Details
 
 
+--------+-----------+---------------------+----------------------+----------------------+
| Date   | Type      | Department          | Care Team            | Description          |
+--------+-----------+---------------------+----------------------+----------------------+
| / | Preadmit  |   [facility]    |   Sukhdepe Pardo MD     | Mesenteric ischemia, |
| 2019   | Visit     | [facility]     | [address]     |  chronic (HCC)       |
|        |           | CLINIC  [address]   | [address]  [place], [place]  |                      |
|        |           | [address]  [place]  | [address]           |                      |
|        |           | [address]          | [phone]         |                      |
|        |           | [phone]        | [phone] (Fax)   |                      |
+--------+-----------+---------------------+----------------------+----------------------+
 
 
 
 Social History
 
 
+---------------+-------+-----------+--------+------+
| Tobacco Use   | Types | Packs/Day | Years  | Date |
|               |       |           | Used   |      |
+---------------+-------+-----------+--------+------+
| Former Smoker |       |           |        |      |
+---------------+-------+-----------+--------+------+
 
 
 
+---------------------+---+---+---+
| Smokeless Tobacco:  |   |   |   |
| Former User         |   |   |   |
+---------------------+---+---+---+
 
 
 
+------------------------------+
| Comments: quit 50 years ago  |
+------------------------------+
 
 
 
+---------------+-------------+---------+----------+
| Alcohol Use   | Drinks/Week | oz/Week | Comments |
+---------------+-------------+---------+----------+
| Not Currently |             |         | 90m days |
+---------------+-------------+---------+----------+
 
 
 
 
+------------------+---------------+
| Sex Assigned at  | Date Recorded |
| Birth            |               |
+------------------+---------------+
| Not on file      |               |
+------------------+---------------+
 
 
 
+----------------+-------------+-------------+
| Job Start Date | Occupation  | Industry    |
+----------------+-------------+-------------+
| Not on file    | Not on file | Not on file |
+----------------+-------------+-------------+
 
 
 
+----------------+--------------+------------+
| Travel History | Travel Start | Travel End |
+----------------+--------------+------------+
 
 
 
+-------------------------------------+
| No recent travel history available. |
+-------------------------------------+
 documented as of this encounter
 
 Last Filed Vital Signs
 
 
+-------------------+----------------------+----------------------+----------+
| Vital Sign        | Reading              | Time Taken           | Comments |
+-------------------+----------------------+----------------------+----------+
| Blood Pressure    | 122/56               | 2019  9:24 AM  |          |
|                   |                      | PDT                  |          |
+-------------------+----------------------+----------------------+----------+
| Pulse             | 62                   | 2019  9:24 AM  |          |
|                   |                      | PDT                  |          |
+-------------------+----------------------+----------------------+----------+
| Temperature       | -                    | -                    |          |
+-------------------+----------------------+----------------------+----------+
| Respiratory Rate  | -                    | -                    |          |
+-------------------+----------------------+----------------------+----------+
| Oxygen Saturation | 97%                  | 2019  9:24 AM  |          |
|                   |                      | PDT                  |          |
+-------------------+----------------------+----------------------+----------+
| Inhaled Oxygen    | -                    | -                    |          |
| Concentration     |                      |                      |          |
+-------------------+----------------------+----------------------+----------+
| Weight            | 80.6 kg (177 lb 11.1 | 2019  9:24 AM  |          |
|                   |  oz)                 | PDT                  |          |
+-------------------+----------------------+----------------------+----------+
| Height            | 167.6 cm (5' 6")     | 2019  9:24 AM  |          |
|                   |                      | PDT                  |          |
+-------------------+----------------------+----------------------+----------+
| Body Mass Index   | 28.68                | 2019  9:24 AM  |          |
|                   |                      | PDT                  |          |
+-------------------+----------------------+----------------------+----------+
 
 documented in this encounter
 
 Plan of Treatment
 
 
+--------+-------------+------------------+----------------------+-------------+
| Date   | Type        | Specialty        | Care Team            | Description |
+--------+-------------+------------------+----------------------+-------------+
| / | Appointment | Radiology        |   Augustin Lopez DNP      |             |
|    |             |                  | [address]     |             |
|        |             |                  | [place]  |             |
|        |             |                  | [address]  [phone]  |             |
|        |             |                  |  [phone] (Fax)  |             |
+--------+-------------+------------------+----------------------+-------------+
| / | Office      | Vascular Surgery |   Augustin Lopez DNP      |             |
|    | Visit       |                  | [address]     |             |
|        |             |                  | [address]  [place]  |             |
|        |             |                  | [address]  [phone]  |             |
|        |             |                  |  [phone] (Fax)  |             |
+--------+-------------+------------------+----------------------+-------------+
 documented as of this encounter
 
 Procedures
 
 
+------------------+--------+-------------+----------------------+----------------------+
| Procedure Name   | Priori | Date/Time   | Associated Diagnosis | Comments             |
|                  | ty     |             |                      |                      |
+------------------+--------+-------------+----------------------+----------------------+
| XR CHEST PA AND  | Routin | 2019  |   Mesenteric         |   Results for this   |
| LATERAL          | e      | 10:15 AM    | ischemia, chronic    | procedure are in the |
|                  |        | PDT         | ([facility])                |  results section.    |
+------------------+--------+-------------+----------------------+----------------------+
| TYPE AND SCREEN  | ASAP   | 2019  |                      |   Results for this   |
|                  |        |  9:15 AM    |                      | procedure are in the |
|                  |        | PDT         |                      |  results section.    |
+------------------+--------+-------------+----------------------+----------------------+
| CBC WITH         | Timed  | 2019  |                      |   Results for this   |
| DIFFERENTIAL     |        |  9:14 AM    |                      | procedure are in the |
|                  |        | PDT         |                      |  results section.    |
+------------------+--------+-------------+----------------------+----------------------+
| COMPREHENSIVE    | Timed  | 2019  |                      |   Results for this   |
| METABOLIC PANEL  |        |  9:14 AM    |                      | procedure are in the |
|                  |        | PDT         |                      |  results section.    |
+------------------+--------+-------------+----------------------+----------------------+
| ECG 12 LEAD      | Timed  | 2019  |                      |   Results for this   |
|                  |        |  9:12 AM    |                      | procedure are in the |
|                  |        | PDT         |                      |  results section.    |
+------------------+--------+-------------+----------------------+----------------------+
 documented in this encounter
 
 Results
 XR Chest PA and Lateral (2019 10:15 AM PDT)
 
+----------+
| Specimen |
+----------+
|          |
+----------+
 
 
 
 
+------------------------------------------------------------------------+---------------+
| Narrative                                                              | Performed At  |
+------------------------------------------------------------------------+---------------+
|      CHEST PA AND LATERAL     CLINICAL INFORMATION:  Pre operative     |   [facility] |
| evaluation     COMPARISON:  CT ANGIOGRAPHY ABDOMEN AND PELVIS          |               |
| (2019);     FINDINGS:  There is a BB in the posteromedial right   |               |
| lower lobe, better defined on  CT abdomen 2019. the lungs are     |               |
| clear.   The heart size is normal  c..   Mild to moderate degenerative |               |
|  changes of the thoracic spine     IMPRESSION:  No active              |               |
| intrathoracic disease.           Signed by: PADILLA Singh Shawn  Sign   |               |
| Date/Time: 2019 10:27 AM                                         |               |
+------------------------------------------------------------------------+---------------+
 
 
 
+------------------------------------------------------------------------+
| Procedure Note                                                         |
+------------------------------------------------------------------------+
|   Cuong, Rad Results In - 2019 10:31 AM PDT                        |
| CHEST PA AND LATERAL                                                   |
|                                                                        |
| CLINICAL INFORMATION:                                                  |
| Pre operative evaluation                                               |
|                                                                        |
| COMPARISON:                                                            |
| CT ANGIOGRAPHY ABDOMEN AND PELVIS (2019);                         |
|                                                                        |
| FINDINGS:                                                              |
| There is a BB in the posteromedial right lower lobe, better defined on |
| CT abdomen 2019. the lungs are clear.  The heart size is normal   |
| c..  Mild to moderate degenerative changes of the thoracic spine       |
|                                                                        |
| IMPRESSION:                                                            |
| No active intrathoracic disease.                                       |
|                                                                        |
| Signed by: PADILLA Singh Shawn                                          |
| Sign Date/Time: 2019 10:27 AM                                    |
+------------------------------------------------------------------------+
 
 
 
+---------------+---------+--------------------+--------------+
| Performing    | Address | City/State/Zipcode | Phone Number |
| Organization  |         |                    |              |
+---------------+---------+--------------------+--------------+
|   [facility] |         |                    |              |
+---------------+---------+--------------------+--------------+
 Type and Screen (2019  9:15 AM PDT)
 
+-----------+------------------------+-----------+------------+--------------+
| Component | Value                  | Ref Range | Performed  | Pathologist  |
|           |                        |           | At         | Signature    |
+-----------+------------------------+-----------+------------+--------------+
| ABO Rh    | O POSITIVE             |           | [facility]       |              |
|           |                        |           | LABORATORY |              |
+-----------+------------------------+-----------+------------+--------------+
 
| Antibody  | NEGATIVE               |           | [facility]       |              |
| Screen    |                        |           | LABORATORY |              |
+-----------+------------------------+-----------+------------+--------------+
| Antibody  | Testing performed at   |           | [facility]       |              |
| Screen    | [facility];[address]          |           | LABORATORY |              |
|           | [address];[place] [address] |           |            |              |
+-----------+------------------------+-----------+------------+--------------+
 
 
 
+----------+
| Specimen |
+----------+
| Blood    |
+----------+
 
 
 
+-------------------+------------------+--------------------+----------------+
| Performing        | Address          | City/State/Zipcode | Phone Number   |
| Organization      |                  |                    |                |
+-------------------+------------------+--------------------+----------------+
|   [facility] |   [address] | [place] [address] |   [phone] |
+-------------------+------------------+--------------------+----------------+
 CBC with Differential (2019  9:14 AM PDT)
 
+-------------+-------------------------+-----------------+------------+--------------+
| Component   | Value                   | Ref Range       | Performed  | Pathologist  |
|             |                         |                 | At         | Signature    |
+-------------+-------------------------+-----------------+------------+--------------+
| WBC         | 4.96                    | 3.80 - 11.00    | [facility]       |              |
|             |                         | K/uL            | LABORATORY |              |
+-------------+-------------------------+-----------------+------------+--------------+
| RBC         | 4.20                    | 4.20 - 5.70     | [facility]       |              |
|             |                         | M/uL            | LABORATORY |              |
+-------------+-------------------------+-----------------+------------+--------------+
| Hemoglobin  | 13.9                    | 13.2 - 17.0     | [facility]       |              |
|             |                         | g/dL            | LABORATORY |              |
+-------------+-------------------------+-----------------+------------+--------------+
| Hematocrit  | 41.3                    | 39.0 - 50.0 %   | [facility]       |              |
|             |                         |                 | LABORATORY |              |
+-------------+-------------------------+-----------------+------------+--------------+
| MCV         | 98.4                    | 80.0 - 100.0 fl | [facility]       |              |
|             |                         |                 | LABORATORY |              |
+-------------+-------------------------+-----------------+------------+--------------+
| MCH         | 33.2                    | 27.0 - 34.0 pg  | [facility]       |              |
|             |                         |                 | LABORATORY |              |
+-------------+-------------------------+-----------------+------------+--------------+
| MCHC        | 33.7                    | 32.0 - 35.5     | [facility]       |              |
|             |                         | g/dL            | LABORATORY |              |
+-------------+-------------------------+-----------------+------------+--------------+
| RDW-SD      | 46.8                    | 37 - 53 fl      | [facility]       |              |
|             |                         |                 | LABORATORY |              |
+-------------+-------------------------+-----------------+------------+--------------+
| Platelet    | 152                     | 150 - 400 K/uL  | [facility]       |              |
| Count       |                         |                 | LABORATORY |              |
+-------------+-------------------------+-----------------+------------+--------------+
| MPV         | 8.0                     | fl              | [facility]       |              |
|             |                         |                 | LABORATORY |              |
+-------------+-------------------------+-----------------+------------+--------------+
 
| Diff Type   | MANUAL                  |                 | [facility]       |              |
|             |                         |                 | LABORATORY |              |
+-------------+-------------------------+-----------------+------------+--------------+
| % Segmented | 35                      | %               | [facility]       |              |
|             |                         |                 | LABORATORY |              |
| Neutrophils |                         |                 |            |              |
+-------------+-------------------------+-----------------+------------+--------------+
| %           | 56                      | %               | [facility]       |              |
| Lymphocytes |                         |                 | LABORATORY |              |
+-------------+-------------------------+-----------------+------------+--------------+
| % Reactive  | 4                       | %               | [facility]       |              |
| Lymphocytes |                         |                 | LABORATORY |              |
+-------------+-------------------------+-----------------+------------+--------------+
| % Monocytes | 5                       | %               | [facility]       |              |
|             |                         |                 | LABORATORY |              |
+-------------+-------------------------+-----------------+------------+--------------+
| Neutrophils | 1.74 (L)                | 1.90 - 7.40     | [facility]       |              |
| , Absolute  |                         | K/uL            | LABORATORY |              |
+-------------+-------------------------+-----------------+------------+--------------+
| Absolute    | 2.77                    | 1.00 - 3.90     | [facility]       |              |
| Lymphocytes |                         | K/uL            | LABORATORY |              |
+-------------+-------------------------+-----------------+------------+--------------+
| Absolute    | 0.20 (H)                | 0.00 K/uL       | [facility]       |              |
| Variant     |                         |                 | LABORATORY |              |
| Lymphocytes |                         |                 |            |              |
+-------------+-------------------------+-----------------+------------+--------------+
| Absolute    | 0.25                    | 0.00 - 0.80     | [facility]       |              |
| Monocytes   |                         | K/uL            | LABORATORY |              |
+-------------+-------------------------+-----------------+------------+--------------+
| RBC         | RBC AND PLT MORPHOLOGY  |                 | [facility]       |              |
| Morphology  | APPEAR NORMALComment:   |                 | LABORATORY |              |
|             | Testing performed at    |                 |            |              |
|             | [facility], [address]  |                 |            |              |
|             | [place]     |                 |            |              |
|             | [address]                   |                 |            |              |
+-------------+-------------------------+-----------------+------------+--------------+
 
 
 
+----------+
| Specimen |
+----------+
| Blood    |
+----------+
 
 
 
+-------------------+------------------+--------------------+----------------+
| Performing        | Address          | City/State/Zipcode | Phone Number   |
| Organization      |                  |                    |                |
+-------------------+------------------+--------------------+----------------+
|   [facility] |   [address] | [place] [address] |   [phone] |
+-------------------+------------------+--------------------+----------------+
 Comprehensive Metabolic Panel (2019  9:14 AM PDT)
 
+-------------+--------------------------+-----------------+------------+--------------+
| Component   | Value                    | Ref Range       | Performed  | Pathologist  |
|             |                          |                 | At         | Signature    |
+-------------+--------------------------+-----------------+------------+--------------+
| Na          | 138                      | 135 - 145       | [facility]       |              |
 
|             |                          | mmol/L          | LABORATORY |              |
+-------------+--------------------------+-----------------+------------+--------------+
| K           | 3.8                      | 3.5 - 4.9       | [facility]       |              |
|             |                          | mmol/L          | LABORATORY |              |
+-------------+--------------------------+-----------------+------------+--------------+
| Cl          | 107                      | 99 - 109 mmol/L | [facility]       |              |
|             |                          |                 | LABORATORY |              |
+-------------+--------------------------+-----------------+------------+--------------+
| CO2         | 28                       | 23 - 32 mmol/L  | [facility]       |              |
|             |                          |                 | LABORATORY |              |
+-------------+--------------------------+-----------------+------------+--------------+
| Anion Gap   | 7                        | 5 - 20 mmol/L   | [facility]       |              |
|             |                          |                 | LABORATORY |              |
+-------------+--------------------------+-----------------+------------+--------------+
| Glucose     | 104 (H)                  | 65 - 99 mg/dL   | [facility]       |              |
|             |                          |                 | LABORATORY |              |
+-------------+--------------------------+-----------------+------------+--------------+
| BUN         | 16                       | 8 - 25 mg/dL    | [facility]       |              |
|             |                          |                 | LABORATORY |              |
+-------------+--------------------------+-----------------+------------+--------------+
| Creatinine  | 1.0                      | 0.70 - 1.30     | [facility]       |              |
|             |                          | mg/dL           | LABORATORY |              |
+-------------+--------------------------+-----------------+------------+--------------+
| BUN/Creatin | 16                       |                 | [facility]       |              |
| ine Ratio   |                          |                 | LABORATORY |              |
+-------------+--------------------------+-----------------+------------+--------------+
| Calcium     | 9.6                      | 8.5 - 10.5      | [facility]       |              |
|             |                          | mg/dL           | LABORATORY |              |
+-------------+--------------------------+-----------------+------------+--------------+
| Protein,    | 7.3                      | 6.3 - 8.2 g/dL  | [facility]       |              |
| Total       |                          |                 | LABORATORY |              |
+-------------+--------------------------+-----------------+------------+--------------+
| Albumin     | 4.1                      | 3.3 - 4.8 g/dL  | [facility]       |              |
|             |                          |                 | LABORATORY |              |
+-------------+--------------------------+-----------------+------------+--------------+
| Globulin    | 3.2                      | 1.3 - 4.9 g/dL  | [facility]       |              |
|             |                          |                 | LABORATORY |              |
+-------------+--------------------------+-----------------+------------+--------------+
| A/G Ratio   | 1.3                      | 1.0 - 2.4       | [facility]       |              |
|             |                          |                 | LABORATORY |              |
+-------------+--------------------------+-----------------+------------+--------------+
| BILIRUBIN,  | 0.5                      | 0.1 - 1.5 mg/dL | [facility]       |              |
| TOTAL       |                          |                 | LABORATORY |              |
+-------------+--------------------------+-----------------+------------+--------------+
| ALK PHOS    | 70                       | 35 - 115 U/L    | [facility]       |              |
|             |                          |                 | LABORATORY |              |
+-------------+--------------------------+-----------------+------------+--------------+
| AST         | 14                       | 10 - 45 U/L     | [facility]       |              |
|             |                          |                 | LABORATORY |              |
+-------------+--------------------------+-----------------+------------+--------------+
| ALT         | 22                       | 10 - 65 U/L     | [facility]       |              |
|             |                          |                 | LABORATORY |              |
+-------------+--------------------------+-----------------+------------+--------------+
| Estimated   | >60Comment: GFR <60:     | >60             | [facility]       |              |
| GFR         | CHRONIC KIDNEY DISEASE,  | mL/min/1.73m2   | LABORATORY |              |
|             | IF FOUND OVER A 3 MONTH  |                 |            |              |
|             | PERIOD.GFR <15: KIDNEY   |                 |            |              |
|             | FAILURE.FOR       |                 |            |              |
|             | [employer], MULTIPLY THE  |                 |            |              |
|             | CALCULATED GFR BY        |                 |            |              |
 
|             | 1.210.This eGFR is       |                 |            |              |
|             | calculated using the     |                 |            |              |
|             | MDRD IDMS traceable      |                 |            |              |
|             | equation.Testing         |                 |            |              |
|             | performed at [facility], [address] |                 |            |              |
|             |  [facility],        |                 |            |              |
|             | [place]   [address]    |                 |            |              |
+-------------+--------------------------+-----------------+------------+--------------+
 
 
 
+----------+
| Specimen |
+----------+
| Blood    |
+----------+
 
 
 
+-------------------+------------------+--------------------+----------------+
| Performing        | Address          | City/State/Zipcode | Phone Number   |
| Organization      |                  |                    |                |
+-------------------+------------------+--------------------+----------------+
|   [facility] LABOR[facility]ORY |   [address] | [place] [address] |   [phone] |
+-------------------+------------------+--------------------+----------------+
 ECG 12 lead (2019  9:12 AM PDT)
 
+-------------+------------------------+-----------+------------+--------------+
| Component   | Value                  | Ref Range | Performed  | Pathologist  |
|             |                        |           | At         | Signature    |
+-------------+------------------------+-----------+------------+--------------+
| VENTRICULAR | 61                     | BPM       | [facility]  |              |
|  RATE EKG   |                        |           |            |              |
+-------------+------------------------+-----------+------------+--------------+
| ATRIAL RATE | 61                     | BPM       | [facility]  |              |
+-------------+------------------------+-----------+------------+--------------+
| P-R         | 200                    | ms        | [facility]  |              |
| INTERVAL    |                        |           |            |              |
+-------------+------------------------+-----------+------------+--------------+
| QRS         | 78                     | ms        | [facility]  |              |
| DURATION    |                        |           |            |              |
+-------------+------------------------+-----------+------------+--------------+
| Q-T         | 448                    | ms        | [facility]  |              |
| INTERVAL    |                        |           |            |              |
+-------------+------------------------+-----------+------------+--------------+
| Q-T         | 450                    | ms        | [facility]  |              |
| INTERVAL    |                        |           |            |              |
| (CORRECTED) |                        |           |            |              |
+-------------+------------------------+-----------+------------+--------------+
| P WAVE AXIS | 28                     | degrees   | [facility]  |              |
+-------------+------------------------+-----------+------------+--------------+
| QRS AXIS    | -5                     | degrees   | [facility]  |              |
+-------------+------------------------+-----------+------------+--------------+
| T AXIS      | 0                      | degrees   | [facility]  |              |
+-------------+------------------------+-----------+------------+--------------+
| INTERPRETAT | Normal sinus           |           | [facility]  |              |
| ION TEXT    | rhythmNormal ECGNo     |           |            |              |
|             | previous ECGs          |           |            |              |
|             | availableConfirmed by  |           |            |              |
|             | Blake Benedict MD  |           |            |              |
 
|             | (69) on 2019      |           |            |              |
|             | 8:32:02 PM             |           |            |              |
+-------------+------------------------+-----------+------------+--------------+
 
 
 
+----------+
| Specimen |
+----------+
|          |
+----------+
 
 
 
+----------------------------------------------------------+--------------+
| Narrative                                                | Performed At |
+----------------------------------------------------------+--------------+
|   This result has an attachment that is not available.   |              |
+----------------------------------------------------------+--------------+
 
 
 
+--------------+---------+--------------------+--------------+
| Performing   | Address | City/State/Zipcode | Phone Number |
| Organization |         |                    |              |
+--------------+---------+--------------------+--------------+
|   [facility]  |         |                    |              |
+--------------+---------+--------------------+--------------+
 documented in this encounter
 
 Visit Diagnoses
 
 
+-----------------------------------------------------------------------------------+
| Diagnosis                                                                         |
+-----------------------------------------------------------------------------------+
|   Mesenteric ischemia, chronic (HCC)  Chronic vascular insufficiency of intestine |
+-----------------------------------------------------------------------------------+
 documented in this encounter

## 2020-01-13 NOTE — XMS
Encounter Summary
  Created on: 2020
 
 Pasquale Nehemias Martinez
 External Reference #: [id number]
 : 43
 Sex: Male
 
 Demographics
 
 
+-----------------------+----------------------+
| Address               | [address]         |
|                       | [place]  [address] |
+-----------------------+----------------------+
| Home Phone            | [phone]      |
+-----------------------+----------------------+
| Preferred Language    | Unknown              |
+-----------------------+----------------------+
| Marital Status        |               |
+-----------------------+----------------------+
| Druze Affiliation | [id number]                 |
+-----------------------+----------------------+
| Race                  | Unknown              |
+-----------------------+----------------------+
| Ethnic Group          | Unknown              |
+-----------------------+----------------------+
 
 
 Author
 
 
+--------------+--------------------------------------------+
| Author       | [facility]  |
|              | and [facility]                                |
+--------------+--------------------------------------------+
| Organization | [facility]  |
|              | and [place]                                |
+--------------+--------------------------------------------+
| Address      | Unknown                                    |
+--------------+--------------------------------------------+
| Phone        | Unavailable                                |
+--------------+--------------------------------------------+
 
 
 
 Support
 
 
+--------------+--------------+---------------------+-----------------+
| Name         | Relationship | Address             | Phone           |
+--------------+--------------+---------------------+-----------------+
| Rupa Giordano | ECON         | [address]             | [phone] |
|              |              | [facility] [place]   |                 |
|              |              | [address]               |                 |
+--------------+--------------+---------------------+-----------------+
 
 
 
 
 Care Team Providers
 
 
+-----------------------+------+-------------+
| Care Team Member Name | Role | Phone       |
+-----------------------+------+-------------+
| No, Physician         | PCP  | Unavailable |
+-----------------------+------+-------------+
 
 
 
 Reason for Visit
 
 
+--------+------------------+
| Reason | Comments         |
+--------+------------------+
| Other  | Labs and Imaging |
+--------+------------------+
 
 
 
 Encounter Details
 
 
+--------+-----------+----------------------+----------------------+------------------+
| Date   | Type      | Department           | Care Team            | Description      |
+--------+-----------+----------------------+----------------------+------------------+
| 10/29/ | Telephone |   [facility]      |   Sukhdeep Pardo MD     | Other (Labs and  |
|    |           | VASCULAR SURGERY     | [address]     | Imaging)         |
|        |           | [address] | [address]  [place], [place]  |                  |
|        |           |  [place]  [place]     | [address]           |                  |
|        |           | [address]           | [phone]         |                  |
|        |           | [phone]         | [phone] (Fax)   |                  |
+--------+-----------+----------------------+----------------------+------------------+
 
 
 
 Social History
 
 
+---------------+-------+-----------+--------+------+
| Tobacco Use   | Types | Packs/Day | Years  | Date |
|               |       |           | Used   |      |
+---------------+-------+-----------+--------+------+
| Former Smoker |       |           |        |      |
+---------------+-------+-----------+--------+------+
 
 
 
+---------------------+---+---+---+
| Smokeless Tobacco:  |   |   |   |
| Former User         |   |   |   |
+---------------------+---+---+---+
 
 
 
+------------------------------+
| Comments: quit 50 years ago  |
 
+------------------------------+
 
 
 
+---------------+-------------+---------+----------+
| Alcohol Use   | Drinks/Week | oz/Week | Comments |
+---------------+-------------+---------+----------+
| Not Currently |             |         | 90m days |
+---------------+-------------+---------+----------+
 
 
 
+------------------+---------------+
| Sex Assigned at  | Date Recorded |
| Birth            |               |
+------------------+---------------+
| Not on file      |               |
+------------------+---------------+
 
 
 
+----------------+-------------+-------------+
| Job Start Date | Occupation  | Industry    |
+----------------+-------------+-------------+
| Not on file    | Not on file | Not on file |
+----------------+-------------+-------------+
 
 
 
+----------------+--------------+------------+
| Travel History | Travel Start | Travel End |
+----------------+--------------+------------+
 
 
 
+-------------------------------------+
| No recent travel history available. |
+-------------------------------------+
 documented as of this encounter
 
 Functional Status
 
 
+---------------------------------------------+----------+--------------------+
| Functional Status                           | Response | Date of Assessment |
+---------------------------------------------+----------+--------------------+
| Are you deaf or do you have serious         | No       | 2019         |
| difficulty hearing?                         |          |                    |
+---------------------------------------------+----------+--------------------+
| Are you blind or do you have serious        | No       | 2019         |
| difficulty seeing, even when wearing        |          |                    |
| glasses?                                    |          |                    |
+---------------------------------------------+----------+--------------------+
| Do you have serious difficulty walking or   | No       | 2019         |
| climbing stairs? (5 years old or older)     |          |                    |
+---------------------------------------------+----------+--------------------+
| Do you have difficulty dressing or bathing? | No       | 2019         |
|  (5 years old or older)                     |          |                    |
+---------------------------------------------+----------+--------------------+
| Because of a physical, mental, or emotional | No       | 2019         |
 
|  condition, do you have difficulty doing    |          |                    |
| errands alone such as visiting a doctor's   |          |                    |
| office or shopping? [15 years old or        |          |                    |
| older)]                                     |          |                    |
+---------------------------------------------+----------+--------------------+
 
 
 
+---------------------------------------------+----------+--------------------+
| Cognitive Status                            | Response | Date of Assessment |
+---------------------------------------------+----------+--------------------+
| Because of a physical, mental, or emotional | No       | 2019         |
|  condition, do you have serious difficulty  |          |                    |
| concentrating, remembering, or making       |          |                    |
| decisions? (5 years old or older)           |          |                    |
+---------------------------------------------+----------+--------------------+
 documented as of this encounter
 
 Plan of Treatment
 
 
+--------+-------------+------------------+----------------------+-------------+
| Date   | Type        | Specialty        | Care Team            | Description |
+--------+-------------+------------------+----------------------+-------------+
| / | Appointment | Radiology        |   Augustin Lopez DNP      |             |
|    |             |                  | [address]     |             |
|        |             |                  | [place]  |             |
|        |             |                  | [address]  [phone]  |             |
|        |             |                  |  [phone] (Fax)  |             |
+--------+-------------+------------------+----------------------+-------------+
| / | Office      | Vascular Surgery |   Augustin Lopez DNP      |             |
|    | Visit       |                  | [address]     |             |
|        |             |                  | [place]  |             |
|        |             |                  | [address]  [phone]  |             |
|        |             |                  |  [phone] (Fax)  |             |
+--------+-------------+------------------+----------------------+-------------+
 documented as of this encounter
 
 Visit Diagnoses
 Not on filedocumented in this encounter

## 2020-01-13 NOTE — XMS
Encounter Summary
  Created on: 2020
 
 Pasquale Nehemias Martinez
 External Reference #: [id number]
 : 43
 Sex: Male
 
 Demographics
 
 
+-----------------------+----------------------+
| Address               | [address]         |
|                       | [place]  [address] |
+-----------------------+----------------------+
| Home Phone            | [phone]      |
+-----------------------+----------------------+
| Preferred Language    | Unknown              |
+-----------------------+----------------------+
| Marital Status        |               |
+-----------------------+----------------------+
| Islam Affiliation | 1077                 |
+-----------------------+----------------------+
| Race                  | Unknown              |
+-----------------------+----------------------+
| Ethnic Group          | Unknown              |
+-----------------------+----------------------+
 
 
 Author
 
 
+--------------+--------------------------------------------+
| Author       | [facility]  |
|              | and [facility]                                |
+--------------+--------------------------------------------+
| Organization | [facility]  |
|              | and [place]                                |
+--------------+--------------------------------------------+
| Address      | Unknown                                    |
+--------------+--------------------------------------------+
| Phone        | Unavailable                                |
+--------------+--------------------------------------------+
 
 
 
 Support
 
 
+--------------+--------------+---------------------+-----------------+
| Name         | Relationship | Address             | Phone           |
+--------------+--------------+---------------------+-----------------+
| Rupa Giordano | ECON         | [address]             | [phone] |
|              |              | [place]   |                 |
|              |              | [address]               |                 |
+--------------+--------------+---------------------+-----------------+
 
 
 
 
 Care Team Providers
 
 
+-----------------------+------+-------------+
| Care Team Member Name | Role | Phone       |
+-----------------------+------+-------------+
| No, Physician         | PCP  | Unavailable |
+-----------------------+------+-------------+
 
 
 
 Reason for Visit
 
 
+------------------+----------+
| Reason           | Comments |
+------------------+----------+
| Diagnostic Order |          |
+------------------+----------+
 
 
 
 Encounter Details
 
 
+--------+-----------+----------------------+----------------------+------------------+
| Date   | Type      | Department           | Care Team            | Description      |
+--------+-----------+----------------------+----------------------+------------------+
| / | Telephone |   [facility]      |   Libertad Saxenamalu MCPHERSON,  | Diagnostic Order |
|    |           | VASCULAR SURGERY     | RN                   |                  |
|        |           | [address] |                      |                  |
|        |           |  [place]  [place]     |                      |                  |
|        |           | [address]           |                      |                  |
|        |           | [phone]         |                      |                  |
+--------+-----------+----------------------+----------------------+------------------+
 
 
 
 Social History
 
 
+---------------+-------+-----------+--------+------+
| Tobacco Use   | Types | Packs/Day | Years  | Date |
|               |       |           | Used   |      |
+---------------+-------+-----------+--------+------+
| Former Smoker |       |           |        |      |
+---------------+-------+-----------+--------+------+
 
 
 
+---------------------+---+---+---+
| Smokeless Tobacco:  |   |   |   |
| Former User         |   |   |   |
+---------------------+---+---+---+
 
 
 
+------------------------------+
| Comments: quit 50 years ago  |
 
+------------------------------+
 
 
 
+---------------+-------------+---------+----------+
| Alcohol Use   | Drinks/Week | oz/Week | Comments |
+---------------+-------------+---------+----------+
| Not Currently |             |         | 90m days |
+---------------+-------------+---------+----------+
 
 
 
+------------------+---------------+
| Sex Assigned at  | Date Recorded |
| Birth            |               |
+------------------+---------------+
| Not on file      |               |
+------------------+---------------+
 
 
 
+----------------+-------------+-------------+
| Job Start Date | Occupation  | Industry    |
+----------------+-------------+-------------+
| Not on file    | Not on file | Not on file |
+----------------+-------------+-------------+
 
 
 
+----------------+--------------+------------+
| Travel History | Travel Start | Travel End |
+----------------+--------------+------------+
 
 
 
+-------------------------------------+
| No recent travel history available. |
+-------------------------------------+
 documented as of this encounter
 
 Functional Status
 
 
+---------------------------------------------+----------+--------------------+
| Functional Status                           | Response | Date of Assessment |
+---------------------------------------------+----------+--------------------+
| Are you deaf or do you have serious         | No       | 2019         |
| difficulty hearing?                         |          |                    |
+---------------------------------------------+----------+--------------------+
| Are you blind or do you have serious        | No       | 2019         |
| difficulty seeing, even when wearing        |          |                    |
| glasses?                                    |          |                    |
+---------------------------------------------+----------+--------------------+
| Do you have serious difficulty walking or   | No       | 2019         |
| climbing stairs? (5 years old or older)     |          |                    |
+---------------------------------------------+----------+--------------------+
| Do you have difficulty dressing or bathing? | No       | 2019         |
|  (5 years old or older)                     |          |                    |
+---------------------------------------------+----------+--------------------+
| Because of a physical, mental, or emotional | No       | 2019         |
 
|  condition, do you have difficulty doing    |          |                    |
| errands alone such as visiting a doctor's   |          |                    |
| office or shopping? [15 years old or        |          |                    |
| older)]                                     |          |                    |
+---------------------------------------------+----------+--------------------+
 
 
 
+---------------------------------------------+----------+--------------------+
| Cognitive Status                            | Response | Date of Assessment |
+---------------------------------------------+----------+--------------------+
| Because of a physical, mental, or emotional | No       | 2019         |
|  condition, do you have serious difficulty  |          |                    |
| concentrating, remembering, or making       |          |                    |
| decisions? (5 years old or older)           |          |                    |
+---------------------------------------------+----------+--------------------+
 documented as of this encounter
 
 Plan of Treatment
 
 
+--------+-------------+------------------+----------------------+-------------+
| Date   | Type        | Specialty        | Care Team            | Description |
+--------+-------------+------------------+----------------------+-------------+
| / | Appointment | Radiology        |   Augustin Lopez DNP      |             |
| 2020   |             |                  | [address]     |             |
|        |             |                  | [place]  |             |
|        |             |                  | [address]  [phone]  |             |
|        |             |                  |  [phone] (Fax)  |             |
+--------+-------------+------------------+----------------------+-------------+
| / | Office      | Vascular Surgery |   Augustin Lopez DNP      |             |
| 2020   | Visit       |                  | [address]     |             |
|        |             |                  | [place]  |             |
|        |             |                  | [id number] [phone]  |             |
|        |             |                  |  [phone] (Fax)  |             |
+--------+-------------+------------------+----------------------+-------------+
 documented as of this encounter
 
 Visit Diagnoses
 Not on filedocumented in this encounter

## 2020-01-13 NOTE — XMS
Encounter Summary
  Created on: 2020
 
 Pasquale Nehemias Martinez
 External Reference #: [id number]
 : 43
 Sex: Male
 
 Demographics
 
 
+-----------------------+----------------------+
| Address               | [address]         |
|                       | [place]  [address] |
+-----------------------+----------------------+
| Home Phone            | [phone]      |
+-----------------------+----------------------+
| Preferred Language    | Unknown              |
+-----------------------+----------------------+
| Marital Status        |               |
+-----------------------+----------------------+
| [employer] Affiliation | 1077                 |
+-----------------------+----------------------+
| Race                  | Unknown              |
+-----------------------+----------------------+
| Ethnic Group          | Unknown              |
+-----------------------+----------------------+
 
 
 Author
 
 
+--------------+--------------------------------------------+
| Author       | [facility]  |
|              | and [facility]                                |
+--------------+--------------------------------------------+
| Organization | [facility]  |
|              | and [place]                                |
+--------------+--------------------------------------------+
| Address      | Unknown                                    |
+--------------+--------------------------------------------+
| Phone        | Unavailable                                |
+--------------+--------------------------------------------+
 
 
 
 Support
 
 
+--------------+--------------+---------------------+-----------------+
| Name         | Relationship | Address             | Phone           |
+--------------+--------------+---------------------+-----------------+
| Rupa Giordano | [employer]         | [address]             | [phone] |
|              |              | [place], [place]   |                 |
|              |              | [address]               |                 |
+--------------+--------------+---------------------+-----------------+
 
 
 
 
 Care Team Providers
 
 
+-----------------------+------+-------------+
| Care Team Member Name | Role | Phone       |
+-----------------------+------+-------------+
| No, Physician         | PCP  | Unavailable |
+-----------------------+------+-------------+
 
 
 
 Encounter Details
 
 
+--------+-----------+---------------------+----------------------+----------------------+
| Date   | Type      | Department          | Care Team            | Description          |
+--------+-----------+---------------------+----------------------+----------------------+
| 10/23/ | Hospital  |   [facility]     |   Augustin Lopez DNP      | Mesenteric ischemia, |
| 2019   | Encounter | VASCULAR SURGERY    | [address]     |  chronic (HCC)       |
|        |           | ULTRASOUND  [address]    | [place]  |                      |
|        |           | [place]   | [address] [phone]  |                      |
|        |           | [place]        |  [phone] (Fax)  |                      |
|        |           | [address]          |                      |                      |
|        |           | [phone]        |                      |                      |
+--------+-----------+---------------------+----------------------+----------------------+
 
 
 
 Social History
 
 
+---------------+-------+-----------+--------+------+
| Tobacco Use   | Types | Packs/Day | Years  | Date |
|               |       |           | Used   |      |
+---------------+-------+-----------+--------+------+
| Former Smoker |       |           |        |      |
+---------------+-------+-----------+--------+------+
 
 
 
+---------------------+---+---+---+
| Smokeless Tobacco:  |   |   |   |
| Former User         |   |   |   |
+---------------------+---+---+---+
 
 
 
+------------------------------+
| Comments: quit 50 years ago  |
+------------------------------+
 
 
 
+---------------+-------------+---------+----------+
| Alcohol Use   | Drinks/Week | oz/Week | Comments |
+---------------+-------------+---------+----------+
| Not Currently |             |         | 90m days |
+---------------+-------------+---------+----------+
 
 
 
 
+------------------+---------------+
| Sex Assigned at  | Date Recorded |
| Birth            |               |
+------------------+---------------+
| Not on file      |               |
+------------------+---------------+
 
 
 
+----------------+-------------+-------------+
| Job Start Date | Occupation  | Industry    |
+----------------+-------------+-------------+
| Not on file    | Not on file | Not on file |
+----------------+-------------+-------------+
 
 
 
+----------------+--------------+------------+
| Travel History | Travel Start | Travel End |
+----------------+--------------+------------+
 
 
 
+-------------------------------------+
| No recent travel history available. |
+-------------------------------------+
 documented as of this encounter
 
 Functional Status
 
 
+---------------------------------------------+----------+--------------------+
| Functional Status                           | Response | Date of Assessment |
+---------------------------------------------+----------+--------------------+
| Are you deaf or do you have serious         | No       | 2019         |
| difficulty hearing?                         |          |                    |
+---------------------------------------------+----------+--------------------+
| Are you blind or do you have serious        | No       | 2019         |
| difficulty seeing, even when wearing        |          |                    |
| glasses?                                    |          |                    |
+---------------------------------------------+----------+--------------------+
| Do you have serious difficulty walking or   | No       | 2019         |
| climbing stairs? (5 years old or older)     |          |                    |
+---------------------------------------------+----------+--------------------+
| Do you have difficulty dressing or bathing? | No       | 2019         |
|  (5 years old or older)                     |          |                    |
+---------------------------------------------+----------+--------------------+
| Because of a physical, mental, or emotional | No       | 2019         |
|  condition, do you have difficulty doing    |          |                    |
| errands alone such as visiting a doctor's   |          |                    |
| office or shopping? [15 years old or        |          |                    |
| older)]                                     |          |                    |
+---------------------------------------------+----------+--------------------+
 
 
 
+---------------------------------------------+----------+--------------------+
| Cognitive Status                            | Response | Date of Assessment |
 
+---------------------------------------------+----------+--------------------+
| Because of a physical, mental, or emotional | No       | 2019         |
|  condition, do you have serious difficulty  |          |                    |
| concentrating, remembering, or making       |          |                    |
| decisions? (5 years old or older)           |          |                    |
+---------------------------------------------+----------+--------------------+
 documented as of this encounter
 
 Medications at Time of Discharge
 
 
+----------------------+----------------------+-----------+---------+----------+----------+
| Medication           | Sig                  | Dispensed | Refills | Start    | End Date |
|                      |                      |           |         | Date     |          |
+----------------------+----------------------+-----------+---------+----------+----------+
|   ASPIRIN 81 PO      | Take 81 mg by mouth. |           | 0       |          |          |
+----------------------+----------------------+-----------+---------+----------+----------+
|   docusate sodium    | Take 1 capsule by    |   30      | 0       | 20 |          |
| (COLACE) 100 mg      | mouth 2 times daily. | capsule   |         | 19       |          |
| capsule              |                      |           |         |          |          |
+----------------------+----------------------+-----------+---------+----------+----------+
|                      | Take 25 mg by mouth  |           | 0       |          |          |
| hydroCHLOROthiazide  | Daily.               |           |         |          |          |
| 25 mg tablet         |                      |           |         |          |          |
+----------------------+----------------------+-----------+---------+----------+----------+
|                      | Take 1 tablet by     |   30      | 0       | 20 |          |
| HYDROcodone-acetamin | mouth every 4 hours  | tablet    |         | 19       |          |
| ophen (NORCO) 5-325  | as needed for Pain.  |           |         |          |          |
| mg per tablet        |                      |           |         |          |          |
+----------------------+----------------------+-----------+---------+----------+----------+
|   losartan (COZAAR)  | Take 50 mg by mouth  |           | 0       |          |          |
| 50 mg tablet         | Daily.               |           |         |          |          |
+----------------------+----------------------+-----------+---------+----------+----------+
|   Multiple           | Take  by mouth.      |           | 0       |          |          |
| Vitamins-Minerals    |                      |           |         |          |          |
| (CENTRUM SILVER      |                      |           |         |          |          |
| 50+MEN PO)           |                      |           |         |          |          |
+----------------------+----------------------+-----------+---------+----------+----------+
|   simvastatin        | Take 10 mg by mouth  |           | 0       |          |          |
| (ZOCOR) 10 mg tablet | nightly.             |           |         |          |          |
+----------------------+----------------------+-----------+---------+----------+----------+
 documented as of this encounter
 
 Plan of Treatment
 
 
+--------+-------------+------------------+----------------------+-------------+
| Date   | Type        | Specialty        | Care Team            | Description |
+--------+-------------+------------------+----------------------+-------------+
| / | Appointment | Radiology        |   Augustin Lopez DNP      |             |
| 2020   |             |                  | [address]     |             |
|        |             |                  | [place]  |             |
|        |             |                  | [address]  [phone]  |             |
|        |             |                  |  [phone] (Fax)  |             |
+--------+-------------+------------------+----------------------+-------------+
| / | Office      | Vascular Surgery |   Augustin Lopez DNP      |             |
|    | Visit       |                  | [address]     |             |
|        |             |                  | [place]  |             |
|        |             |                  | [address]  [phone]  |             |
|        |             |                  |  [phone] (Fax)  |             |
 
+--------+-------------+------------------+----------------------+-------------+
 documented as of this encounter
 
 Visit Diagnoses
 
 
+-----------------------------------------------------------------------------------+
| Diagnosis                                                                         |
+-----------------------------------------------------------------------------------+
|   Mesenteric ischemia, chronic (HCC)  Chronic vascular insufficiency of intestine |
+-----------------------------------------------------------------------------------+
 documented in this encounter

## 2020-01-13 NOTE — XMS
Encounter Summary
  Created on: 2020
 
 Pasquale Nehemias Martinez
 External Reference #: [id number]
 : 43
 Sex: Male
 
 Demographics
 
 
+-----------------------+----------------------+
| Address               | [address]         |
|                       | [place]  [address] |
+-----------------------+----------------------+
| Home Phone            | [phone]      |
+-----------------------+----------------------+
| Preferred Language    | Unknown              |
+-----------------------+----------------------+
| Marital Status        |               |
+-----------------------+----------------------+
| Shinto Affiliation | [id number]                 |
+-----------------------+----------------------+
| Race                  | Unknown              |
+-----------------------+----------------------+
| Ethnic Group          | Unknown              |
+-----------------------+----------------------+
 
 
 Author
 
 
+--------------+--------------------------------------------+
| Author       | [facility]  |
|              | and [facility]                                |
+--------------+--------------------------------------------+
| Organization | [facility]  |
|              | and [place]                                |
+--------------+--------------------------------------------+
| Address      | Unknown                                    |
+--------------+--------------------------------------------+
| Phone        | Unavailable                                |
+--------------+--------------------------------------------+
 
 
 
 Support
 
 
+--------------+--------------+---------------------+-----------------+
| Name         | Relationship | Address             | Phone           |
+--------------+--------------+---------------------+-----------------+
| Rupa Giordano | ECON         | [address]             | [phone] |
|              |              | [address] [place] [address], [place]   |                 |
|              |              | [address]               |                 |
+--------------+--------------+---------------------+-----------------+
 
 
 
 
 Care Team Providers
 
 
+-----------------------+------+-------------+
| Care Team Member Name | Role | Phone       |
+-----------------------+------+-------------+
| No, Physician         | PCP  | Unavailable |
+-----------------------+------+-------------+
 
 
 
 Reason for Visit
 Auth/Cert
 
+--------+--------+-----------+--------------+--------------+--------------+
| Status | Reason | Specialty | Diagnoses /  | Referred By  | Referred To  |
|        |        |           | Procedures   | Contact      | Contact      |
+--------+--------+-----------+--------------+--------------+--------------+
|        |        |           |   Diagnoses  |              |              |
|        |        |           |  Mesenteric  |              |              |
|        |        |           | ischemia,    |              |              |
|        |        |           | chronic      |              |              |
|        |        |           | ([facility])        |              |              |
|        |        |           | Procedures   |              |              |
|        |        |           | REVASCULARIZ |              |              |
|        |        |           | ATION        |              |              |
|        |        |           | MESENTERIC   |              |              |
+--------+--------+-----------+--------------+--------------+--------------+
 
 
 
 
 Encounter Details
 
 
+--------+-----------+----------------------+----------------------+----------------------+
| Date   | Type      | Department           | Care Team            | Description          |
+--------+-----------+----------------------+----------------------+----------------------+
| / | Hospital  |   [facility]    |   Sukhdeep Pardo MD     | Mesenteric ischemia, |
| 2019 - | Encounter | [facility] | [address]     |  chronic (HCC);      |
|        |           |  CARE FLOOR [phone]   | [address] [address]  | Mesenteric ischemia, |
| / |           | [place]           | [id number]           |  chronic ([facility]);      |
|    |           | [address]         | [phone]         | Hypertension,        |
|        |           | [address]           | [phone] (Fax)   | unspecified type     |
|        |           | [phone]         |                      |                      |
+--------+-----------+----------------------+----------------------+----------------------+
 
 
 
 Social History
 
 
+---------------+-------+-----------+--------+------+
| Tobacco Use   | Types | Packs/Day | Years  | Date |
|               |       |           | Used   |      |
+---------------+-------+-----------+--------+------+
| Former Smoker |       |           |        |      |
+---------------+-------+-----------+--------+------+
 
 
 
 
+---------------------+---+---+---+
| Smokeless Tobacco:  |   |   |   |
| Former User         |   |   |   |
+---------------------+---+---+---+
 
 
 
+------------------------------+
| Comments: quit 50 years ago  |
+------------------------------+
 
 
 
+---------------+-------------+---------+----------+
| Alcohol Use   | Drinks/Week | oz/Week | Comments |
+---------------+-------------+---------+----------+
| Not Currently |             |         | 90m days |
+---------------+-------------+---------+----------+
 
 
 
+------------------+---------------+
| Sex Assigned at  | Date Recorded |
| Birth            |               |
+------------------+---------------+
| Not on file      |               |
+------------------+---------------+
 
 
 
+----------------+-------------+-------------+
| Job Start Date | Occupation  | Industry    |
+----------------+-------------+-------------+
| Not on file    | Not on file | Not on file |
+----------------+-------------+-------------+
 
 
 
+----------------+--------------+------------+
| Travel History | Travel Start | Travel End |
+----------------+--------------+------------+
 
 
 
+-------------------------------------+
| No recent travel history available. |
+-------------------------------------+
 documented as of this encounter
 
 Last Filed Vital Signs
 
 
+-------------------+----------------------+----------------------+----------+
| Vital Sign        | Reading              | Time Taken           | Comments |
+-------------------+----------------------+----------------------+----------+
| Blood Pressure    | 141/66               | 2019 10:42 AM  |          |
|                   |                      | PDT                  |          |
+-------------------+----------------------+----------------------+----------+
 
| Pulse             | 76                   | 2019  7:57 AM  |          |
|                   |                      | PDT                  |          |
+-------------------+----------------------+----------------------+----------+
| Temperature       | 36.9   C (98.5   F)  | 2019  7:57 AM  |          |
|                   |                      | PDT                  |          |
+-------------------+----------------------+----------------------+----------+
| Respiratory Rate  | 18                   | 2019  7:57 AM  |          |
|                   |                      | PDT                  |          |
+-------------------+----------------------+----------------------+----------+
| Oxygen Saturation | 98%                  | 2019  7:57 AM  |          |
|                   |                      | PDT                  |          |
+-------------------+----------------------+----------------------+----------+
| Inhaled Oxygen    | -                    | -                    |          |
| Concentration     |                      |                      |          |
+-------------------+----------------------+----------------------+----------+
| Weight            | 78.3 kg (172 lb 9.9  | 2019  3:50 AM  |          |
|                   | oz)                  | PDT                  |          |
+-------------------+----------------------+----------------------+----------+
| Height            | 168.9 cm (5' 6.5")   | 2019 10:43 AM  |          |
|                   |                      | PDT                  |          |
+-------------------+----------------------+----------------------+----------+
| Body Mass Index   | 27.44                | 2019 10:43 AM  |          |
|                   |                      | PDT                  |          |
+-------------------+----------------------+----------------------+----------+
 documented in this encounter
 
 Functional Status
 
 
+---------------------------------------------+----------+--------------------+
| Functional Status                           | Response | Date of Assessment |
+---------------------------------------------+----------+--------------------+
| Are you deaf or do you have serious         | No       | 2019         |
| difficulty hearing?                         |          |                    |
+---------------------------------------------+----------+--------------------+
| Are you blind or do you have serious        | No       | 2019         |
| difficulty seeing, even when wearing        |          |                    |
| glasses?                                    |          |                    |
+---------------------------------------------+----------+--------------------+
| Do you have serious difficulty walking or   | No       | 2019         |
| climbing stairs? (5 years old or older)     |          |                    |
+---------------------------------------------+----------+--------------------+
| Do you have difficulty dressing or bathing? | No       | 2019         |
|  (5 years old or older)                     |          |                    |
+---------------------------------------------+----------+--------------------+
| Because of a physical, mental, or emotional | No       | 2019         |
|  condition, do you have difficulty doing    |          |                    |
| errands alone such as visiting a doctor's   |          |                    |
| office or shopping? [15 years old or        |          |                    |
| older)]                                     |          |                    |
+---------------------------------------------+----------+--------------------+
 
 
 
+---------------------------------------------+----------+--------------------+
| Cognitive Status                            | Response | Date of Assessment |
+---------------------------------------------+----------+--------------------+
| Because of a physical, mental, or emotional | No       | 2019         |
|  condition, do you have serious difficulty  |          |                    |
| concentrating, remembering, or making       |          |                    |
 
| decisions? (5 years old or older)           |          |                    |
+---------------------------------------------+----------+--------------------+
 documented as of this encounter
 
 Discharge Summaries
 Billy Gutierres PA-C - 2019  8:36 AM PDTFormatting of this note might be different f
rom the original.
 Physician Discharge Summary 
 
 Patient ID:
 Nehemias Giordano
 [id number]
 74 y.o.
 1945
 
 Admit date: 2019
 
 Discharge date and time: 2019
 
 Admitting Physician: Sukhdeep Pardo MD 
 
 Discharge Physician: Billy Gutierres PA-C
 
 Admission Diagnoses: Mesenteric ischemia, chronic (HCC) [K55.1]
 
 Discharge Diagnoses: Same; Status post mesenteric artery revascularization
 
 Admission Condition: poor
 
 Discharged Condition: stable
 
 Indication for Admission: Symptomatic SMA stenosis
 
 Hospital Course: Patient was admitted on 2019 and underwent open SMA endarterectomy an
d patch angioplasty. Post operative course has been uneventful. Began passing flatus two day
s ago and diet has been advanced. Stable for discharge home.
 
 Consults: none
 
 Treatments: surgery: SMA endart and patch angioplasty
 
 Discharge Exam:
 Vitals:reviewed
 CONSTITUTIONAL:   Conversant, well developed, NAD
 EYES: Anicteric sclerae, no lid drag, no proptosis
 RESP: Normal effort, regular, even, unlabored rate
 CV: No peripheral edema, rate regular
 ABD:  Appropriate tenderness.  Hypoactive bowel tones
 SKIN: Pink, warm, dry without rash/lesion
 MS: ROM not limited, no digital cyanosis, normal gait
 NEURO: Conversant, A&O times 3
 PSYCH: appropriate affect, speech and tone, judgement and insight intact
 Vascular:  Palpable brachial pulses bilaterally. Abdominal binder in place. Staples intact.
 
 Disposition: home
 
 Patient Instructions: Keep wound clean and dry, ice to area for comfort and remove dressing
 in two days. Leave open to air. Monitor for signs of infection. 
  
 Discharge Medications 
 
  
 New Medications  
   Details 
 docusate sodium 100 mg capsule
  Take 1 capsule by mouth 2 times daily.
 aka:  COLACE
  
 HYDROcodone-acetaminophen 5-325 mg per tablet
  Take 1 tablet by mouth every 4 hours as needed for Pain.
 aka:  NORCO
  
  
 Unchanged Medications  
   Details 
 ASPIRIN 81 PO
  Take 81 mg by mouth.
  
 CENTRUM SILVER 50+MEN PO
  Take  by mouth.
  
 hydroCHLOROthiazide 25 mg tablet
  Take 25 mg by mouth Daily.
  
 losartan 50 mg tablet
  Take 50 mg by mouth Daily.
 aka:  COZAAR
  
 simvastatin 10 mg tablet
  Take 10 mg by mouth nightly.
 aka:  ZOCOR
  
  
 
 Plan: POD 6. Stable for discharge. Continue aspirin and statin therapy. Continue ambulation
 and pain management. Getting more aggressive with constipation. Patient understands to come
 back in with worsening abdominal pain and failure of bowel movement. 
 Activity: activity as tolerated, ambulate in house and no lifting, Driving, or Strenuous ex
ercise for 6 weeks
 Diet: cardiac diet
 Wound Care: keep wound clean and dry, ice to area for comfort and remove dressing in two da
ys. Leave open to air. Monitor for signs of infection. 
 
 Follow-up with Vascular clinic in 2 weeks.
 
 Signed:
 Billy Gutierres PA-C
 2019
 8:36
 Electronically signed by Billy Gutierres PA-C at 2019  8:42 AM PDTdocumented in this 
encounter
 
 Discharge Instructions
 Instructions Araceli Bartholomew RN - 2019Formatting of this note might be different fro
m the original.
 
 Constipation (Adult)
 Constipation means that you have bowel movements that are less frequent than usual. Stools 
often become very hard and difficult to pass.
 Constipation is very common. At some point in life, it affects almost everyone. Since every
one's bowel habits are different, what is constipation to one person may not be to another. 
 
Your healthcare provider may do tests to diagnose constipation. It depends on whathe or sh
efinds when evaluating you.
 
 Symptoms of constipation include:
  Abdominal pain
  Bloating
  Vomiting
  Painful bowel movements
  Itching, swelling, bleeding, or pain around the anus
 Causes
 Constipation can have many causes. These include:
  Diet low in fiber
  Too much dairy
  Not drinking enough liquids
  Lack of exercise or physical activity (especially true for older adults)
  Changes in lifestyle or daily routine, including pregnancy, aging, work, and travel
  Frequent use or misuse of laxatives
  Ignoring the urge to have a bowel movement or delaying it until later
  Medicines, such as certain prescription pain medicines, iron supplements, antacids, cert
ain antidepressants, and calcium supplements
  Diseases like irritable bowel syndrome, bowel obstructions, stroke, diabetes, thyroid di
sease, Parkinson disease, hemorrhoids, and colon cancer
 Complications
 Potential complications of constipation can include:
  Hemorrhoids
  Rectal bleeding from hemorrhoids or anal fissures(skin tears)
  Hernias
  Dependency on laxatives
  Chronic constipation
  Fecal impaction, a severe form of constipation in which a large amount of hard stool is 
in your rectum that you can't pass
  Bowel obstruction or perforation
 Home care
 All treatment should be done after talking with your healthcare provider. This is especiall
y true if you have another medical problems, are taking prescription medicines, or are an ol
deandre adult. Treatment most often involves lifestyle changes. You may also need medicines. You
r healthcare provider will tell you which will work best for you. Follow the advice below to
 help avoid this problem in the future.
 Lifestyle changes
 These lifestyle changes can help prevent constipation:
  Diet. Eat a high-fiber diet, with fresh fruit and vegetables, and reduce dairy intake, m
eats, and processed foods
  Fluids. It's important to get enough fluids each day. Drink plenty of water when you eat
 more fiber. If you are on diet that limits the amount of fluid you can have, talk about thi
s with your healthcare provider.
  Regular exercise. Check with your healthcare provider first.
 Medicines
 Take any medicines as directed. Some laxatives are safe to use only every now and then. Oth
ers can be taken on a regular basis. While laxatives don't cause bowel dependence, they are 
treating the symptoms. So your constipation may return if you don't make other changes. Talk
 with your healthcare provider or pharmacist if you have questions.
 Prescription pain medicines can cause constipation. If you are taking this kind of medicine
, ask your healthcare provider if you should also take a stool softener.
 Medicines you may take to treat constipation include:
  Fiber supplements
  Stool softeners
  Laxatives
  Enemas
  Rectal suppositories
 Follow-up care
 
 Follow up with your healthcare provider if symptoms don't get better in the next few days. 
You may need to have more tests or see a specialist.
 Call 911
 Call 911 if any of these occur:
  Trouble breathing
  Stiff, rigid abdomen that is severely painful to touch
  Confusion
  Fainting or loss of consciousness
  Rapid heart rate
  Chest pain
 When to seek medical advice
 Call your healthcare provider right away if any of these occur:
  Fever of 100.4F (38C) or higher, or as directed by your healthcare provider
  Failure to resume normal bowel movements
  Pain in your abdomen or back gets worse
  Nausea or vomiting
  Swelling in your abdomen
  Blood in the stool
  Black, tarry stool
  Involuntary weight loss
  Weakness
 Date Last Reviewed: 2018-2018 [employer]. [address] [place] [address]. All righ
ts reserved. This information is not intended as a substitute for professional medical care.
 Always follow your healthcare professional's instructions.
 
 After Open Abdominal Superior Mesenteric Artery Surgery
 You have hadsurgery to repair SMA stenosis.
 Home care
 Recommendations for taking care of yourself at home include the following:
  Avoid strenuous activity for 4 to 6 weeks after your surgery.
  Ask your healthcare provider how long it will be before you can return to work.
  Gradually increase your activity. It may take some time for you to return to your normal
 activity level.
  Don  t drive for 2 weeks after surgery or while you are taking opioid pain medicine. As
k someone to take you to any appointments.
  Check your incision every day for signs of infection (swelling, redness, drainage, or wa
rmth).
  Keep your incision clean. Wash it gently with soap and water when you shower.
  Don  t lift anything heavier than 5 pounds for 2 weeks after surgery.
  Avoid sitting or standing for long periods without moving your legs and feet.
  Keep your feet up when you sit in a chair.
  Take your medicines exactly as directed.
 
 When to call your healthcare provider
 Call your healthcare provider right away if you have any of the following:
  Redness, pain, swelling, or drainage from your incision
  Fever of 100.4F (38C) or higher, or as directed by your healthcare provider
  Sudden coldness, pain, or paleness in your leg
  Loss of feeling in your legs
  Severe or sudden pain in your stomach
  Fail to pass gas
  Bloody bowel movements
  Prolonged constipation
  Nausea or vomiting
  Trouble breathing
  Pain or heaviness in your chest or arms 
 Date Last Reviewed: 2016-2018 [employer]. [address] [place] [address], [place] [address]. All righ
ts reserved. This information is not intended as a substitute for professional medical care.
 
 Always follow your healthcare professional's instructions.
 
 
 documented in this encounter
 
 Medications at Time of Discharge
 
 
+----------------------+----------------------+-----------+---------+----------+----------+
| Medication           | Sig                  | Dispensed | Refills | Start    | End Date |
|                      |                      |           |         | Date     |          |
+----------------------+----------------------+-----------+---------+----------+----------+
|   ASPIRIN 81 PO      | Take 81 mg by mouth. |           | 0       |          |          |
+----------------------+----------------------+-----------+---------+----------+----------+
|   docusate sodium    | Take 1 capsule by    |   30      | 0       | / |          |
| (COLACE) 100 mg      | mouth 2 times daily. | capsule   |         | 19       |          |
| capsule              |                      |           |         |          |          |
+----------------------+----------------------+-----------+---------+----------+----------+
|                      | Take 25 mg by mouth  |           | 0       |          |          |
| hydroCHLOROthiazide  | Daily.               |           |         |          |          |
| 25 mg tablet         |                      |           |         |          |          |
+----------------------+----------------------+-----------+---------+----------+----------+
|                      | Take 1 tablet by     |   30      | 0       | / |          |
| HYDROcodone-acetamin | mouth every 4 hours  | tablet    |         | 19       |          |
| ophen (NORCO) 5-325  | as needed for Pain.  |           |         |          |          |
| mg per tablet        |                      |           |         |          |          |
+----------------------+----------------------+-----------+---------+----------+----------+
|   losartan (COZAAR)  | Take 50 mg by mouth  |           | 0       |          |          |
| 50 mg tablet         | Daily.               |           |         |          |          |
+----------------------+----------------------+-----------+---------+----------+----------+
|   Multiple           | Take  by mouth.      |           | 0       |          |          |
| Vitamins-Minerals    |                      |           |         |          |          |
| (CENTRUM SILVER      |                      |           |         |          |          |
| 50+MEN PO)           |                      |           |         |          |          |
+----------------------+----------------------+-----------+---------+----------+----------+
|   simvastatin        | Take 10 mg by mouth  |           | 0       |          |          |
| (ZOCOR) 10 mg tablet | nightly.             |           |         |          |          |
+----------------------+----------------------+-----------+---------+----------+----------+
 documented as of this encounter
 
 Progress Notes
 Araceli Bartholomew RN - 2019 11:37 AM PDTPatient discharged to home via private car. Ve
rbal and written instructions provided to patient, all questions addressed. Rx and all belon
gings gathered. IVs removed and bandages applied. Patient wheeled to car and discharged in s
table condition with family. 
 Electronically signed by Araceli Bartholomew RN at 2019 11:37 AM Gracy Mary RN 
- 2019  6:58 AM PDTPt passing gas but still no BM. Bowel tones: normoactive to hypoact
dave in upper quadrants, hypoactive in both lower quadrants.  Pt declined suppository twice, 
stating, "If I still haven't had a BM by the morning, I might do the suppository around 8."
 
 Pt set to dc home with wife @ 11 (lives in [place]).  
 
 Abdominal hydrocolloid dressing C/D/I, with no new drainage.  Pt splints appropriately when
 coughing.
 
 End of shift review complete.Electronically signed by Gracy Joe RN at 2019  8:
04 AM Beatrice Man RN - 2019  6:31 PM PDTPt advanced to full liquids, continues 
to pass gas but no BM yet. Bowel tones present. Will continue to monitor.
 
 Beatrice Goel RN Electronically signed by Beatrice Goel RN at 2019  6:32 PM Christopher Cho MD - 2019 12:46 PM PDTVascular Surgery
  still with no BM.  Just took 1 tab of Norco.  Seems to be controlling the pain.  He wants 
to wait till tomorrow. Discussed his care with his wife by phone.  Questions answered about 
diet, climbing 13 stairs to get into one level home. Wife will arrive by 11am with friend to
 help him home and into house.  Wants a walker for home as well. Ok to shower.Electronically
 signed by Christopher Man MD at 2019 12:48 PM Christopher Prince MD - 2019  8:46 AM PDTFormatting of this note might be different from the original.
 [facility]
 Service: Vascular Surgery
 Progress Note
 
 Hospital Day:   LOS: 5 days 
 Post-Op Day:  #5
 SUBJECTIVE
 
      Patient Summary:  S/p SMA endarterectomy for chronic mesenteric ischemia
 
      Events Overnight:    Abdominal binder in place. Passing gas, no stool.  No N/V. Thirst
y.
 
 Scheduled Medications 
   aspirin  81 mg Oral Daily 
   hydroCHLOROthiazide  25 mg Oral Daily 
   losartan  50 mg Oral Daily 
   pantoprazole  40 mg Intravenous Daily 
 
 Continuous Infusions 
   niCARdipine Stopped (19 1628) 
 
 PRN Medications
 acetaminophen, bisacodyl, HYDROcodone-acetaminophen, HYDROmorphone, insulin lispro, morphin
e, sodium chloride 0.9%
 
 OBJECTIVE
 Vital Signs:
 Vitals: 
  19 0715 
 BP: 155/74 
 Pulse: 71 
 Resp: 18 
 Temp: 36.7 C (98 F) 
 
 Physical Exam
 Vitals:reviewed
 CONSTITUTIONAL:  Alert/appropriate
 CV: No peripheral edema, rate regular
 ABD:  Incisional tenderness only. normal bowel tones
 DRSNG: dry, spotty strikethru.
 SKIN: Pink, warm, well hydrated
 LE: w/out calf tenderness. No edema. 
 
 DATA
 Recent Results (from the past 24 hour(s)) 
 Basic Metabolic Panel 
  Collection Time: 19  4:17 
 Result Value Ref Range 
  Na 135 135 - 145 mmol/L 
  K 3.8 3.5 - 4.9 mmol/L 
  Cl 102 99 - 109 mmol/L 
  CO2 26 23 - 32 mmol/L 
 
  Anion Gap 11 5 - 20 mmol/L 
  Glucose 140 (H) 65 - 99 mg/dL 
  BUN 8 8 - 25 mg/dL 
  Creatinine 0.9 0.70 - 1.30 mg/dL 
  BUN/Creatinine Ratio 9  
  Calcium 8.5 8.5 - 10.5 mg/dL 
  Estimated GFR >60 >60 mL/min/1.73m2 
 CBC with Differential 
  Collection Time: 19  4:17 
 Result Value Ref Range 
  WBC 5.40 3.80 - 11.00 K/uL 
  RBC 3.57 (L) 4.20 - 5.70 M/uL 
  Hemoglobin 12.1 (L) 13.2 - 17.0 g/dL 
  Hematocrit 34.5 (L) 39.0 - 50.0 % 
  MCV 96.7 80.0 - 100.0 fl 
  MCH 33.8 27.0 - 34.0 pg 
  MCHC 35.0 32.0 - 35.5 g/dL 
  RDW-SD 45.5 37 - 53 fl 
  Platelet Count 158 150 - 400 K/uL 
  MPV 7.5 fl 
  Diff Type AUTOMATED  
  % Neutrophils 55.42 % 
  % Lymphocytes 36.41 % 
  Monocyte % 6.61 % 
  Eosinophils % 1.23 % 
  Basophils % 0.33 % 
  Neutrophils, Absolute 3.00 1.90 - 7.40 K/uL 
  Absolute Lymphocytes 1.97 1.00 - 3.90 K/uL 
  Absolute Monocytes 0.36 0.00 - 0.80 K/uL 
  Eosinophils, Absolute 0.07 0.00 - 0.50 K/uL 
  Basophils, Absolute 0.02 0.00 - 0.10 K/uL 
 
 PROBLEM LIST
 
 Principal Problem:
   Mesenteric ischemia, chronic 
 Active Problems:
   Hypertension
   Hyperlipidemia
 
 ASSESSMENT & PLAN
 
 S/p SMA endarterectomy for chronic mesenteric ischemia.  POD5- ileus resolving. Will begin 
advance diet.  Continue ambulation.  Oral pain med, hold IV dilaudid. Home afternoon if tole
rates diet and pain controlled.   
 
 Disposition:  [facility]
 Code Status:  Full Code
 
 Christopher Man MD
 2019                                           [phone] signed by Merry Man MD at 2019 12:46 PM PDTGracy Joe, RN - 2019  6:54
 AM PDTHourly rounding performed.  Pt passing gas but has not had BM yet. Ambulated 1 lap ar
ound unit. Cares & concerns will be passed on to daysotilia RN.
 End of shift review complete.Electronically signed by Gracy Joe RN at 2019  7:
58 AM Ace Kaur RN - 2019  6:28 PM PDTA/O, up with supervision, passing gas t
his shift, still no BM, requesting dilaudid q2, plan to transition to PO pain meds tomorrow,
 advanced to clear liquids today, tolerating well
 
 Chart check complete
 
 
 Electronically signed by: Ace Mas RN 2019 18:30
 Electronically signed by Ace Mas RN at 2019  6:30 PM Christopher Prince MD - 2019  9:46 AM PDTFormatting of this note might be different from the [facility]
 Service: Vascular Surgery
 Progress Note
 
 Hospital Day:   LOS: 4 days 
 Post-Op Day:  3
 SUBJECTIVE
 
      Patient Summary:  S/p SMA endarterectomy for chronic mesenteric ischemia
 
      Events Overnight:  Transferred from ICU to floor. Abdominal binder in place. Passing g
as, no stool.  No N/V. Thirsty.
 
 Scheduled Medications 
   pantoprazole  40 mg Intravenous Daily 
 
 Continuous Infusions 
   dextrose 5% and sodium chloride 0.45% with KCl 20 mEq/L 125 mL/hr at 19 0219 
   niCARdipine Stopped (19 1628) 
 
 PRN Medications
 acetaminophen, bisacodyl, HYDROmorphone, insulin lispro, morphine, sodium chloride 0.9%
 
 OBJECTIVE
 Vital Signs:
 Vitals: 
  19 0807 
 BP: 174/85 
 Pulse: 83 
 Resp: 18 
 Temp: 37.9 C (100.2 F) 
 
 Physical Exam
 Vitals:reviewed
 CONSTITUTIONAL:  Alert/appropriate
 CV: No peripheral edema, rate regular
 ABD:  Incisional tenderness only. normal bowel tones
 DRSNG: dry, spotty strikethru.
 SKIN: Pink, warm, well hydrated
 LE: w/out calf tenderness. No edema. 
 
 DATA
 Recent Results (from the past 24 hour(s)) 
 Basic Metabolic Panel 
  Collection Time: 19  4:29 
 Result Value Ref Range 
  Na 135 135 - 145 mmol/L 
  K 4.0 3.5 - 4.9 mmol/L 
  Cl 103 99 - 109 mmol/L 
  CO2 25 23 - 32 mmol/L 
  Anion Gap 11 5 - 20 mmol/L 
  Glucose 140 (H) 65 - 99 mg/dL 
  BUN 9 8 - 25 mg/dL 
  Creatinine 0.8 0.70 - 1.30 mg/dL 
  BUN/Creatinine Ratio 11  
 
  Calcium 8.7 8.5 - 10.5 mg/dL 
  Estimated GFR >60 >60 mL/min/1.73m2 
 CBC with Differential 
  Collection Time: 19  4:29 
 Result Value Ref Range 
  WBC 5.54 3.80 - 11.00 K/uL 
  RBC 3.72 (L) 4.20 - 5.70 M/uL 
  Hemoglobin 12.3 (L) 13.2 - 17.0 g/dL 
  Hematocrit 36.0 (L) 39.0 - 50.0 % 
  MCV 96.8 80.0 - 100.0 fl 
  MCH 33.1 27.0 - 34.0 pg 
  MCHC 34.2 32.0 - 35.5 g/dL 
  RDW-SD 46.8 37 - 53 fl 
  Platelet Count 140 (L) 150 - 400 K/uL 
  MPV 7.4 fl 
  Diff Type AUTOMATED  
  % Neutrophils 57.00 % 
  % Lymphocytes 36.25 % 
  Monocyte % 5.29 % 
  Eosinophils % 1.03 % 
  Basophils % 0.43 % 
  Neutrophils, Absolute 3.16 1.90 - 7.40 K/uL 
  Absolute Lymphocytes 2.01 1.00 - 3.90 K/uL 
  Absolute Monocytes 0.29 0.00 - 0.80 K/uL 
  Eosinophils, Absolute 0.06 0.00 - 0.50 K/uL 
  Basophils, Absolute 0.02 0.00 - 0.10 K/uL 
 
 PROBLEM LIST
 
 Principal Problem:
   Mesenteric ischemia, chronic 
 Active Problems:
   Hypertension
   Hyperlipidemia
 
 ASSESSMENT & PLAN
 
 S/p SMA endarterectomy for chronic mesenteric ischemia.  POD4- ileus resolving. Will begin 
clear liquids.  Continue ambulation.  Resume po BP meds for sBP climbing. Wait on po pain me
d.  
 
 Disposition:  [facility]
 Code Status:  Full Code
 
 Christopher Man MD
 2019                                           [phone] signed by Merry Man MD at 2019  9:52 AM Lizzy Navarro RN - 2019  5:57
 PM PDTPt transferred to 91, report received from LUZ MARIA Godfrey. Medicated for pain, see MAR
. Pt up walking this shift with FWW. Bowel tones hypoactive, but gurgling.
 
 Chart check complete.
 
 Lizzy Orantes RN
 Electronically signed by Lizzy Orantes RN at 2019  6:00 PM Billy Briceno PA- C - 2019  3:56 PM PDTFormatting of this note might be different from the original.
 [facility]
 Service: Vascular Surgery
 Progress Note
 
 Hospital Day:   LOS: 3 days 
 
 Post-Op Day:  3
 SUBJECTIVE
 
      Patient Summary:  S/p SMA endarterectomy for chronic mesenteric ischemia
 
      Events Overnight:  No events overnight. Abdominal binder in place. 
 
 Scheduled Medications 
   pantoprazole  40 mg Intravenous Daily 
 
 Continuous Infusions 
   dextrose 5% and sodium chloride 0.45% with KCl 20 mEq/L 125 mL/hr at 19 0819 
   niCARdipine Stopped (19 1628) 
 
 PRN Medications
 acetaminophen, bisacodyl, HYDROmorphone, insulin lispro, morphine, sodium chloride 0.9%
 
 OBJECTIVE
 Vital Signs:
 Vitals: 
  19 1456 
 BP: (!) 166/91 
 Pulse: 81 
 Resp: 16 
 Temp: 36.8 C (98.2 F) 
 
 Physical Exam
 Vitals:reviewed
 CONSTITUTIONAL:  Conversant, well developed, NAD
 EYES: Anicteric sclerae, no lid drag, no proptosis
 RESP: Normal effort, regular, even, unlabored rate
 CV: No peripheral edema, rate regular
 ABD:  Appropriate tenderness.  Hypoactive bowel tones
 SKIN: Pink, warm, dry without rash/lesion
 MS: ROM not limited, no digital cyanosis, normal gait
 NEURO: Conversant, A&O times 3
 PSYCH: appropriate affect, speech and tone, judgement and insight intact
 Vascular:  Palpable brachial pulses bilaterally. Abdominal binder in place. Staples intact.
 
 DATA
 Recent Results (from the past 24 hour(s)) 
 Basic Metabolic Panel 
  Collection Time: 19  4:23 
 Result Value Ref Range 
  Na 137 135 - 145 mmol/L 
  K 4.0 3.5 - 4.9 mmol/L 
  Cl 104 99 - 109 mmol/L 
  CO2 26 23 - 32 mmol/L 
  Anion Gap 11 5 - 20 mmol/L 
  Glucose 140 (H) 65 - 99 mg/dL 
  BUN 8 8 - 25 mg/dL 
  Creatinine 0.9 0.70 - 1.30 mg/dL 
  BUN/Creatinine Ratio 9  
  Calcium 8.4 (L) 8.5 - 10.5 mg/dL 
  Estimated GFR >60 >60 mL/min/1.73m2 
 CBC with Differential 
  Collection Time: 19  4:23 
 Result Value Ref Range 
  WBC 9.31 3.80 - 11.00 K/uL 
  RBC 3.81 (L) 4.20 - 5.70 M/uL 
 
  Hemoglobin 12.7 (L) 13.2 - 17.0 g/dL 
  Hematocrit 37.1 (L) 39.0 - 50.0 % 
  MCV 97.5 80.0 - 100.0 fl 
  MCH 33.5 27.0 - 34.0 pg 
  MCHC 34.3 32.0 - 35.5 g/dL 
  RDW-SD 46.8 37 - 53 fl 
  Platelet Count 162 150 - 400 K/uL 
  MPV 7.5 fl 
  Diff Type AUTOMATED  
  % Neutrophils 54.94 % 
  % Lymphocytes 39.44 % 
  Monocyte % 4.62 % 
  Eosinophils % 0.69 % 
  Basophils % 0.31 % 
  Neutrophils, Absolute 5.12 1.90 - 7.40 K/uL 
  Absolute Lymphocytes 3.67 1.00 - 3.90 K/uL 
  Absolute Monocytes 0.43 0.00 - 0.80 K/uL 
  Eosinophils, Absolute 0.06 0.00 - 0.50 K/uL 
  Basophils, Absolute 0.03 0.00 - 0.10 K/uL 
 
 PROBLEM LIST
 
 Principal Problem:
   Mesenteric ischemia, chronic 
 Active Problems:
   Hypertension
   Hyperlipidemia
 
 ASSESSMENT & PLAN
 
 S/p SMA endarterectomy for chronic mesenteric ischemia.  POD3.  Denies flatus still. Contin
ue strict NPO with swabs for comfort until return of bowel function.  Continue ambulation wi
th PT.  
 
 Disposition:  [facility]
 Code Status:  Full Code
 
 Billy Gutierres PA-C
 2019Electronically signed by Billy Gutierres PA-C at 2019  3:58 PM Bekah Carmona RN - 2019  2:51 PM PDTPt transferred to 9RP. Electronically signed by Hina Lord RN at 2019  2:52 PM Bekah Carmona RN - 2019  2:33 PM PD
[facility]ep[facility] called [facility] on 9RP. Will transfer pt at this time. Electronically signed by Nino Lord RN at 2019  2:33 PM PDTMaye Guevara RN - 2019  7:31 PM PDT
Patient worked with PT this morning and has sat in chair the remainder of the shift. Pt c/o 
occasional pain, worse with ambulation, coughing, or laughing. Medicated with 1mg Dilaudid x
 3 with relief. Patient made several trips to bathroom with use of 2WW and CGA. Abdominal bi
nder in place per JAILYN Sierra. Pt denies passing flatus this shift, continues to be NPO.
 Maye Guevara RN
 Electronically signed by Maye Guevara RN at 2019  7:33 PM Sukhdeep Castro MD -   6:56 AM PDTFormatting of this note might be different from the original.
 [facility]
 Service: Vascular Surgery
 Progress Note
 
 Hospital Day:   LOS: 2 days 
 Post-Op Day:  2 Days Post-Op
 SUBJECTIVE
 
      Patient Summary:  S/p SMA endarterectomy for chronic mesenteric ischemia
 
 
      Events Overnight:  No events overnight.  Pain well controlled with pain meds.  Ambulat
ed yesterday with assistance.  
 
 Scheduled Medications 
   pantoprazole  40 mg Intravenous Daily 
 
 Continuous Infusions 
   dextrose 5% and sodium chloride 0.45% with KCl 20 mEq/L 125 mL/hr at 19 2228 
   niCARdipine Stopped (19 1628) 
 
 PRN Medications
 acetaminophen, bisacodyl, HYDROmorphone, insulin lispro, morphine, sodium chloride 0.9%
 
 OBJECTIVE
 Vital Signs:
 Vitals: 
  19 0421 
 BP: 149/80 
 Pulse: 63 
 Resp: 16 
 Temp: 37.2 C (99 F) 
 
 Physical Exam
 Vitals:reviewed
 CONSTITUTIONAL:  Conversant, well developed, NAD
 EYES: Anicteric sclerae, no lid drag, no proptosis
 RESP: Normal effort, regular, even, unlabored rate
 CV: No peripheral edema, rate regular
 ABD:  Appropriate tenderness.  Hypoactive bowel tones
 SKIN: Pink, warm, dry without rash/lesion
 MS: ROM not limited, no digital cyanosis, normal gait
 NEURO: Conversant, A&O times 3
 PSYCH: appropriate affect, speech and tone, judgement and insight intact
 Vascular:  Palpable brachial pulses bilaterally. 
 
 DATA
 Recent Results (from the past 24 hour(s)) 
 Lactic Acid 
  Collection Time: 19  9:09 
 Result Value Ref Range 
  Lactate, Serum 1.6 0.4 - 2.0 mmol/L 
 Basic Metabolic Panel 
  Collection Time: 19  4:06 
 Result Value Ref Range 
  Na 138 135 - 145 mmol/L 
  K 4.4 3.5 - 4.9 mmol/L 
  Cl 106 99 - 109 mmol/L 
  CO2 25 23 - 32 mmol/L 
  Anion Gap 11 5 - 20 mmol/L 
  Glucose 144 (H) 65 - 99 mg/dL 
  BUN 16 8 - 25 mg/dL 
  Creatinine 0.9 0.70 - 1.30 mg/dL 
  BUN/Creatinine Ratio 18  
  Calcium 8.6 8.5 - 10.5 mg/dL 
  Estimated GFR >60 >60 mL/min/1.73m2 
 CBC with Differential 
  Collection Time: 19  4:06 
 Result Value Ref Range 
  WBC 10.14 3.80 - 11.00 K/uL 
  RBC 3.79 (L) 4.20 - 5.70 M/uL 
 
  Hemoglobin 12.6 (L) 13.2 - 17.0 g/dL 
  Hematocrit 37.1 (L) 39.0 - 50.0 % 
  MCV 97.8 80.0 - 100.0 fl 
  MCH 33.3 27.0 - 34.0 pg 
  MCHC 34.1 32.0 - 35.5 g/dL 
  RDW-SD 47.3 37 - 53 fl 
  Platelet Count 149 (L) 150 - 400 K/uL 
  MPV 7.4 fl 
  Diff Type AUTOMATED  
  % Neutrophils 63.73 % 
  % Lymphocytes 31.45 % 
  Monocyte % 4.45 % 
  Eosinophils % 0.24 % 
  Basophils % 0.13 % 
  Neutrophils, Absolute 6.46 1.90 - 7.40 K/uL 
  Absolute Lymphocytes 3.19 1.00 - 3.90 K/uL 
  Absolute Monocytes 0.45 0.00 - 0.80 K/uL 
  Eosinophils, Absolute 0.02 0.00 - 0.50 K/uL 
  Basophils, Absolute 0.01 0.00 - 0.10 K/uL 
 
 PROBLEM LIST
 
 Principal Problem:
   Mesenteric ischemia, chronic 
 Active Problems:
   Hypertension
   Hyperlipidemia
 
 ASSESSMENT & PLAN
 
 S/p SMA endarterectomy for chronic mesenteric ischemia.  Doing well.  Denies flatus. Contin
ue strict NPO with swabs for comfort until return of bowel function.  Continue ambulation wi
th PT.  
 
 Disposition:  [facility]
 Code Status:  Full Code
 
 Sukhdeep Pardo MD
 2019Electronically signed by Sukhdeep Pardo MD at 2019  6:59 AM Sukhdeep Castro MD - 
2019  9:10 AM PDTFormatting of this note might be different from the original.
 [facility]
 Service: Vascular Surgery
 Progress Note
 
 Hospital Day:   LOS: 1 day 
 Post-Op Day:  1 Day Post-Op
 SUBJECTIVE
 
      Patient Summary:  S/p SMA endarterectomy for chronic mesenteric ischemia
 
      Events Overnight:  No events overnight.  Pain well controlled with pain meds.  
 
 Scheduled Medications 
 
 Continuous Infusions 
   dextrose 5% and sodium chloride 0.45% with KCl 20 mEq/L 125 mL/hr at 19 0724 
   niCARdipine Stopped (19 1628) 
 
 PRN Medications
 acetaminophen, aluminum & magnesium hydroxide-simethicone, bisacodyl, calcium carbonate, do
 
cusate sodium, HYDROmorphone, insulin lispro, morphine, sodium chloride 0.9%
 
 OBJECTIVE
 Vital Signs:
 Vitals: 
  19 0800 
 BP: 128/68 
 Pulse: 78 
 Resp: 12 
 Temp: 37 C (98.6 F) 
 
 Physical Exam
 Vitals:reviewed
 CONSTITUTIONAL:  Conversant, well developed, NAD
 EYES: Anicteric sclerae, no lid drag, no proptosis
 RESP: Normal effort, regular, even, unlabored rate
 CV: No peripheral edema, rate regular
 SKIN: Pink, warm, dry without rash/lesion
 ABD:  Soft, appropriate tenderness, hypoactive bowel tones
 MS: ROM not limited, no digital cyanosis
 NEURO: Conversant, A&O times 3
 PSYCH: appropriate affect, speech and tone, judgement and insight intact
 
 DATA
 Recent Results (from the past 24 hour(s)) 
 Type and Screen 
  Collection Time: 19 11:03 
 Result Value Ref Range 
  ABO Rh O POSITIVE  
  Antibody Screen NEGATIVE  
  BB BAND [id number]  
  BB BAND   
   Testing performed at [facility];[address] [place] [address];[place] [address] 
 Basic Metabolic Panel 
  Collection Time: 19  4:40 
 Result Value Ref Range 
  Na 140 135 - 145 mmol/L 
  K 4.5 3.5 - 4.9 mmol/L 
  Cl 107 99 - 109 mmol/L 
  CO2 23 23 - 32 mmol/L 
  Anion Gap 15 5 - 20 mmol/L 
  Glucose 192 (H) 65 - 99 mg/dL 
  BUN 17 8 - 25 mg/dL 
  Creatinine 1.02 0.70 - 1.30 mg/dL 
  BUN/Creatinine Ratio 17  
  Calcium 8.6 8.5 - 10.5 mg/dL 
  Estimated GFR >60 >60 mL/min/1.73m2 
 CBC with Differential 
  Collection Time: 19  4:40 
 Result Value Ref Range 
  WBC 13.98 (H) 3.80 - 11.00 K/uL 
  RBC 4.07 (L) 4.20 - 5.70 M/uL 
  Hemoglobin 13.3 13.2 - 17.0 g/dL 
  Hematocrit 39.6 39.0 - 50.0 % 
  MCV 97.4 80.0 - 100.0 fl 
  MCH 32.8 27.0 - 34.0 pg 
  MCHC 33.7 32.0 - 35.5 g/dL 
  RDW-SD 47.3 37 - 53 fl 
  Platelet Count 185 150 - 400 K/uL 
  MPV 7.3 fl 
 
  Diff Type AUTOMATED  
  % Neutrophils 55.68 % 
  % Lymphocytes 39.54 % 
  Monocyte % 4.55 % 
  Eosinophils % 0.05 % 
  Basophils % 0.18 % 
  Neutrophils, Absolute 7.79 (H) 1.90 - 7.40 K/uL 
  Absolute Lymphocytes 5.53 (H) 1.00 - 3.90 K/uL 
  Absolute Monocytes 0.64 0.00 - 0.80 K/uL 
  Eosinophils, Absolute 0.01 0.00 - 0.50 K/uL 
  Basophils, Absolute 0.03 0.00 - 0.10 K/uL 
  RBC Morphology RBC AND PLT MORPHOLOGY APPEAR NORMAL  
  Platelet Estimate ADEQUATE  
  Comment SLIDE SCANNED, AGREES WITH AUTOMATED RESULTS.  
 ECG 12 lead 
  Collection Time: 19  6:38 
 Result Value Ref Range 
  INTERPRETATION TEXT Not Confirmed  
 
 PROBLEM LIST
 
 Principal Problem:
   Mesenteric ischemia, chronic 
 Active Problems:
   Hypertension
   Hyperlipidemia
 
 ASSESSMENT & PLAN
 
 S/p SMA endarterectomy for chronic mesenteric ischemia.  Doing well.  Transfer to cardiac u
nit today. Up out of bed with PT and ambulate with assist.  Keep NPO until return of bowel f
unction.  Discontinue yi catheter today.  
 
 Disposition:  [facility]
 Code Status:  Full Code
 
 Sukhdeep Pardo MD
 2019Electronically signed by Sukhdeep Pardo MD at 2019  9:15 AM PDTStdiane, Maye VELOZ RN
 - 2019  6:30 PM PDTPt received after OR procedure with Dr Pardo.  His main complaint wa
s of abdominal pain.  He was also hypertensive.  Goal BP <160.  Once pain was better control
led, he no longer met the criteria for the nicardipene gtt.  He will be NPO until he passes 
gas which is anticipated to be 3-4 days.
 
 No blood, restraints, protocols, or signed and held orders.
 
 Morphine given x1 per parameters with no effect.
 Dilaudid given x3 per parameters with moderate effect.
 
 End of shift review complete. Electronically signed by Maye Amin RN at 2019  6:32
 PM PDTdocumented in this encounter
 
 Plan of Treatment
 
 
+--------+-------------+------------------+----------------------+-------------+
| Date   | Type        | Specialty        | Care Team            | Description |
+--------+-------------+------------------+----------------------+-------------+
| / | Appointment | Radiology        |   Augustin Lopez DNP      |             |
|    |             |                  | [address]     |             |
|        |             |                  | [place]  |             |
 
|        |             |                  | [id number] [phone]  |             |
|        |             |                  |  [phone] (Fax)  |             |
+--------+-------------+------------------+----------------------+-------------+
| / | Office      | Vascular Surgery |   Augustin Lopez DNP      |             |
2020   | Visit       |                  | [address]     |             |
|        |             |                  | [place]  |             |
|        |             |                  | [address] [phone]  |             |
|        |             |                  |  [phone] (Fax)  |             |
+--------+-------------+------------------+----------------------+-------------+
 documented as of this encounter
 
 Procedures
 
 
+---------------------+--------+-------------+----------------------+----------------------+
| Procedure Name      | Priori | Date/Time   | Associated Diagnosis | Comments             |
|                     | ty     |             |                      |                      |
+---------------------+--------+-------------+----------------------+----------------------+
| CBC WITH            | Routin | 2019  |                      |   Results for this   |
| DIFFERENTIAL        | e      |  4:17 AM    |                      | procedure are in the |
|                     |        | PDT         |                      |  results section.    |
+---------------------+--------+-------------+----------------------+----------------------+
| BASIC METABOLIC     | Routin | 2019  |                      |   Results for this   |
| PANEL               | e      |  4:17 AM    |                      | procedure are in the |
|                     |        | PDT         |                      |  results section.    |
+---------------------+--------+-------------+----------------------+----------------------+
| CBC WITH            | Routin | 2019  |                      |   Results for this   |
| DIFFERENTIAL        | e      |  4:29 AM    |                      | procedure are in the |
|                     |        | PDT         |                      |  results section.    |
+---------------------+--------+-------------+----------------------+----------------------+
| BASIC METABOLIC     | Routin | 2019  |                      |   Results for this   |
| PANEL               | e      |  4:29 AM    |                      | procedure are in the |
|                     |        | PDT         |                      |  results section.    |
+---------------------+--------+-------------+----------------------+----------------------+
| CBC WITH            | Routin | 2019  |                      |   Results for this   |
| DIFFERENTIAL        | e      |  4:23 AM    |                      | procedure are in the |
|                     |        | PDT         |                      |  results section.    |
+---------------------+--------+-------------+----------------------+----------------------+
| BASIC METABOLIC     | Routin | 2019  |                      |   Results for this   |
| PANEL               | e      |  4:23 AM    |                      | procedure are in the |
|                     |        | PDT         |                      |  results section.    |
+---------------------+--------+-------------+----------------------+----------------------+
| CBC WITH            | Routin | 2019  |                      |   Results for this   |
| DIFFERENTIAL        | e      |  4:06 AM    |                      | procedure are in the |
|                     |        | PDT         |                      |  results section.    |
+---------------------+--------+-------------+----------------------+----------------------+
| BASIC METABOLIC     | Routin | 2019  |                      |   Results for this   |
| PANEL               | e      |  4:06 AM    |                      | procedure are in the |
|                     |        | PDT         |                      |  results section.    |
+---------------------+--------+-------------+----------------------+----------------------+
| LACTIC ACID         | STAT   | 2019  |                      |   Results for this   |
|                     |        |  9:09 AM    |                      | procedure are in the |
|                     |        | PDT         |                      |  results section.    |
+---------------------+--------+-------------+----------------------+----------------------+
| ECG 12 LEAD         | Routin | 2019  |                      |   Results for this   |
|                     | e      |  5:37 AM    |                      | procedure are in the |
|                     |        | PDT         |                      |  results section.    |
+---------------------+--------+-------------+----------------------+----------------------+
| CBC WITH            | Routin | 2019  |                      |   Results for this   |
| DIFFERENTIAL        | e      |  4:40 AM    |                      | procedure are in the |
 
|                     |        | PDT         |                      |  results section.    |
+---------------------+--------+-------------+----------------------+----------------------+
| BASIC METABOLIC     | Routin | 2019  |                      |   Results for this   |
| PANEL               | e      |  4:40 AM    |                      | procedure are in the |
|                     |        | PDT         |                      |  results section.    |
+---------------------+--------+-------------+----------------------+----------------------+
| SURGICAL PATHOLOGY  | Routin | 2019  |   [facility]         |   Results for this   |
| EXAM                | e      |  1:50 PM    | ischemia, chronic    | procedure are in the |
|                     |        | PDT         | (HCC)                |  results section.    |
+---------------------+--------+-------------+----------------------+----------------------+
| REVASCULARIZATION   |        | 2019  |   [facility]         |                      |
| [facility]          |        | 11:12 AM    | ischemia, chronic    |                      |
|                     |        | PDT         | (HCC)                |                      |
+---------------------+--------+-------------+----------------------+----------------------+
| TYPE AND SCREEN     | STAT   | 2019  |                      |   Results for this   |
|                     |        | 11:03 AM    |                      | procedure are in the |
|                     |        | PDT         |                      |  results section.    |
+---------------------+--------+-------------+----------------------+----------------------+
 documented in this encounter
 
 Results
 CBC with Differential (2019  4:17 AM PDT)
 
+-------------+--------------------------+-----------------+------------+--------------+
| Component   | Value                    | Ref Range       | Performed  | Pathologist  |
|             |                          |                 | At         | Signature    |
+-------------+--------------------------+-----------------+------------+--------------+
| WBC         | 5.40                     | 3.80 - 11.00    | [facility]       |              |
|             |                          | K/uL            | LABORATORY |              |
+-------------+--------------------------+-----------------+------------+--------------+
| RBC         | 3.57 (L)                 | 4.20 - 5.70     | [facility]       |              |
|             |                          | M/uL            | LABORATORY |              |
+-------------+--------------------------+-----------------+------------+--------------+
| Hemoglobin  | 12.1 (L)                 | 13.2 - 17.0     | [facility]       |              |
|             |                          | g/dL            | LABORATORY |              |
+-------------+--------------------------+-----------------+------------+--------------+
| Hematocrit  | 34.5 (L)                 | 39.0 - 50.0 %   | [facility]       |              |
|             |                          |                 | LABORATORY |              |
+-------------+--------------------------+-----------------+------------+--------------+
| MCV         | 96.7                     | 80.0 - 100.0 fl | [facility]       |              |
|             |                          |                 | LABORATORY |              |
+-------------+--------------------------+-----------------+------------+--------------+
| MCH         | 33.8                     | 27.0 - 34.0 pg  | [facility]       |              |
|             |                          |                 | LABORATORY |              |
+-------------+--------------------------+-----------------+------------+--------------+
| MCHC        | 35.0                     | 32.0 - 35.5     | [facility]       |              |
|             |                          | g/dL            | LABORATORY |              |
+-------------+--------------------------+-----------------+------------+--------------+
| RDW-SD      | 45.5                     | 37 - 53 fl      | [facility]       |              |
|             |                          |                 | LABORATORY |              |
+-------------+--------------------------+-----------------+------------+--------------+
| Platelet    | 158                      | 150 - 400 K/uL  | [facility]       |              |
| Count       |                          |                 | LABORATORY |              |
+-------------+--------------------------+-----------------+------------+--------------+
| MPV         | 7.5                      | fl              | [facility]       |              |
|             |                          |                 | LABORATORY |              |
+-------------+--------------------------+-----------------+------------+--------------+
| Diff Type   | AUTOMATED                |                 | [facility]       |              |
|             |                          |                 | LABORATORY |              |
+-------------+--------------------------+-----------------+------------+--------------+
 
| %           | 55.42                    | %               | [facility]       |              |
| Neutrophils |                          |                 | LABORATORY |              |
+-------------+--------------------------+-----------------+------------+--------------+
| %           | 36.41                    | %               | [facility]       |              |
| Lymphocytes |                          |                 | LABORATORY |              |
+-------------+--------------------------+-----------------+------------+--------------+
| Monocyte %  | 6.61                     | %               | [facility]       |              |
|             |                          |                 | LABORATORY |              |
+-------------+--------------------------+-----------------+------------+--------------+
| Eosinophils | 1.23                     | %               | [facility]       |              |
|  %          |                          |                 | LABORATORY |              |
+-------------+--------------------------+-----------------+------------+--------------+
| Basophils % | 0.33                     | %               | [facility]       |              |
|             |                          |                 | LABORATORY |              |
+-------------+--------------------------+-----------------+------------+--------------+
| Neutrophils | 3.00                     | 1.90 - 7.40     | [facility]       |              |
| , Absolute  |                          | K/uL            | LABORATORY |              |
+-------------+--------------------------+-----------------+------------+--------------+
| Absolute    | 1.97                     | 1.00 - 3.90     | [facility]       |              |
| Lymphocytes |                          | K/uL            | LABORATORY |              |
+-------------+--------------------------+-----------------+------------+--------------+
| Absolute    | 0.36                     | 0.00 - 0.80     | [facility]       |              |
| Monocytes   |                          | K/uL            | LABORATORY |              |
+-------------+--------------------------+-----------------+------------+--------------+
| Eosinophils | 0.07                     | 0.00 - 0.50     | [facility]       |              |
| , Absolute  |                          | K/uL            | LABORATORY |              |
+-------------+--------------------------+-----------------+------------+--------------+
| Basophils,  | 0.02Comment: Testing     | 0.00 - 0.10     | [facility]       |              |
| Absolute    | performed at [facility], [address] | K/uL            | LABORATORY |              |
|             |  [address],        |                 |            |              |
|             | [place]  [id number]     |                 |            |              |
+-------------+--------------------------+-----------------+------------+--------------+
 
 
 
+----------+
| Specimen |
+----------+
| Blood    |
+----------+
 
 
 
+-------------------+------------------+--------------------+----------------+
| Performing        | Address          | City/State/Zipcode | Phone Number   |
| Organization      |                  |                    |                |
+-------------------+------------------+--------------------+----------------+
|   [facility] |   [address] | [place] [address] |   [phone] |
+-------------------+------------------+--------------------+----------------+
 Basic Metabolic Panel (2019  4:17 AM PDT)
 
+-------------+--------------------------+-----------------+------------+--------------+
| Component   | Value                    | Ref Range       | Performed  | Pathologist  |
|             |                          |                 | At         | Signature    |
+-------------+--------------------------+-----------------+------------+--------------+
| Na          | 135                      | 135 - 145       | [facility]       |              |
|             |                          | mmol/L          | LABORATORY |              |
+-------------+--------------------------+-----------------+------------+--------------+
| K           | 3.8                      | 3.5 - 4.9       | [facility]       |              |
|             |                          | mmol/L          | LABORATORY |              |
 
+-------------+--------------------------+-----------------+------------+--------------+
| Cl          | 102                      | 99 - 109 mmol/L | [facility]       |              |
|             |                          |                 | LABORATORY |              |
+-------------+--------------------------+-----------------+------------+--------------+
| CO2         | 26                       | 23 - 32 mmol/L  | [facility]       |              |
|             |                          |                 | LABORATORY |              |
+-------------+--------------------------+-----------------+------------+--------------+
| Anion Gap   | 11                       | 5 - 20 mmol/L   | [facility]       |              |
|             |                          |                 | LABORATORY |              |
+-------------+--------------------------+-----------------+------------+--------------+
| Glucose     | 140 (H)                  | 65 - 99 mg/dL   | [facility]       |              |
|             |                          |                 | LABORATORY |              |
+-------------+--------------------------+-----------------+------------+--------------+
| BUN         | 8                        | 8 - 25 mg/dL    | [facility]       |              |
|             |                          |                 | LABORATORY |              |
+-------------+--------------------------+-----------------+------------+--------------+
| Creatinine  | 0.9                      | 0.70 - 1.30     | [facility]       |              |
|             |                          | mg/dL           | LABORATORY |              |
+-------------+--------------------------+-----------------+------------+--------------+
| BUN/Creatin | 9                        |                 | [facility]       |              |
| ine Ratio   |                          |                 | LABORATORY |              |
+-------------+--------------------------+-----------------+------------+--------------+
| Calcium     | 8.5                      | 8.5 - 10.5      | [facility]       |              |
|             |                          | mg/dL           | LABORATORY |              |
+-------------+--------------------------+-----------------+------------+--------------+
| Estimated   | >60Comment: GFR <60:     | >60             | [facility]       |              |
| GFR         | CHRONIC KIDNEY DISEASE,  | mL/min/1.73m2   | LABORATORY |              |
|             | IF FOUND OVER A 3 MONTH  |                 |            |              |
|             | PERIOD.GFR <15: KIDNEY   |                 |            |              |
|             | FAILURE.FOR       |                 |            |              |
|             | [employer], MULTIPLY THE  |                 |            |              |
|             | CALCULATED GFR BY        |                 |            |              |
|             | 1.210.This eGFR is       |                 |            |              |
|             | calculated using the     |                 |            |              |
|             | MDRD IDMS traceable      |                 |            |              |
|             | equation.Testing         |                 |            |              |
|             | performed at [facility], [address] |                 |            |              |
|             |  [facility],        |                 |            |              |
|             | [place]   [address]    |                 |            |              |
+-------------+--------------------------+-----------------+------------+--------------+
 
 
 
+----------+
| Specimen |
+----------+
| Blood    |
+----------+
 
 
 
+-------------------+------------------+--------------------+----------------+
| Performing        | Address          | City/State/Zipcode | Phone Number   |
| Organization      |                  |                    |                |
+-------------------+------------------+--------------------+----------------+
|   [facility] |   [address] | [place] [address] |   [phone] |
+-------------------+------------------+--------------------+----------------+
 CBC with Differential (2019  4:29 AM PDT)
 
+-------------+--------------------------+-----------------+------------+--------------+
 
| Component   | Value                    | Ref Range       | Performed  | Pathologist  |
|             |                          |                 | At         | Signature    |
+-------------+--------------------------+-----------------+------------+--------------+
| WBC         | 5.54                     | 3.80 - 11.00    | [facility]       |              |
|             |                          | K/uL            | LABORATORY |              |
+-------------+--------------------------+-----------------+------------+--------------+
| RBC         | 3.72 (L)                 | 4.20 - 5.70     | [facility]       |              |
|             |                          | M/uL            | LABORATORY |              |
+-------------+--------------------------+-----------------+------------+--------------+
| Hemoglobin  | 12.3 (L)                 | 13.2 - 17.0     | [facility]       |              |
|             |                          | g/dL            | LABORATORY |              |
+-------------+--------------------------+-----------------+------------+--------------+
| Hematocrit  | 36.0 (L)                 | 39.0 - 50.0 %   | [facility]       |              |
|             |                          |                 | LABORATORY |              |
+-------------+--------------------------+-----------------+------------+--------------+
| MCV         | 96.8                     | 80.0 - 100.0 fl | [facility]       |              |
|             |                          |                 | LABORATORY |              |
+-------------+--------------------------+-----------------+------------+--------------+
| MCH         | 33.1                     | 27.0 - 34.0 pg  | [facility]       |              |
|             |                          |                 | LABORATORY |              |
+-------------+--------------------------+-----------------+------------+--------------+
| MCHC        | 34.2                     | 32.0 - 35.5     | [facility]       |              |
|             |                          | g/dL            | LABORATORY |              |
+-------------+--------------------------+-----------------+------------+--------------+
| RDW-SD      | 46.8                     | 37 - 53 fl      | [facility]       |              |
|             |                          |                 | LABORATORY |              |
+-------------+--------------------------+-----------------+------------+--------------+
| Platelet    | 140 (L)                  | 150 - 400 K/uL  | [facility]       |              |
| Count       |                          |                 | LABORATORY |              |
+-------------+--------------------------+-----------------+------------+--------------+
| MPV         | 7.4                      | fl              | [facility]       |              |
|             |                          |                 | LABORATORY |              |
+-------------+--------------------------+-----------------+------------+--------------+
| Diff Type   | AUTOMATED                |                 | [facility]       |              |
|             |                          |                 | LABORATORY |              |
+-------------+--------------------------+-----------------+------------+--------------+
| %           | 57.00                    | %               | [facility]       |              |
| Neutrophils |                          |                 | LABORATORY |              |
+-------------+--------------------------+-----------------+------------+--------------+
| %           | 36.25                    | %               | [facility]       |              |
| Lymphocytes |                          |                 | LABORATORY |              |
+-------------+--------------------------+-----------------+------------+--------------+
| Monocyte %  | 5.29                     | %               | [facility]       |              |
|             |                          |                 | LABORATORY |              |
+-------------+--------------------------+-----------------+------------+--------------+
| Eosinophils | 1.03                     | %               | [facility]       |              |
|  %          |                          |                 | LABORATORY |              |
+-------------+--------------------------+-----------------+------------+--------------+
| Basophils % | 0.43                     | %               | [facility]       |              |
|             |                          |                 | LABORATORY |              |
+-------------+--------------------------+-----------------+------------+--------------+
| Neutrophils | 3.16                     | 1.90 - 7.40     | [facility]       |              |
| , Absolute  |                          | K/uL            | LABORATORY |              |
+-------------+--------------------------+-----------------+------------+--------------+
| Absolute    | 2.01                     | 1.00 - 3.90     | [facility]       |              |
| Lymphocytes |                          | K/uL            | LABORATORY |              |
+-------------+--------------------------+-----------------+------------+--------------+
| Absolute    | 0.29                     | 0.00 - 0.80     | [facility]       |              |
| Monocytes   |                          | K/uL            | LABORATORY |              |
+-------------+--------------------------+-----------------+------------+--------------+
 
| Eosinophils | 0.06                     | 0.00 - 0.50     | [facility]       |              |
| , Absolute  |                          | K/uL            | LABORATORY |              |
+-------------+--------------------------+-----------------+------------+--------------+
| Basophils,  | 0.02Comment: Testing     | 0.00 - 0.10     | [facility]       |              |
| Absolute    | performed at [facility], [address] | K/uL            | LABORATORY |              |
|             |  [address],        |                 |            |              |
|             | [place]  [address]     |                 |            |              |
+-------------+--------------------------+-----------------+------------+--------------+
 
 
 
+----------+
| Specimen |
+----------+
| Blood    |
+----------+
 
 
 
+-------------------+------------------+--------------------+----------------+
| Performing        | Address          | City/State/Zipcode | Phone Number   |
| Organization      |                  |                    |                |
+-------------------+------------------+--------------------+----------------+
|   [facility] |   [address] | [place] [address] |   [phone] |
+-------------------+------------------+--------------------+----------------+
 Basic Metabolic Panel (2019  4:29 AM PDT)
 
+-------------+--------------------------+-----------------+------------+--------------+
| Component   | Value                    | Ref Range       | Performed  | Pathologist  |
|             |                          |                 | At         | Signature    |
+-------------+--------------------------+-----------------+------------+--------------+
| Na          | 135                      | 135 - 145       | [facility]       |              |
|             |                          | mmol/L          | LABORATORY |              |
+-------------+--------------------------+-----------------+------------+--------------+
| K           | 4.0                      | 3.5 - 4.9       | [facility]       |              |
|             |                          | mmol/L          | LABORATORY |              |
+-------------+--------------------------+-----------------+------------+--------------+
| Cl          | 103                      | 99 - 109 mmol/L | [facility]       |              |
|             |                          |                 | LABORATORY |              |
+-------------+--------------------------+-----------------+------------+--------------+
| CO2         | 25                       | 23 - 32 mmol/L  | [facility]       |              |
|             |                          |                 | LABORATORY |              |
+-------------+--------------------------+-----------------+------------+--------------+
| Anion Gap   | 11                       | 5 - 20 mmol/L   | [facility]       |              |
|             |                          |                 | LABORATORY |              |
+-------------+--------------------------+-----------------+------------+--------------+
| Glucose     | 140 (H)                  | 65 - 99 mg/dL   | [facility]       |              |
|             |                          |                 | LABORATORY |              |
+-------------+--------------------------+-----------------+------------+--------------+
| BUN         | 9                        | 8 - 25 mg/dL    | [facility]       |              |
|             |                          |                 | LABORATORY |              |
+-------------+--------------------------+-----------------+------------+--------------+
| Creatinine  | 0.8                      | 0.70 - 1.30     | [facility]       |              |
|             |                          | mg/dL           | LABORATORY |              |
+-------------+--------------------------+-----------------+------------+--------------+
| BUN/Creatin | 11                       |                 | [facility]       |              |
| ine Ratio   |                          |                 | LABORATORY |              |
+-------------+--------------------------+-----------------+------------+--------------+
| Calcium     | 8.7                      | 8.5 - 10.5      | [facility]       |              |
|             |                          | mg/dL           | LABORATORY |              |
 
+-------------+--------------------------+-----------------+------------+--------------+
| Estimated   | >60Comment: GFR <60:     | >60             | [facility]       |              |
| GFR         | CHRONIC KIDNEY DISEASE,  | mL/min/1.73m2   | LABORATORY |              |
|             | IF FOUND OVER A 3 MONTH  |                 |            |              |
|             | PERIOD.GFR <15: KIDNEY   |                 |            |              |
|             | FAILURE.FOR       |                 |            |              |
|             | AMERICANS, MULTIPLY THE  |                 |            |              |
|             | CALCULATED GFR BY        |                 |            |              |
|             | 1.210.This eGFR is       |                 |            |              |
|             | calculated using the     |                 |            |              |
|             | MDRD IDMS traceable      |                 |            |              |
|             | equation.Testing         |                 |            |              |
|             | performed at [facility], [address] |                 |            |              |
|             |  [facility],        |                 |            |              |
|             | [place]   [address]    |                 |            |              |
+-------------+--------------------------+-----------------+------------+--------------+
 
 
 
+----------+
| Specimen |
+----------+
| Blood    |
+----------+
 
 
 
+-------------------+------------------+--------------------+----------------+
| Performing        | Address          | City/State/Zipcode | Phone Number   |
| Organization      |                  |                    |                |
+-------------------+------------------+--------------------+----------------+
|   [facility] |   [address] | [place] [address] |   [phone] |
+-------------------+------------------+--------------------+----------------+
 CBC with Differential (2019  4:23 AM PDT)
 
+-------------+--------------------------+-----------------+------------+--------------+
| Component   | Value                    | Ref Range       | Performed  | Pathologist  |
|             |                          |                 | At         | Signature    |
+-------------+--------------------------+-----------------+------------+--------------+
| WBC         | 9.31                     | 3.80 - 11.00    | [facility]       |              |
|             |                          | K/uL            | LABORATORY |              |
+-------------+--------------------------+-----------------+------------+--------------+
| RBC         | 3.81 (L)                 | 4.20 - 5.70     | [facility]       |              |
|             |                          | M/uL            | LABORATORY |              |
+-------------+--------------------------+-----------------+------------+--------------+
| Hemoglobin  | 12.7 (L)                 | 13.2 - 17.0     | [facility]       |              |
|             |                          | g/dL            | LABORATORY |              |
+-------------+--------------------------+-----------------+------------+--------------+
| Hematocrit  | 37.1 (L)                 | 39.0 - 50.0 %   | [facility]       |              |
|             |                          |                 | LABORATORY |              |
+-------------+--------------------------+-----------------+------------+--------------+
| MCV         | 97.5                     | 80.0 - 100.0 fl | [facility]       |              |
|             |                          |                 | LABORATORY |              |
+-------------+--------------------------+-----------------+------------+--------------+
| MCH         | 33.5                     | 27.0 - 34.0 pg  | [facility]       |              |
|             |                          |                 | LABORATORY |              |
+-------------+--------------------------+-----------------+------------+--------------+
| MCHC        | 34.3                     | 32.0 - 35.5     | [facility]       |              |
|             |                          | g/dL            | LABORATORY |              |
+-------------+--------------------------+-----------------+------------+--------------+
 
| RDW-SD      | 46.8                     | 37 - 53 fl      | [facility]       |              |
|             |                          |                 | LABORATORY |              |
+-------------+--------------------------+-----------------+------------+--------------+
| Platelet    | 162                      | 150 - 400 K/uL  | [facility]       |              |
| Count       |                          |                 | LABORATORY |              |
+-------------+--------------------------+-----------------+------------+--------------+
| MPV         | 7.5                      | fl              | [facility]       |              |
|             |                          |                 | LABORATORY |              |
+-------------+--------------------------+-----------------+------------+--------------+
| Diff Type   | AUTOMATED                |                 | [facility]       |              |
|             |                          |                 | LABORATORY |              |
+-------------+--------------------------+-----------------+------------+--------------+
| %           | 54.94                    | %               | [facility]       |              |
| Neutrophils |                          |                 | LABORATORY |              |
+-------------+--------------------------+-----------------+------------+--------------+
| %           | 39.44                    | %               | [facility]       |              |
| Lymphocytes |                          |                 | LABORATORY |              |
+-------------+--------------------------+-----------------+------------+--------------+
| Monocyte %  | 4.62                     | %               | [facility]       |              |
|             |                          |                 | LABORATORY |              |
+-------------+--------------------------+-----------------+------------+--------------+
| Eosinophils | 0.69                     | %               | [facility]       |              |
|  %          |                          |                 | LABORATORY |              |
+-------------+--------------------------+-----------------+------------+--------------+
| Basophils % | 0.31                     | %               | [facility]       |              |
|             |                          |                 | LABORATORY |              |
+-------------+--------------------------+-----------------+------------+--------------+
| Neutrophils | 5.12                     | 1.90 - 7.40     | [facility]       |              |
| , Absolute  |                          | K/uL            | LABORATORY |              |
+-------------+--------------------------+-----------------+------------+--------------+
| Absolute    | 3.67                     | 1.00 - 3.90     | [facility]       |              |
| Lymphocytes |                          | K/uL            | LABORATORY |              |
+-------------+--------------------------+-----------------+------------+--------------+
| Absolute    | 0.43                     | 0.00 - 0.80     | [facility]       |              |
| Monocytes   |                          | K/uL            | LABORATORY |              |
+-------------+--------------------------+-----------------+------------+--------------+
| Eosinophils | 0.06                     | 0.00 - 0.50     | [facility]       |              |
| , Absolute  |                          | K/uL            | LABORATORY |              |
+-------------+--------------------------+-----------------+------------+--------------+
| Basophils,  | 0.03Comment: Testing     | 0.00 - 0.10     | [facility]       |              |
| Absolute    | performed at [facility], [address] | K/uL            | LABORATORY |              |
|             |  [address],        |                 |            |              |
|             | [place]  [address]     |                 |            |              |
+-------------+--------------------------+-----------------+------------+--------------+
 
 
 
+----------+
| Specimen |
+----------+
| Blood    |
+----------+
 
 
 
+-------------------+------------------+--------------------+----------------+
| Performing        | Address          | City/State/Zipcode | Phone Number   |
| Organization      |                  |                    |                |
+-------------------+------------------+--------------------+----------------+
|   [facility] |   [address] | [place] [address] |   [phone] |
 
+-------------------+------------------+--------------------+----------------+
 Basic Metabolic Panel (2019  4:23 AM PDT)
 
+-------------+--------------------------+-----------------+------------+--------------+
| Component   | Value                    | Ref Range       | Performed  | Pathologist  |
|             |                          |                 | At         | Signature    |
+-------------+--------------------------+-----------------+------------+--------------+
| Na          | 137                      | 135 - 145       | [facility]       |              |
|             |                          | mmol/L          | LABORATORY |              |
+-------------+--------------------------+-----------------+------------+--------------+
| K           | 4.0                      | 3.5 - 4.9       | [facility]       |              |
|             |                          | mmol/L          | LABORATORY |              |
+-------------+--------------------------+-----------------+------------+--------------+
| Cl          | 104                      | 99 - 109 mmol/L | [facility]       |              |
|             |                          |                 | LABORATORY |              |
+-------------+--------------------------+-----------------+------------+--------------+
| CO2         | 26                       | 23 - 32 mmol/L  | [facility]       |              |
|             |                          |                 | LABORATORY |              |
+-------------+--------------------------+-----------------+------------+--------------+
| Anion Gap   | 11                       | 5 - 20 mmol/L   | [facility]       |              |
|             |                          |                 | LABORATORY |              |
+-------------+--------------------------+-----------------+------------+--------------+
| Glucose     | 140 (H)                  | 65 - 99 mg/dL   | [facility]       |              |
|             |                          |                 | LABORATORY |              |
+-------------+--------------------------+-----------------+------------+--------------+
| BUN         | 8                        | 8 - 25 mg/dL    | [facility]       |              |
|             |                          |                 | LABORATORY |              |
+-------------+--------------------------+-----------------+------------+--------------+
| Creatinine  | 0.9                      | 0.70 - 1.30     | [facility]       |              |
|             |                          | mg/dL           | LABORATORY |              |
+-------------+--------------------------+-----------------+------------+--------------+
| BUN/Creatin | 9                        |                 | [facility]       |              |
| ine Ratio   |                          |                 | LABORATORY |              |
+-------------+--------------------------+-----------------+------------+--------------+
| Calcium     | 8.4 (L)                  | 8.5 - 10.5      | [facility]       |              |
|             |                          | mg/dL           | LABORATORY |              |
+-------------+--------------------------+-----------------+------------+--------------+
| Estimated   | >60Comment: GFR <60:     | >60             | [facility]       |              |
| GFR         | CHRONIC KIDNEY DISEASE,  | mL/min/1.73m2   | LABORATORY |              |
|             | IF FOUND OVER A 3 MONTH  |                 |            |              |
|             | PERIOD.GFR <15: KIDNEY   |                 |            |              |
|             | FAILURE.FOR       |                 |            |              |
|             | AMERICANS, MULTIPLY THE  |                 |            |              |
|             | CALCULATED GFR BY        |                 |            |              |
|             | 1.210.This eGFR is       |                 |            |              |
|             | calculated using the     |                 |            |              |
|             | MDRD [facility] traceable      |                 |            |              |
|             | equation.Testing         |                 |            |              |
|             | performed at [facility], [address] |                 |            |              |
|             |  [facility],        |                 |            |              |
|             | [place]   [address]    |                 |            |              |
+-------------+--------------------------+-----------------+------------+--------------+
 
 
 
+----------+
| Specimen |
+----------+
| Blood    |
+----------+
 
 
 
 
+-------------------+------------------+--------------------+----------------+
| Performing        | Address          | City/State/Zipcode | Phone Number   |
| Organization      |                  |                    |                |
+-------------------+------------------+--------------------+----------------+
|   [facility] |   [address] | [place] [address] |   [phone] |
+-------------------+------------------+--------------------+----------------+
 CBC with Differential (2019  4:06 AM PDT)
 
+-------------+--------------------------+-----------------+------------+--------------+
| Component   | Value                    | Ref Range       | Performed  | Pathologist  |
|             |                          |                 | At         | Signature    |
+-------------+--------------------------+-----------------+------------+--------------+
| WBC         | 10.14                    | 3.80 - 11.00    | [facility]       |              |
|             |                          | K/uL            | LABORATORY |              |
+-------------+--------------------------+-----------------+------------+--------------+
| RBC         | 3.79 (L)                 | 4.20 - 5.70     | [facility]       |              |
|             |                          | M/uL            | LABORATORY |              |
+-------------+--------------------------+-----------------+------------+--------------+
| Hemoglobin  | 12.6 (L)                 | 13.2 - 17.0     | [facility]       |              |
|             |                          | g/dL            | LABORATORY |              |
+-------------+--------------------------+-----------------+------------+--------------+
| Hematocrit  | 37.1 (L)                 | 39.0 - 50.0 %   | [facility]       |              |
|             |                          |                 | LABORATORY |              |
+-------------+--------------------------+-----------------+------------+--------------+
| MCV         | 97.8                     | 80.0 - 100.0 fl | [facility]       |              |
|             |                          |                 | LABORATORY |              |
+-------------+--------------------------+-----------------+------------+--------------+
| MCH         | 33.3                     | 27.0 - 34.0 pg  | [facility]       |              |
|             |                          |                 | LABORATORY |              |
+-------------+--------------------------+-----------------+------------+--------------+
| MCHC        | 34.1                     | 32.0 - 35.5     | [facility]       |              |
|             |                          | g/dL            | LABORATORY |              |
+-------------+--------------------------+-----------------+------------+--------------+
| RDW-SD      | 47.3                     | 37 - 53 fl      | [facility]       |              |
|             |                          |                 | LABORATORY |              |
+-------------+--------------------------+-----------------+------------+--------------+
| Platelet    | 149 (L)                  | 150 - 400 K/uL  | [facility]       |              |
| Count       |                          |                 | LABORATORY |              |
+-------------+--------------------------+-----------------+------------+--------------+
| MPV         | 7.4                      | fl              | [facility]       |              |
|             |                          |                 | LABORATORY |              |
+-------------+--------------------------+-----------------+------------+--------------+
| Diff Type   | AUTOMATED                |                 | [facility]       |              |
|             |                          |                 | LABORATORY |              |
+-------------+--------------------------+-----------------+------------+--------------+
| %           | 63.73                    | %               | [facility]       |              |
| Neutrophils |                          |                 | LABORATORY |              |
+-------------+--------------------------+-----------------+------------+--------------+
| %           | 31.45                    | %               | [facility]       |              |
| Lymphocytes |                          |                 | LABORATORY |              |
+-------------+--------------------------+-----------------+------------+--------------+
| Monocyte %  | 4.45                     | %               | [facility]       |              |
|             |                          |                 | LABORATORY |              |
+-------------+--------------------------+-----------------+------------+--------------+
| Eosinophils | 0.24                     | %               | [facility]       |              |
|  %          |                          |                 | LABORATORY |              |
+-------------+--------------------------+-----------------+------------+--------------+
 
| Basophils % | 0.13                     | %               | [facility]       |              |
|             |                          |                 | LABORATORY |              |
+-------------+--------------------------+-----------------+------------+--------------+
| Neutrophils | 6.46                     | 1.90 - 7.40     | [facility]       |              |
| , Absolute  |                          | K/uL            | LABORATORY |              |
+-------------+--------------------------+-----------------+------------+--------------+
| Absolute    | 3.19                     | 1.00 - 3.90     | [facility]       |              |
| Lymphocytes |                          | K/uL            | LABORATORY |              |
+-------------+--------------------------+-----------------+------------+--------------+
| Absolute    | 0.45                     | 0.00 - 0.80     | [facility]       |              |
| Monocytes   |                          | K/uL            | LABORATORY |              |
+-------------+--------------------------+-----------------+------------+--------------+
| Eosinophils | 0.02                     | 0.00 - 0.50     | [facility]       |              |
| , Absolute  |                          | K/uL            | LABORATORY |              |
+-------------+--------------------------+-----------------+------------+--------------+
| Basophils,  | 0.01Comment: Testing     | 0.00 - 0.10     | [facility]       |              |
| Absolute    | performed at [facility], [address] | K/uL            | LABORATORY |              |
|             |  [place],        |                 |            |              |
|             | [place]  [address]     |                 |            |              |
+-------------+--------------------------+-----------------+------------+--------------+
 
 
 
+----------+
| Specimen |
+----------+
| Blood    |
+----------+
 
 
 
+-------------------+------------------+--------------------+----------------+
| Performing        | Address          | City/State/Zipcode | Phone Number   |
| Organization      |                  |                    |                |
+-------------------+------------------+--------------------+----------------+
|   [facility] |   [address] | [place] [address] |   [phone] |
+-------------------+------------------+--------------------+----------------+
 Basic Metabolic Panel (2019  4:06 AM PDT)
 
+-------------+--------------------------+-----------------+------------+--------------+
| Component   | Value                    | Ref Range       | Performed  | Pathologist  |
|             |                          |                 | At         | Signature    |
+-------------+--------------------------+-----------------+------------+--------------+
| Na          | 138                      | 135 - 145       | [facility]       |              |
|             |                          | mmol/L          | LABORATORY |              |
+-------------+--------------------------+-----------------+------------+--------------+
| K           | 4.4                      | 3.5 - 4.9       | [facility]       |              |
|             |                          | mmol/L          | LABORATORY |              |
+-------------+--------------------------+-----------------+------------+--------------+
| Cl          | 106                      | 99 - 109 mmol/L | [facility]       |              |
|             |                          |                 | LABORATORY |              |
+-------------+--------------------------+-----------------+------------+--------------+
| CO2         | 25                       | 23 - 32 mmol/L  | [facility]       |              |
|             |                          |                 | LABORATORY |              |
+-------------+--------------------------+-----------------+------------+--------------+
| Anion Gap   | 11                       | 5 - 20 mmol/L   | [facility]       |              |
|             |                          |                 | LABORATORY |              |
+-------------+--------------------------+-----------------+------------+--------------+
| Glucose     | 144 (H)                  | 65 - 99 mg/dL   | [facility]       |              |
|             |                          |                 | LABORATORY |              |
 
+-------------+--------------------------+-----------------+------------+--------------+
| BUN         | 16                       | 8 - 25 mg/dL    | [facility]       |              |
|             |                          |                 | LABORATORY |              |
+-------------+--------------------------+-----------------+------------+--------------+
| Creatinine  | 0.9                      | 0.70 - 1.30     | [facility]       |              |
|             |                          | mg/dL           | LABORATORY |              |
+-------------+--------------------------+-----------------+------------+--------------+
| BUN/Creatin | 18                       |                 | [facility]       |              |
| ine Ratio   |                          |                 | LABORATORY |              |
+-------------+--------------------------+-----------------+------------+--------------+
| Calcium     | 8.6                      | 8.5 - 10.5      | [facility]       |              |
|             |                          | mg/dL           | LABORATORY |              |
+-------------+--------------------------+-----------------+------------+--------------+
| Estimated   | >60Comment: GFR <60:     | >60             | [facility]       |              |
| GFR         | CHRONIC KIDNEY DISEASE,  | mL/min/1.73m2   | LABORATORY |              |
|             | IF FOUND OVER A 3 MONTH  |                 |            |              |
|             | PERIOD.GFR <15: KIDNEY   |                 |            |              |
|             | FAILURE.FOR       |                 |            |              |
|             | [employer]IC[employer], MULTIPLY THE  |                 |            |              |
|             | CALCULATED GFR BY        |                 |            |              |
|             | 1.210.This eGFR is       |                 |            |              |
|             | calculated using the     |                 |            |              |
|             | MDRD IDMS traceable      |                 |            |              |
|             | equation.Testing         |                 |            |              |
|             | performed at [facility], [address] |                 |            |              |
|             |  [address],        |                 |            |              |
|             | [place]   [address]    |                 |            |              |
+-------------+--------------------------+-----------------+------------+--------------+
 
 
 
+----------+
| Specimen |
+----------+
| Blood    |
+----------+
 
 
 
+-------------------+------------------+--------------------+----------------+
| Performing        | Address          | City/State/Zipcode | Phone Number   |
| Organization      |                  |                    |                |
+-------------------+------------------+--------------------+----------------+
|   [facility] |   [address] | [place] [address] |   [phone] |
+-------------------+------------------+--------------------+----------------+
 Lactic Acid (2019  9:09 AM PDT)
 
+-----------+-------------------------+------------+------------+--------------+
| Component | Value                   | Ref Range  | Performed  | Pathologist  |
|           |                         |            | At         | Signature    |
+-----------+-------------------------+------------+------------+--------------+
| Lactate,  | 1.6Comment: Testing     | 0.4 - 2.0  | [facility]       |              |
| Serum     | performed at [facility];[address]    | mmol/L     | LABORATORY |              |
|           | [address];[place], [place]  |            |            |              |
|           | [id number]                   |            |            |              |
+-----------+-------------------------+------------+------------+--------------+
 
 
 
+----------+
 
| Specimen |
+----------+
| Blood    |
+----------+
 
 
 
+-------------------+------------------+--------------------+----------------+
| Performing        | Address          | City/State/Zipcode | Phone Number   |
| Organization      |                  |                    |                |
+-------------------+------------------+--------------------+----------------+
|   [facility] |   [address] | [place] [address] |   [phone] |
+-------------------+------------------+--------------------+----------------+
 ECG 12 lead (2019  5:37 AM PDT)
 
+-------------+--------------------------+-----------+------------+--------------+
| Component   | Value                    | Ref Range | Performed  | Pathologist  |
|             |                          |           | At         | Signature    |
+-------------+--------------------------+-----------+------------+--------------+
| VENTRICULAR | 74                       | BPM       | [facility]  |              |
|  RATE EKG   |                          |           |            |              |
+-------------+--------------------------+-----------+------------+--------------+
| ATRIAL RATE | 74                       | BPM       | [facility]  |              |
+-------------+--------------------------+-----------+------------+--------------+
| P-R         | 214                      | ms        | [facility]  |              |
| INTERVAL    |                          |           |            |              |
+-------------+--------------------------+-----------+------------+--------------+
| QRS         | 90                       | ms        | [facility]  |              |
| DURATION    |                          |           |            |              |
+-------------+--------------------------+-----------+------------+--------------+
| Q-T         | 412                      | ms        | [facility]  |              |
| INTERVAL    |                          |           |            |              |
+-------------+--------------------------+-----------+------------+--------------+
| Q-T         | 458                      | ms        | [facility]  |              |
| INTERVAL    |                          |           |            |              |
| (CORRECTED) |                          |           |            |              |
+-------------+--------------------------+-----------+------------+--------------+
| P WAVE AXIS | 38                       | degrees   | [facility]  |              |
+-------------+--------------------------+-----------+------------+--------------+
| QRS AXIS    | -8                       | degrees   | [facility]  |              |
+-------------+--------------------------+-----------+------------+--------------+
| T AXIS      | 5                        | degrees   | [facility]  |              |
+-------------+--------------------------+-----------+------------+--------------+
| INTERPRETAT | Sinus rhythm with 1st    |           | [facility]  |              |
| ION TEXT    | degree A-V blockAbnormal |           |            |              |
|             |  ECGWhen compared with   |           |            |              |
|             | ECG of 17-SEP-2019       |           |            |              |
|             | 09:12,No significant     |           |            |              |
|             | change was               |           |            |              |
|             | foundConfirmed by        |           |            |              |
|             | Geovanni Bermudez MD     |           |            |              |
|             | (127) on 2019       |           |            |              |
|             | 5:46:12 PM               |           |            |              |
+-------------+--------------------------+-----------+------------+--------------+
 
 
 
+----------+
| Specimen |
+----------+
 
|          |
+----------+
 
 
 
+----------------------------------------------------------+--------------+
| Narrative                                                | Performed At |
+----------------------------------------------------------+--------------+
|   This result has an attachment that is not available.   |              |
+----------------------------------------------------------+--------------+
 
 
 
+--------------+---------+--------------------+--------------+
| Performing   | Address | City/State/Zipcode | Phone Number |
| Organization |         |                    |              |
+--------------+---------+--------------------+--------------+
|   [facility]  |         |                    |              |
+--------------+---------+--------------------+--------------+
 CBC with Differential (2019  4:40 AM PDT)
 
+-------------+--------------------------+-----------------+------------+--------------+
| Component   | Value                    | Ref Range       | Performed  | Pathologist  |
|             |                          |                 | At         | Signature    |
+-------------+--------------------------+-----------------+------------+--------------+
| WBC         | 13.98 (H)                | 3.80 - 11.00    | [facility]       |              |
|             |                          | K/uL            | LABORATORY |              |
+-------------+--------------------------+-----------------+------------+--------------+
| RBC         | 4.07 (L)                 | 4.20 - 5.70     | [facility]       |              |
|             |                          | M/uL            | LABORATORY |              |
+-------------+--------------------------+-----------------+------------+--------------+
| Hemoglobin  | 13.3                     | 13.2 - 17.0     | [facility]       |              |
|             |                          | g/dL            | LABORATORY |              |
+-------------+--------------------------+-----------------+------------+--------------+
| Hematocrit  | 39.6                     | 39.0 - 50.0 %   | [facility]       |              |
|             |                          |                 | LABORATORY |              |
+-------------+--------------------------+-----------------+------------+--------------+
| MCV         | 97.4                     | 80.0 - 100.0 fl | [facility]       |              |
|             |                          |                 | LABORATORY |              |
+-------------+--------------------------+-----------------+------------+--------------+
| MCH         | 32.8                     | 27.0 - 34.0 pg  | [facility]       |              |
|             |                          |                 | LABORATORY |              |
+-------------+--------------------------+-----------------+------------+--------------+
| MCHC        | 33.7                     | 32.0 - 35.5     | [facility]       |              |
|             |                          | g/dL            | LABORATORY |              |
+-------------+--------------------------+-----------------+------------+--------------+
| RDW-SD      | 47.3                     | 37 - 53 fl      | [facility]       |              |
|             |                          |                 | LABORATORY |              |
+-------------+--------------------------+-----------------+------------+--------------+
| Platelet    | 185                      | 150 - 400 K/uL  | [facility]       |              |
| Count       |                          |                 | LABORATORY |              |
+-------------+--------------------------+-----------------+------------+--------------+
| MPV         | 7.3                      | fl              | [facility]       |              |
|             |                          |                 | LABORATORY |              |
+-------------+--------------------------+-----------------+------------+--------------+
| Diff Type   | AUTOMATED                |                 | [facility]       |              |
|             |                          |                 | LABORATORY |              |
+-------------+--------------------------+-----------------+------------+--------------+
| %           | 55.68                    | %               | [facility]       |              |
| Neutrophils |                          |                 | LABORATORY |              |
 
+-------------+--------------------------+-----------------+------------+--------------+
| %           | 39.54                    | %               | [facility]       |              |
| Lymphocytes |                          |                 | LABORATORY |              |
+-------------+--------------------------+-----------------+------------+--------------+
| Monocyte %  | 4.55                     | %               | [facility]       |              |
|             |                          |                 | LABORATORY |              |
+-------------+--------------------------+-----------------+------------+--------------+
| Eosinophils | 0.05                     | %               | [facility]       |              |
|  %          |                          |                 | LABORATORY |              |
+-------------+--------------------------+-----------------+------------+--------------+
| Basophils % | 0.18                     | %               | [facility]       |              |
|             |                          |                 | LABORATORY |              |
+-------------+--------------------------+-----------------+------------+--------------+
| Neutrophils | 7.79 (H)                 | 1.90 - 7.40     | [facility]       |              |
| , Absolute  |                          | K/uL            | LABORATORY |              |
+-------------+--------------------------+-----------------+------------+--------------+
| Absolute    | 5.53 (H)                 | 1.00 - 3.90     | [facility]       |              |
| Lymphocytes |                          | K/uL            | LABORATORY |              |
+-------------+--------------------------+-----------------+------------+--------------+
| Absolute    | 0.64                     | 0.00 - 0.80     | [facility]       |              |
| Monocytes   |                          | K/uL            | LABORATORY |              |
+-------------+--------------------------+-----------------+------------+--------------+
| Eosinophils | 0.01                     | 0.00 - 0.50     | [facility]       |              |
| , Absolute  |                          | K/uL            | LABORATORY |              |
+-------------+--------------------------+-----------------+------------+--------------+
| Basophils,  | 0.03                     | 0.00 - 0.10     | [facility]       |              |
| Absolute    |                          | K/uL            | LABORATORY |              |
+-------------+--------------------------+-----------------+------------+--------------+
| RBC         | RBC AND PLT MORPHOLOGY   |                 | [facility]       |              |
| Morphology  | APPEAR NORMAL            |                 | LABORATORY |              |
+-------------+--------------------------+-----------------+------------+--------------+
| Platelet    | ADEQUATE                 |                 | [facility]       |              |
| Estimate    |                          |                 | LABORATORY |              |
+-------------+--------------------------+-----------------+------------+--------------+
| Comment     | SLIDE SCANNED, AGREES    |                 | [facility]       |              |
|             | WITH AUTOMATED           |                 | LABORATORY |              |
|             | RESULTS.Comment: Testing |                 |            |              |
|             |  performed at [facility]    |                 |            |              |
|             | [facility], [place]   |                 |            |              |
|             | [id number]                    |                 |            |              |
+-------------+--------------------------+-----------------+------------+--------------+
 
 
 
+----------+
| Specimen |
+----------+
| Blood    |
+----------+
 
 
 
+-------------------+------------------+--------------------+----------------+
| Performing        | Address          | City/State/Zipcode | Phone Number   |
| Organization      |                  |                    |                |
+-------------------+------------------+--------------------+----------------+
|   [facility] |   [address] | [place] [address] |   [phone] |
+-------------------+------------------+--------------------+----------------+
 Basic Metabolic Panel (2019  4:40 AM PDT)
 
 
+-------------+--------------------------+-----------------+------------+--------------+
| Component   | Value                    | Ref Range       | Performed  | Pathologist  |
|             |                          |                 | At         | Signature    |
+-------------+--------------------------+-----------------+------------+--------------+
| Na          | 140                      | 135 - 145       | [facility]       |              |
|             |                          | mmol/L          | LABORATORY |              |
+-------------+--------------------------+-----------------+------------+--------------+
| K           | 4.5                      | 3.5 - 4.9       | [facility]       |              |
|             |                          | mmol/L          | LABORATORY |              |
+-------------+--------------------------+-----------------+------------+--------------+
| Cl          | 107                      | 99 - 109 mmol/L | [facility]       |              |
|             |                          |                 | LABORATORY |              |
+-------------+--------------------------+-----------------+------------+--------------+
| CO2         | 23                       | 23 - 32 mmol/L  | [facility]       |              |
|             |                          |                 | LABORATORY |              |
+-------------+--------------------------+-----------------+------------+--------------+
| Anion Gap   | 15                       | 5 - 20 mmol/L   | [facility]       |              |
|             |                          |                 | LABORATORY |              |
+-------------+--------------------------+-----------------+------------+--------------+
| Glucose     | 192 (H)                  | 65 - 99 mg/dL   | [facility]       |              |
|             |                          |                 | LABORATORY |              |
+-------------+--------------------------+-----------------+------------+--------------+
| BUN         | 17                       | 8 - 25 mg/dL    | [facility]       |              |
|             |                          |                 | LABORATORY |              |
+-------------+--------------------------+-----------------+------------+--------------+
| Creatinine  | 1.02                     | 0.70 - 1.30     | [facility]       |              |
|             |                          | mg/dL           | LABORATORY |              |
+-------------+--------------------------+-----------------+------------+--------------+
| BUN/Creatin | 17                       |                 | [facility]       |              |
| ine Ratio   |                          |                 | LABORATORY |              |
+-------------+--------------------------+-----------------+------------+--------------+
| Calcium     | 8.6                      | 8.5 - 10.5      | [facility]       |              |
|             |                          | mg/dL           | LABORATORY |              |
+-------------+--------------------------+-----------------+------------+--------------+
| Estimated   | >60Comment: GFR <60:     | >60             | [facility]       |              |
| GFR         | CHRONIC KIDNEY DISEASE,  | mL/min/1.73m2   | LABORATORY |              |
|             | IF FOUND OVER A 3 MONTH  |                 |            |              |
|             | PERIOD.GFR <15: KIDNEY   |                 |            |              |
|             | FAILURE.FOR       |                 |            |              |
|             | [employer], MULTIPLY THE  |                 |            |              |
|             | CALCULATED GFR BY        |                 |            |              |
|             | 1.210.This eGFR is       |                 |            |              |
|             | calculated using the     |                 |            |              |
|             | MDRD [facility] traceable      |                 |            |              |
|             | equation.Testing         |                 |            |              |
|             | performed at [facility]     |                 |            |              |
|             | [facility];[place], [place]   |                 |            |              |
|             | [id number]                    |                 |            |              |
+-------------+--------------------------+-----------------+------------+--------------+
 
 
 
+----------+
| Specimen |
+----------+
| Blood    |
+----------+
 
 
 
 
+-------------------+------------------+--------------------+----------------+
| Performing        | Address          | City/State/Zipcode | Phone Number   |
| Organization      |                  |                    |                |
+-------------------+------------------+--------------------+----------------+
|   [facility] |   [address] | [place] [address] |   [phone] |
+-------------------+------------------+--------------------+----------------+
 Surgical Pathology Exam (2019  1:50 PM PDT)
 
+--------------------+
| Specimen           |
+--------------------+
| Tissue - Arterial  |
| part (body         |
| structure)         |
+--------------------+
 
 
 
+-------------------------------------------------------------------------------------------
--------------------------------------------------------------------------------------------
----------------+-----------------+
| Narrative                                                                                 
                                                                                            
                | Performed At    |
+-------------------------------------------------------------------------------------------
--------------------------------------------------------------------------------------------
----------------+-----------------+
|   This result has an attachment that is not available.  SPECIMEN(S): A                    
                                                                                            
                |   [facility]  |
|  SUPERIOR MESENTERIC ARTERY PLAQUE SPECIMEN SOURCE:A. SUPERIOR                            
                                                                                            
                | INCYTE          |
| MESENTERIC ARTERY PLAQUE CLINICAL HISTORY:K55.1 (mesenteric ischemia,                     
                                                                                            
                |                 |
| chronic). FINAL PATHOLOGIC DIAGNOSIS:Superior mesenteric artery                           
                                                                                            
                |                 |
| plaque:-   Benign atherosclerotic plaque and focal fragments of benign                    
                                                                                            
                |                 |
|  fibrous tissue and smooth muscle. JVR:[facility]:C2NR MICROSCOPIC                               
                                                                                            
                |                 |
| EXAMINATION:Histologic sections of all submitted blocks are examined                      
                                                                                            
                |                 |
| by light microscopy.   These findings, together with the gross                            
                                                                                            
                |                 |
| examination, support the pathologic diagnosis. GROSS DESCRIPTION:The                      
                                                                                            
                |                 |
| specimen, labeled "JH, superior mesenteric artery plaque," is received                    
                                                                                            
                |                 |
|  in formalin and consists of three tan-pink and focally calcified                         
                                                                                            
                |                 |
 
| portions of tissue ranging from 1.0-2.5 cm in greatestdimension.                          
                                                                                            
                |                 |
|   Sectioning shows tan-pink tan-yellow and calcified cut surfaces with                    
                                                                                            
                |                 |
|  no discrete lesions identified.   Representative sections are                            
                                                                                            
                |                 |
| submitted in cassette (A1), following decalcification inDecal Stat.AR                     
                                                                                            
                |                 |
| (under the direct supervision of a pathologist) The Gross Description                     
                                                                                            
                |                 |
| was prepared using a voice recognition system.   The report was                           
                                                                                            
                |                 |
| reviewed for accuracy; however, sound-alike word errors, addition                         
                                                                                            
                |                 |
| and/or deletions may occur.   If there is anyquestion about this                          
                                                                                            
                |                 |
| report, please contact Client Services. PERFORMING LABORATORY:The                         
                                                                                            
                |                 |
| technical component was performed by [facility], [address] [place] [address]                     
                                                                                            
                |                 |
| [place] [address] (Medical Director: Liset Naidu MD; CLIA#                           
                                                                                            
                |                 |
| [id number]).    Professional interpretation was performed [employer]                        
                                                                                            
                |                 |
| [facility]                     
                                                                                            
                |                 |
[place] [address] (Medical Director:   Liam GUZMAN                            
                                                                                            
                |                 |
| PADILLA Clarke; CLIA#:   [id number]). Diagnostician:   Mike Ryan                    
                                                                                            
                |                 |
|  MDPathologistElectronically Signed 2019                                            
                                                                                            
                |                 |
|The technical component was performed by [facility], [address] [place] [address] (Medical Director: Liset Naidu MD; CLIA# [id number]).    Professional interpretation w
as performed by |                 |
|[facility], [address] [place] [address] (Medical Director:   Liam Clarke M.D.; CLIA#:   [id number]).       
                |                 |
|                                                                                           
                                                                                            
                |                 |
|Diagnostician:   Mike Ryan MD                                                       
                                                                                            
                |                 |
 
|Pathologist                                                                                
                                                                                            
               |                 |
|Electronically Signed 2019                                                           
                                                                                            
                |                 |
|                                                                                           
                                                                                            
                |                 |
|                                                                                           
                                                                                            
                |                 |
+-------------------------------------------------------------------------------------------
--------------------------------------------------------------------------------------------
----------------+-----------------+
 
 
 
+-----------------+---------+--------------------+--------------+
| Performing      | Address | City/State/Zipcode | Phone Number |
| Organization    |         |                    |              |
+-----------------+---------+--------------------+--------------+
|   [facility]  |         |                    |              |
| [facility]          |         |                    |              |
+-----------------+---------+--------------------+--------------+
 Type and Screen (2019 11:03 AM PDT)
 
+-----------+------------------------+-----------+------------+--------------+
| Component | Value                  | Ref Range | Performed  | Pathologist  |
|           |                        |           | At         | Signature    |
+-----------+------------------------+-----------+------------+--------------+
| ABO Rh    | O POSITIVE             |           | [facility]       |              |
|           |                        |           | LABORATORY |              |
+-----------+------------------------+-----------+------------+--------------+
| Antibody  | NEGATIVE               |           | [facility]       |              |
| Screen    |                        |           | LABORATORY |              |
+-----------+------------------------+-----------+------------+--------------+
| BB BAND   | [id number]               |           | [facility]       |              |
|           |                        |           | LABORATORY |              |
+-----------+------------------------+-----------+------------+--------------+
| BB BAND   | Testing performed at   |           | [facility]       |              |
|           | [facility];[address]          |           | LABORATORY |              |
|           | [address]vd;[place] [address] |           |            |              |
+-----------+------------------------+-----------+------------+--------------+
 
 
 
+----------+
| Specimen |
+----------+
| Blood    |
+----------+
 
 
 
+-------------------+------------------+--------------------+----------------+
| Performing        | Address          | City/State/Zipcode | Phone Number   |
| Organization      |                  |                    |                |
+-------------------+------------------+--------------------+----------------+
|   [facility] |   [address] | [place] [address] |   [phone] |
 
+-------------------+------------------+--------------------+----------------+
 documented in this encounter
 
 Visit Diagnoses
 
 
+------------------------------------------------------------------------------------------+
| Diagnosis                                                                                |
+------------------------------------------------------------------------------------------+
|   Mesenteric ischemia, chronic (HCC) - Primary  Chronic vascular insufficiency of        |
| intestine                                                                                |
+------------------------------------------------------------------------------------------+
|   Hypertension, unspecified type                                                         |
+------------------------------------------------------------------------------------------+
|   Hyperlipidemia  Other and unspecified hyperlipidemia                                   |
+------------------------------------------------------------------------------------------+
|   Diabetes mellitus (HCC)  Type II or unspecified type diabetes mellitus without mention |
|  of complication, not stated as uncontrolled                                             |
+------------------------------------------------------------------------------------------+
 documented in this encounter
 
 Admitting Diagnoses
 
 
+-----------------------------------------------------------------------------------+
| Diagnosis                                                                         |
+-----------------------------------------------------------------------------------+
|   Mesenteric ischemia, chronic (HCC)  Chronic vascular insufficiency of intestine |
+-----------------------------------------------------------------------------------+
 documented in this encounter
 
 Administered Medications
 
 
+-----------------------------------+--------+----------+-------+------+------+
| Medication Order                  | MAR    | Action   | Dose  | Rate | Site |
|                                   | Action | Date     |       |      |      |
+-----------------------------------+--------+----------+-------+------+------+
|   aspirin EC tablet 81 mg  81 mg, | Given  | 20 | 81 mg |      |      |
|  Oral, DAILY, First dose on Sat   |        | 19  8:09 |       |      |      |
| 19 at 1100                   |        |  AM PDT  |       |      |      |
+-----------------------------------+--------+----------+-------+------+------+
 
 
 
+-------+----------+-------+---+---+
| Given | 20 | 81 mg |   |   |
|       | 19  8:29 |       |   |   |
|       |  AM PDT  |       |   |   |
+-------+----------+-------+---+---+
| Given | 20 | 81 mg |   |   |
|       | 19 10:23 |       |   |   |
|       |  AM PDT  |       |   |   |
+-------+----------+-------+---+---+
 
 
 
+---+---+
|   |   |
+---+---+
 
 
 
 
+-----------------------------------+---------+----------+---+----------+---+
|   balanced electrolytes in water  | New Bag | 20 |   | 50 mL/hr |   |
| (PLASMALYTE-148/NORMOSOL-R)       |         | 19 11:07 |   |          |   |
| infusion  at 50 mL/hr,            |         |  AM PDT  |   |          |   |
| Intravenous, CONTINUOUS, Starting |         |          |   |          |   |
|  Tue 19 at 1115, Pre-op      |         |          |   |          |   |
+-----------------------------------+---------+----------+---+----------+---+
 
 
 
+---+---+
|   |   |
+---+---+
 
 
 
+-----------------------------------+-------+----------+-------+---+---+
|   bisacodyl (DULCOLAX)            | Given | 20 | 10 mg |   |   |
| suppository 10 mg  10 mg, Rectal, |       | 19  8:11 |       |   |   |
|  DAILY PRN, Constipation,         |       |  AM PDT  |       |   |   |
| Starting Tue 19 at 1507, If  |       |          |       |   |   |
| all other bowel medications       |       |          |       |   |   |
| ineffective x 24 hours or not     |       |          |       |   |   |
| ordered,                          |       |          |       |   |   |
+-----------------------------------+-------+----------+-------+---+---+
 
 
 
+---+---+
|   |   |
+---+---+
 
 
 
+-----------------------------------+---------+----------+---+-------+---+
|   dextrose 5% and sodium chloride | New Bag | 20 |   | 125   |   |
|  0.45% with KCl 20 mEq/L (D5 1/2  |         | 19  7:35 |   | mL/hr |   |
| NS + KCL 20) infusion  at 125     |         |  PM PDT  |   |       |   |
| mL/hr, Intravenous, CONTINUOUS,   |         |          |   |       |   |
| Starting e 19 at 1600      |         |          |   |       |   |
+-----------------------------------+---------+----------+---+-------+---+
 
 
 
+---------+----------+---+-------+---+
| New Bag | 20 |   | 125   |   |
|         | 19 12:07 |   | mL/hr |   |
|         |  PM PDT  |   |       |   |
+---------+----------+---+-------+---+
| New Bag | 20 |   | 125   |   |
|         | 19  2:19 |   | mL/hr |   |
|         |  AM PDT  |   |       |   |
+---------+----------+---+-------+---+
 
 
 
+---+---+
 
|   |   |
+---+---+
 
 
 
+----------------------------------+-------+----------+-------+---+---+
|   hydroCHLOROthiazide tablet 25  | Given | 20 | 25 mg |   |   |
| mg  25 mg, Oral, DAILY, First    |       | 19  8:10 |       |   |   |
| dose on Sat 19 at 1100      |       |  AM PDT  |       |   |   |
+----------------------------------+-------+----------+-------+---+---+
 
 
 
+-------+----------+-------+---+---+
| Given | 20 | 25 mg |   |   |
|       | 19  8:30 |       |   |   |
|       |  AM PDT  |       |   |   |
+-------+----------+-------+---+---+
| Given | 20 | 25 mg |   |   |
|       | 19 10:23 |       |   |   |
|       |  AM PDT  |       |   |   |
+-------+----------+-------+---+---+
 
 
 
+---+---+
|   |   |
+---+---+
 
 
 
+-----------------------------------+-------+----------+---------+---+---+
|   HYDROcodone-acetaminophen       | Given | 20 | 2       |   |   |
| (NORCO) 5-325 mg per tablet 1-2   |       | 19 11:14 | tablets |   |   |
| tablet  1-2 tablet, Oral, EVERY 4 |       |  AM PDT  |         |   |   |
|  HOURS PRN, Pain, Starting Sun    |       |          |         |   |   |
| 19 at 0843                   |       |          |         |   |   |
+-----------------------------------+-------+----------+---------+---+---+
 
 
 
+-------+----------+---------+---+---+
| Given | 20 | 2       |   |   |
|       | 19  7:30 | tablets |   |   |
|       |  AM PDT  |         |   |   |
+-------+----------+---------+---+---+
| Given | 20 | 2       |   |   |
|       | 19 10:22 | tablets |   |   |
|       |  PM PDT  |         |   |   |
+-------+----------+---------+---+---+
 
 
 
+---+---+
|   |   |
+---+---+
 
 
 
+---------------------------------+-------+----------+------+---+---+
 
|   HYDROmorphone (DILAUDID)      | Given | 20 | 1 mg |   |   |
| injection 0.5-1 mg  0.5-1 mg,   |       | 19  8:30 |      |   |   |
| Intravenous, EVERY 1 HOUR PRN,  |       |  AM PDT  |      |   |   |
| Pain, Starting Tue 19 at   |       |          |      |   |   |
| 1535                            |       |          |      |   |   |
+---------------------------------+-------+----------+------+---+---+
 
 
 
+-------+----------+------+---+---+
| Given | 20 | 1 mg |   |   |
|       | 19  3:35 |      |   |   |
|       |  AM PDT  |      |   |   |
+-------+----------+------+---+---+
| Given | 20 | 1 mg |   |   |
|       | 19 10:05 |      |   |   |
|       |  PM PDT  |      |   |   |
+-------+----------+------+---+---+
 
 
 
+---+---+
|   |   |
+---+---+
 
 
 
+-----------------------------------+-------+----------+-------+---+---+
|   losartan (COZAAR) tablet 50 mg  | Given | 20 | 50 mg |   |   |
|  50 mg, Oral, DAILY, First dose   |       | 19  8:09 |       |   |   |
| on Sat 19 at 1100            |       |  AM PDT  |       |   |   |
+-----------------------------------+-------+----------+-------+---+---+
 
 
 
+-------+----------+-------+---+---+
| Given | 20 | 50 mg |   |   |
|       | 19  8:30 |       |   |   |
|       |  AM PDT  |       |   |   |
+-------+----------+-------+---+---+
| Given | 20 | 50 mg |   |   |
|       | 19 10:23 |       |   |   |
|       |  AM PDT  |       |   |   |
+-------+----------+-------+---+---+
 
 
 
+---+---+
|   |   |
+---+---+
 
 
 
+-----------------------------------+-------+----------+------+---+---+
|   morphine injection 1-2 mg  1-2  | Given | 20 | 2 mg |   |   |
| mg, Intravenous, EVERY 1 HOUR     |       | 19  3:14 |      |   |   |
| PRN, Pain, Starting Tue 19   |       |  PM PDT  |      |   |   |
| at 1507, If oral route not an     |       |          |      |   |   |
| option.  Slow IV push, not faster |       |          |      |   |   |
|  than 2mg/minute. First dose must |       |          |      |   |   |
 
|  be lowest dose, titrate to       |       |          |      |   |   |
| effective dose by repeat of       |       |          |      |   |   |
| lowest dose every 30 minutes prn  |       |          |      |   |   |
| pain, may not exceed maximum dose |       |          |      |   |   |
|  ordered per interval.  Use       |       |          |      |   |   |
| Pasero Sedation Scale.,           |       |          |      |   |   |
+-----------------------------------+-------+----------+------+---+---+
 
 
 
+---+---+
|   |   |
+---+---+
 
 
 
+-----------------------------------+-------+----------+-------+---+---+
|   pantoprazole (PROTONIX)         | Given | 20 | 40 mg |   |   |
| injection 40 mg  40 mg,           |       | 19  8:10 |       |   |   |
| Intravenous, DAILY, First dose on |       |  AM PDT  |       |   |   |
|  Wed 19 at 1100, If          |       |          |       |   |   |
| reconstituting, mix each 40 mg    |       |          |       |   |   |
| vial with 10 mL NS to make 4      |       |          |       |   |   |
| mg/mL., Indication: GERD          |       |          |       |   |   |
+-----------------------------------+-------+----------+-------+---+---+
 
 
 
+-------+----------+-------+---+---+
| Given | 20 | 40 mg |   |   |
|       | 19  8:30 |       |   |   |
|       |  AM PDT  |       |   |   |
+-------+----------+-------+---+---+
| Given | 20 | 40 mg |   |   |
|       | 19  8:00 |       |   |   |
|       |  AM PDT  |       |   |   |
+-------+----------+-------+---+---+
 
 
 
+---+---+
|   |   |
+---+---+
 
 
 
+-----------------------------------+---------+----------+---+----------+---+
|   sodium chloride 0.9% (NS)       | New Bag | 20 |   | 30 mL/hr |   |
| infusion  at 30 mL/hr,            |         | 19 11:34 |   |          |   |
| Intravenous, CONTINUOUS, Starting |         |  AM PDT  |   |          |   |
|  Tue 19 at 1200,             |         |          |   |          |   |
| Recovery/Phase I                  |         |          |   |          |   |
+-----------------------------------+---------+----------+---+----------+---+
 
 
 
+-------------------------+----------+---+---+---+
| Continued by Anesthesia | 20 |   |   |   |
|                         | 19 11:27 |   |   |   |
|                         |  AM PDT  |   |   |   |
 
+-------------------------+----------+---+---+---+
 
 
 
+---+---+
|   |   |
+---+---+
 documented in this encounter

## 2020-01-13 NOTE — XMS
Encounter Summary
  Created on: 2020
 
 Pasquale Nehemias Martinez
 External Reference #: [id number]
 : 43
 Sex: Male
 
 Demographics
 
 
+-----------------------+----------------------+
| Address               | [address]         |
|                       | [place]  [address] |
+-----------------------+----------------------+
| Home Phone            | [phone]      |
+-----------------------+----------------------+
| Preferred Language    | Unknown              |
+-----------------------+----------------------+
| Marital Status        |               |
+-----------------------+----------------------+
| [employer]ology Affiliation | [id number]                 |
+-----------------------+----------------------+
| Race                  | Unknown              |
+-----------------------+----------------------+
| Ethnic Group          | Unknown              |
+-----------------------+----------------------+
 
 
 Author
 
 
+--------------+--------------------------------------------+
| Author       | [facility]  |
|              | and [facility]                                |
+--------------+--------------------------------------------+
| Organization | [facility]  |
|              | and [place]                                |
+--------------+--------------------------------------------+
| Address      | Unknown                                    |
+--------------+--------------------------------------------+
| Phone        | Unavailable                                |
+--------------+--------------------------------------------+
 
 
 
 Support
 
 
+--------------+--------------+---------------------+-----------------+
| Name         | Relationship | Address             | Phone           |
+--------------+--------------+---------------------+-----------------+
| Rupa Giordano | ECON         | [address]             | [phone] |
|              |              | [place]   |                 |
|              |              | [address]               |                 |
+--------------+--------------+---------------------+-----------------+
 
 
 
 
 Care Team Providers
 
 
+-----------------------+------+-------------+
| Care Team Member Name | Role | Phone       |
+-----------------------+------+-------------+
| No, Physician         | PCP  | Unavailable |
+-----------------------+------+-------------+
 
 
 
 Reason for Visit
 
 
+------------------+----------+
| Reason           | Comments |
+------------------+----------+
| Diagnostic Order |          |
+------------------+----------+
 
 
 
 Encounter Details
 
 
+--------+-----------+----------------------+----------------------+------------------+
| Date   | Type      | Department           | Care Team            | Description      |
+--------+-----------+----------------------+----------------------+------------------+
| / | Telephone |   [facility]      |   Libertad Saxenamalu MCPHERSON,  | Diagnostic Order |
|    |           | VASCULAR SURGERY     | RN                   |                  |
|        |           | [address] |                      |                  |
|        |           |  [address]  [place]     |                      |                  |
|        |           | [address]           |                      |                  |
|        |           | [phone]         |                      |                  |
+--------+-----------+----------------------+----------------------+------------------+
 
 
 
 Social History
 
 
+---------------+-------+-----------+--------+------+
| Tobacco Use   | Types | Packs/Day | Years  | Date |
|               |       |           | Used   |      |
+---------------+-------+-----------+--------+------+
| Former Smoker |       |           |        |      |
+---------------+-------+-----------+--------+------+
 
 
 
+---------------------+---+---+---+
| Smokeless Tobacco:  |   |   |   |
| Former User         |   |   |   |
+---------------------+---+---+---+
 
 
 
+------------------------------+
| Comments: quit 50 years ago  |
 
+------------------------------+
 
 
 
+---------------+-------------+---------+----------+
| Alcohol Use   | Drinks/Week | oz/Week | Comments |
+---------------+-------------+---------+----------+
| Not Currently |             |         | 90m days |
+---------------+-------------+---------+----------+
 
 
 
+------------------+---------------+
| Sex Assigned at  | Date Recorded |
| Birth            |               |
+------------------+---------------+
| Not on file      |               |
+------------------+---------------+
 
 
 
+----------------+-------------+-------------+
| Job Start Date | Occupation  | Industry    |
+----------------+-------------+-------------+
| Not on file    | Not on file | Not on file |
+----------------+-------------+-------------+
 
 
 
+----------------+--------------+------------+
| Travel History | Travel Start | Travel End |
+----------------+--------------+------------+
 
 
 
+-------------------------------------+
| No recent travel history available. |
+-------------------------------------+
 documented as of this encounter
 
 Functional Status
 
 
+---------------------------------------------+----------+--------------------+
| Functional Status                           | Response | Date of Assessment |
+---------------------------------------------+----------+--------------------+
| Are you deaf or do you have serious         | No       | 2019         |
| difficulty hearing?                         |          |                    |
+---------------------------------------------+----------+--------------------+
| Are you blind or do you have serious        | No       | 2019         |
| difficulty seeing, even when wearing        |          |                    |
| glasses?                                    |          |                    |
+---------------------------------------------+----------+--------------------+
| Do you have serious difficulty walking or   | No       | 2019         |
| climbing stairs? (5 years old or older)     |          |                    |
+---------------------------------------------+----------+--------------------+
| Do you have difficulty dressing or bathing? | No       | 2019         |
|  (5 years old or older)                     |          |                    |
+---------------------------------------------+----------+--------------------+
| Because of a physical, mental, or emotional | No       | 2019         |
 
|  condition, do you have difficulty doing    |          |                    |
| errands alone such as visiting a doctor's   |          |                    |
| office or shopping? [15 years old or        |          |                    |
| older)]                                     |          |                    |
+---------------------------------------------+----------+--------------------+
 
 
 
+---------------------------------------------+----------+--------------------+
| Cognitive Status                            | Response | Date of Assessment |
+---------------------------------------------+----------+--------------------+
| Because of a physical, mental, or emotional | No       | 2019         |
|  condition, do you have serious difficulty  |          |                    |
| concentrating, remembering, or making       |          |                    |
| decisions? (5 years old or older)           |          |                    |
+---------------------------------------------+----------+--------------------+
 documented as of this encounter
 
 Plan of Treatment
 
 
+--------+-------------+------------------+----------------------+-------------+
| Date   | Type        | Specialty        | Care Team            | Description |
+--------+-------------+------------------+----------------------+-------------+
| / | Appointment | Radiology        |   Augustin Lopez DNP      |             |
| 2020   |             |                  | [address]     |             |
|        |             |                  | [place]  |             |
|        |             |                  | [address]  [phone]  |             |
|        |             |                  |  [phone] (Fax)  |             |
+--------+-------------+------------------+----------------------+-------------+
| / | Office      | Vascular Surgery |   Augustin Lopez DNP      |             |
| 2020   | Visit       |                  | [address]     |             |
|        |             |                  | [place]  |             |
|        |             |                  | [id number] [phone]  |             |
|        |             |                  |  [phone] (Fax)  |             |
+--------+-------------+------------------+----------------------+-------------+
 documented as of this encounter
 
 Visit Diagnoses
 Not on filedocumented in this encounter

## 2020-01-13 NOTE — XMS
Encounter Summary
  Created on: 2020
 
 Pasquale Nehemias Martinez
 External Reference #: [id number]
 : 43
 Sex: Male
 
 Demographics
 
 
+-----------------------+----------------------+
| Address               | [address]         |
|                       | [place]  [address] |
+-----------------------+----------------------+
| Home Phone            | [phone]      |
+-----------------------+----------------------+
| Preferred Language    | Unknown              |
+-----------------------+----------------------+
| Marital Status        |               |
+-----------------------+----------------------+
| [employer] Affiliation | 1077                 |
+-----------------------+----------------------+
| Race                  | Unknown              |
+-----------------------+----------------------+
| Ethnic Group          | Unknown              |
+-----------------------+----------------------+
 
 
 Author
 
 
+--------------+--------------------------------------------+
| Author       | [facility]  |
|              | and [facility]                                |
+--------------+--------------------------------------------+
| Organization | [facility]  |
|              | and [place]                                |
+--------------+--------------------------------------------+
| Address      | Unknown                                    |
+--------------+--------------------------------------------+
| Phone        | Unavailable                                |
+--------------+--------------------------------------------+
 
 
 
 Support
 
 
+--------------+--------------+---------------------+-----------------+
| Name         | Relationship | Address             | Phone           |
+--------------+--------------+---------------------+-----------------+
| Rupa Giordano | E[employer]         | [address]             | [phone] |
|              |              | [place], [place]   |                 |
|              |              | [address]               |                 |
+--------------+--------------+---------------------+-----------------+
 
 
 
 
 Care Team Providers
 
 
+-----------------------+------+-------------+
| Care Team Member Name | Role | Phone       |
+-----------------------+------+-------------+
| No, Physician         | PCP  | Unavailable |
+-----------------------+------+-------------+
 
 
 
 Reason for Visit
 
 
+--------+------------------+
| Reason | Comments         |
+--------+------------------+
| Other  | Labs and Imaging |
+--------+------------------+
 
 
 
 Encounter Details
 
 
+--------+-----------+----------------------+----------------------+------------------+
| Date   | Type      | Department           | Care Team            | Description      |
+--------+-----------+----------------------+----------------------+------------------+
| 10/29/ | Telephone |   [facility]      |   Sukhdeep Pardo MD     | Other (Labs and  |
|    |           | VASCULAR SURGERY     | [address]     | Imaging)         |
|        |           | [address] | [address]  [place], [place]  |                  |
|        |           |  [place]  [place]     | [address]           |                  |
|        |           | [address]           | [phone]         |                  |
|        |           | [phone]         | [phone] (Fax)   |                  |
+--------+-----------+----------------------+----------------------+------------------+
 
 
 
 Social History
 
 
+---------------+-------+-----------+--------+------+
| Tobacco Use   | Types | Packs/Day | Years  | Date |
|               |       |           | Used   |      |
+---------------+-------+-----------+--------+------+
| Former Smoker |       |           |        |      |
+---------------+-------+-----------+--------+------+
 
 
 
+---------------------+---+---+---+
| Smokeless Tobacco:  |   |   |   |
| Former User         |   |   |   |
+---------------------+---+---+---+
 
 
 
+------------------------------+
| Comments: quit 50 years ago  |
 
+------------------------------+
 
 
 
+---------------+-------------+---------+----------+
| Alcohol Use   | Drinks/Week | oz/Week | Comments |
+---------------+-------------+---------+----------+
| Not Currently |             |         | 90m days |
+---------------+-------------+---------+----------+
 
 
 
+------------------+---------------+
| Sex Assigned at  | Date Recorded |
| Birth            |               |
+------------------+---------------+
| Not on file      |               |
+------------------+---------------+
 
 
 
+----------------+-------------+-------------+
| Job Start Date | Occupation  | Industry    |
+----------------+-------------+-------------+
| Not on file    | Not on file | Not on file |
+----------------+-------------+-------------+
 
 
 
+----------------+--------------+------------+
| Travel History | Travel Start | Travel End |
+----------------+--------------+------------+
 
 
 
+-------------------------------------+
| No recent travel history available. |
+-------------------------------------+
 documented as of this encounter
 
 Functional Status
 
 
+---------------------------------------------+----------+--------------------+
| Functional Status                           | Response | Date of Assessment |
+---------------------------------------------+----------+--------------------+
| Are you deaf or do you have serious         | No       | 2019         |
| difficulty hearing?                         |          |                    |
+---------------------------------------------+----------+--------------------+
| Are you blind or do you have serious        | No       | 2019         |
| difficulty seeing, even when wearing        |          |                    |
| glasses?                                    |          |                    |
+---------------------------------------------+----------+--------------------+
| Do you have serious difficulty walking or   | No       | 2019         |
| climbing stairs? (5 years old or older)     |          |                    |
+---------------------------------------------+----------+--------------------+
| Do you have difficulty dressing or bathing? | No       | 2019         |
|  (5 years old or older)                     |          |                    |
+---------------------------------------------+----------+--------------------+
| Because of a physical, mental, or emotional | No       | 2019         |
 
|  condition, do you have difficulty doing    |          |                    |
| errands alone such as visiting a doctor's   |          |                    |
| office or shopping? [15 years old or        |          |                    |
| older)]                                     |          |                    |
+---------------------------------------------+----------+--------------------+
 
 
 
+---------------------------------------------+----------+--------------------+
| Cognitive Status                            | Response | Date of Assessment |
+---------------------------------------------+----------+--------------------+
| Because of a physical, mental, or emotional | No       | 2019         |
|  condition, do you have serious difficulty  |          |                    |
| concentrating, remembering, or making       |          |                    |
| decisions? (5 years old or older)           |          |                    |
+---------------------------------------------+----------+--------------------+
 documented as of this encounter
 
 Plan of Treatment
 
 
+--------+-------------+------------------+----------------------+-------------+
| Date   | Type        | Specialty        | Care Team            | Description |
+--------+-------------+------------------+----------------------+-------------+
| / | Appointment | Radiology        |   Augustin Lopez DNP      |             |
|    |             |                  | [address]     |             |
|        |             |                  | [place]  |             |
|        |             |                  | [address]  [phone]  |             |
|        |             |                  |  [phone] (Fax)  |             |
+--------+-------------+------------------+----------------------+-------------+
| / | Office      | Vascular Surgery |   Augustin Lopez DNP      |             |
|    | Visit       |                  | [address]     |             |
|        |             |                  | [place]  |             |
|        |             |                  | [address]  [phone]  |             |
|        |             |                  |  [phone] (Fax)  |             |
+--------+-------------+------------------+----------------------+-------------+
 documented as of this encounter
 
 Visit Diagnoses
 Not on filedocumented in this encounter

## 2020-01-13 NOTE — XMS
Encounter Summary
  Created on: 2020
 
 Pasquale Nehemias Martinez
 External Reference #: [id number]
 : 43
 Sex: Male
 
 Demographics
 
 
+-----------------------+----------------------+
| Address               | [address]         |
|                       | [place]  [address] |
+-----------------------+----------------------+
| Home Phone            | [phone]      |
+-----------------------+----------------------+
| Preferred Language    | Unknown              |
+-----------------------+----------------------+
| Marital Status        |               |
+-----------------------+----------------------+
| Catholic Affiliation | 1077                 |
+-----------------------+----------------------+
| Race                  | Unknown              |
+-----------------------+----------------------+
| Ethnic Group          | Unknown              |
+-----------------------+----------------------+
 
 
 Author
 
 
+--------------+--------------------------------------------+
| Author       | [facility]  |
|              | and [facility]                                |
+--------------+--------------------------------------------+
| Organization | [facility]  |
|              | and [place]                                |
+--------------+--------------------------------------------+
| Address      | Unknown                                    |
+--------------+--------------------------------------------+
| Phone        | Unavailable                                |
+--------------+--------------------------------------------+
 
 
 
 Support
 
 
+--------------+--------------+---------------------+-----------------+
| Name         | Relationship | Address             | Phone           |
+--------------+--------------+---------------------+-----------------+
| Rupa Giordano | ECON         | [address]             | [phone] |
|              |              | [address] [facility] [place]   |                 |
|              |              | [address]               |                 |
+--------------+--------------+---------------------+-----------------+
 
 
 
 
 Care Team Providers
 
 
+-----------------------+------+-------------+
| Care Team Member Name | Role | Phone       |
+-----------------------+------+-------------+
| No, Physician         | PCP  | Unavailable |
+-----------------------+------+-------------+
 
 
 
 Reason for Referral
 Diagnostic/Screening (Routine)
 
+--------+--------+-----------+--------------+--------------+--------------+
| Status | Reason | Specialty | Diagnoses /  | Referred By  | Referred To  |
|        |        |           | Procedures   | Contact      | Contact      |
+--------+--------+-----------+--------------+--------------+--------------+
| Closed |        | Radiology |   Diagnoses  |   Erasmo         |              |
|        |        |           |  Mesenteric  | Vascular     |              |
|        |        |           | ischemia,    | Surgery      |              |
|        |        |           | chronic      | [address]         |              |
|        |        |           | ([facility])        | [address]  |              |
|        |        |           | Procedures   | [address]        |              |
|        |        |           | IR Angiogram | [place], [place] |              |
|        |        |           |  Mesenteric  |  [address]  |              |
|        |        |           | Visceral     |  Phone:      |              |
|        |        |           |              | [phone] |              |
|        |        |           |              |   Fax:       |              |
|        |        |           |              | [phone] |              |
+--------+--------+-----------+--------------+--------------+--------------+
 
 
 
 
 Reason for Visit
 Auth/Cert
 
+--------+--------+-----------+--------------+--------------+--------------+
| Status | Reason | Specialty | Diagnoses /  | Referred By  | Referred To  |
|        |        |           | Procedures   | Contact      | Contact      |
+--------+--------+-----------+--------------+--------------+--------------+
|        |        |           |   Diagnoses  |              |              |
|        |        |           |  Mesenteric  |              |              |
|        |        |           | ischemia,    |              |              |
|        |        |           | chronic      |              |              |
|        |        |           | ([facility])        |              |              |
|        |        |           | Procedures   |              |              |
|        |        |           | IR ANGIOGRAM |              |              |
|        |        |           |  VISCERAL    |              |              |
|        |        |           | SELECTIVE    |              |              |
+--------+--------+-----------+--------------+--------------+--------------+
 
 
 
 
 Encounter Details
 
 
 
+--------+-----------+----------------------+----------------------+----------------------+
| Date   | Type      | Department           | Care Team            | Description          |
+--------+-----------+----------------------+----------------------+----------------------+
| / | Hospital  |   [facility]    |   Sukhdeep Pardo MD     | Mesenteric ischemia, |
| 2019   | Encounter [facility]       | [address]     |  chronic (HCC)       |
|        |           | CLINICAL DECISION    | [address]  [facility] [place]  |                      |
|        |           | [address]  [address] | [address]           |                      |
|        |           |   [place]       | [phone]         |                      |
|        |           | [address]           | [phone] (Fax)   |                      |
|        |           | [phone]         |                      |                      |
+--------+-----------+----------------------+----------------------+----------------------+
 
 
 
 Social History
 
 
+---------------+-------+-----------+--------+------+
| Tobacco Use   | Types | Packs/Day | Years  | Date |
|               |       |           | Used   |      |
+---------------+-------+-----------+--------+------+
| Former Smoker |       |           |        |      |
+---------------+-------+-----------+--------+------+
 
 
 
+---------------------+---+---+---+
| Smokeless Tobacco:  |   |   |   |
| Former User         |   |   |   |
+---------------------+---+---+---+
 
 
 
+------------------------------+
| Comments: quit 50 years ago  |
+------------------------------+
 
 
 
+---------------+-------------+---------+----------+
| Alcohol Use   | Drinks/Week | oz/Week | Comments |
+---------------+-------------+---------+----------+
| Not Currently |             |         | 90m days |
+---------------+-------------+---------+----------+
 
 
 
+------------------+---------------+
| Sex Assigned at  | Date Recorded |
| Birth            |               |
+------------------+---------------+
| Not on file      |               |
+------------------+---------------+
 
 
 
+----------------+-------------+-------------+
| Job Start Date | Occupation  | Industry    |
+----------------+-------------+-------------+
| Not on file    | Not on file | Not on file |
 
+----------------+-------------+-------------+
 
 
 
+----------------+--------------+------------+
| Travel History | Travel Start | Travel End |
+----------------+--------------+------------+
 
 
 
+-------------------------------------+
| No recent travel history available. |
+-------------------------------------+
 documented as of this encounter
 
 Last Filed Vital Signs
 
 
+-------------------+----------------------+----------------------+----------+
| Vital Sign        | Reading              | Time Taken           | Comments |
+-------------------+----------------------+----------------------+----------+
| Blood Pressure    | 150/90               | 2019  1:25 PM  |          |
|                   |                      | PST                  |          |
+-------------------+----------------------+----------------------+----------+
| Pulse             | 80                   | 2019  1:25 PM  |          |
|                   |                      | PST                  |          |
+-------------------+----------------------+----------------------+----------+
| Temperature       | 36.6   C (97.9   F)  | 2019 11:27 AM  |          |
|                   |                      | PST                  |          |
+-------------------+----------------------+----------------------+----------+
| Respiratory Rate  | 18                   | 2019 11:27 AM  |          |
|                   |                      | PST                  |          |
+-------------------+----------------------+----------------------+----------+
| Oxygen Saturation | 97%                  | 2019  1:25 PM  |          |
|                   |                      | PST                  |          |
+-------------------+----------------------+----------------------+----------+
| Inhaled Oxygen    | -                    | -                    |          |
| Concentration     |                      |                      |          |
+-------------------+----------------------+----------------------+----------+
| Weight            | 79.3 kg (174 lb 13.2 | 2019  8:01 AM  |          |
|                   |  oz)                 | PST                  |          |
+-------------------+----------------------+----------------------+----------+
| Height            | 167.6 cm (5' 6")     | 2019  8:01 AM  |          |
|                   |                      | PST                  |          |
+-------------------+----------------------+----------------------+----------+
| Body Mass Index   | 28.22                | 2019  8:01 AM  |          |
|                   |                      | PST                  |          |
+-------------------+----------------------+----------------------+----------+
 documented in this encounter
 
 Functional Status
 
 
+---------------------------------------------+----------+--------------------+
| Functional Status                           | Response | Date of Assessment |
+---------------------------------------------+----------+--------------------+
| Are you deaf or do you have serious         | No       | 2019         |
| difficulty hearing?                         |          |                    |
+---------------------------------------------+----------+--------------------+
| Are you blind or do you have serious        | No       | 2019         |
 
| difficulty seeing, even when wearing        |          |                    |
| glasses?                                    |          |                    |
+---------------------------------------------+----------+--------------------+
| Do you have serious difficulty walking or   | No       | 2019         |
| climbing stairs? (5 years old or older)     |          |                    |
+---------------------------------------------+----------+--------------------+
| Do you have difficulty dressing or bathing? | No       | 2019         |
|  (5 years old or older)                     |          |                    |
+---------------------------------------------+----------+--------------------+
| Because of a physical, mental, or emotional | No       | 2019         |
|  condition, do you have difficulty doing    |          |                    |
| errands alone such as visiting a doctor's   |          |                    |
| office or shopping? [15 years old or        |          |                    |
| older)]                                     |          |                    |
+---------------------------------------------+----------+--------------------+
 
 
 
+---------------------------------------------+----------+--------------------+
| Cognitive Status                            | Response | Date of Assessment |
+---------------------------------------------+----------+--------------------+
| Because of a physical, mental, or emotional | No       | 2019         |
|  condition, do you have serious difficulty  |          |                    |
| concentrating, remembering, or making       |          |                    |
| decisions? (5 years old or older)           |          |                    |
+---------------------------------------------+----------+--------------------+
 documented as of this encounter
 
 Discharge Instructions
 Instructions Katie Angelo RN - 2019Formatting of this note might be different fro
m the original.
 
 Peripheral Angioplasty
 Peripheral angioplasty is a procedure that helps open blockages in peripheral arteries. The
se vessels carry blood to your lower body, legs, and arms.
 Talk with your healthcare provider about the risks and complications of angioplasty. 
    
 Balloon is inserted Balloon is inflated Blood flow is improved 
 Before the procedure
 Recommendations of what to do include:
  Tell your healthcare provider about all medicines you take including over-the-counter me
dicines, herbal supplements, and any allergies you may have, especially to iodine.
  Follow any directions you  re given for not eating or drinking before the procedure.
  Arrange for a family member or friend to drive you home.
 During the procedure
  You may get medicine through an IV (intravenous) line to relax you. An injection will nu
mb the site your healthcare provider will use to perform the procedure. The site used is gen
erally an artery in the groin although the wrist or arm may be used. The healthcare provider
 makes a tiny skin cut (incision) near an artery in your groin.
  Your healthcare provider puts a thin, flexible tube (catheter) through the incision. He 
or she then threads the catheter into the affected artery while using X-ray monitoring.
  Contrast   dye  is injected into the catheter. X-rays (angiography) are taken.
  A tiny balloon is pushed through the catheter to the blockage. Your healthcare provider 
inflates and deflates the balloon a few times. This compresses the plaque. A small metal or 
mesh tube (stent) may be put in the artery to help keep it open. The balloon and catheter ar
e then taken out.
 After the procedure
 You  ll be taken to a recovery area. Pressure is put on the insertion site for about 30 to
 45 minutes. Your healthcare provider will tell you how long to lie down and keep the insert
ion site still.You will go home that day or spend the night in the facility. You will be g
 
mariana aftercare instructions for when you go home.
 Call your healthcare provider
 Call your healthcare provider right away or get immediate medical attentionif:
  You notice a lump or bleeding at the site where the catheter was inserted
  You feel increasing pain at the insertion site
  You become lightheaded or dizzy
  You have leg pain or numbness
  You have a leg that turns blue or feels cold
  You develop a fever greater than 101.5F (38.6C).
  You develop a skin rash
  You cannot urinate after the procedure
  You have chest pain or shortness of breath 
 Date Last Reviewed: 2016-2018 [employer]. [address] [place], [place] [address]. [facility]
ts reserved. This information is not intended as a substitute for professional medical care.
 Always follow your healthcare professional's instructions.
 
 
 documented in this encounter
 
 Medications at Time of Discharge
 
 
+----------------------+----------------------+-----------+---------+----------+----------+
| Medication           | Sig                  | Dispensed | Refills | Start    | End Date |
|                      |                      |           |         | Date     |          |
+----------------------+----------------------+-----------+---------+----------+----------+
|   ASPIRIN 81 PO      | Take 81 mg by mouth. |           | 0       |          |          |
+----------------------+----------------------+-----------+---------+----------+----------+
|   clopidogrel        | Take 1 tablet by     |   30      | 11      | 20 |          |
| (PLAVIX) 75 mg       | mouth Daily.         | tablet    |         | 19       |          |
| tablet               |                      |           |         |          |          |
+----------------------+----------------------+-----------+---------+----------+----------+
|   docusate sodium    | Take 1 capsule by    |   30      | 0       | 20 |          |
| (COLACE) 100 mg      | mouth 2 times daily. | capsule   |         | 19       |          |
| capsule              |                      |           |         |          |          |
+----------------------+----------------------+-----------+---------+----------+----------+
|                      | Take 25 mg by mouth  |           | 0       |          |          |
| hydroCHLOROthiazide  | Daily.               |           |         |          |          |
| 25 mg tablet         |                      |           |         |          |          |
+----------------------+----------------------+-----------+---------+----------+----------+
|                      | Take 1 tablet by     |   30      | 0       | 20 |          |
| HYDROcodone-acetamin | mouth every 4 hours  | tablet    |         | 19       |          |
| ophen (NORCO) 5-325  | as needed for Pain.  |           |         |          |          |
| mg per tablet        |                      |           |         |          |          |
+----------------------+----------------------+-----------+---------+----------+----------+
|   losartan (COZAAR)  | Take 50 mg by mouth  |           | 0       |          |          |
| 50 mg tablet         | Daily.               |           |         |          |          |
+----------------------+----------------------+-----------+---------+----------+----------+
|   Multiple           | Take  by mouth.      |           | 0       |          |          |
| Vitamins-Minerals    |                      |           |         |          |          |
| (CENTRUM SILVER      |                      |           |         |          |          |
| 50+MEN PO)           |                      |           |         |          |          |
+----------------------+----------------------+-----------+---------+----------+----------+
|   simvastatin        | Take 10 mg by mouth  |           | 0       |          |          |
| (ZOCOR) 10 mg tablet | nightly.             |           |         |          |          |
+----------------------+----------------------+-----------+---------+----------+----------+
 documented as of this encounter
 
 Plan of Treatment
 
 
 
+--------+-------------+------------------+----------------------+-------------+
| Date   | Type        | Specialty        | Care Team            | Description |
+--------+-------------+------------------+----------------------+-------------+
| / | Appointment | Radiology        |   Augustin Lopez DNP      |             |
|    |             |                  | [address]     |             |
|        |             |                  | [place]  |             |
|        |             |                  | [address]  [phone]  |             |
|        |             |                  |  [phone] (Fax)  |             |
+--------+-------------+------------------+----------------------+-------------+
| / | Office      | Vascular Surgery |   Augustin Lopez DNP      |             |
|    | Visit       |                  | [address]     |             |
|        |             |                  | [place]  |             |
|        |             |                  | [address]  [phone]  |             |
|        |             |                  |  [phone] (Fax)  |             |
+--------+-------------+------------------+----------------------+-------------+
 documented as of this encounter
 
 Procedures
 
 
+---------------------+--------+-------------+----------------------+----------------------+
| Procedure Name      | Priori | Date/Time   | Associated Diagnosis | Comments             |
|                     | ty     |             |                      |                      |
+---------------------+--------+-------------+----------------------+----------------------+
| IR ANGIOGRAM        | Routin | 2019  |   Mesenteric         |   Results for this   |
| MESENTERIC VISCERAL | e      | 10:03 AM    | ischemia, chronic    | procedure are in the |
|                     |        | PST         | ([facility])                |  results section.    |
+---------------------+--------+-------------+----------------------+----------------------+
 documented in this encounter
 
 Results
 IR Angiogram Mesenteric Visceral (2019 10:03 AM PST)
 
+----------+
| Specimen |
+----------+
|          |
+----------+
 
 
 
+-----------------------------------------------------------------------+---------------+
| Impressions                                                           | Performed At  |
+-----------------------------------------------------------------------+---------------+
|      1.   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
| around large   branches.  2.   After angioplasty, moderate            |               |
| angiographic results.  3.   Not a good place for stent placement due  |               |
| to multiple large branches.                                           |               |
+-----------------------------------------------------------------------+---------------+
 
 
 
+----------------------------------------------------------------------------+--------------
-+
| Narrative                                                                  | Performed At 
 |
+----------------------------------------------------------------------------+--------------
-+
 
|   This result has an attachment that is not available.  ABDOMINAL          |   [facility]
 |
| AORTOGRAM PREOPERATIVE DIAGNOSIS: Chronic mesenteric ischemia              |              
 |
| POSTOPERATIVE DIAGNOSIS: Same PROCEDURE:   1. Moderate conscious           |              
 |
| sedation2. Ultrasound guided access of the right common femoral            |              
 |
| artery3. Aortogram4. Selective catheterization of superior mesenteric      |              
 |
| artery with mesenteric arteriogram5.   Superior mesenteric artery          |              
 |
| angioplasty using 7 x 40 mm angioplasty balloon SURGEON: Sukhdeep Pardo MD       |              
 |
| ASSISTANT: None ANESTHESIA: Moderate sedation and local anesthesia         |              
 |
|       Informed consent was obtained from the patient. Continuous           |              
 |
| cardiac monitoring was performed throughout the procedure. Conscious       |              
 |
| sedation was provided by the nursing staff during the procedure under      |              
 |
| my supervision.          Sedation time: 60 minutes                         |              
 |
|   Medications: 1 mg Versed IV, 50 Mcg Fentanyl IV ESTIMATED BLOOD          |              
 |
| LOSS: Minimal CONTRAST: 89 mL of Omnipaque 240 INDICATIONS: See            |              
 |
| preoperative history and physical FINDINGS: SMA was nearly occluded at     |              
 |
|  origin.   After angioplasty, there was still a focal area of stenosis     |              
 |
|  but this was not in a good place to stent due to multiple large           |              
 |
| branches around stenosis.   However, there was improved flow               |              
 |
| throughout SMA at end of case. DESCRIPTION OF PROCEDURE:   The patient     |              
 |
|  was properly identified and brought to the Cath Lab. The patient was      |              
 |
| placed supine on the catheter table and patient was given moderate         |              
 |
| sedation by nursing staff under my supervision. Patient's bilateral        |              
 |
| groins were then prepped and draped in the usual sterile fashion.          |              
 |
| Local anesthetic was given to the right groin and the right common         |              
 |
| femoral artery was accessed under ultrasound guidance using a              |              
 |
| micropuncture needle. A micropuncture sheath was inserted over a wire      |              
 |
| and up sized to a 4 French sheath. A Omni flush catheter was then          |              
 |
| inserted into the aorta and aortogram was then performed with the          |              
 |
| findings as described above. Next, an Amplatzer wire was then inserted     |              
 |
|  over the catheter and the 4 French sheath was removed. A 6.5 French       |              
 |
 
| durable sheath was then inserted over the wire with the tip of the         |              
 |
| sheath being placed into the proximal SMA. A vertebral catheter was        |              
 |
| inserted over the wire and the wire was then removed. A Glidewire was      |              
 |
| then placed into the vertebral catheter and used to cross through the      |              
 |
| SMA occlusion.   Next, a 260 fix core wire was then inserted over the      |              
 |
| catheter.   The SMA was then angioplastied using 7 x 40 mm angioplasty     |              
 |
|  balloon.   A final arteriogram was then performed through the sheath.     |              
 |
|  The steerable sheath was then removed and a Mynx closure device was       |              
 |
| then used on the right common femoral artery and hemostasis was            |              
 |
| ensured. The patient was then taken to the observation unit for            |              
 |
| further monitoring.                                                        |              
 |
|patient was given moderate sedation by nursing staff under my supervision.  |              
 |
|Patient's bilateral groins were then prepped and draped in the usual        |              
 |
|sterile fashion. Local anesthetic was given to the right groin and the      |              
 |
|right common femoral artery was accessed under ultrasound guidance using a  |              
 |
|micropuncture needle. A micropuncture sheath was inserted over a wire and   |              
 |
|up sized to a 4 French sheath. A Omni flush catheter was then inserted      |              
 |
|into the aorta and aortogram was then performed with the findings as        |              
 |
|described above. Next, an Amplatzer wire was then inserted over the         |              
 |
|catheter and the 4 French sheath was removed. A 6.5 French durable sheath   |              
 |
|was then inserted over the wire with the tip of the sheath being placed     |              
 |
|into the proximal SMA. A vertebral catheter was inserted over the wire and  |              
 |
|the wire was then removed. A Glidewire was then placed into the vertebral   |              
 |
|catheter and used to cross through the SMA occlusion.   Next, a 260 fix     |              
 |
|core wire was then inserted over the catheter.   The SMA was then           |              
 |
|angioplastied using 7 x 40 mm angioplasty balloon.   A final arteriogram    |              
 |
|was then performed through the sheath. The steerable sheath was then        |              
 |
|removed and a Mynx closure device was then used on the right common         |              
 |
|femoral artery and hemostasis was ensured. The patient was then taken to    |              
 |
|the observation unit for further monitoring.                                |              
 |
 
|                                                                            |              
 |
+----------------------------------------------------------------------------+--------------
-+
 
 
 
+---------------+---------+--------------------+--------------+
| Performing    | Address | City/State/Zipcode | Phone Number |
| Organization  |         |                    |              |
+---------------+---------+--------------------+--------------+
|   [facility] |         |                    |              |
+---------------+---------+--------------------+--------------+
 documented in this encounter
 
 Visit Diagnoses
 
 
+-----------------------------------------------------------------------------------+
| Diagnosis                                                                         |
+-----------------------------------------------------------------------------------+
|   Mesenteric ischemia, chronic (HCC)  Chronic vascular insufficiency of intestine |
+-----------------------------------------------------------------------------------+
 documented in this encounter
 
 Administered Medications
 
 
+----------------------------------+--------+----------+--------+------+------+
| Medication Order                 | MAR    | Action   | Dose   | Rate | Site |
|                                  | Action | Date     |        |      |      |
+----------------------------------+--------+----------+--------+------+------+
|   clopidogrel (PLAVIX) tablet    | Given  | 20 | 300 mg |      |      |
| Oral, PRN, Starting 19  |        | 19  9:45 |        |      |      |
| at 0945                          |        |  AM PST  |        |      |      |
+----------------------------------+--------+----------+--------+------+------+
 
 
 
+-----------------------------------+---+
|                                   |   |
+-----------------------------------+---+
|   fentaNYL (PF) injection   |   |
| mcg   mcg, Intravenous,     |   |
| EVERY 2 HOURS PRN, Pain, Starting |   |
|  19 at 1028              |   |
+-----------------------------------+---+
|                                   |   |
+-----------------------------------+---+
 
 
 
+---------------------------------+-------+----------+--------+---+---+
|   fentaNYL (PF) injection       | Given | 20 | 50 mcg |   |   |
| Intravenous, PRN, Starting Wed  |       | 19  8:46 |        |   |   |
| 19 at 0846                 |       |  AM PST  |        |   |   |
+---------------------------------+-------+----------+--------+---+---+
 
 
 
 
+---+---+
|   |   |
+---+---+
 
 
 
+-------------------------------+-------+----------+--------+---+---+
|   heparin 1,000 units/mL      | Given | 20 | 7,000  |   |   |
| injection  Intravenous, PRN,  |       | 19  9:13 | Units  |   |   |
| Starting Wed 19 at 0913  |       |  AM PST  |        |   |   |
+-------------------------------+-------+----------+--------+---+---+
 
 
 
+---+---+
|   |   |
+---+---+
 
 
 
+-----------------------------------+-------+----------+----------+---+---+
|   HYDROcodone-acetaminophen       | Given | 20 | 1 tablet |   |   |
| (NORCO) 5-325 mg per tablet 1-2   |       | 19 10:51 |          |   |   |
| tablet  1-2 tablet, Oral, EVERY 4 |       |  AM PST  |          |   |   |
|  HOURS PRN, Pain, Starting Wed    |       |          |          |   |   |
| 19 at 1028                   |       |          |          |   |   |
+-----------------------------------+-------+----------+----------+---+---+
 
 
 
+---+---+
|   |   |
+---+---+
 
 
 
+-----------------------------------+-------+----------+--------+---+---+
|   iohexol (OMNIPAQUE 240) 240     | Given | 20 | 89 mLs |   |   |
| mg/mL injection  Intravenous,     |       | 19  9:47 |        |   |   |
| PRN, Starting Wed 19 at 0945 |       |  AM PST  |        |   |   |
+-----------------------------------+-------+----------+--------+---+---+
 
 
 
+-------+----------+--------+---+---+
| Given | 20 | 30 mLs |   |   |
|       | 19  9:47 |        |   |   |
|       |  AM PST  |        |   |   |
+-------+----------+--------+---+---+
| Given | 20 | 59 mLs |   |   |
|       | 19  9:45 |        |   |   |
|       |  AM PST  |        |   |   |
+-------+----------+--------+---+---+
 
 
 
+---+---+
|   |   |
+---+---+
 
 
 
 
+---------------------------------+-------+----------+--------+---+---+
|   lidocaine 1% injection  PRN,  | Given | 20 | 10 mLs |   |   |
| Starting Wed 19 at 0846    |       | 19  8:46 |        |   |   |
|                                 |       |  AM PST  |        |   |   |
+---------------------------------+-------+----------+--------+---+---+
 
 
 
+-----------------------------------+---+
|                                   |   |
+-----------------------------------+---+
|   metoclopramide (REGLAN) 5 mg/mL |   |
|  injection 10 mg  10 mg,          |   |
| Intravenous, EVERY 4 HOURS PRN,   |   |
| Nausea, Vomiting, Starting Wed    |   |
| 19 at 1021, Use if           |   |
| ondansetron and prochlorperazine  |   |
| ineffective after 30min or not    |   |
| ordered. Use PO option unless NPO |   |
|  status or unable to tolerate.    |   |
| Protect from light.,              |   |
| Post-op/Phase II                  |   |
+-----------------------------------+---+
|                                   |   |
+-----------------------------------+---+
|   metoclopramide (REGLAN) tablet  |   |
| 10 mg  10 mg, Oral, EVERY 4 HOURS |   |
|  PRN, Nausea, Vomiting, Starting  |   |
| Wed 19 at 1021, Use if       |   |
| ondansetron and prochlorperazine  |   |
| ineffective after 30min or not    |   |
| ordered, Post-op/Phase II         |   |
+-----------------------------------+---+
|                                   |   |
+-----------------------------------+---+
 
 
 
+-------------------------------+-------+----------+------+---+---+
|   midazolam (VERSED) 1 mg/mL  | Given | 20 | 1 mg |   |   |
| injection  Intravenous, PRN,  |       | 19  8:46 |      |   |   |
| Starting Wed 19 at 0846  |       |  AM PST  |      |   |   |
+-------------------------------+-------+----------+------+---+---+
 
 
 
+---+---+
|   |   |
+---+---+
 
 
 
+---------------------------------+-------+----------+---------+---+---+
|   nitroglycerin injection       | Given | 20 | 300 mcg |   |   |
| Intravenous, PRN, Starting Wed  |       | 19  9:25 |         |   |   |
| 19 at 0925                 |       |  AM PST  |         |   |   |
+---------------------------------+-------+----------+---------+---+---+
 
 
 
 
+---+---+
|   |   |
+---+---+
 
 
 
+-----------------------------------+-------+----------+------+---+---+
|   ondansetron (ZOFRAN ODT)        | Given | 20 | 4 mg |   |   |
| disintegrating tablet 4 mg  4 mg, |       | 19 12:11 |      |   |   |
|  Oral, EVERY 6 HOURS PRN, Nausea, |       |  PM PST  |      |   |   |
|  Vomiting, Starting 19   |       |          |      |   |   |
| at 1021, First line agent,        |       |          |      |   |   |
| Post-op/Phase II                  |       |          |      |   |   |
+-----------------------------------+-------+----------+------+---+---+
 
 
 
+-----------------------------------+---+
|                                   |   |
+-----------------------------------+---+
|   ondansetron (ZOFRAN) injection  |   |
| 4 mg  4 mg, Intravenous, EVERY 6  |   |
| HOURS PRN, Nausea, Vomiting,      |   |
| Starting Wed 19 at 1021,     |   |
| First line agent. Use PO option   |   |
| unless NPO status or unable to    |   |
| tolerate., Post-op/Phase II       |   |
+-----------------------------------+---+
|                                   |   |
+-----------------------------------+---+
 documented in this encounter

## 2020-01-13 NOTE — XMS
Encounter Summary
  Created on: 2020
 
 Pasquale Nehemias Martinez
 External Reference #: [id number]
 : 43
 Sex: Male
 
 Demographics
 
 
+-----------------------+----------------------+
| Address               | [address]         |
|                       | [place]  [address] |
+-----------------------+----------------------+
| Home Phone            | [phone]      |
+-----------------------+----------------------+
| Preferred Language    | Unknown              |
+-----------------------+----------------------+
| Marital Status        |               |
+-----------------------+----------------------+
| Evangelical Affiliation | [id number]                 |
+-----------------------+----------------------+
| Race                  | Unknown              |
+-----------------------+----------------------+
| Ethnic Group          | Unknown              |
+-----------------------+----------------------+
 
 
 Author
 
 
+--------------+--------------------------------------------+
| Author       | [facility]  |
|              | and [facility]                                |
+--------------+--------------------------------------------+
| Organization | [facility]  |
|              | and [place]                                |
+--------------+--------------------------------------------+
| Address      | Unknown                                    |
+--------------+--------------------------------------------+
| Phone        | Unavailable                                |
+--------------+--------------------------------------------+
 
 
 
 Support
 
 
+--------------+--------------+---------------------+-----------------+
| Name         | Relationship | Address             | Phone           |
+--------------+--------------+---------------------+-----------------+
| Rupa Giordano | ECON         | [address]             | [phone] |
|              |              | [place]   |                 |
|              |              | [id number]               |                 |
+--------------+--------------+---------------------+-----------------+
 
 
 
 
 Care Team Providers
 
 
+-----------------------+------+-------------+
| Care Team Member Name | Role | Phone       |
+-----------------------+------+-------------+
 PCP  | Unavailable |
+-----------------------+------+-------------+
 
 
 
 Encounter Details
 
 
+--------+-------------+------------------+--------------------+-------------+
| Date   | Type        | Department       | Care Team          | Description |
+--------+-------------+------------------+--------------------+-------------+
| / | Orders Only |   [facility]     |   Carson Schmitz    |             |
| 2016   |             | CONVERSION  [address]  | [address]   |             |
|        |             | [address]       | [address]   |             |
|        |             | [place]     | [place]      |             |
|        |             | [address]       | [address]         |             |
|        |             | [phone]     | [phone]       |             |
|        |             |                  | [phone] (Fax) |             |
+--------+-------------+------------------+--------------------+-------------+
 
 
 
 Social History
 
 
+----------------+-------+-----------+--------+------+
| Tobacco Use    | Types | Packs/Day | Years  | Date |
|                |       |           | Used   |      |
+----------------+-------+-----------+--------+------+
| Never Assessed |       |           |        |      |
+----------------+-------+-----------+--------+------+
 
 
 
+------------------+---------------+
| Sex Assigned at  | Date Recorded |
| Birth            |               |
+------------------+---------------+
| Not on file      |               |
+------------------+---------------+
 
 
 
+----------------+-------------+-------------+
| Job Start Date | Occupation  | Industry    |
+----------------+-------------+-------------+
| Not on file    | Not on file | Not on file |
+----------------+-------------+-------------+
 
 
 
+----------------+--------------+------------+
| Travel History | Travel Start | Travel End |
 
+----------------+--------------+------------+
 
 
 
+-------------------------------------+
| No recent travel history available. |
+-------------------------------------+
 documented as of this encounter
 
 Plan of Treatment
 
 
+--------+-------------+------------------+----------------------+-------------+
| Date   | Type        | Specialty        | Care Team            | Description |
+--------+-------------+------------------+----------------------+-------------+
| / | Appointment | Radiology        |   Augustin Lopez DNP      |             |
|    |             |                  | [address]     |             |
|        |             |                  | [place]  |             |
|        |             |                  | [address]  [phone]  |             |
|        |             |                  |  [phone] (Fax)  |             |
+--------+-------------+------------------+----------------------+-------------+
| / | Office      | Vascular Surgery |   Augustin Lopez DNP      |             |
|    | Visit       |                  | [address]     |             |
|        |             |                  | [place]  |             |
|        |             |                  | [address]  [phone]  |             |
|        |             |                  |  [phone] (Fax)  |             |
+--------+-------------+------------------+----------------------+-------------+
 documented as of this encounter
 
 Procedures
 
 
+----------------------+--------+-------------+----------------------+----------------------
+
| Procedure Name       | Priori | Date/Time   | Associated Diagnosis | Comments             
|
|                      | ty     |             |                      |                      
|
+----------------------+--------+-------------+----------------------+----------------------
+
| ECHO INTERPRETATION  | Routin | 2016  |                      |   Results for this   
|
| OF OUTSIDE FILMS     | e      |  9:32 AM    |                      | procedure are in the 
|
|                      |        | PST         |                      |  results section.    
|
+----------------------+--------+-------------+----------------------+----------------------
+
 documented in this encounter
 
 Results
 ECHO Interpretation of Outside Films (2016  9:32 AM PST)
 
+----------+
| Specimen |
+----------+
|          |
+----------+
 
 
 
 
+------------------------------------------------------------------------+--------------+
| Impressions                                                            | Performed At |
+------------------------------------------------------------------------+--------------+
|   1. The left ventricle is normal in size, mild increase in wall       |              |
| thickness and hyperdynamic systolic function EF>70%.  2. The diastolic |              |
|  filling pattern indicates impaired relaxation consistent with mild    |              |
| dysfunction (Grade I).  3. The right ventricle is normal in size and   |              |
| function.  4. Trace tricuspid regurgitation with no pulmonary          |              |
| hypertension.  5. Mild degenerative changes in the aortic and mitral   |              |
| valve.  6. There is no pericardial effusion.                           |              |
+------------------------------------------------------------------------+--------------+
 
 
 
+------------------------------------------------------------------------+--------------+
| Narrative                                                              | Performed At |
+------------------------------------------------------------------------+--------------+
|      Patient Name:   Nehemias Giordano  YOB: 1945        |              |
| Accession:   [id number]     Performing Physician:   Geovanni Mayes MD  |              |
|  _______________________________________________________________       |              |
| INDICATIONS  -----------  Murmur     CONCLUSIONS  -----------  1. The  |              |
| left ventricle is normal in size, mild increase in wall thickness and  |              |
| hyperdynamic systolic function EF>70%.  2. The diastolic filling       |              |
| pattern indicates impaired relaxation consistent with mild dysfunction |              |
|  (Grade I).  3. The right ventricle is normal in size and function.    |              |
| 4. Trace tricuspid regurgitation with no pulmonary hypertension.  5.   |              |
| Mild degenerative changes in the aortic and mitral valve.  6. There is |              |
|  no pericardial effusion.     FINDINGS  --------  ECG rhythm: Sinus    |              |
| rhythm.  Study: A 2-dimensional transthoracic echocardiogram with      |              |
| m-mode, spectral and color flow Doppler was perfomed.  Study: This was |              |
|  a technically adequate study.  Left Ventricle: Left ventricular       |              |
| systolic function is hyperdynamic with an estimated EF of >70%.  Left  |              |
| Ventricle: The left ventricle cavity size is normal.  Left Ventricle:  |              |
| There is mild concentric left ventricular hypertrophy.  Left           |              |
| Ventricle: No regional wall motion abnormalities.  Left Ventricle: The |              |
|  diastolic filling pattern indicates impaired relaxation consistent    |              |
| with mild dysfunction (Grade I).  Right Ventricle: The right ventricle |              |
|  is normal in size and function.  Left Atrium: The left atrium is      |              |
| normal in size.  Right Atrium: The right atrium is normal in size.     |              |
| Aortic Valve: The aortic valve is mildly calcified.  Aortic Valve:     |              |
| There is no evidence of aortic stenosis.  Mitral Valve: No mitral      |              |
| regurgitation.  Mitral Valve: Mild mitral annular calcification        |              |
| present.  Tricuspid Valve: The tricuspid valve appears structurally    |              |
| normal.  Tricuspid Valve: Trace tricuspid regurgitation present.       |              |
| Tricuspid Valve: The right ventricular systolic pressure (pulmonary    |              |
| artery systolic pressure), as measured by Doppler, is 12.26mmHg.       |              |
| Pulmonic Valve: The pulmonic valve was not well visualized.  Pulmonic  |              |
| Valve: Mild degenerative changes in the aortic and mitral valve.       |              |
| Pericardium: There is no pericardial effusion.  Pericardium: No        |              |
| pleural effusion seen.  IVC/Hepatic Veins: The IVC is small (<1.5cm)   |              |
| and collapses with sniff, consistent with central venous pressures of  |              |
| 0-5mmHg.  Aorta: The aortic root, ascending aorta and aortic arch are  |              |
| normal.     MEASUREMENTS  -------------  Ao asc:    3.48 cm  IVC:      |              |
| 1.36 cm  LA Major:    5.02 cm  EDV(Teich):    82.80 ml  IVSd:    1.2   |              |
| cm  LVIDd:    4.29 cm  LVPWd:    1.1 cm  LVOT Area:    3.09 cm2  LVOT  |              |
| Diam:    1.98 cm  %FS:    38.10 %  EF(Teich):    68.61 %  ESV(Teich):  |              |
|    25.98 ml  LVIDs:    2.65 cm  SV(Teich):    56.81 ml  RA Major:      |              |
| 4.99 cm  RV Major:    5.66 cm  RVIDd:    2.37 cm  LVEF MOD A2C:        |              |
| 69.91 %  SV MOD A2C:    52.88 ml  LVEF MOD A4C:    70.34 %  SV MOD     |              |
 
| A4C:    57.70 ml  EF Biplane:    70.25 %  LVEDV MOD BP:    80.73 ml    |              |
| LVESV MOD BP:    24.01 ml  LVEDV MOD A2C:    75.63 ml  LVLd A2C:       |              |
| 7.56 cm  LVEDV MOD A4C:    82.03 ml  LVLd A4C:    7.97 cm  LVESV MOD   |              |
| A2C:    22.75 ml  LVLs A2C:    6.43 cm  LVESV MOD A4C:    24.33 ml     |              |
| LVLs A4C:    6.05 cm  LAESV(A-L):    43.21 ml  LAESV Index (A-L):      |              |
| 21.39 ml/m2  LAAs A2C:    15.21 cm2  LAESV A-L A2C:    41.14 ml  LALs  |              |
| A2C:    4.77 cm  LAAs A4C:    15.98 cm2  LAESV A-L A4C:    42.14 ml    |              |
| LALs A4C:    5.14 cm  RAAs:    10.97 cm2  RAESV A-L:    19.03 ml       |              |
| RAESV MOD:    18.53 ml  RALs:    5.37 cm  Ao Diam:    3.47 cm  LA      |              |
| Diam:    3.82 cm  LA/Ao:    1.10  AV maxP.35 mmHg  AV meanPG:   |              |
|    4.59 mmHg  AV Vmax:    1.35 m/s  AV Vmean:    1.01 m/s  AV VTI:     |              |
| 31.29 cm  KATHLEEN Vmax:    2.49 cm2  KATHLEEN (VTI):    2.22 cm2  AVAI Vmax:    |              |
|  0.00 cm2/m2  AVAI (VTI):    0.00 cm2/m2  LVOT maxP.79 mmHg     |              |
| LVOT meanP.41 mmHg  LVSI Dopp:    34.51 ml/m2  LVSV Dopp:       |              |
| 69.71 ml  LVOT Vmax:    1.09 m/s  LVOT Vmean:    0.71 m/s  LVOT VTI:   |              |
|    22.54 cm  MV A Pancho:    0.96 m/s  MV DecT:    282.18 ms  MV E Pancho:   |              |
|    1.11 m/s  MV E/A Ratio:    1.14  MV PHT:    81.83 ms  MVA By PHT:   |              |
|    2.68 cm2  Septal e':    0.06 m/s  Septal E/e':    16.53  Lateral    |              |
| e':    0.07 m/s  Lateral E/e':    14.35  RAP:    5 mmHg  RVSP:         |              |
| 12.25 mmHg  TR maxP.25 mmHg  TR Vmax:    1.34 m/s               |              |
| Sonographer: ÁNGELA  Authenticated by:   Geovanni Mayes MD  Report      |              |
| Date/Time: --                                                          |              |
+------------------------------------------------------------------------+--------------+
 
 
 
+-------------------------------------------------------------------------------------------
-------------------------+
| Procedure Note                                                                            
                         |
+-------------------------------------------------------------------------------------------
-------------------------+
|   Antonio [facility]ins Conversion - 2019 10:39 PM PDT   Patient Name:  Jennifer Giordano     
                         |
| Birth:  1945 Accession:  [id number] Performing Physician:  Geovanni Mayes           
                         |
| MD_______________________________________________________________INDICATIONS-----------M  
                         |
| urmur CONCLUSIONS-----------1. The left ventricle is normal in size, mild increase in     
                         |
| wall thickness and hyperdynamic systolic function EF>70%.2. The diastolic filling         
                         |
| pattern indicates impaired relaxation consistent with mild dysfunction (Grade I).3. The   
                         |
| right ventricle is normal in size and function.4. Trace tricuspid regurgitation with no   
                         |
| pulmonary hypertension.5. Mild degenerative changes in the aortic and mitral valve.6.     
                         |
| There is no pericardial effusion. FINDINGS--------ECG rhythm: Sinus rhythm.Study: A       
                         |
| 2-dimensional transthoracic echocardiogram with m-mode, spectral and color flow Doppler   
                         |
| was perfomed.Study: This was a technically adequate study.Left Ventricle: Left            
                         |
| ventricular systolic function is hyperdynamic with an estimated EF of >70%.Left           
                         |
| Ventricle: The left ventricle cavity size is normal.Left Ventricle: There is mild         
                         |
| concentric left ventricular hypertrophy.Left Ventricle: No regional wall motion           
                         |
 
| abnormalities.Left Ventricle: The diastolic filling pattern indicates impaired            
                         |
| relaxation consistent with mild dysfunction (Grade I).Right Ventricle: The right          
                         |
| ventricle is normal in size and function.Left Atrium: The left atrium is normal in        
                         |
| size.Right Atrium: The right atrium is normal in size.Aortic Valve: The aortic valve is   
                         |
| mildly calcified.Aortic Valve: There is no evidence of aortic stenosis.Mitral Valve: No   
                         |
| mitral regurgitation.Mitral Valve: Mild mitral annular calcification present.Tricuspid    
                         |
| Valve: The tricuspid valve appears structurally normal.Tricuspid Valve: Trace tricuspid   
                         |
| regurgitation present.Tricuspid Valve: The right ventricular systolic pressure            
                         |
| (pulmonary artery systolic pressure), as measured by Doppler, is 12.26mmHg.Pulmonic       
                         |
| Valve: The pulmonic valve was not well visualized.Pulmonic Valve: Mild degenerative       
                         |
| changes in the aortic and mitral valve.Pericardium: There is no pericardial               
                         |
| effusion.Pericardium: No pleural effusion seen.IVC/Hepatic Veins: The IVC is small        
                         |
| (<1.5cm) and collapses with sniff, consistent with central venous pressures of            
                         |
| 0-5mmHg.Aorta: The aortic root, ascending aorta and aortic arch are normal.               
                         |
| MEASUREMENTS-------------Ao asc:   3.48 cmIVC:   1.36 cmLA Major:   5.02 cmEDV(Teich):    
                         |
|  82.80 mlIVSd:   1.2 cmLVIDd:   4.29 cmLVPWd:   1.1 cmLVOT Area:   3.09 [id number] Diam:     
                         |
| 1.98 cm%FS:   38.10 %EF(Teich):   68.61 %ESV(Teich):   25.98 mlLVIDs:   2.65              
                         |
| cmSV(Teich):   56.81 mlRA Major:   4.99 cmRV Major:   5.66 cmRVIDd:   2.37 cmLVEF MOD     
                         |
| A2C:   69.91 %SV MOD A2C:   52.88 mlLVEF MOD A4C:   70.34 %SV MOD A4C:   57.70 mlEF       
                         |
| Biplane:   70.25 %LVEDV MOD BP:   80.73 mlLVESV MOD BP:   24.01 mlLVEDV MOD A2C:   75.63  
                         |
|  mlLVLd A2C:   7.56 cmLVEDV MOD A4C:   82.03 mlLVLd A4C:   7.97 cmLVESV MOD A2C:   22.75  
                         |
|  mlLVLs A2C:   6.43 cmLVESV MOD A4C:   24.33 mlLVLs A4C:   6.05 cmLAESV(A-L):   43.21     
                         |
| mlLAESV Index (A-L):   21.39 ml/m2LAAs A2C:   15.21 [id number] A-L A2C:   41.14 mlLALs      
                         |
| A2C:   4.77 cmLAAs A4C:   15.98 [id number] A-L A4C:   42.14 mlLALs A4C:   5.14 cmRAAs:      
                         |
| 10.97 [id number] A-L:   19.03 mlRAESV MOD:   18.53 mlRALs:   5.37 cmAo Diam:   3.47 cmLA    
                         |
| Diam:   3.82 cmLA/Ao:   1.10AV maxP.35 mmHgAV meanP.59 mmHgAV Vmax:   1.35    
                         |
| m/Tigist Vmean:   1.01 m/Tigist VTI:   31.29 cmAVA Vmax:   2.49 cm2AVA (VTI):   2.22 [id number]    
                         |
| Vmax:   0.00 cm2/m2AVAI (VTI):   0.00 cm2/m2LVOT maxP.79 mmHgLVOT meanP.41    
                         |
| mmHgLVSI Dopp:   34.51 ml/m2LVSV Dopp:   69.71 mlLVOT Vmax:   1.09 m/sLVOT Vmean:   0.71  
                         |
|  m/sLVOT VTI:   22.54 cmMV A Pancho:   0.96 m/sMV DecT:   282.18 msMV E Pancho:   1.11 m/sMV    
                         |
 
| E/A Ratio:   1.14MV PHT:   81.83 msMVA By PHT:   2.68 qv4Vnrcra e':   0.06 m/sSeptal      
                         |
| E/e':   16.53Lateral e':   0.07 m/sLateral E/e':   14.35RAP:   5 mmHgRVSP:   12.25        
                         |
| mmHgTR maxP.25 mmHgTR Vmax:   1.34 m/s Sonographer: DHAuthenticated by:  Gerber,  
                         |
|  [facility] Date/Time: -- IMPRESSION: 1. The left ventricle is normal in size,      
                         |
| mild increase in wall thickness and hyperdynamic systolic function EF>70%.2. The          
                         |
| diastolic filling pattern indicates impaired relaxation consistent with mild dysfunction  
                         |
|  (Grade I).3. The right ventricle is normal in size and function.4. Trace tricuspid       
                         |
| regurgitation with no pulmonary hypertension.5. Mild degenerative changes in the aortic   
                         |
| and mitral valve.6. There is no pericardial effusion.                                     
                         |
|EDV(Teich):   82.80 ml                                                                     
                         |
|IVSd:   1.2 cm                                                                             
                         |
|LVIDd:   4.29 cm                                                                           
                         |
|LVPWd:   1.1 cm                                                                            
                         |
|LVOT Area:   3.09 cm2                                                                      
                         |
|LVOT Diam:   1.98 cm                                                                       
                         |
|%FS:   38.10 %                                                                             
                         |
|EF(Teich):   68.61 %                                                                       
                         |
|ESV(Teich):   25.98 ml                                                                     
                         |
|LVIDs:   2.65 cm                                                                           
                         |
|SV(Teich):   56.81 ml                                                                      
                         |
|RA Major:   4.99 cm                                                                        
                         |
|RV Major:   5.66 cm                                                                        
                         |
|RVIDd:   2.37 cm                                                                           
                         |
|LVEF MOD A2C:   69.91 %                                                                    
                         |
|SV MOD A2C:   52.88 ml                                                                     
                         |
|LVEF MOD A4C:   70.34 %                                                                    
                         |
|SV MOD A4C:   57.70 ml                                                                     
                         |
|EF Biplane:   70.25 %                                                                      
                         |
|LVEDV MOD BP:   80.73 ml                                                                   
                         |
|LVESV MOD BP:   24.01 ml                                                                   
                         |
 
|LVEDV MOD A2C:   75.63 ml                                                                  
                         |
|LVLd A2C:   7.56 cm                                                                        
                         |
|LVEDV MOD A4C:   82.03 ml                                                                  
                         |
|LVLd A4C:   7.97 cm                                                                        
                         |
|LVESV MOD A2C:   22.75 ml                                                                  
                         |
|LVLs A2C:   6.43 cm                                                                        
                         |
|LVESV MOD A4C:   24.33 ml                                                                  
                         |
|LVLs A4C:   6.05 cm                                                                        
                         |
|LAESV(A-L):   43.21 ml                                                                     
                         |
|LAESV Index (A-L):   21.39 ml/m2                                                           
                         |
|LAAs A2C:   15.21 cm2                                                                      
                         |
|LAESV A-L A2C:   41.14 ml                                                                  
                         |
|LALs A2C:   4.77 cm                                                                        
                         |
|LAAs A4C:   15.98 cm2                                                                      
                         |
|LAESV A-L A4C:   42.14 ml                                                                  
                         |
|LALs A4C:   5.14 cm                                                                        
                         |
|RAAs:   10.97 cm2                                                                          
                         |
|RAESV A-L:   19.03 ml                                                                      
                         |
|RAESV MOD:   18.53 ml                                                                      
                         |
|RALs:   5.37 cm                                                                            
                         |
|Ao Diam:   3.47 cm                                                                         
                         |
|LA Diam:   3.82 cm                                                                         
                         |
|LA/Ao:   1.10                                                                              
                         |
|AV maxP.35 mmHg                                                                      
                         |
|AV meanP.59 mmHg                                                                     
                         |
|AV Vmax:   1.35 m/s                                                                        
                         |
|AV Vmean:   1.01 m/s                                                                       
                         |
|AV VTI:   31.29 cm                                                                         
                         |
|KATHLEEN Vmax:   2.49 cm2                                                                       
                         |
|KATHLEEN (VTI):   2.22 cm2                                                                      
                         |
 
|AVAI Vmax:   0.00 cm2/m2                                                                   
                         |
|AVAI (VTI):   0.00 cm2/m2                                                                  
                         |
|LVOT maxP.79 mmHg                                                                    
                         |
|LVOT meanP.41 mmHg                                                                   
                         |
|LVSI Dopp:   34.51 ml/m2                                                                   
                         |
|LVSV Dopp:   69.71 ml                                                                      
                         |
|LVOT Vmax:   1.09 m/s                                                                      
                         |
|LVOT Vmean:   0.71 m/s                                                                     
                         |
|LVOT VTI:   22.54 cm                                                                       
                         |
|MV A Pancho:   0.96 m/s                                                                       
                         |
|MV DecT:   282.18 ms                                                                       
                         |
|MV E Pancho:   1.11 m/s                                                                       
                         |
|MV E/A Ratio:   1.14                                                                       
                         |
|MV PHT:   81.83 ms                                                                         
                         |
|MVA By PHT:   2.68 cm2                                                                     
                         |
|Septal e':   0.06 m/s                                                                      
                         |
|Septal E/e':   16.53                                                                       
                         |
|Lateral e':   0.07 m/s                                                                     
                         |
|Lateral E/e':   14.35                                                                      
                         |
|RAP:   5 mmHg                                                                              
                         |
|RVSP:   12.25 mmHg                                                                         
                         |
|TR maxP.25 mmHg                                                                      
                         |
|TR Vmax:   1.34 m/s                                                                        
                         |
|Sonographer:                                                                             
                         |
|Authenticated by:  Geovanni Mayes MD                                                    
                         |
|Report Date/Time: --                                                                       
                         |
|IMPRESSION:                                                                                
                         |
|1. The left ventricle is normal in size, mild increase in wall thickness and hyperdynamic s
ystolic function EF>70%. |
 
|2. The diastolic filling pattern indicates impaired relaxation consistent with mild dysfunc
tion (Grade I).          |
|3. The right ventricle is normal in size and function.                                     
                         |
|4. Trace tricuspid regurgitation with no pulmonary hypertension.                           
                         |
|5. 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                               
                         |
|6. There is no pericardial effusion.                                                       
                         |
+-------------------------------------------------------------------------------------------
-------------------------+
 documented in this encounter
 
 Visit Diagnoses
 Not on filedocumented in this encounter

## 2020-01-13 NOTE — XMS
Encounter Summary
  Created on: 2020
 
 Pasquale Nehemias Martinez
 External Reference #: [id number]
 : 43
 Sex: Male
 
 Demographics
 
 
+-----------------------+----------------------+
| Address               | [address]         |
|                       | [place]  [id number] |
+-----------------------+----------------------+
| Home Phone            | [phone]      |
+-----------------------+----------------------+
| Preferred Language    | Unknown              |
+-----------------------+----------------------+
| Marital Status        |               |
+-----------------------+----------------------+
| Syn[employer] Affiliation | [id number]                 |
+-----------------------+----------------------+
| Race                  | Unknown              |
+-----------------------+----------------------+
| Ethnic Group          | Unknown              |
+-----------------------+----------------------+
 
 
 Author
 
 
+--------------+--------------------------------------------+
| Author       | [facility]  |
|              | and [place]                                |
+--------------+--------------------------------------------+
| Organization | [facility]  |
|              | and [place]                                |
+--------------+--------------------------------------------+
| Address      | Unknown                                    |
+--------------+--------------------------------------------+
| Phone        | Unavailable                                |
+--------------+--------------------------------------------+
 
 
 
 Support
 
 
+--------------+--------------+---------------------+-----------------+
| Name         | Relationship | Address             | Phone           |
+--------------+--------------+---------------------+-----------------+
| Rupa Giordano | ECON         | [address]             | [phone] |
|              |              | [place]   |                 |
|              |              | [id number]               |                 |
+--------------+--------------+---------------------+-----------------+
 
 
 
 
 Care Team Providers
 
 
+-----------------------+------+-------------+
| Care Team Member Name | Role | Phone       |
+-----------------------+------+-------------+
 PCP  | Unavailable |
+-----------------------+------+-------------+
 
 
 
 Encounter Details
 
 
+--------+-------------+------------------+--------------------+-------------+
| Date   | Type        | Department       | Care Team          | Description |
+--------+-------------+------------------+--------------------+-------------+
| / | Orders Only |   [facility]     |   [facility]    |             |
| 2016   |             | CONVERSION  [address]  | [address]   |             |
|        |             | [address]       | [address]   |             |
|        |             | [place]     | [place]      |             |
|        |             | [address]       | [address]         |             |
|        |             | [phone]     | [phone]       |             |
|        |             |                  | [phone] (Fax) |             |
+--------+-------------+------------------+--------------------+-------------+
 
 
 
 Social History
 
 
+----------------+-------+-----------+--------+------+
| Tobacco Use    | Types | Packs/Day | Years  | Date |
|                |       |           | Used   |      |
+----------------+-------+-----------+--------+------+
| Never Assessed |       |           |        |      |
+----------------+-------+-----------+--------+------+
 
 
 
+------------------+---------------+
| Sex Assigned at  | Date Recorded |
| Birth            |               |
+------------------+---------------+
| Not on file      |               |
+------------------+---------------+
 
 
 
+----------------+-------------+-------------+
| Job Start Date | Occupation  | Industry    |
+----------------+-------------+-------------+
| Not on file    | Not on file | Not on file |
+----------------+-------------+-------------+
 
 
 
+----------------+--------------+------------+
| Travel History | Travel Start | Travel End |
 
+----------------+--------------+------------+
 
 
 
+-------------------------------------+
| No recent travel history available. |
+-------------------------------------+
 documented as of this encounter
 
 Plan of Treatment
 
 
+--------+-------------+------------------+----------------------+-------------+
| Date   | Type        | Specialty        | Care Team            | Description |
+--------+-------------+------------------+----------------------+-------------+
| / | Appointment | Radiology        |   Augustin Lopez DNP      |             |
|    |             |                  | [address]     |             |
|        |             |                  | [place]  |             |
|        |             |                  | [id number]  [phone]  |             |
|        |             |                  |  [phone] (Fax)  |             |
+--------+-------------+------------------+----------------------+-------------+
| / | Office      | Vascular Surgery |   Augustin Lopez DNP      |             |
|    | Visit       |                  | [address]     |             |
|        |             |                  | [place]  |             |
|        |             |                  | [address]  [phone]  |             |
|        |             |                  |  [phone] (Fax)  |             |
+--------+-------------+------------------+----------------------+-------------+
 documented as of this encounter
 
 Procedures
 
 
+----------------------+--------+-------------+----------------------+----------------------
+
| Procedure Name       | Priori | Date/Time   | Associated Diagnosis | Comments             
|
|                      | ty     |             |                      |                      
|
+----------------------+--------+-------------+----------------------+----------------------
+
| ECHO INTERPRETATION  | Routin | 2016  |                      |   Results for this   
|
| OF OUTSIDE FILMS     | e      |  9:32 AM    |                      | procedure are in the 
|
|                      |        | PST         |                      |  results section.    
|
+----------------------+--------+-------------+----------------------+----------------------
+
 documented in this encounter
 
 Results
 ECHO Interpretation of Outside Films (2016  9:32 AM PST)
 
+----------+
| Specimen |
+----------+
|          |
+----------+
 
 
 
 
+------------------------------------------------------------------------+--------------+
| Impressions                                                            | Performed At |
+------------------------------------------------------------------------+--------------+
|   1. The left ventricle is normal in size, mild increase in wall       |              |
| thickness and hyperdynamic systolic function EF>70%.  2. The diastolic |              |
|  filling pattern indicates impaired relaxation consistent with mild    |              |
| dysfunction (Grade I).  3. The right ventricle is normal in size and   |              |
| function.  4. Trace tricuspid regurgitation with no pulmonary          |              |
| hypertension.  5. Mild degenerative changes in the aortic and mitral   |              |
| valve.  6. There is no pericardial effusion.                           |              |
+------------------------------------------------------------------------+--------------+
 
 
 
+------------------------------------------------------------------------+--------------+
| Narrative                                                              | Performed At |
+------------------------------------------------------------------------+--------------+
|      Patient Name:   Nehemias Giordano  YOB: 1945        |              |
| Accession:   [id number]     Performing Physician:   Geovanni Mayes MD  |              |
|  _______________________________________________________________       |              |
| INDICATIONS  -----------  Murmur     CONCLUSIONS  -----------  1. The  |              |
| left ventricle is normal in size, mild increase in wall thickness and  |              |
| hyperdynamic systolic function EF>70%.  2. The diastolic filling       |              |
| pattern indicates impaired relaxation consistent with mild dysfunction |              |
|  (Grade I).  3. The right ventricle is normal in size and function.    |              |
| 4. Trace tricuspid regurgitation with no pulmonary hypertension.  5.   |              |
| Mild degenerative changes in the aortic and mitral valve.  6. There is |              |
|  no pericardial effusion.     FINDINGS  --------  ECG rhythm: Sinus    |              |
| rhythm.  Study: A 2-dimensional transthoracic echocardiogram with      |              |
| m-mode, spectral and color flow Doppler was perfomed.  Study: This was |              |
|  a technically adequate study.  Left Ventricle: Left ventricular       |              |
| systolic function is hyperdynamic with an estimated EF of >70%.  Left  |              |
| Ventricle: The left ventricle cavity size is normal.  Left Ventricle:  |              |
| There is mild concentric left ventricular hypertrophy.  Left           |              |
| Ventricle: No regional wall motion abnormalities.  Left Ventricle: The |              |
|  diastolic filling pattern indicates impaired relaxation consistent    |              |
| with mild dysfunction (Grade I).  Right Ventricle: The right ventricle |              |
|  is normal in size and function.  Left Atrium: The left atrium is      |              |
| normal in size.  Right Atrium: The right atrium is normal in size.     |              |
| Aortic Valve: The aortic valve is mildly calcified.  Aortic Valve:     |              |
| There is no evidence of aortic stenosis.  Mitral Valve: No mitral      |              |
| regurgitation.  Mitral Valve: Mild mitral annular calcification        |              |
| present.  Tricuspid Valve: The tricuspid valve appears structurally    |              |
| normal.  Tricuspid Valve: Trace tricuspid regurgitation present.       |              |
| Tricuspid Valve: The right ventricular systolic pressure (pulmonary    |              |
| artery systolic pressure), as measured by Doppler, is 12.26mmHg.       |              |
| Pulmonic Valve: The pulmonic valve was not well visualized.  Pulmonic  |              |
| Valve: Mild degenerative changes in the aortic and mitral valve.       |              |
| Pericardium: There is no pericardial effusion.  Pericardium: No        |              |
| pleural effusion seen.  IVC/Hepatic Veins: The IVC is small (<1.5cm)   |              |
| and collapses with sniff, consistent with central venous pressures of  |              |
| 0-5mmHg.  Aorta: The aortic root, ascending aorta and aortic arch are  |              |
| normal.     MEASUREMENTS  -------------  Ao asc:    3.48 cm  IVC:      |              |
| 1.36 cm  LA Major:    5.02 cm  EDV(Teich):    82.80 ml  IVSd:    1.2   |              |
| cm  LVIDd:    4.29 cm  LVPWd:    1.1 cm  LVOT Area:    3.09 cm2  LVOT  |              |
| Diam:    1.98 cm  %FS:    38.10 %  EF(Teich):    68.61 %  ESV(Teich):  |              |
|    25.98 ml  LVIDs:    2.65 cm  SV(Teich):    56.81 ml  RA Major:      |              |
| 4.99 cm  RV Major:    5.66 cm  RVIDd:    2.37 cm  LVEF MOD A2C:        |              |
| 69.91 %  SV MOD A2C:    52.88 ml  LVEF MOD A4C:    70.34 %  SV MOD     |              |
 
| A4C:    57.70 ml  EF Biplane:    70.25 %  LVEDV MOD BP:    80.73 ml    |              |
| LVESV MOD BP:    24.01 ml  LVEDV MOD A2C:    75.63 ml  LVLd A2C:       |              |
| 7.56 cm  LVEDV MOD A4C:    82.03 ml  LVLd A4C:    7.97 cm  LVESV MOD   |              |
| A2C:    22.75 ml  LVLs A2C:    6.43 cm  LVESV MOD A4C:    24.33 ml     |              |
| LVLs A4C:    6.05 cm  LAESV(A-L):    43.21 ml  LAESV Index (A-L):      |              |
| 21.39 ml/m2  LAAs A2C:    15.21 cm2  LAESV A-L A2C:    41.14 ml  LALs  |              |
| A2C:    4.77 cm  LAAs A4C:    15.98 cm2  LAESV A-L A4C:    42.14 ml    |              |
| LALs A4C:    5.14 cm  RAAs:    10.97 cm2  RAESV A-L:    19.03 ml       |              |
| RAESV MOD:    18.53 ml  RALs:    5.37 cm  Ao Diam:    3.47 cm  LA      |              |
| Diam:    3.82 cm  LA/Ao:    1.10  AV maxP.35 mmHg  AV meanPG:   |              |
|    4.59 mmHg  AV Vmax:    1.35 m/s  AV Vmean:    1.01 m/s  AV VTI:     |              |
| 31.29 cm  KATHLEEN Vmax:    2.49 cm2  KATHLEEN (VTI):    2.22 cm2  AVAI Vmax:    |              |
|  0.00 cm2/m2  AVAI (VTI):    0.00 cm2/m2  LVOT maxP.79 mmHg     |              |
| LVOT meanP.41 mmHg  LVSI Dopp:    34.51 ml/m2  LVSV Dopp:       |              |
| 69.71 ml  LVOT Vmax:    1.09 m/s  LVOT Vmean:    0.71 m/s  LVOT VTI:   |              |
|    22.54 cm  MV A Pancho:    0.96 m/s  MV DecT:    282.18 ms  MV E Pancho:   |              |
|    1.11 m/s  MV E/A Ratio:    1.14  MV PHT:    81.83 ms  MVA By PHT:   |              |
|    2.68 cm2  Septal e':    0.06 m/s  Septal E/e':    16.53  Lateral    |              |
| e':    0.07 m/s  Lateral E/e':    14.35  RAP:    5 mmHg  RVSP:         |              |
| 12.25 mmHg  TR maxP.25 mmHg  TR Vmax:    1.34 m/s               |              |
| Sonographer: [email or web address]  Authenticated by:   Geovanni Mayes MD  Report      |              |
| Date/Time: --                                                          |              |
+------------------------------------------------------------------------+--------------+
 
 
 
+-------------------------------------------------------------------------------------------
-------------------------+
| Procedure Note                                                                            
                         |
+-------------------------------------------------------------------------------------------
-------------------------+
|   Antonio Hutchins Conversion - 2019 10:39 PM PDT   Patient Name:  Jennifer Giordano     
                         |
| Birth:  1945 Accession:  [id number] Performing Physician:  Geovanni Mayes           
                         |
| MD_______________________________________________________________INDICATIONS-----------M  
                         |
| urmur CONCLUSIONS-----------1. The left ventricle is normal in size, mild increase in     
                         |
| wall thickness and hyperdynamic systolic function EF>70%.2. The diastolic filling         
                         |
| pattern indicates impaired relaxation consistent with mild dysfunction (Grade I).3. The   
                         |
| right ventricle is normal in size and function.4. Trace tricuspid regurgitation with no   
                         |
| pulmonary hypertension.5. Mild degenerative changes in the aortic and mitral valve.6.     
                         |
| There is no pericardial effusion. FINDINGS--------ECG rhythm: Sinus rhythm.Study: A       
                         |
| 2-dimensional transthoracic echocardiogram with m-mode, spectral and color flow Doppler   
                         |
| was perfomed.Study: This was a technically adequate study.Left Ventricle: Left            
                         |
| ventricular systolic function is hyperdynamic with an estimated EF of >70%.Left           
                         |
| Ventricle: The left ventricle cavity size is normal.Left Ventricle: There is mild         
                         |
| concentric left ventricular hypertrophy.Left Ventricle: No regional wall motion           
                         |
 
| abnormalities.Left Ventricle: The diastolic filling pattern indicates impaired            
                         |
| relaxation consistent with mild dysfunction (Grade I).Right Ventricle: The right          
                         |
| ventricle is normal in size and function.Left Atrium: The left atrium is normal in        
                         |
| size.Right Atrium: The right atrium is normal in size.Aortic Valve: The aortic valve is   
                         |
| mildly calcified.Aortic Valve: There is no evidence of aortic stenosis.Mitral Valve: No   
                         |
| mitral regurgitation.Mitral Valve: Mild mitral annular calcification present.Tricuspid    
                         |
| Valve: The tricuspid valve appears structurally normal.Tricuspid Valve: Trace tricuspid   
                         |
| regurgitation present.Tricuspid Valve: The right ventricular systolic pressure            
                         |
| (pulmonary artery systolic pressure), as measured by Doppler, is 12.26mmHg.Pulmonic       
                         |
| Valve: The pulmonic valve was not well visualized.Pulmonic Valve: Mild degenerative       
                         |
| changes in the aortic and mitral valve.Pericardium: There is no pericardial               
                         |
| effusion.Pericardium: No pleural effusion seen.IVC/Hepatic Veins: The IVC is small        
                         |
| (<1.5cm) and collapses with sniff, consistent with central venous pressures of            
                         |
| 0-5mmHg.Aorta: The aortic root, ascending aorta and aortic arch are normal.               
                         |
| MEASUREMENTS-------------Ao asc:   3.48 cmIVC:   1.36 cmLA Major:   5.02 cmEDV(Teich):    
                         |
|  82.80 mlIVSd:   1.2 cmLVIDd:   4.29 cmLVPWd:   1.1 cmLVOT Area:   3.09 [id number] Diam:     
                         |
| 1.98 cm%FS:   38.10 %EF(Teich):   68.61 %ESV(Teich):   25.98 mlLVIDs:   2.65              
                         |
| cmSV(Teich):   56.81 mlRA Major:   4.99 cmRV Major:   5.66 cmRVIDd:   2.37 cmLVEF MOD     
                         |
| A2C:   69.91 %SV MOD A2C:   52.88 mlLVEF MOD A4C:   70.34 %SV MOD A4C:   57.70 mlEF       
                         |
| Biplane:   70.25 %LVEDV MOD BP:   80.73 mlLVESV MOD BP:   24.01 mlLVEDV MOD A2C:   75.63  
                         |
|  mlLVLd A2C:   7.56 cmLVEDV MOD A4C:   82.03 mlLVLd A4C:   7.97 cmLVESV MOD A2C:   22.75  
                         |
|  mlLVLs A2C:   6.43 cmLVESV MOD A4C:   24.33 mlLVLs A4C:   6.05 cmLAESV(A-L):   43.21     
                         |
| mlLAESV Index (A-L):   21.39 ml/m2LAAs A2C:   15.21 [id number] A-L A2C:   41.14 mlLALs      
                         |
| A2C:   4.77 cmLAAs A4C:   15.98 lt5IUOHP A-L A4C:   42.14 mlLALs A4C:   5.14 cmRAAs:      
                         |
| 10.97 fm4PZCPL A-L:   19.03 mlRAESV MOD:   18.53 mlRALs:   5.37 cmAo Diam:   3.47 cmLA    
                         |
| Diam:   3.82 cmLA/Ao:   1.10AV maxP.35 mmHgAV meanP.59 mmHgAV Vmax:   1.35    
                         |
| m/Tigist Vmean:   1.01 m/Tigist VTI:   31.29 cmAVA Vmax:   2.49 cm2AVA (VTI):   2.22 [id number]    
                         |
| Vmax:   0.00 cm2/m2AVAI (VTI):   0.00 cm2/m2LVOT maxP.79 mmHgLVOT meanP.41    
                         |
| mmHgLVSI Dopp:   34.51 ml/m2LVSV Dopp:   69.71 mlLVOT Vmax:   1.09 m/sLVOT Vmean:   0.71  
                         |
|  m/sLVOT VTI:   22.54 cmMV A Pancho:   0.96 m/sMV DecT:   282.18 msMV E Pancho:   1.11 m/sMV    
                         |
 
| E/A Ratio:   1.14MV PHT:   81.83 msMVA By PHT:   2.68 za0Vjbnts e':   0.06 m/sSeptal      
                         |
| E/e':   16.53Lateral e':   0.07 m/sLateral E/e':   14.35RAP:   5 mmHgRVSP:   12.25        
                         |
| mmHgTR maxP.25 mmHgTR Vmax:   1.34 m/s Sonographer: DHAuthenticated by:  Gerber,  
                         |
|  [facility] Date/Time: -- IMPRESSION: 1. The left ventricle is normal in size,      
                         |
| mild increase in wall thickness and hyperdynamic systolic function EF>70%.2. The          
                         |
| diastolic filling pattern indicates impaired relaxation consistent with mild dysfunction  
                         |
|  (Grade I).3. The right ventricle is normal in size and function.4. Trace tricuspid       
                         |
| regurgitation with no pulmonary hypertension.5. Mild degenerative changes in the aortic   
                         |
| and mitral valve.6. There is no pericardial effusion.                                     
                         |
|EDV(Teich):   82.80 ml                                                                     
                         |
|IVSd:   1.2 cm                                                                             
                         |
|LVIDd:   4.29 cm                                                                           
                         |
|LVPWd:   1.1 cm                                                                            
                         |
|LVOT Area:   3.09 cm2                                                                      
                         |
|LVOT Diam:   1.98 cm                                                                       
                         |
|%FS:   38.10 %                                                                             
                         |
|EF(Teich):   68.61 %                                                                       
                         |
|ESV(Teich):   25.98 ml                                                                     
                         |
|LVIDs:   2.65 cm                                                                           
                         |
|SV(Teich):   56.81 ml                                                                      
                         |
|RA Major:   4.99 cm                                                                        
                         |
|RV Major:   5.66 cm                                                                        
                         |
|RVIDd:   2.37 cm                                                                           
                         |
|LVEF MOD A2C:   69.91 %                                                                    
                         |
|SV MOD A2C:   52.88 ml                                                                     
                         |
|LVEF MOD A4C:   70.34 %                                                                    
                         |
|SV MOD A4C:   57.70 ml                                                                     
                         |
|EF Biplane:   70.25 %                                                                      
                         |
|LVEDV MOD BP:   80.73 ml                                                                   
                         |
|LVESV MOD BP:   24.01 ml                                                                   
                         |
 
|LVEDV MOD A2C:   75.63 ml                                                                  
                         |
|LVLd A2C:   7.56 cm                                                                        
                         |
|LVEDV MOD A4C:   82.03 ml                                                                  
                         |
|LVLd A4C:   7.97 cm                                                                        
                         |
|LVESV MOD A2C:   22.75 ml                                                                  
                         |
|LVLs A2C:   6.43 cm                                                                        
                         |
|LVESV MOD A4C:   24.33 ml                                                                  
                         |
|LVLs A4C:   6.05 cm                                                                        
                         |
|LAESV(A-L):   43.21 ml                                                                     
                         |
|LAESV Index (A-L):   21.39 ml/m2                                                           
                         |
|LAAs A2C:   15.21 cm2                                                                      
                         |
|LAESV A-L A2C:   41.14 ml                                                                  
                         |
|LALs A2C:   4.77 cm                                                                        
                         |
|LAAs A4C:   15.98 cm2                                                                      
                         |
|LAESV A-L A4C:   42.14 ml                                                                  
                         |
|LALs A4C:   5.14 cm                                                                        
                         |
|RAAs:   10.97 cm2                                                                          
                         |
|RAESV A-L:   19.03 ml                                                                      
                         |
|RAESV MOD:   18.53 ml                                                                      
                         |
|RALs:   5.37 cm                                                                            
                         |
|Ao Diam:   3.47 cm                                                                         
                         |
|LA Diam:   3.82 cm                                                                         
                         |
|LA/Ao:   1.10                                                                              
                         |
|AV maxP.35 mmHg                                                                      
                         |
|AV meanP.59 mmHg                                                                     
                         |
|AV Vmax:   1.35 m/s                                                                        
                         |
|AV Vmean:   1.01 m/s                                                                       
                         |
|AV VTI:   31.29 cm                                                                         
                         |
|KATHLEEN Vmax:   2.49 cm2                                                                       
                         |
|KATHLEEN (VTI):   2.22 cm2                                                                      
                         |
 
|AVAI Vmax:   0.00 cm2/m2                                                                   
                         |
|AVAI (VTI):   0.00 cm2/m2                                                                  
                         |
|LVOT maxP.79 mmHg                                                                    
                         |
|LVOT meanP.41 mmHg                                                                   
                         |
|LVSI Dopp:   34.51 ml/m2                                                                   
                         |
|LVSV Dopp:   69.71 ml                                                                      
                         |
|LVOT Vmax:   1.09 m/s                                                                      
                         |
|LVOT Vmean:   0.71 m/s                                                                     
                         |
|LVOT VTI:   22.54 cm                                                                       
                         |
|MV A Pancho:   0.96 m/s                                                                       
                         |
|MV DecT:   282.18 ms                                                                       
                         |
|MV E Pancho:   1.11 m/s                                                                       
                         |
|MV E/A Ratio:   1.14                                                                       
                         |
|MV PHT:   81.83 ms                                                                         
                         |
|MVA By PHT:   2.68 cm2                                                                     
                         |
|Septal e':   0.06 m/s                                                                      
                         |
|Septal E/e':   16.53                                                                       
                         |
|Lateral e':   0.07 m/s                                                                     
                         |
|Lateral E/e':   14.35                                                                      
                         |
|RAP:   5 mmHg                                                                              
                         |
|RVSP:   12.25 mmHg                                                                         
                         |
|TR maxP.25 mmHg                                                                      
                         |
|TR Vmax:   1.34 m/s                                                                        
                         |
|Sonographer:                                                                             
                         |
|Authenticated by:  Geovanni Mayes MD                                                    
                         |
|Report Date/Time: --                                                                       
                         |
|IMPRESSION:                                                                                
                         |
|1. The left ventricle is normal in size, mild increase in wall thickness and hyperdynamic s
ystolic function EF>70%. |
 
|2. The diastolic filling pattern indicates impaired relaxation consistent with mild dysfunc
tion (Grade I).          |
|3. The right ventricle is normal in size and function.                                     
                         |
|4. Trace tricuspid regurgitation with no pulmonary hypertension.                           
                         |
|5. 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                               
                         |
|6. There is no pericardial effusion.                                                       
                         |
+-------------------------------------------------------------------------------------------
-------------------------+
 documented in this encounter
 
 Visit Diagnoses
 Not on filedocumented in this encounter

## 2020-01-13 NOTE — XMS
Encounter Summary
  Created on: 2020
 
 Pasquale Nehemias Martinez
 External Reference #: [id number]
 : 43
 Sex: Male
 
 Demographics
 
 
+-----------------------+----------------------+
| Address               | [address]         |
|                       | [place]  [address] |
+-----------------------+----------------------+
| Home Phone            | [phone]      |
+-----------------------+----------------------+
| Preferred Language    | Unknown              |
+-----------------------+----------------------+
| Marital Status        |               |
+-----------------------+----------------------+
| Latter day Affiliation | 1077                 |
+-----------------------+----------------------+
| Race                  | Unknown              |
+-----------------------+----------------------+
| Ethnic Group          | Unknown              |
+-----------------------+----------------------+
 
 
 Author
 
 
+--------------+--------------------------------------------+
| Author       | [facility] [place]  |
|              | and [facility]                                |
+--------------+--------------------------------------------+
| Organization | [facility]  |
|              | and [place]                                |
+--------------+--------------------------------------------+
| Address      | Unknown                                    |
+--------------+--------------------------------------------+
| Phone        | Unavailable                                |
+--------------+--------------------------------------------+
 
 
 
 Support
 
 
+--------------+--------------+---------------------+-----------------+
| Name         | Relationship | Address             | Phone           |
+--------------+--------------+---------------------+-----------------+
| Rupa Giordano | ECON         | [address]             | [phone] |
|              |              | [place], [place]   |                 |
|              |              | [address]               |                 |
+--------------+--------------+---------------------+-----------------+
 
 
 
 
 Care Team Providers
 
 
+-----------------------+------+-------------+
| Care Team Member Name | Role | Phone       |
+-----------------------+------+-------------+
 PCP  | Unavailable |
+-----------------------+------+-------------+
 
 
 
 Reason for Visit
 
 
+-------------+----------+
| Reason      | Comments |
+-------------+----------+
| Advice Only |          |
+-------------+----------+
 
 
 
 Encounter Details
 
 
+--------+-----------+----------------------+----------------------+-------------+
| Date   | Type      | Department           | Care Team            | Description |
+--------+-----------+----------------------+----------------------+-------------+
| / | Telephone |   [facility]      |   Sukhdeep Pardo MD     | Advice Only |
| 2019   |           | VASCULAR SURGERY     | [address]     |             |
|        |           | [address] | [address]  [place], [place]  |             |
|        |           |  E  [place]     | [address]           |             |
|        |           | [address]           | [phone]         |             |
|        |           | [phone]         | [phone] (Fax)   |             |
+--------+-----------+----------------------+----------------------+-------------+
 
 
 
 Social History
 
 
+---------------+-------+-----------+--------+------+
| Tobacco Use   | Types | Packs/Day | Years  | Date |
|               |       |           | Used   |      |
+---------------+-------+-----------+--------+------+
| Former Smoker |       |           |        |      |
+---------------+-------+-----------+--------+------+
 
 
 
+---------------------+---+---+---+
| Smokeless Tobacco:  |   |   |   |
| Never Used          |   |   |   |
+---------------------+---+---+---+
 
 
 
+------------------------------+
| Comments: quit 50 years ago  |
 
+------------------------------+
 
 
 
+------------------+---------------+
| Sex Assigned at  | Date Recorded |
| Birth            |               |
+------------------+---------------+
| Not on file      |               |
+------------------+---------------+
 
 
 
+----------------+-------------+-------------+
| Job Start Date | Occupation  | Industry    |
+----------------+-------------+-------------+
| Not on file    | Not on file | Not on file |
+----------------+-------------+-------------+
 
 
 
+----------------+--------------+------------+
| Travel History | Travel Start | Travel End |
+----------------+--------------+------------+
 
 
 
+-------------------------------------+
| No recent travel history available. |
+-------------------------------------+
 documented as of this encounter
 
 Plan of Treatment
 
 
+--------+-------------+------------------+----------------------+-------------+
| Date   | Type        | Specialty        | Care Team            | Description |
+--------+-------------+------------------+----------------------+-------------+
| / | Appointment | Radiology        |   Augustin Lopez DNP      |             |
|    |             |                  | [address]     |             |
|        |             |                  | [place]  |             |
|        |             |                  | [address]  [phone]  |             |
|        |             |                  |  [phone] (Fax)  |             |
+--------+-------------+------------------+----------------------+-------------+
| / | Office      | Vascular Surgery |   Augustin Lopez DNP      |             |
|    | Visit       |                  | [address]     |             |
|        |             |                  | [place]  |             |
|        |             |                  | [address]  [phone]  |             |
|        |             |                  |  [phone] (Fax)  |             |
+--------+-------------+------------------+----------------------+-------------+
 documented as of this encounter
 
 Visit Diagnoses
 Not on filedocumented in this encounter

## 2020-01-13 NOTE — XMS
Encounter Summary
  Created on: 2020
 
 Pasquale Nehemias Martinez
 External Reference #: [id number]
 : 43
 Sex: Male
 
 Demographics
 
 
+-----------------------+----------------------+
| Address               | [address]         |
|                       | [place]  [address] |
+-----------------------+----------------------+
| Home Phone            | [phone]      |
+-----------------------+----------------------+
| Preferred Language    | Unknown              |
+-----------------------+----------------------+
| Marital Status        |               |
+-----------------------+----------------------+
| Mor[employer] Affiliation | 1077                 |
+-----------------------+----------------------+
| Race                  | Unknown              |
+-----------------------+----------------------+
| Ethnic Group          | Unknown              |
+-----------------------+----------------------+
 
 
 Author
 
 
+--------------+--------------------------------------------+
| Author       | [facility] [place]  |
|              | and [facility]                                |
+--------------+--------------------------------------------+
| Organization | [facility] [place]  |
|              | and [place]                                |
+--------------+--------------------------------------------+
| Address      | Unknown                                    |
+--------------+--------------------------------------------+
| Phone        | Unavailable                                |
+--------------+--------------------------------------------+
 
 
 
 Support
 
 
+--------------+--------------+---------------------+-----------------+
| Name         | Relationship | Address             | Phone           |
+--------------+--------------+---------------------+-----------------+
| Rupa Giordano | ECON         | [address]             | [phone] |
|              |              | [place], [place]   |                 |
|              |              | [address]               |                 |
+--------------+--------------+---------------------+-----------------+
 
 
 
 
 Care Team Providers
 
 
+-----------------------+------+-------------+
| Care Team Member Name | Role | Phone       |
+-----------------------+------+-------------+
| No, Physician         | PCP  | Unavailable |
+-----------------------+------+-------------+
 
 
 
 Reason for Visit
 
 
+-----------+----------+
| Reason    | Comments |
+-----------+----------+
| Follow-up |          |
+-----------+----------+
 
 
 
 Encounter Details
 
 
+--------+-----------+----------------------+----------------------+-------------+
| Date   | Type      | Department           | Care Team            | Description |
+--------+-----------+----------------------+----------------------+-------------+
| / | Telephone |   [facility]      |   Yudi Mckeon,  | Follow-up   |
| 2019   |           | VASCULAR SURGERY     | Medical Assistant    |             |
|        |           | [address] |                      |             |
|        |           |  [place]  [place], [place]     |                      |             |
|        |           | [address]           |                      |             |
|        |           | [phone]         |                      |             |
+--------+-----------+----------------------+----------------------+-------------+
 
 
 
 Social History
 
 
+---------------+-------+-----------+--------+------+
| Tobacco Use   | Types | Packs/Day | Years  | Date |
|               |       |           | Used   |      |
+---------------+-------+-----------+--------+------+
| Former Smoker |       |           |        |      |
+---------------+-------+-----------+--------+------+
 
 
 
+---------------------+---+---+---+
| Smokeless Tobacco:  |   |   |   |
| Former User         |   |   |   |
+---------------------+---+---+---+
 
 
 
+------------------------------+
| Comments: quit 50 years ago  |
 
+------------------------------+
 
 
 
+---------------+-------------+---------+----------+
| Alcohol Use   | Drinks/Week | oz/Week | Comments |
+---------------+-------------+---------+----------+
| Not Currently |             |         | 90m days |
+---------------+-------------+---------+----------+
 
 
 
+------------------+---------------+
| Sex Assigned at  | Date Recorded |
| Birth            |               |
+------------------+---------------+
| Not on file      |               |
+------------------+---------------+
 
 
 
+----------------+-------------+-------------+
| Job Start Date | Occupation  | Industry    |
+----------------+-------------+-------------+
| Not on file    | Not on file | Not on file |
+----------------+-------------+-------------+
 
 
 
+----------------+--------------+------------+
| Travel History | Travel Start | Travel End |
+----------------+--------------+------------+
 
 
 
+-------------------------------------+
| No recent travel history available. |
+-------------------------------------+
 documented as of this encounter
 
 Functional Status
 
 
+---------------------------------------------+----------+--------------------+
| Functional Status                           | Response | Date of Assessment |
+---------------------------------------------+----------+--------------------+
| Are you deaf or do you have serious         | No       | 2019         |
| difficulty hearing?                         |          |                    |
+---------------------------------------------+----------+--------------------+
| Are you blind or do you have serious        | No       | 2019         |
| difficulty seeing, even when wearing        |          |                    |
| glasses?                                    |          |                    |
+---------------------------------------------+----------+--------------------+
| Do you have serious difficulty walking or   | No       | 2019         |
| climbing stairs? (5 years old or older)     |          |                    |
+---------------------------------------------+----------+--------------------+
| Do you have difficulty dressing or bathing? | No       | 2019         |
|  (5 years old or older)                     |          |                    |
+---------------------------------------------+----------+--------------------+
| Because of a physical, mental, or emotional | No       | 2019         |
 
|  condition, do you have difficulty doing    |          |                    |
| errands alone such as visiting a doctor's   |          |                    |
| office or shopping? [15 years old or        |          |                    |
| older)]                                     |          |                    |
+---------------------------------------------+----------+--------------------+
 
 
 
+---------------------------------------------+----------+--------------------+
| Cognitive Status                            | Response | Date of Assessment |
+---------------------------------------------+----------+--------------------+
| Because of a physical, mental, or emotional | No       | 2019         |
|  condition, do you have serious difficulty  |          |                    |
| concentrating, remembering, or making       |          |                    |
| decisions? (5 years old or older)           |          |                    |
+---------------------------------------------+----------+--------------------+
 documented as of this encounter
 
 Plan of Treatment
 
 
+--------+-------------+------------------+----------------------+-------------+
| Date   | Type        | Specialty        | Care Team            | Description |
+--------+-------------+------------------+----------------------+-------------+
| / | Appointment | Radiology        |   Augustin Lopez DNP      |             |
|    |             |                  | [address]     |             |
|        |             |                  | [place]  |             |
|        |             |                  | [id number] [phone]  |             |
|        |             |                  |  [phone] (Fax)  |             |
+--------+-------------+------------------+----------------------+-------------+
| / | Office      | Vascular Surgery |   Augustin Lopez DNP      |             |
| 2020   | Visit       |                  | [address]     |             |
|        |             |                  | [place]  |             |
|        |             |                  | [address]  [phone]  |             |
|        |             |                  |  [phone] (Fax)  |             |
+--------+-------------+------------------+----------------------+-------------+
 documented as of this encounter
 
 Visit Diagnoses
 Not on filedocumented in this encounter

## 2020-01-13 NOTE — XMS
Encounter Summary
  Created on: 2020
 
 Pasquale Nehemias Martinez
 External Reference #: [id number]
 : 43
 Sex: Male
 
 Demographics
 
 
+-----------------------+----------------------+
| Address               | [address]         |
|                       | [place]  [address] |
+-----------------------+----------------------+
| Home Phone            | [phone]      |
+-----------------------+----------------------+
| Preferred Language    | Unknown              |
+-----------------------+----------------------+
| Marital Status        |               |
+-----------------------+----------------------+
| Spiritism Affiliation | [id number]                 |
+-----------------------+----------------------+
| Race                  | Unknown              |
+-----------------------+----------------------+
| Ethnic Group          | Unknown              |
+-----------------------+----------------------+
 
 
 Author
 
 
+--------------+--------------------------------------------+
| Author       | [facility]  |
|              | and [facility]                                |
+--------------+--------------------------------------------+
| Organization | [facility]  |
|              | and [place]                                |
+--------------+--------------------------------------------+
| Address      | Unknown                                    |
+--------------+--------------------------------------------+
| Phone        | Unavailable                                |
+--------------+--------------------------------------------+
 
 
 
 Support
 
 
+--------------+--------------+---------------------+-----------------+
| Name         | Relationship | Address             | Phone           |
+--------------+--------------+---------------------+-----------------+
| Rupa Giordano | E[employer]         | [address]             | [phone] |
|              |              | [place]   |                 |
|              |              | [address]               |                 |
+--------------+--------------+---------------------+-----------------+
 
 
 
 
 Care Team Providers
 
 
+-----------------------+------+-------------+
| Care Team Member Name | Role | Phone       |
+-----------------------+------+-------------+
| No, Physician         | PCP  | Unavailable |
+-----------------------+------+-------------+
 
 
 
 Reason for Visit
 
 
+-------------+----------+
| Reason      | Comments |
+-------------+----------+
| Appointment |          |
+-------------+----------+
 
 
 
 Encounter Details
 
 
+--------+-----------+----------------------+----------------------+-------------+
| Date   | Type      | Department           | Care Team            | Description |
+--------+-----------+----------------------+----------------------+-------------+
| / | Telephone |   [facility]      |   Briana Casper Coates, | Appointment |
| 2019   |           | VASCULAR SURGERY     |  RN                  |             |
|        |           | [address] |                      |             |
|        |           |  [address]  [place]     |                      |             |
|        |           | [address]           |                      |             |
|        |           | [phone]         |                      |             |
+--------+-----------+----------------------+----------------------+-------------+
 
 
 
 Social History
 
 
+---------------+-------+-----------+--------+------+
| Tobacco Use   | Types | Packs/Day | Years  | Date |
|               |       |           | Used   |      |
+---------------+-------+-----------+--------+------+
| Former Smoker |       |           |        |      |
+---------------+-------+-----------+--------+------+
 
 
 
+---------------------+---+---+---+
| Smokeless Tobacco:  |   |   |   |
| Former User         |   |   |   |
+---------------------+---+---+---+
 
 
 
+------------------------------+
| Comments: quit 50 years ago  |
 
+------------------------------+
 
 
 
+---------------+-------------+---------+----------+
| Alcohol Use   | Drinks/Week | oz/Week | Comments |
+---------------+-------------+---------+----------+
| Not Currently |             |         | 90m days |
+---------------+-------------+---------+----------+
 
 
 
+------------------+---------------+
| Sex Assigned at  | Date Recorded |
| Birth            |               |
+------------------+---------------+
| Not on file      |               |
+------------------+---------------+
 
 
 
+----------------+-------------+-------------+
| Job Start Date | Occupation  | Industry    |
+----------------+-------------+-------------+
| Not on file    | Not on file | Not on file |
+----------------+-------------+-------------+
 
 
 
+----------------+--------------+------------+
| Travel History | Travel Start | Travel End |
+----------------+--------------+------------+
 
 
 
+-------------------------------------+
| No recent travel history available. |
+-------------------------------------+
 documented as of this encounter
 
 Functional Status
 
 
+---------------------------------------------+----------+--------------------+
| Functional Status                           | Response | Date of Assessment |
+---------------------------------------------+----------+--------------------+
| Are you deaf or do you have serious         | No       | 2019         |
| difficulty hearing?                         |          |                    |
+---------------------------------------------+----------+--------------------+
| Are you blind or do you have serious        | No       | 2019         |
| difficulty seeing, even when wearing        |          |                    |
| glasses?                                    |          |                    |
+---------------------------------------------+----------+--------------------+
| Do you have serious difficulty walking or   | No       | 2019         |
| climbing stairs? (5 years old or older)     |          |                    |
+---------------------------------------------+----------+--------------------+
| Do you have difficulty dressing or bathing? | No       | 2019         |
|  (5 years old or older)                     |          |                    |
+---------------------------------------------+----------+--------------------+
| Because of a physical, mental, or emotional | No       | 2019         |
 
|  condition, do you have difficulty doing    |          |                    |
| errands alone such as visiting a doctor's   |          |                    |
| office or shopping? [15 years old or        |          |                    |
| older)]                                     |          |                    |
+---------------------------------------------+----------+--------------------+
 
 
 
+---------------------------------------------+----------+--------------------+
| Cognitive Status                            | Response | Date of Assessment |
+---------------------------------------------+----------+--------------------+
| Because of a physical, mental, or emotional | No       | 2019         |
|  condition, do you have serious difficulty  |          |                    |
| concentrating, remembering, or making       |          |                    |
| decisions? (5 years old or older)           |          |                    |
+---------------------------------------------+----------+--------------------+
 documented as of this encounter
 
 Plan of Treatment
 
 
+--------+-------------+------------------+----------------------+-------------+
| Date   | Type        | Specialty        | Care Team            | Description |
+--------+-------------+------------------+----------------------+-------------+
| / | Appointment | Radiology        |   Augustin Lopez DNP      |             |
|    |             |                  | [address]     |             |
|        |             |                  | [place]  |             |
|        |             |                  | [id number] [phone]  |             |
|        |             |                  |  [phone] (Fax)  |             |
+--------+-------------+------------------+----------------------+-------------+
| / | Office      | Vascular Surgery |   Augustin Lopez DNP      |             |
| 2020   | Visit       |                  | [address]     |             |
|        |             |                  | [place]  |             |
|        |             |                  | [address]  [phone]  |             |
|        |             |                  |  [phone] (Fax)  |             |
+--------+-------------+------------------+----------------------+-------------+
 documented as of this encounter
 
 Visit Diagnoses
 Not on filedocumented in this encounter

## 2020-01-13 NOTE — XMS
Encounter Summary
  Created on: 2020
 
 Pasquale Nehemias Martinez
 External Reference #: [id number]
 : 43
 Sex: Male
 
 Demographics
 
 
+-----------------------+----------------------+
| Address               | [address]         |
|                       | [place]  [address] |
+-----------------------+----------------------+
| Home Phone            | [phone]      |
+-----------------------+----------------------+
| Preferred Language    | Unknown              |
+-----------------------+----------------------+
| Marital Status        |               |
+-----------------------+----------------------+
| Congregation Affiliation | [id number]                 |
+-----------------------+----------------------+
| Race                  | Unknown              |
+-----------------------+----------------------+
| Ethnic Group          | Unknown              |
+-----------------------+----------------------+
 
 
 Author
 
 
+--------------+--------------------------------------------+
| Author       | [facility]  |
|              | and [facility]                                |
+--------------+--------------------------------------------+
| Organization | [facility]  |
|              | and [place]                                |
+--------------+--------------------------------------------+
| Address      | Unknown                                    |
+--------------+--------------------------------------------+
| Phone        | Unavailable                                |
+--------------+--------------------------------------------+
 
 
 
 Support
 
 
+--------------+--------------+---------------------+-----------------+
| Name         | Relationship | Address             | Phone           |
+--------------+--------------+---------------------+-----------------+
| Rupa Giordano | ECON         | [address]             | [phone] |
|              |              | [address] [place] [address], [place]   |                 |
|              |              | [id number]               |                 |
+--------------+--------------+---------------------+-----------------+
 
 
 
 
 Care Team Providers
 
 
+-----------------------+------+-------------+
| Care Team Member Name | Role | Phone       |
+-----------------------+------+-------------+
| No, Physician         | PCP  | Unavailable |
+-----------------------+------+-------------+
 
 
 
 Reason for Visit
 Auth/Cert
 
+--------+--------+-----------+--------------+--------------+--------------+
| Status | Reason | Specialty | Diagnoses /  | Referred By  | Referred To  |
|        |        |           | Procedures   | Contact      | Contact      |
+--------+--------+-----------+--------------+--------------+--------------+
|        |        |           |   Diagnoses  |              |              |
|        |        |           |  Mesenteric  |              |              |
|        |        |           | ischemia,    |              |              |
|        |        |           | chronic      |              |              |
|        |        |           | ([facility])        |              |              |
|        |        |           | Procedures   |              |              |
|        |        |           | REVASCULARIZ |              |              |
|        |        |           | ATION        |              |              |
|        |        |           | MESENTERIC   |              |              |
+--------+--------+-----------+--------------+--------------+--------------+
 
 
 
 
 Encounter Details
 
 
+--------+-----------+----------------------+----------------------+----------------------+
| Date   | Type      | Department           | Care Team            | Description          |
+--------+-----------+----------------------+----------------------+----------------------+
| / | Hospital  |   [facility]    |   Sukhdeep Pardo MD     | Mesenteric ischemia, |
| 2019 - | Encounter | [facility] | [address]     |  chronic (HCC);      |
|        |           |  CARE FLO[phone]   | [address]  [place]  | Mesenteric ischemia, |
| / |           | [place]           | [id number]           |  chronic ([facility]);      |
|    |           | [place]         | [phone]         | Hypertension,        |
|        |           | [address]           | [phone] (Fax)   | unspecified type     |
|        |           | [phone]         |                      |                      |
+--------+-----------+----------------------+----------------------+----------------------+
 
 
 
 Social History
 
 
+---------------+-------+-----------+--------+------+
| Tobacco Use   | Types | Packs/Day | Years  | Date |
|               |       |           | Used   |      |
+---------------+-------+-----------+--------+------+
| Former Smoker |       |           |        |      |
+---------------+-------+-----------+--------+------+
 
 
 
 
+---------------------+---+---+---+
| Smokeless Tobacco:  |   |   |   |
| Former User         |   |   |   |
+---------------------+---+---+---+
 
 
 
+------------------------------+
| Comments: quit 50 years ago  |
+------------------------------+
 
 
 
+---------------+-------------+---------+----------+
| Alcohol Use   | Drinks/Week | oz/Week | Comments |
+---------------+-------------+---------+----------+
| Not Currently |             |         | 90m days |
+---------------+-------------+---------+----------+
 
 
 
+------------------+---------------+
| Sex Assigned at  | Date Recorded |
| Birth            |               |
+------------------+---------------+
| Not on file      |               |
+------------------+---------------+
 
 
 
+----------------+-------------+-------------+
| Job Start Date | Occupation  | Industry    |
+----------------+-------------+-------------+
| Not on file    | Not on file | Not on file |
+----------------+-------------+-------------+
 
 
 
+----------------+--------------+------------+
| Travel History | Travel Start | Travel End |
+----------------+--------------+------------+
 
 
 
+-------------------------------------+
| No recent travel history available. |
+-------------------------------------+
 documented as of this encounter
 
 Last Filed Vital Signs
 
 
+-------------------+----------------------+----------------------+----------+
| Vital Sign        | Reading              | Time Taken           | Comments |
+-------------------+----------------------+----------------------+----------+
| Blood Pressure    | 141/66               | 2019 10:42 AM  |          |
|                   |                      | PDT                  |          |
+-------------------+----------------------+----------------------+----------+
 
| Pulse             | 76                   | 2019  7:57 AM  |          |
|                   |                      | PDT                  |          |
+-------------------+----------------------+----------------------+----------+
| Temperature       | 36.9   C (98.5   F)  | 2019  7:57 AM  |          |
|                   |                      | PDT                  |          |
+-------------------+----------------------+----------------------+----------+
| Respiratory Rate  | 18                   | 2019  7:57 AM  |          |
|                   |                      | PDT                  |          |
+-------------------+----------------------+----------------------+----------+
| Oxygen Saturation | 98%                  | 2019  7:57 AM  |          |
|                   |                      | PDT                  |          |
+-------------------+----------------------+----------------------+----------+
| Inhaled Oxygen    | -                    | -                    |          |
| Concentration     |                      |                      |          |
+-------------------+----------------------+----------------------+----------+
| Weight            | 78.3 kg (172 lb 9.9  | 2019  3:50 AM  |          |
|                   | oz)                  | PDT                  |          |
+-------------------+----------------------+----------------------+----------+
| Height            | 168.9 cm (5' 6.5")   | 2019 10:43 AM  |          |
|                   |                      | PDT                  |          |
+-------------------+----------------------+----------------------+----------+
| Body Mass Index   | 27.44                | 2019 10:43 AM  |          |
|                   |                      | PDT                  |          |
+-------------------+----------------------+----------------------+----------+
 documented in this encounter
 
 Functional Status
 
 
+---------------------------------------------+----------+--------------------+
| Functional Status                           | Response | Date of Assessment |
+---------------------------------------------+----------+--------------------+
| Are you deaf or do you have serious         | No       | 2019         |
| difficulty hearing?                         |          |                    |
+---------------------------------------------+----------+--------------------+
| Are you blind or do you have serious        | No       | 2019         |
| difficulty seeing, even when wearing        |          |                    |
| glasses?                                    |          |                    |
+---------------------------------------------+----------+--------------------+
| Do you have serious difficulty walking or   | No       | 2019         |
| climbing stairs? (5 years old or older)     |          |                    |
+---------------------------------------------+----------+--------------------+
| Do you have difficulty dressing or bathing? | No       | 2019         |
|  (5 years old or older)                     |          |                    |
+---------------------------------------------+----------+--------------------+
| Because of a physical, mental, or emotional | No       | 2019         |
|  condition, do you have difficulty doing    |          |                    |
| errands alone such as visiting a doctor's   |          |                    |
| office or shopping? [15 years old or        |          |                    |
| older)]                                     |          |                    |
+---------------------------------------------+----------+--------------------+
 
 
 
+---------------------------------------------+----------+--------------------+
| Cognitive Status                            | Response | Date of Assessment |
+---------------------------------------------+----------+--------------------+
| Because of a physical, mental, or emotional | No       | 2019         |
|  condition, do you have serious difficulty  |          |                    |
| concentrating, remembering, or making       |          |                    |
 
| decisions? (5 years old or older)           |          |                    |
+---------------------------------------------+----------+--------------------+
 documented as of this encounter
 
 Discharge Summaries
 Billy Gutierres PA-C - 2019  8:36 AM PDTFormatting of this note might be different f
rom the original.
 Physician Discharge Summary 
 
 Patient ID:
 Nehemias Giordano
 [id number]
 74 y.o.
 1945
 
 Admit date: 2019
 
 Discharge date and time: 2019
 
 Admitting Physician: Sukhdeep Pardo MD 
 
 Discharge Physician: Billy Gutierres PA-C
 
 Admission Diagnoses: Mesenteric ischemia, chronic (HCC) [K55.1]
 
 Discharge Diagnoses: Same; Status post mesenteric artery revascularization
 
 Admission Condition: poor
 
 Discharged Condition: stable
 
 Indication for Admission: Symptomatic SMA stenosis
 
 Hospital Course: Patient was admitted on 2019 and underwent open SMA endarterectomy an
d patch angioplasty. Post operative course has been uneventful. Began passing flatus two day
s ago and diet has been advanced. Stable for discharge home.
 
 Consults: none
 
 Treatments: surgery: SMA endart and patch angioplasty
 
 Discharge Exam:
 Vitals:reviewed
 CONSTITUTIONAL:   Conversant, well developed, NAD
 EYES: Anicteric sclerae, no lid drag, no proptosis
 RESP: Normal effort, regular, even, unlabored rate
 CV: No peripheral edema, rate regular
 ABD:  Appropriate tenderness.  Hypoactive bowel tones
 SKIN: Pink, warm, dry without rash/lesion
 MS: ROM not limited, no digital cyanosis, normal gait
 NEURO: Conversant, A&O times 3
 PSYCH: appropriate affect, speech and tone, judgement and insight intact
 Vascular:  Palpable brachial pulses bilaterally. Abdominal binder in place. Staples intact.
 
 Disposition: home
 
 Patient Instructions: Keep wound clean and dry, ice to area for comfort and remove dressing
 in two days. Leave open to air. Monitor for signs of infection. 
  
 Discharge Medications 
 
  
 New Medications  
   Details 
 docusate sodium 100 mg capsule
  Take 1 capsule by mouth 2 times daily.
 aka:  COLACE
  
 HYDROcodone-acetaminophen 5-325 mg per tablet
  Take 1 tablet by mouth every 4 hours as needed for Pain.
 aka:  NORCO
  
  
 Unchanged Medications  
   Details 
 ASPIRIN 81 PO
  Take 81 mg by mouth.
  
 CENTRUM SILVER 50+MEN PO
  Take  by mouth.
  
 hydroCHLOROthiazide 25 mg tablet
  Take 25 mg by mouth Daily.
  
 losartan 50 mg tablet
  Take 50 mg by mouth Daily.
 aka:  COZAAR
  
 simvastatin 10 mg tablet
  Take 10 mg by mouth nightly.
 aka:  ZOCOR
  
  
 
 Plan: POD 6. Stable for discharge. Continue aspirin and statin therapy. Continue ambulation
 and pain management. Getting more aggressive with constipation. Patient understands to come
 back in with worsening abdominal pain and failure of bowel movement. 
 Activity: activity as tolerated, ambulate in house and no lifting, Driving, or Strenuous ex
ercise for 6 weeks
 Diet: cardiac diet
 Wound Care: keep wound clean and dry, ice to area for comfort and remove dressing in two da
ys. Leave open to air. Monitor for signs of infection. 
 
 Follow-up with Vascular clinic in 2 weeks.
 
 Signed:
 Billy Gutierres PA-C
 2019
 8:36
 Electronically signed by Billy Gutierres PA-C at 2019  8:42 AM PDTdocumented in this 
encounter
 
 Discharge Instructions
 Instructions Araceli Bartholomew RN - 2019Formatting of this note might be different fro
m the original.
 
 Constipation (Adult)
 Constipation means that you have bowel movements that are less frequent than usual. Stools 
often become very hard and difficult to pass.
 Constipation is very common. At some point in life, it affects almost everyone. Since every
one's bowel habits are different, what is constipation to one person may not be to another. 
 
Your healthcare provider may do tests to diagnose constipation. It depends on whathe or sh
efinds when evaluating you.
 
 Symptoms of constipation include:
  Abdominal pain
  Bloating
  Vomiting
  Painful bowel movements
  Itching, swelling, bleeding, or pain around the anus
 Causes
 Constipation can have many causes. These include:
  Diet low in fiber
  Too much dairy
  Not drinking enough liquids
  Lack of exercise or physical activity (especially true for older adults)
  Changes in lifestyle or daily routine, including pregnancy, aging, work, and travel
  Frequent use or misuse of laxatives
  Ignoring the urge to have a bowel movement or delaying it until later
  Medicines, such as certain prescription pain medicines, iron supplements, antacids, cert
ain antidepressants, and calcium supplements
  Diseases like irritable bowel syndrome, bowel obstructions, stroke, diabetes, thyroid di
sease, Parkinson disease, hemorrhoids, and colon cancer
 Complications
 Potential complications of constipation can include:
  Hemorrhoids
  Rectal bleeding from hemorrhoids or anal fissures(skin tears)
  Hernias
  Dependency on laxatives
  Chronic constipation
  Fecal impaction, a severe form of constipation in which a large amount of hard stool is 
in your rectum that you can't pass
  Bowel obstruction or perforation
 Home care
 All treatment should be done after talking with your healthcare provider. This is especiall
y true if you have another medical problems, are taking prescription medicines, or are an ol
deandre adult. Treatment most often involves lifestyle changes. You may also need medicines. You
r healthcare provider will tell you which will work best for you. Follow the advice below to
 help avoid this problem in the future.
 Lifestyle changes
 These lifestyle changes can help prevent constipation:
  Diet. Eat a high-fiber diet, with fresh fruit and vegetables, and reduce dairy intake, m
eats, and processed foods
  Fluids. It's important to get enough fluids each day. Drink plenty of water when you eat
 more fiber. If you are on diet that limits the amount of fluid you can have, talk about thi
s with your healthcare provider.
  Regular exercise. Check with your healthcare provider first.
 Medicines
 Take any medicines as directed. Some laxatives are safe to use only every now and then. Oth
ers can be taken on a regular basis. While laxatives don't cause bowel dependence, they are 
treating the symptoms. So your constipation may return if you don't make other changes. Talk
 with your healthcare provider or pharmacist if you have questions.
 Prescription pain medicines can cause constipation. If you are taking this kind of medicine
, ask your healthcare provider if you should also take a stool softener.
 Medicines you may take to treat constipation include:
  Fiber supplements
  Stool softeners
  Laxatives
  Enemas
  Rectal suppositories
 Follow-up care
 
 Follow up with your healthcare provider if symptoms don't get better in the next few days. 
You may need to have more tests or see a specialist.
 Call 911
 Call 911 if any of these occur:
  Trouble breathing
  Stiff, rigid abdomen that is severely painful to touch
  Confusion
  Fainting or loss of consciousness
  Rapid heart rate
  Chest pain
 When to seek medical advice
 Call your healthcare provider right away if any of these occur:
  Fever of 100.4F (38C) or higher, or as directed by your healthcare provider
  Failure to resume normal bowel movements
  Pain in your abdomen or back gets worse
  Nausea or vomiting
  Swelling in your abdomen
  Blood in the stool
  Black, tarry stool
  Involuntary weight loss
  Weakness
 Date Last Reviewed: 2018-2018 [employer]. [address] [place] [address]. All righ
ts reserved. This information is not intended as a substitute for professional medical care.
 Always follow your healthcare professional's instructions.
 
 After Open Abdominal Superior Mesenteric Artery Surgery
 You have hadsurgery to repair SMA stenosis.
 Home care
 Recommendations for taking care of yourself at home include the following:
  Avoid strenuous activity for 4 to 6 weeks after your surgery.
  Ask your healthcare provider how long it will be before you can return to work.
  Gradually increase your activity. It may take some time for you to return to your normal
 activity level.
  Don  t drive for 2 weeks after surgery or while you are taking opioid pain medicine. As
k someone to take you to any appointments.
  Check your incision every day for signs of infection (swelling, redness, drainage, or wa
rmth).
  Keep your incision clean. Wash it gently with soap and water when you shower.
  Don  t lift anything heavier than 5 pounds for 2 weeks after surgery.
  Avoid sitting or standing for long periods without moving your legs and feet.
  Keep your feet up when you sit in a chair.
  Take your medicines exactly as directed.
 
 When to call your healthcare provider
 Call your healthcare provider right away if you have any of the following:
  Redness, pain, swelling, or drainage from your incision
  Fever of 100.4F (38C) or higher, or as directed by your healthcare provider
  Sudden coldness, pain, or paleness in your leg
  Loss of feeling in your legs
  Severe or sudden pain in your stomach
  Fail to pass gas
  Bloody bowel movements
  Prolonged constipation
  Nausea or vomiting
  Trouble breathing
  Pain or heaviness in your chest or arms 
 Date Last Reviewed: 2016-2018 [employer]. [address] [place] [address], [place] [address]. All righ
ts reserved. This information is not intended as a substitute for professional medical care.
 
 Always follow your healthcare professional's instructions.
 
 
 documented in this encounter
 
 Medications at Time of Discharge
 
 
+----------------------+----------------------+-----------+---------+----------+----------+
| Medication           | Sig                  | Dispensed | Refills | Start    | End Date |
|                      |                      |           |         | Date     |          |
+----------------------+----------------------+-----------+---------+----------+----------+
|   ASPIRIN 81 PO      | Take 81 mg by mouth. |           | 0       |          |          |
+----------------------+----------------------+-----------+---------+----------+----------+
|   docusate sodium    | Take 1 capsule by    |   30      | 0       | / |          |
| (COLACE) 100 mg      | mouth 2 times daily. | capsule   |         | 19       |          |
| capsule              |                      |           |         |          |          |
+----------------------+----------------------+-----------+---------+----------+----------+
|                      | Take 25 mg by mouth  |           | 0       |          |          |
| hydroCHLOROthiazide  | Daily.               |           |         |          |          |
| 25 mg tablet         |                      |           |         |          |          |
+----------------------+----------------------+-----------+---------+----------+----------+
|                      | Take 1 tablet by     |   30      | 0       | / |          |
| HYDROcodone-acetamin | mouth every 4 hours  | tablet    |         | 19       |          |
| ophen (NORCO) 5-325  | as needed for Pain.  |           |         |          |          |
| mg per tablet        |                      |           |         |          |          |
+----------------------+----------------------+-----------+---------+----------+----------+
|   losartan (COZAAR)  | Take 50 mg by mouth  |           | 0       |          |          |
| 50 mg tablet         | Daily.               |           |         |          |          |
+----------------------+----------------------+-----------+---------+----------+----------+
|   Multiple           | Take  by mouth.      |           | 0       |          |          |
| Vitamins-Minerals    |                      |           |         |          |          |
| (CENTRUM SILVER      |                      |           |         |          |          |
| 50+MEN PO)           |                      |           |         |          |          |
+----------------------+----------------------+-----------+---------+----------+----------+
|   simvastatin        | Take 10 mg by mouth  |           | 0       |          |          |
| (ZOCOR) 10 mg tablet | nightly.             |           |         |          |          |
+----------------------+----------------------+-----------+---------+----------+----------+
 documented as of this encounter
 
 Progress Notes
 Araceli Bartholomew RN - 2019 11:37 AM PDTPatient discharged to home via private car. Ve
rbal and written instructions provided to patient, all questions addressed. Rx and all belon
gings gathered. IVs removed and bandages applied. Patient wheeled to car and discharged in s
table condition with family. 
 Electronically signed by Araceli Bartholomew RN at 2019 11:37 AM Gracy Mary RN 
- 2019  6:58 AM PDTPt passing gas but still no BM. Bowel tones: normoactive to hypoact
dave in upper quadrants, hypoactive in both lower quadrants.  Pt declined suppository twice, 
stating, "If I still haven't had a BM by the morning, I might do the suppository around 8."
 
 Pt set to dc home with wife @ 11 (lives in [place]).  
 
 Abdominal hydrocolloid dressing C/D/I, with no new drainage.  Pt splints appropriately when
 coughing.
 
 End of shift review complete.Electronically signed by Gracy Joe RN at 2019  8:
04 AM Beatrice Man RN - 2019  6:31 PM PDTPt advanced to full liquids, continues 
to pass gas but no BM yet. Bowel tones present. Will continue to monitor.
 
 Beatrice Goel RN Electronically signed by Beatrice Goel RN at 2019  6:32 PM Christopher Cho MD - 2019 12:46 PM PDTVascular Surgery
  still with no BM.  Just took 1 tab of Norco.  Seems to be controlling the pain.  He wants 
to wait till tomorrow. Discussed his care with his wife by phone.  Questions answered about 
diet, climbing 13 stairs to get into one level home. Wife will arrive by 11am with friend to
 help him home and into house.  Wants a walker for home as well. Ok to shower.Electronically
 signed by Christopher Man MD at 2019 12:48 PM Christopher Prince MD - 2019  8:46 AM PDTFormatting of this note might be different from the original.
 [facility]
 Service: Vascular Surgery
 Progress Note
 
 Hospital Day:   LOS: 5 days 
 Post-Op Day:  #5
 SUBJECTIVE
 
      Patient Summary:  S/p SMA endarterectomy for chronic mesenteric ischemia
 
      Events Overnight:    Abdominal binder in place. Passing gas, no stool.  No N/V. Thirst
y.
 
 Scheduled Medications 
   aspirin  81 mg Oral Daily 
   hydroCHLOROthiazide  25 mg Oral Daily 
   losartan  50 mg Oral Daily 
   pantoprazole  40 mg Intravenous Daily 
 
 Continuous Infusions 
   niCARdipine Stopped (19 1628) 
 
 PRN Medications
 acetaminophen, bisacodyl, HYDROcodone-acetaminophen, HYDROmorphone, insulin lispro, morphin
e, sodium chloride 0.9%
 
 OBJECTIVE
 Vital Signs:
 Vitals: 
  19 0715 
 BP: 155/74 
 Pulse: 71 
 Resp: 18 
 Temp: 36.7 C (98 F) 
 
 Physical Exam
 Vitals:reviewed
 CONSTITUTIONAL:  Alert/appropriate
 CV: No peripheral edema, rate regular
 ABD:  Incisional tenderness only. normal bowel tones
 DRSNG: dry, spotty strikethru.
 SKIN: Pink, warm, well hydrated
 LE: w/out calf tenderness. No edema. 
 
 DATA
 Recent Results (from the past 24 hour(s)) 
 Basic Metabolic Panel 
  Collection Time: 19  4:17 
 Result Value Ref Range 
  Na 135 135 - 145 mmol/L 
  K 3.8 3.5 - 4.9 mmol/L 
  Cl 102 99 - 109 mmol/L 
  CO2 26 23 - 32 mmol/L 
 
  Anion Gap 11 5 - 20 mmol/L 
  Glucose 140 (H) 65 - 99 mg/dL 
  BUN 8 8 - 25 mg/dL 
  Creatinine 0.9 0.70 - 1.30 mg/dL 
  BUN/Creatinine Ratio 9  
  Calcium 8.5 8.5 - 10.5 mg/dL 
  Estimated GFR >60 >60 mL/min/1.73m2 
 CBC with Differential 
  Collection Time: 19  4:17 
 Result Value Ref Range 
  WBC 5.40 3.80 - 11.00 K/uL 
  RBC 3.57 (L) 4.20 - 5.70 M/uL 
  Hemoglobin 12.1 (L) 13.2 - 17.0 g/dL 
  Hematocrit 34.5 (L) 39.0 - 50.0 % 
  MCV 96.7 80.0 - 100.0 fl 
  MCH 33.8 27.0 - 34.0 pg 
  MCHC 35.0 32.0 - 35.5 g/dL 
  RDW-SD 45.5 37 - 53 fl 
  Platelet Count 158 150 - 400 K/uL 
  MPV 7.5 fl 
  Diff Type AUTOMATED  
  % Neutrophils 55.42 % 
  % Lymphocytes 36.41 % 
  Monocyte % 6.61 % 
  Eosinophils % 1.23 % 
  Basophils % 0.33 % 
  Neutrophils, Absolute 3.00 1.90 - 7.40 K/uL 
  Absolute Lymphocytes 1.97 1.00 - 3.90 K/uL 
  Absolute Monocytes 0.36 0.00 - 0.80 K/uL 
  Eosinophils, Absolute 0.07 0.00 - 0.50 K/uL 
  Basophils, Absolute 0.02 0.00 - 0.10 K/uL 
 
 PROBLEM LIST
 
 Principal Problem:
   Mesenteric ischemia, chronic 
 Active Problems:
   Hypertension
   Hyperlipidemia
 
 ASSESSMENT & PLAN
 
 S/p SMA endarterectomy for chronic mesenteric ischemia.  POD5- ileus resolving. Will begin 
advance diet.  Continue ambulation.  Oral pain med, hold IV dilaudid. Home afternoon if tole
rates diet and pain controlled.   
 
 Disposition:  [facility]
 Code Status:  Full Code
 
 Christopher Man MD
 2019                                           [phone] signed by Merry Man MD at 2019 12:46 PM PDTGracy Joe, RN - 2019  6:54
 AM PDTHourly rounding performed.  Pt passing gas but has not had BM yet. Ambulated 1 lap ar
ound unit. Cares & concerns will be passed on to daysotilia RN.
 End of shift review complete.Electronically signed by Gracy Joe RN at 2019  7:
58 AM Ace Kaur RN - 2019  6:28 PM PDTA/O, up with supervision, passing gas t
his shift, still no BM, requesting dilaudid q2, plan to transition to PO pain meds tomorrow,
 advanced to clear liquids today, tolerating well
 
 Chart check complete
 
 
 Electronically signed by: Ace Mas RN 2019 18:30
 Electronically signed by Ace Mas RN at 2019  6:30 PM Christopher Prince MD - 2019  9:46 AM PDTFormatting of this note might be different from the [facility]
 Service: Vascular Surgery
 Progress Note
 
 Hospital Day:   LOS: 4 days 
 Post-Op Day:  3
 SUBJECTIVE
 
      Patient Summary:  S/p SMA endarterectomy for chronic mesenteric ischemia
 
      Events Overnight:  Transferred from ICU to floor. Abdominal binder in place. Passing g
as, no stool.  No N/V. Thirsty.
 
 Scheduled Medications 
   pantoprazole  40 mg Intravenous Daily 
 
 Continuous Infusions 
   dextrose 5% and sodium chloride 0.45% with KCl 20 mEq/L 125 mL/hr at 19 0219 
   niCARdipine Stopped (19 1628) 
 
 PRN Medications
 acetaminophen, bisacodyl, HYDROmorphone, insulin lispro, morphine, sodium chloride 0.9%
 
 OBJECTIVE
 Vital Signs:
 Vitals: 
  19 0807 
 BP: 174/85 
 Pulse: 83 
 Resp: 18 
 Temp: 37.9 C (100.2 F) 
 
 Physical Exam
 Vitals:reviewed
 CONSTITUTIONAL:  Alert/appropriate
 CV: No peripheral edema, rate regular
 ABD:  Incisional tenderness only. normal bowel tones
 DRSNG: dry, spotty strikethru.
 SKIN: Pink, warm, well hydrated
 LE: w/out calf tenderness. No edema. 
 
 DATA
 Recent Results (from the past 24 hour(s)) 
 Basic Metabolic Panel 
  Collection Time: 19  4:29 
 Result Value Ref Range 
  Na 135 135 - 145 mmol/L 
  K 4.0 3.5 - 4.9 mmol/L 
  Cl 103 99 - 109 mmol/L 
  CO2 25 23 - 32 mmol/L 
  Anion Gap 11 5 - 20 mmol/L 
  Glucose 140 (H) 65 - 99 mg/dL 
  BUN 9 8 - 25 mg/dL 
  Creatinine 0.8 0.70 - 1.30 mg/dL 
  BUN/Creatinine Ratio 11  
 
  Calcium 8.7 8.5 - 10.5 mg/dL 
  Estimated GFR >60 >60 mL/min/1.73m2 
 CBC with Differential 
  Collection Time: 19  4:29 
 Result Value Ref Range 
  WBC 5.54 3.80 - 11.00 K/uL 
  RBC 3.72 (L) 4.20 - 5.70 M/uL 
  Hemoglobin 12.3 (L) 13.2 - 17.0 g/dL 
  Hematocrit 36.0 (L) 39.0 - 50.0 % 
  MCV 96.8 80.0 - 100.0 fl 
  MCH 33.1 27.0 - 34.0 pg 
  MCHC 34.2 32.0 - 35.5 g/dL 
  RDW-SD 46.8 37 - 53 fl 
  Platelet Count 140 (L) 150 - 400 K/uL 
  MPV 7.4 fl 
  Diff Type AUTOMATED  
  % Neutrophils 57.00 % 
  % Lymphocytes 36.25 % 
  Monocyte % 5.29 % 
  Eosinophils % 1.03 % 
  Basophils % 0.43 % 
  Neutrophils, Absolute 3.16 1.90 - 7.40 K/uL 
  Absolute Lymphocytes 2.01 1.00 - 3.90 K/uL 
  Absolute Monocytes 0.29 0.00 - 0.80 K/uL 
  Eosinophils, Absolute 0.06 0.00 - 0.50 K/uL 
  Basophils, Absolute 0.02 0.00 - 0.10 K/uL 
 
 PROBLEM LIST
 
 Principal Problem:
   Mesenteric ischemia, chronic 
 Active Problems:
   Hypertension
   Hyperlipidemia
 
 ASSESSMENT & PLAN
 
 S/p SMA endarterectomy for chronic mesenteric ischemia.  POD4- ileus resolving. Will begin 
clear liquids.  Continue ambulation.  Resume po BP meds for sBP climbing. Wait on po pain me
d.  
 
 Disposition:  [facility]
 Code Status:  Full Code
 
 Christopher Man MD
 2019                                           [phone] signed by Merry Man MD at 2019  9:52 AM Lizzy Navarro RN - 2019  5:57
 PM PDTPt transferred to 91, report received from LUZ MARIA Godfrey. Medicated for pain, see MAR
. Pt up walking this shift with FWW. Bowel tones hypoactive, but gurgling.
 
 Chart check complete.
 
 Lizzy Orantes RN
 Electronically signed by Lizzy Orantes RN at 2019  6:00 PM Billy Briceno PA- C - 2019  3:56 PM PDTFormatting of this note might be different from the original.
 [facility]
 Service: Vascular Surgery
 Progress Note
 
 Hospital Day:   LOS: 3 days 
 
 Post-Op Day:  3
 SUBJECTIVE
 
      Patient Summary:  S/p SMA endarterectomy for chronic mesenteric ischemia
 
      Events Overnight:  No events overnight. Abdominal binder in place. 
 
 Scheduled Medications 
   pantoprazole  40 mg Intravenous Daily 
 
 Continuous Infusions 
   dextrose 5% and sodium chloride 0.45% with KCl 20 mEq/L 125 mL/hr at 19 0819 
   niCARdipine Stopped (19 1628) 
 
 PRN Medications
 acetaminophen, bisacodyl, HYDROmorphone, insulin lispro, morphine, sodium chloride 0.9%
 
 OBJECTIVE
 Vital Signs:
 Vitals: 
  19 1456 
 BP: (!) 166/91 
 Pulse: 81 
 Resp: 16 
 Temp: 36.8 C (98.2 F) 
 
 Physical Exam
 Vitals:reviewed
 CONSTITUTIONAL:  Conversant, well developed, NAD
 EYES: Anicteric sclerae, no lid drag, no proptosis
 RESP: Normal effort, regular, even, unlabored rate
 CV: No peripheral edema, rate regular
 ABD:  Appropriate tenderness.  Hypoactive bowel tones
 SKIN: Pink, warm, dry without rash/lesion
 MS: ROM not limited, no digital cyanosis, normal gait
 NEURO: Conversant, A&O times 3
 PSYCH: appropriate affect, speech and tone, judgement and insight intact
 Vascular:  Palpable brachial pulses bilaterally. Abdominal binder in place. Staples intact.
 
 DATA
 Recent Results (from the past 24 hour(s)) 
 Basic Metabolic Panel 
  Collection Time: 19  4:23 
 Result Value Ref Range 
  Na 137 135 - 145 mmol/L 
  K 4.0 3.5 - 4.9 mmol/L 
  Cl 104 99 - 109 mmol/L 
  CO2 26 23 - 32 mmol/L 
  Anion Gap 11 5 - 20 mmol/L 
  Glucose 140 (H) 65 - 99 mg/dL 
  BUN 8 8 - 25 mg/dL 
  Creatinine 0.9 0.70 - 1.30 mg/dL 
  BUN/Creatinine Ratio 9  
  Calcium 8.4 (L) 8.5 - 10.5 mg/dL 
  Estimated GFR >60 >60 mL/min/1.73m2 
 CBC with Differential 
  Collection Time: 19  4:23 
 Result Value Ref Range 
  WBC 9.31 3.80 - 11.00 K/uL 
  RBC 3.81 (L) 4.20 - 5.70 M/uL 
 
  Hemoglobin 12.7 (L) 13.2 - 17.0 g/dL 
  Hematocrit 37.1 (L) 39.0 - 50.0 % 
  MCV 97.5 80.0 - 100.0 fl 
  MCH 33.5 27.0 - 34.0 pg 
  MCHC 34.3 32.0 - 35.5 g/dL 
  RDW-SD 46.8 37 - 53 fl 
  Platelet Count 162 150 - 400 K/uL 
  MPV 7.5 fl 
  Diff Type AUTOMATED  
  % Neutrophils 54.94 % 
  % Lymphocytes 39.44 % 
  Monocyte % 4.62 % 
  Eosinophils % 0.69 % 
  Basophils % 0.31 % 
  Neutrophils, Absolute 5.12 1.90 - 7.40 K/uL 
  Absolute Lymphocytes 3.67 1.00 - 3.90 K/uL 
  Absolute Monocytes 0.43 0.00 - 0.80 K/uL 
  Eosinophils, Absolute 0.06 0.00 - 0.50 K/uL 
  Basophils, Absolute 0.03 0.00 - 0.10 K/uL 
 
 PROBLEM LIST
 
 Principal Problem:
   Mesenteric ischemia, chronic 
 Active Problems:
   Hypertension
   Hyperlipidemia
 
 ASSESSMENT & PLAN
 
 S/p SMA endarterectomy for chronic mesenteric ischemia.  POD3.  Denies flatus still. Contin
ue strict NPO with swabs for comfort until return of bowel function.  Continue ambulation wi
th PT.  
 
 Disposition:  [facility]
 Code Status:  Full Code
 
 Billy Gutierres PA-C
 2019Electronically signed by Billy Gutierres PA-C at 2019  3:58 PM Bekah Carmona RN - 2019  2:51 PM PDTPt transferred to 9RP. Electronically signed by Hina Lord RN at 2019  2:52 PM Bekah Carmona RN - 2019  2:33 PM PD
[facility] on 9[facility]. Will transfer pt at this time. Electronically signed by Nino Lord RN at 2019  2:33 PM PDTMaye Guevara RN - 2019  7:31 PM PDT
Patient worked with PT this morning and has sat in chair the remainder of the shift. Pt c/o 
occasional pain, worse with ambulation, coughing, or laughing. Medicated with 1mg Dilaudid x
 3 with relief. Patient made several trips to bathroom with use of 2WW and CGA. Abdominal bi
nder in place per JAILYN Sierra. Pt denies passing flatus this shift, continues to be NPO.
 Myae Guevara RN
 Electronically signed by Maye Guevara RN at 2019  7:33 PM Sukhdeep Castro MD -   6:56 AM PDTFormatting of this note might be different from the original.
 [facility]
 Service: Vascular Surgery
 Progress Note
 
 Hospital Day:   LOS: 2 days 
 Post-Op Day:  2 Days Post-Op
 SUBJECTIVE
 
      Patient Summary:  S/p SMA endarterectomy for chronic mesenteric ischemia
 
 
      Events Overnight:  No events overnight.  Pain well controlled with pain meds.  Ambulat
ed yesterday with assistance.  
 
 Scheduled Medications 
   pantoprazole  40 mg Intravenous Daily 
 
 Continuous Infusions 
   dextrose 5% and sodium chloride 0.45% with KCl 20 mEq/L 125 mL/hr at 19 2228 
   niCARdipine Stopped (19 1628) 
 
 PRN Medications
 acetaminophen, bisacodyl, HYDROmorphone, insulin lispro, morphine, sodium chloride 0.9%
 
 OBJECTIVE
 Vital Signs:
 Vitals: 
  19 0421 
 BP: 149/80 
 Pulse: 63 
 Resp: 16 
 Temp: 37.2 C (99 F) 
 
 Physical Exam
 Vitals:reviewed
 CONSTITUTIONAL:  Conversant, well developed, NAD
 EYES: Anicteric sclerae, no lid drag, no proptosis
 RESP: Normal effort, regular, even, unlabored rate
 CV: No peripheral edema, rate regular
 ABD:  Appropriate tenderness.  Hypoactive bowel tones
 SKIN: Pink, warm, dry without rash/lesion
 MS: ROM not limited, no digital cyanosis, normal gait
 NEURO: Conversant, A&O times 3
 PSYCH: appropriate affect, speech and tone, judgement and insight intact
 Vascular:  Palpable brachial pulses bilaterally. 
 
 DATA
 Recent Results (from the past 24 hour(s)) 
 Lactic Acid 
  Collection Time: 19  9:09 
 Result Value Ref Range 
  Lactate, Serum 1.6 0.4 - 2.0 mmol/L 
 Basic Metabolic Panel 
  Collection Time: 19  4:06 
 Result Value Ref Range 
  Na 138 135 - 145 mmol/L 
  K 4.4 3.5 - 4.9 mmol/L 
  Cl 106 99 - 109 mmol/L 
  CO2 25 23 - 32 mmol/L 
  Anion Gap 11 5 - 20 mmol/L 
  Glucose 144 (H) 65 - 99 mg/dL 
  BUN 16 8 - 25 mg/dL 
  Creatinine 0.9 0.70 - 1.30 mg/dL 
  BUN/Creatinine Ratio 18  
  Calcium 8.6 8.5 - 10.5 mg/dL 
  Estimated GFR >60 >60 mL/min/1.73m2 
 CBC with Differential 
  Collection Time: 19  4:06 
 Result Value Ref Range 
  WBC 10.14 3.80 - 11.00 K/uL 
  RBC 3.79 (L) 4.20 - 5.70 M/uL 
 
  Hemoglobin 12.6 (L) 13.2 - 17.0 g/dL 
  Hematocrit 37.1 (L) 39.0 - 50.0 % 
  MCV 97.8 80.0 - 100.0 fl 
  MCH 33.3 27.0 - 34.0 pg 
  MCHC 34.1 32.0 - 35.5 g/dL 
  RDW-SD 47.3 37 - 53 fl 
  Platelet Count 149 (L) 150 - 400 K/uL 
  MPV 7.4 fl 
  Diff Type AUTOMATED  
  % Neutrophils 63.73 % 
  % Lymphocytes 31.45 % 
  Monocyte % 4.45 % 
  Eosinophils % 0.24 % 
  Basophils % 0.13 % 
  Neutrophils, Absolute 6.46 1.90 - 7.40 K/uL 
  Absolute Lymphocytes 3.19 1.00 - 3.90 K/uL 
  Absolute Monocytes 0.45 0.00 - 0.80 K/uL 
  Eosinophils, Absolute 0.02 0.00 - 0.50 K/uL 
  Basophils, Absolute 0.01 0.00 - 0.10 K/uL 
 
 PROBLEM LIST
 
 Principal Problem:
   Mesenteric ischemia, chronic 
 Active Problems:
   Hypertension
   Hyperlipidemia
 
 ASSESSMENT & PLAN
 
 S/p SMA endarterectomy for chronic mesenteric ischemia.  Doing well.  Denies flatus. Contin
ue strict NPO with swabs for comfort until return of bowel function.  Continue ambulation wi
th PT.  
 
 Disposition:  [facility]
 Code Status:  Full Code
 
 Sukhdeep Pardo MD
 2019Electronically signed by Sukhdeep Pardo MD at 2019  6:59 AM Sukhdeep Castro MD - 
2019  9:10 AM PDTFormatting of this note might be different from the original.
 [facility]
 Service: Vascular Surgery
 Progress Note
 
 Hospital Day:   LOS: 1 day 
 Post-Op Day:  1 Day Post-Op
 SUBJECTIVE
 
      Patient Summary:  S/p SMA endarterectomy for chronic mesenteric ischemia
 
      Events Overnight:  No events overnight.  Pain well controlled with pain meds.  
 
 Scheduled Medications 
 
 Continuous Infusions 
   dextrose 5% and sodium chloride 0.45% with KCl 20 mEq/L 125 mL/hr at 19 0724 
   niCARdipine Stopped (19 1628) 
 
 PRN Medications
 acetaminophen, aluminum & magnesium hydroxide-simethicone, bisacodyl, calcium carbonate, do
 
cusate sodium, HYDROmorphone, insulin lispro, morphine, sodium chloride 0.9%
 
 OBJECTIVE
 Vital Signs:
 Vitals: 
  19 0800 
 BP: 128/68 
 Pulse: 78 
 Resp: 12 
 Temp: 37 C (98.6 F) 
 
 Physical Exam
 Vitals:reviewed
 CONSTITUTIONAL:  Conversant, well developed, NAD
 EYES: Anicteric sclerae, no lid drag, no proptosis
 RESP: Normal effort, regular, even, unlabored rate
 CV: No peripheral edema, rate regular
 SKIN: Pink, warm, dry without rash/lesion
 ABD:  Soft, appropriate tenderness, hypoactive bowel tones
 MS: ROM not limited, no digital cyanosis
 NEURO: Conversant, A&O times 3
 PSYCH: appropriate affect, speech and tone, judgement and insight intact
 
 DATA
 Recent Results (from the past 24 hour(s)) 
 Type and Screen 
  Collection Time: 19 11:03 
 Result Value Ref Range 
  ABO Rh O POSITIVE  
  Antibody Screen NEGATIVE  
  BB BAND [id number]  
  BB BAND   
   Testing performed at [facility];[address] [place] [address];[place] [address] 
 Basic Metabolic Panel 
  Collection Time: 19  4:40 
 Result Value Ref Range 
  Na 140 135 - 145 mmol/L 
  K 4.5 3.5 - 4.9 mmol/L 
  Cl 107 99 - 109 mmol/L 
  CO2 23 23 - 32 mmol/L 
  Anion Gap 15 5 - 20 mmol/L 
  Glucose 192 (H) 65 - 99 mg/dL 
  BUN 17 8 - 25 mg/dL 
  Creatinine 1.02 0.70 - 1.30 mg/dL 
  BUN/Creatinine Ratio 17  
  Calcium 8.6 8.5 - 10.5 mg/dL 
  Estimated GFR >60 >60 mL/min/1.73m2 
 CBC with Differential 
  Collection Time: 19  4:40 
 Result Value Ref Range 
  WBC 13.98 (H) 3.80 - 11.00 K/uL 
  RBC 4.07 (L) 4.20 - 5.70 M/uL 
  Hemoglobin 13.3 13.2 - 17.0 g/dL 
  Hematocrit 39.6 39.0 - 50.0 % 
  MCV 97.4 80.0 - 100.0 fl 
  MCH 32.8 27.0 - 34.0 pg 
  MCHC 33.7 32.0 - 35.5 g/dL 
  RDW-SD 47.3 37 - 53 fl 
  Platelet Count 185 150 - 400 K/uL 
  MPV 7.3 fl 
 
  Diff Type AUTOMATED  
  % Neutrophils 55.68 % 
  % Lymphocytes 39.54 % 
  Monocyte % 4.55 % 
  Eosinophils % 0.05 % 
  Basophils % 0.18 % 
  Neutrophils, Absolute 7.79 (H) 1.90 - 7.40 K/uL 
  Absolute Lymphocytes 5.53 (H) 1.00 - 3.90 K/uL 
  Absolute Monocytes 0.64 0.00 - 0.80 K/uL 
  Eosinophils, Absolute 0.01 0.00 - 0.50 K/uL 
  Basophils, Absolute 0.03 0.00 - 0.10 K/uL 
  RBC Morphology RBC AND PLT MORPHOLOGY APPEAR NORMAL  
  Platelet Estimate ADEQUATE  
  Comment SLIDE SCANNED, AGREES WITH AUTOMATED RESULTS.  
 ECG 12 lead 
  Collection Time: 19  6:38 
 Result Value Ref Range 
  INTERPRETATION TEXT Not Confirmed  
 
 PROBLEM LIST
 
 Principal Problem:
   Mesenteric ischemia, chronic 
 Active Problems:
   Hypertension
   Hyperlipidemia
 
 ASSESSMENT & PLAN
 
 S/p SMA endarterectomy for chronic mesenteric ischemia.  Doing well.  Transfer to cardiac u
nit today. Up out of bed with PT and ambulate with assist.  Keep NPO until return of bowel f
unction.  Discontinue yi catheter today.  
 
 Disposition:  [facility]
 Code Status:  Full Code
 
 Sukhdeep Pardo MD
 2019Electronically signed by Sukhdeep Pardo MD at 2019  9:15 AM PDTStdiane, Maye VELOZ RN
 - 2019  6:30 PM PDTPt received after OR procedure with Dr Pardo.  His main complaint wa
s of abdominal pain.  He was also hypertensive.  Goal BP <160.  Once pain was better control
led, he no longer met the criteria for the nicardipene gtt.  He will be NPO until he passes 
gas which is anticipated to be 3-4 days.
 
 No blood, restraints, protocols, or signed and held orders.
 
 Morphine given x1 per parameters with no effect.
 Dilaudid given x3 per parameters with moderate effect.
 
 End of shift review complete. Electronically signed by Maye Amin RN at 2019  6:32
 PM PDTdocumented in this encounter
 
 Plan of Treatment
 
 
+--------+-------------+------------------+----------------------+-------------+
| Date   | Type        | Specialty        | Care Team            | Description |
+--------+-------------+------------------+----------------------+-------------+
| / | Appointment | Radiology        |   Augustin Lopez DNP      |             |
|    |             |                  | [address]     |             |
|        |             |                  | [place]  |             |
 
|        |             |                  | [id number] [phone]  |             |
|        |             |                  |  [phone] (Fax)  |             |
+--------+-------------+------------------+----------------------+-------------+
| / | Office      | Vascular Surgery |   Augustin Lopez DNP      |             |
2020   | Visit       |                  | [address]     |             |
|        |             |                  | [place]  |             |
|        |             |                  | [address] [phone]  |             |
|        |             |                  |  [phone] (Fax)  |             |
+--------+-------------+------------------+----------------------+-------------+
 documented as of this encounter
 
 Procedures
 
 
+---------------------+--------+-------------+----------------------+----------------------+
| Procedure Name      | Priori | Date/Time   | Associated Diagnosis | Comments             |
|                     | ty     |             |                      |                      |
+---------------------+--------+-------------+----------------------+----------------------+
| CBC WITH            | Routin | 2019  |                      |   Results for this   |
| DIFFERENTIAL        | e      |  4:17 AM    |                      | procedure are in the |
|                     |        | PDT         |                      |  results section.    |
+---------------------+--------+-------------+----------------------+----------------------+
| BASIC METABOLIC     | Routin | 2019  |                      |   Results for this   |
| PANEL               | e      |  4:17 AM    |                      | procedure are in the |
|                     |        | PDT         |                      |  results section.    |
+---------------------+--------+-------------+----------------------+----------------------+
| CBC WITH            | Routin | 2019  |                      |   Results for this   |
| DIFFERENTIAL        | e      |  4:29 AM    |                      | procedure are in the |
|                     |        | PDT         |                      |  results section.    |
+---------------------+--------+-------------+----------------------+----------------------+
| BASIC METABOLIC     | Routin | 2019  |                      |   Results for this   |
| PANEL               | e      |  4:29 AM    |                      | procedure are in the |
|                     |        | PDT         |                      |  results section.    |
+---------------------+--------+-------------+----------------------+----------------------+
| CBC WITH            | Routin | 2019  |                      |   Results for this   |
| DIFFERENTIAL        | e      |  4:23 AM    |                      | procedure are in the |
|                     |        | PDT         |                      |  results section.    |
+---------------------+--------+-------------+----------------------+----------------------+
| BASIC METABOLIC     | Routin | 2019  |                      |   Results for this   |
| PANEL               | e      |  4:23 AM    |                      | procedure are in the |
|                     |        | PDT         |                      |  results section.    |
+---------------------+--------+-------------+----------------------+----------------------+
| CBC WITH            | Routin | 2019  |                      |   Results for this   |
| DIFFERENTIAL        | e      |  4:06 AM    |                      | procedure are in the |
|                     |        | PDT         |                      |  results section.    |
+---------------------+--------+-------------+----------------------+----------------------+
| BASIC METABOLIC     | Routin | 2019  |                      |   Results for this   |
| PANEL               | e      |  4:06 AM    |                      | procedure are in the |
|                     |        | PDT         |                      |  results section.    |
+---------------------+--------+-------------+----------------------+----------------------+
| LACTIC ACID         | STAT   | 2019  |                      |   Results for this   |
|                     |        |  9:09 AM    |                      | procedure are in the |
|                     |        | PDT         |                      |  results section.    |
+---------------------+--------+-------------+----------------------+----------------------+
| ECG 12 LEAD         | Routin | 2019  |                      |   Results for this   |
|                     | e      |  5:37 AM    |                      | procedure are in the |
|                     |        | PDT         |                      |  results section.    |
+---------------------+--------+-------------+----------------------+----------------------+
| CBC WITH            | Routin | 2019  |                      |   Results for this   |
| DIFFERENTIAL        | e      |  4:40 AM    |                      | procedure are in the |
 
|                     |        | PDT         |                      |  results section.    |
+---------------------+--------+-------------+----------------------+----------------------+
| BASIC METABOLIC     | Routin | 2019  |                      |   Results for this   |
| PANEL               | e      |  4:40 AM    |                      | procedure are in the |
|                     |        | PDT         |                      |  results section.    |
+---------------------+--------+-------------+----------------------+----------------------+
| SURGICAL PATHOLOGY  | Routin | 2019  |   [facility]         |   Results for this   |
| EXAM                | e      |  1:50 PM    | ischemia, chronic    | procedure are in the |
|                     |        | PDT         | (HCC)                |  results section.    |
+---------------------+--------+-------------+----------------------+----------------------+
| REVASCULARIZATION   |        | 2019  |   [facility]         |                      |
| [facility]          |        | 11:12 AM    | ischemia, chronic    |                      |
|                     |        | PDT         | (HCC)                |                      |
+---------------------+--------+-------------+----------------------+----------------------+
| TYPE AND SCREEN     | STAT   | 2019  |                      |   Results for this   |
|                     |        | 11:03 AM    |                      | procedure are in the |
|                     |        | PDT         |                      |  results section.    |
+---------------------+--------+-------------+----------------------+----------------------+
 documented in this encounter
 
 Results
 CBC with Differential (2019  4:17 AM PDT)
 
+-------------+--------------------------+-----------------+------------+--------------+
| Component   | Value                    | Ref Range       | Performed  | Pathologist  |
|             |                          |                 | At         | Signature    |
+-------------+--------------------------+-----------------+------------+--------------+
| WBC         | 5.40                     | 3.80 - 11.00    | [facility]       |              |
|             |                          | K/uL            | LABORATORY |              |
+-------------+--------------------------+-----------------+------------+--------------+
| RBC         | 3.57 (L)                 | 4.20 - 5.70     | [facility]       |              |
|             |                          | M/uL            | LABORATORY |              |
+-------------+--------------------------+-----------------+------------+--------------+
| Hemoglobin  | 12.1 (L)                 | 13.2 - 17.0     | [facility]       |              |
|             |                          | g/dL            | LABORATORY |              |
+-------------+--------------------------+-----------------+------------+--------------+
| Hematocrit  | 34.5 (L)                 | 39.0 - 50.0 %   | [facility]       |              |
|             |                          |                 | LABORATORY |              |
+-------------+--------------------------+-----------------+------------+--------------+
| MCV         | 96.7                     | 80.0 - 100.0 fl | [facility]       |              |
|             |                          |                 | LABORATORY |              |
+-------------+--------------------------+-----------------+------------+--------------+
| MCH         | 33.8                     | 27.0 - 34.0 pg  | [facility]       |              |
|             |                          |                 | LABORATORY |              |
+-------------+--------------------------+-----------------+------------+--------------+
| MCHC        | 35.0                     | 32.0 - 35.5     | [facility]       |              |
|             |                          | g/dL            | LABORATORY |              |
+-------------+--------------------------+-----------------+------------+--------------+
| RDW-SD      | 45.5                     | 37 - 53 fl      | [facility]       |              |
|             |                          |                 | LABORATORY |              |
+-------------+--------------------------+-----------------+------------+--------------+
| Platelet    | 158                      | 150 - 400 K/uL  | [facility]       |              |
| Count       |                          |                 | LABORATORY |              |
+-------------+--------------------------+-----------------+------------+--------------+
| MPV         | 7.5                      | fl              | [facility]       |              |
|             |                          |                 | LABORATORY |              |
+-------------+--------------------------+-----------------+------------+--------------+
| Diff Type   | AUTOMATED                |                 | [facility]       |              |
|             |                          |                 | LABORATORY |              |
+-------------+--------------------------+-----------------+------------+--------------+
 
| %           | 55.42                    | %               | [facility]       |              |
| Neutrophils |                          |                 | LABORATORY |              |
+-------------+--------------------------+-----------------+------------+--------------+
| %           | 36.41                    | %               | [facility]       |              |
| Lymphocytes |                          |                 | LABORATORY |              |
+-------------+--------------------------+-----------------+------------+--------------+
| Monocyte %  | 6.61                     | %               | [facility]       |              |
|             |                          |                 | LABORATORY |              |
+-------------+--------------------------+-----------------+------------+--------------+
| Eosinophils | 1.23                     | %               | [facility]       |              |
|  %          |                          |                 | LABORATORY |              |
+-------------+--------------------------+-----------------+------------+--------------+
| Basophils % | 0.33                     | %               | [facility]       |              |
|             |                          |                 | LABORATORY |              |
+-------------+--------------------------+-----------------+------------+--------------+
| Neutrophils | 3.00                     | 1.90 - 7.40     | [facility]       |              |
| , Absolute  |                          | K/uL            | LABORATORY |              |
+-------------+--------------------------+-----------------+------------+--------------+
| Absolute    | 1.97                     | 1.00 - 3.90     | [facility]       |              |
| Lymphocytes |                          | K/uL            | LABORATORY |              |
+-------------+--------------------------+-----------------+------------+--------------+
| Absolute    | 0.36                     | 0.00 - 0.80     | [facility]       |              |
| Monocytes   |                          | K/uL            | LABORATORY |              |
+-------------+--------------------------+-----------------+------------+--------------+
| Eosinophils | 0.07                     | 0.00 - 0.50     | [facility]       |              |
| , Absolute  |                          | K/uL            | LABORATORY |              |
+-------------+--------------------------+-----------------+------------+--------------+
| Basophils,  | 0.02Comment: Testing     | 0.00 - 0.10     | [facility]       |              |
| Absolute    | performed at [facility], [address] | K/uL            | LABORATORY |              |
|             |  [address],        |                 |            |              |
|             | [place]  [id number]     |                 |            |              |
+-------------+--------------------------+-----------------+------------+--------------+
 
 
 
+----------+
| Specimen |
+----------+
| Blood    |
+----------+
 
 
 
+-------------------+------------------+--------------------+----------------+
| Performing        | Address          | City/State/Zipcode | Phone Number   |
| Organization      |                  |                    |                |
+-------------------+------------------+--------------------+----------------+
|   [facility] |   [address] | [place] [address] |   [phone] |
+-------------------+------------------+--------------------+----------------+
 Basic Metabolic Panel (2019  4:17 AM PDT)
 
+-------------+--------------------------+-----------------+------------+--------------+
| Component   | Value                    | Ref Range       | Performed  | Pathologist  |
|             |                          |                 | At         | Signature    |
+-------------+--------------------------+-----------------+------------+--------------+
| Na          | 135                      | 135 - 145       | [facility]       |              |
|             |                          | mmol/L          | LABORATORY |              |
+-------------+--------------------------+-----------------+------------+--------------+
| K           | 3.8                      | 3.5 - 4.9       | [facility]       |              |
|             |                          | mmol/L          | LABORATORY |              |
 
+-------------+--------------------------+-----------------+------------+--------------+
| Cl          | 102                      | 99 - 109 mmol/L | [facility]       |              |
|             |                          |                 | LABORATORY |              |
+-------------+--------------------------+-----------------+------------+--------------+
| CO2         | 26                       | 23 - 32 mmol/L  | [facility]       |              |
|             |                          |                 | LABORATORY |              |
+-------------+--------------------------+-----------------+------------+--------------+
| Anion Gap   | 11                       | 5 - 20 mmol/L   | [facility]       |              |
|             |                          |                 | LABORATORY |              |
+-------------+--------------------------+-----------------+------------+--------------+
| Glucose     | 140 (H)                  | 65 - 99 mg/dL   | [facility]       |              |
|             |                          |                 | LABORATORY |              |
+-------------+--------------------------+-----------------+------------+--------------+
| BUN         | 8                        | 8 - 25 mg/dL    | [facility]       |              |
|             |                          |                 | LABORATORY |              |
+-------------+--------------------------+-----------------+------------+--------------+
| Creatinine  | 0.9                      | 0.70 - 1.30     | [facility]       |              |
|             |                          | mg/dL           | LABORATORY |              |
+-------------+--------------------------+-----------------+------------+--------------+
| BUN/Creatin | 9                        |                 | [facility]       |              |
| ine Ratio   |                          |                 | LABORATORY |              |
+-------------+--------------------------+-----------------+------------+--------------+
| Calcium     | 8.5                      | 8.5 - 10.5      | [facility]       |              |
|             |                          | mg/dL           | LABORATORY |              |
+-------------+--------------------------+-----------------+------------+--------------+
| Estimated   | >60Comment: GFR <60:     | >60             | [facility]       |              |
| GFR         | CHRONIC KIDNEY DISEASE,  | mL/min/1.73m2   | LABORATORY |              |
|             | IF FOUND OVER A 3 MONTH  |                 |            |              |
|             | PERIOD.GFR <15: KIDNEY   |                 |            |              |
|             | FAILURE.FOR       |                 |            |              |
|             | [employer], MULTIPLY THE  |                 |            |              |
|             | CALCULATED GFR BY        |                 |            |              |
|             | 1.210.This eGFR is       |                 |            |              |
|             | calculated using the     |                 |            |              |
|             | MDRD IDMS traceable      |                 |            |              |
|             | equation.Testing         |                 |            |              |
|             | performed at [facility], [address] |                 |            |              |
|             |  [facility],        |                 |            |              |
|             | [place]   [address]    |                 |            |              |
+-------------+--------------------------+-----------------+------------+--------------+
 
 
 
+----------+
| Specimen |
+----------+
| Blood    |
+----------+
 
 
 
+-------------------+------------------+--------------------+----------------+
| Performing        | Address          | City/State/Zipcode | Phone Number   |
| Organization      |                  |                    |                |
+-------------------+------------------+--------------------+----------------+
|   [facility] |   [address] | [place] [address] |   [phone] |
+-------------------+------------------+--------------------+----------------+
 CBC with Differential (2019  4:29 AM PDT)
 
+-------------+--------------------------+-----------------+------------+--------------+
 
| Component   | Value                    | Ref Range       | Performed  | Pathologist  |
|             |                          |                 | At         | Signature    |
+-------------+--------------------------+-----------------+------------+--------------+
| WBC         | 5.54                     | 3.80 - 11.00    | [facility]       |              |
|             |                          | K/uL            | LABORATORY |              |
+-------------+--------------------------+-----------------+------------+--------------+
| RBC         | 3.72 (L)                 | 4.20 - 5.70     | [facility]       |              |
|             |                          | M/uL            | LABORATORY |              |
+-------------+--------------------------+-----------------+------------+--------------+
| Hemoglobin  | 12.3 (L)                 | 13.2 - 17.0     | [facility]       |              |
|             |                          | g/dL            | LABORATORY |              |
+-------------+--------------------------+-----------------+------------+--------------+
| Hematocrit  | 36.0 (L)                 | 39.0 - 50.0 %   | [facility]       |              |
|             |                          |                 | LABORATORY |              |
+-------------+--------------------------+-----------------+------------+--------------+
| MCV         | 96.8                     | 80.0 - 100.0 fl | [facility]       |              |
|             |                          |                 | LABORATORY |              |
+-------------+--------------------------+-----------------+------------+--------------+
| MCH         | 33.1                     | 27.0 - 34.0 pg  | [facility]       |              |
|             |                          |                 | LABORATORY |              |
+-------------+--------------------------+-----------------+------------+--------------+
| MCHC        | 34.2                     | 32.0 - 35.5     | [facility]       |              |
|             |                          | g/dL            | LABORATORY |              |
+-------------+--------------------------+-----------------+------------+--------------+
| RDW-SD      | 46.8                     | 37 - 53 fl      | [facility]       |              |
|             |                          |                 | LABORATORY |              |
+-------------+--------------------------+-----------------+------------+--------------+
| Platelet    | 140 (L)                  | 150 - 400 K/uL  | [facility]       |              |
| Count       |                          |                 | LABORATORY |              |
+-------------+--------------------------+-----------------+------------+--------------+
| MPV         | 7.4                      | fl              | [facility]       |              |
|             |                          |                 | LABORATORY |              |
+-------------+--------------------------+-----------------+------------+--------------+
| Diff Type   | AUTOMATED                |                 | [facility]       |              |
|             |                          |                 | LABORATORY |              |
+-------------+--------------------------+-----------------+------------+--------------+
| %           | 57.00                    | %               | [facility]       |              |
| Neutrophils |                          |                 | LABORATORY |              |
+-------------+--------------------------+-----------------+------------+--------------+
| %           | 36.25                    | %               | [facility]       |              |
| Lymphocytes |                          |                 | LABORATORY |              |
+-------------+--------------------------+-----------------+------------+--------------+
| Monocyte %  | 5.29                     | %               | [facility]       |              |
|             |                          |                 | LABORATORY |              |
+-------------+--------------------------+-----------------+------------+--------------+
| Eosinophils | 1.03                     | %               | [facility]       |              |
|  %          |                          |                 | LABORATORY |              |
+-------------+--------------------------+-----------------+------------+--------------+
| Basophils % | 0.43                     | %               | [facility]       |              |
|             |                          |                 | LABORATORY |              |
+-------------+--------------------------+-----------------+------------+--------------+
| Neutrophils | 3.16                     | 1.90 - 7.40     | [facility]       |              |
| , Absolute  |                          | K/uL            | LABORATORY |              |
+-------------+--------------------------+-----------------+------------+--------------+
| Absolute    | 2.01                     | 1.00 - 3.90     | [facility]       |              |
| Lymphocytes |                          | K/uL            | LABORATORY |              |
+-------------+--------------------------+-----------------+------------+--------------+
| Absolute    | 0.29                     | 0.00 - 0.80     | [facility]       |              |
| Monocytes   |                          | K/uL            | LABORATORY |              |
+-------------+--------------------------+-----------------+------------+--------------+
 
| Eosinophils | 0.06                     | 0.00 - 0.50     | [facility]       |              |
| , Absolute  |                          | K/uL            | LABORATORY |              |
+-------------+--------------------------+-----------------+------------+--------------+
| Basophils,  | 0.02Comment: Testing     | 0.00 - 0.10     | [facility]       |              |
| Absolute    | performed at [facility], [address] | K/uL            | LABORATORY |              |
|             |  [address],        |                 |            |              |
|             | [place]  [address]     |                 |            |              |
+-------------+--------------------------+-----------------+------------+--------------+
 
 
 
+----------+
| Specimen |
+----------+
| Blood    |
+----------+
 
 
 
+-------------------+------------------+--------------------+----------------+
| Performing        | Address          | City/State/Zipcode | Phone Number   |
| Organization      |                  |                    |                |
+-------------------+------------------+--------------------+----------------+
|   [facility] |   [address] | [place] [address] |   [phone] |
+-------------------+------------------+--------------------+----------------+
 Basic Metabolic Panel (2019  4:29 AM PDT)
 
+-------------+--------------------------+-----------------+------------+--------------+
| Component   | Value                    | Ref Range       | Performed  | Pathologist  |
|             |                          |                 | At         | Signature    |
+-------------+--------------------------+-----------------+------------+--------------+
| Na          | 135                      | 135 - 145       | [facility]       |              |
|             |                          | mmol/L          | LABORATORY |              |
+-------------+--------------------------+-----------------+------------+--------------+
| K           | 4.0                      | 3.5 - 4.9       | [facility]       |              |
|             |                          | mmol/L          | LABORATORY |              |
+-------------+--------------------------+-----------------+------------+--------------+
| Cl          | 103                      | 99 - 109 mmol/L | [facility]       |              |
|             |                          |                 | LABORATORY |              |
+-------------+--------------------------+-----------------+------------+--------------+
| CO2         | 25                       | 23 - 32 mmol/L  | [facility]       |              |
|             |                          |                 | LABORATORY |              |
+-------------+--------------------------+-----------------+------------+--------------+
| Anion Gap   | 11                       | 5 - 20 mmol/L   | [facility]       |              |
|             |                          |                 | LABORATORY |              |
+-------------+--------------------------+-----------------+------------+--------------+
| Glucose     | 140 (H)                  | 65 - 99 mg/dL   | [facility]       |              |
|             |                          |                 | LABORATORY |              |
+-------------+--------------------------+-----------------+------------+--------------+
| BUN         | 9                        | 8 - 25 mg/dL    | [facility]       |              |
|             |                          |                 | LABORATORY |              |
+-------------+--------------------------+-----------------+------------+--------------+
| Creatinine  | 0.8                      | 0.70 - 1.30     | [facility]       |              |
|             |                          | mg/dL           | LABORATORY |              |
+-------------+--------------------------+-----------------+------------+--------------+
| BUN/Creatin | 11                       |                 | [facility]       |              |
| ine Ratio   |                          |                 | LABORATORY |              |
+-------------+--------------------------+-----------------+------------+--------------+
| Calcium     | 8.7                      | 8.5 - 10.5      | [facility]       |              |
|             |                          | mg/dL           | LABORATORY |              |
 
+-------------+--------------------------+-----------------+------------+--------------+
| Estimated   | >60Comment: GFR <60:     | >60             | [facility]       |              |
| GFR         | CHRONIC KIDNEY DISEASE,  | mL/min/1.73m2   | LABORATORY |              |
|             | IF FOUND OVER A 3 MONTH  |                 |            |              |
|             | PERIOD.GFR <15: KIDNEY   |                 |            |              |
|             | FAILURE.FOR       |                 |            |              |
|             | AMERIC[employer], MULTIPLY THE  |                 |            |              |
|             | CALCULATED GFR BY        |                 |            |              |
|             | 1.210.This eGFR is       |                 |            |              |
|             | calculated using the     |                 |            |              |
|             | MDRD IDMS traceable      |                 |            |              |
|             | equation.Testing         |                 |            |              |
|             | performed at [facility], [address] |                 |            |              |
|             |  [facility],        |                 |            |              |
|             | [place]   [address]    |                 |            |              |
+-------------+--------------------------+-----------------+------------+--------------+
 
 
 
+----------+
| Specimen |
+----------+
| Blood    |
+----------+
 
 
 
+-------------------+------------------+--------------------+----------------+
| Performing        | Address          | City/State/Zipcode | Phone Number   |
| Organization      |                  |                    |                |
+-------------------+------------------+--------------------+----------------+
|   [facility] |   [address] | [place] [address] |   [phone] |
+-------------------+------------------+--------------------+----------------+
 CBC with Differential (2019  4:23 AM PDT)
 
+-------------+--------------------------+-----------------+------------+--------------+
| Component   | Value                    | Ref Range       | Performed  | Pathologist  |
|             |                          |                 | At         | Signature    |
+-------------+--------------------------+-----------------+------------+--------------+
| WBC         | 9.31                     | 3.80 - 11.00    | [facility]       |              |
|             |                          | K/uL            | LABORATORY |              |
+-------------+--------------------------+-----------------+------------+--------------+
| RBC         | 3.81 (L)                 | 4.20 - 5.70     | [facility]       |              |
|             |                          | M/uL            | LABORATORY |              |
+-------------+--------------------------+-----------------+------------+--------------+
| Hemoglobin  | 12.7 (L)                 | 13.2 - 17.0     | [facility]       |              |
|             |                          | g/dL            | LABORATORY |              |
+-------------+--------------------------+-----------------+------------+--------------+
| Hematocrit  | 37.1 (L)                 | 39.0 - 50.0 %   | [facility]       |              |
|             |                          |                 | LABORATORY |              |
+-------------+--------------------------+-----------------+------------+--------------+
| MCV         | 97.5                     | 80.0 - 100.0 fl | [facility]       |              |
|             |                          |                 | LABORATORY |              |
+-------------+--------------------------+-----------------+------------+--------------+
| MCH         | 33.5                     | 27.0 - 34.0 pg  | [facility]       |              |
|             |                          |                 | LABORATORY |              |
+-------------+--------------------------+-----------------+------------+--------------+
| MCHC        | 34.3                     | 32.0 - 35.5     | [facility]       |              |
|             |                          | g/dL            | LABORATORY |              |
+-------------+--------------------------+-----------------+------------+--------------+
 
| RDW-SD      | 46.8                     | 37 - 53 fl      | [facility]       |              |
|             |                          |                 | LABORATORY |              |
+-------------+--------------------------+-----------------+------------+--------------+
| Platelet    | 162                      | 150 - 400 K/uL  | [facility]       |              |
| Count       |                          |                 | LABORATORY |              |
+-------------+--------------------------+-----------------+------------+--------------+
| MPV         | 7.5                      | fl              | [facility]       |              |
|             |                          |                 | LABORATORY |              |
+-------------+--------------------------+-----------------+------------+--------------+
| Diff Type   | AUTOMATED                |                 | [facility]       |              |
|             |                          |                 | LABORATORY |              |
+-------------+--------------------------+-----------------+------------+--------------+
| %           | 54.94                    | %               | [facility]       |              |
| Neutrophils |                          |                 | LABORATORY |              |
+-------------+--------------------------+-----------------+------------+--------------+
| %           | 39.44                    | %               | [facility]       |              |
| Lymphocytes |                          |                 | LABORATORY |              |
+-------------+--------------------------+-----------------+------------+--------------+
| Monocyte %  | 4.62                     | %               | [facility]       |              |
|             |                          |                 | LABORATORY |              |
+-------------+--------------------------+-----------------+------------+--------------+
| Eosinophils | 0.69                     | %               | [facility]       |              |
|  %          |                          |                 | LABORATORY |              |
+-------------+--------------------------+-----------------+------------+--------------+
| Basophils % | 0.31                     | %               | [facility]       |              |
|             |                          |                 | LABORATORY |              |
+-------------+--------------------------+-----------------+------------+--------------+
| Neutrophils | 5.12                     | 1.90 - 7.40     | [facility]       |              |
| , Absolute  |                          | K/uL            | LABORATORY |              |
+-------------+--------------------------+-----------------+------------+--------------+
| Absolute    | 3.67                     | 1.00 - 3.90     | [facility]       |              |
| Lymphocytes |                          | K/uL            | LABORATORY |              |
+-------------+--------------------------+-----------------+------------+--------------+
| Absolute    | 0.43                     | 0.00 - 0.80     | [facility]       |              |
| Monocytes   |                          | K/uL            | LABORATORY |              |
+-------------+--------------------------+-----------------+------------+--------------+
| Eosinophils | 0.06                     | 0.00 - 0.50     | [facility]       |              |
| , Absolute  |                          | K/uL            | LABORATORY |              |
+-------------+--------------------------+-----------------+------------+--------------+
| Basophils,  | 0.03Comment: Testing     | 0.00 - 0.10     | [facility]       |              |
| Absolute    | performed at [facility], [address] | K/uL            | LABORATORY |              |
|             |  [address],        |                 |            |              |
|             | [place]  [id number]     |                 |            |              |
+-------------+--------------------------+-----------------+------------+--------------+
 
 
 
+----------+
| Specimen |
+----------+
| Blood    |
+----------+
 
 
 
+-------------------+------------------+--------------------+----------------+
| Performing        | Address          | City/State/Zipcode | Phone Number   |
| Organization      |                  |                    |                |
+-------------------+------------------+--------------------+----------------+
|   [facility] |   [address] | [place] [address] |   [phone] |
 
+-------------------+------------------+--------------------+----------------+
 Basic Metabolic Panel (2019  4:23 AM PDT)
 
+-------------+--------------------------+-----------------+------------+--------------+
| Component   | Value                    | Ref Range       | Performed  | Pathologist  |
|             |                          |                 | At         | Signature    |
+-------------+--------------------------+-----------------+------------+--------------+
| Na          | 137                      | 135 - 145       | [facility]       |              |
|             |                          | mmol/L          | LABORATORY |              |
+-------------+--------------------------+-----------------+------------+--------------+
| K           | 4.0                      | 3.5 - 4.9       | [facility]       |              |
|             |                          | mmol/L          | LABORATORY |              |
+-------------+--------------------------+-----------------+------------+--------------+
| Cl          | 104                      | 99 - 109 mmol/L | [facility]       |              |
|             |                          |                 | LABORATORY |              |
+-------------+--------------------------+-----------------+------------+--------------+
| CO2         | 26                       | 23 - 32 mmol/L  | [facility]       |              |
|             |                          |                 | LABORATORY |              |
+-------------+--------------------------+-----------------+------------+--------------+
| Anion Gap   | 11                       | 5 - 20 mmol/L   | [facility]       |              |
|             |                          |                 | LABORATORY |              |
+-------------+--------------------------+-----------------+------------+--------------+
| Glucose     | 140 (H)                  | 65 - 99 mg/dL   | [facility]       |              |
|             |                          |                 | LABORATORY |              |
+-------------+--------------------------+-----------------+------------+--------------+
| BUN         | 8                        | 8 - 25 mg/dL    | [facility]       |              |
|             |                          |                 | LABORATORY |              |
+-------------+--------------------------+-----------------+------------+--------------+
| Creatinine  | 0.9                      | 0.70 - 1.30     | [facility]       |              |
|             |                          | mg/dL           | LABORATORY |              |
+-------------+--------------------------+-----------------+------------+--------------+
| BUN/Creatin | 9                        |                 | [facility]       |              |
| ine Ratio   |                          |                 | LABORATORY |              |
+-------------+--------------------------+-----------------+------------+--------------+
| Calcium     | 8.4 (L)                  | 8.5 - 10.5      | [facility]       |              |
|             |                          | mg/dL           | LABORATORY |              |
+-------------+--------------------------+-----------------+------------+--------------+
| Estimated   | >60Comment: GFR <60:     | >60             | [facility]       |              |
| GFR         | CHRONIC KIDNEY DISEASE,  | mL/min/1.73m2   | LABORATORY |              |
|             | IF FOUND OVER A 3 MONTH  |                 |            |              |
|             | PERIOD.GFR <15: KIDNEY   |                 |            |              |
|             | FAILURE.FOR       |                 |            |              |
|             | [employer]IC[employer], MULTIPLY THE  |                 |            |              |
|             | CALCULATED GFR BY        |                 |            |              |
|             | 1.210.This eGFR is       |                 |            |              |
|             | calculated using the     |                 |            |              |
|             | MDRD [facility] traceable      |                 |            |              |
|             | equation.Testing         |                 |            |              |
|             | performed at [facility], [address] |                 |            |              |
|             |  [facility],        |                 |            |              |
|             | [place]   [address]    |                 |            |              |
+-------------+--------------------------+-----------------+------------+--------------+
 
 
 
+----------+
| Specimen |
+----------+
| Blood    |
+----------+
 
 
 
 
+-------------------+------------------+--------------------+----------------+
| Performing        | Address          | City/State/Zipcode | Phone Number   |
| Organization      |                  |                    |                |
+-------------------+------------------+--------------------+----------------+
|   [facility] |   [address] | [place] [address] |   [phone] |
+-------------------+------------------+--------------------+----------------+
 CBC with Differential (2019  4:06 AM PDT)
 
+-------------+--------------------------+-----------------+------------+--------------+
| Component   | Value                    | Ref Range       | Performed  | Pathologist  |
|             |                          |                 | At         | Signature    |
+-------------+--------------------------+-----------------+------------+--------------+
| WBC         | 10.14                    | 3.80 - 11.00    | [facility]       |              |
|             |                          | K/uL            | LABORATORY |              |
+-------------+--------------------------+-----------------+------------+--------------+
| RBC         | 3.79 (L)                 | 4.20 - 5.70     | [facility]       |              |
|             |                          | M/uL            | LABORATORY |              |
+-------------+--------------------------+-----------------+------------+--------------+
| Hemoglobin  | 12.6 (L)                 | 13.2 - 17.0     | [facility]       |              |
|             |                          | g/dL            | LABORATORY |              |
+-------------+--------------------------+-----------------+------------+--------------+
| Hematocrit  | 37.1 (L)                 | 39.0 - 50.0 %   | [facility]       |              |
|             |                          |                 | LABORATORY |              |
+-------------+--------------------------+-----------------+------------+--------------+
| MCV         | 97.8                     | 80.0 - 100.0 fl | [facility]       |              |
|             |                          |                 | LABORATORY |              |
+-------------+--------------------------+-----------------+------------+--------------+
| MCH         | 33.3                     | 27.0 - 34.0 pg  | [facility]       |              |
|             |                          |                 | LABORATORY |              |
+-------------+--------------------------+-----------------+------------+--------------+
| MCHC        | 34.1                     | 32.0 - 35.5     | [facility]       |              |
|             |                          | g/dL            | LABORATORY |              |
+-------------+--------------------------+-----------------+------------+--------------+
| RDW-SD      | 47.3                     | 37 - 53 fl      | [facility]       |              |
|             |                          |                 | LABORATORY |              |
+-------------+--------------------------+-----------------+------------+--------------+
| Platelet    | 149 (L)                  | 150 - 400 K/uL  | [facility]       |              |
| Count       |                          |                 | LABORATORY |              |
+-------------+--------------------------+-----------------+------------+--------------+
| MPV         | 7.4                      | fl              | [facility]       |              |
|             |                          |                 | LABORATORY |              |
+-------------+--------------------------+-----------------+------------+--------------+
| Diff Type   | AUTOMATED                |                 | [facility]       |              |
|             |                          |                 | LABORATORY |              |
+-------------+--------------------------+-----------------+------------+--------------+
| %           | 63.73                    | %               | [facility]       |              |
| Neutrophils |                          |                 | LABORATORY |              |
+-------------+--------------------------+-----------------+------------+--------------+
| %           | 31.45                    | %               | [facility]       |              |
| Lymphocytes |                          |                 | LABORATORY |              |
+-------------+--------------------------+-----------------+------------+--------------+
| Monocyte %  | 4.45                     | %               | [facility]       |              |
|             |                          |                 | LABORATORY |              |
+-------------+--------------------------+-----------------+------------+--------------+
| Eosinophils | 0.24                     | %               | [facility]       |              |
|  %          |                          |                 | LABORATORY |              |
+-------------+--------------------------+-----------------+------------+--------------+
 
| Basophils % | 0.13                     | %               | [facility]       |              |
|             |                          |                 | LABORATORY |              |
+-------------+--------------------------+-----------------+------------+--------------+
| Neutrophils | 6.46                     | 1.90 - 7.40     | [facility]       |              |
| , Absolute  |                          | K/uL            | LABORATORY |              |
+-------------+--------------------------+-----------------+------------+--------------+
| Absolute    | 3.19                     | 1.00 - 3.90     | [facility]       |              |
| Lymphocytes |                          | K/uL            | LABORATORY |              |
+-------------+--------------------------+-----------------+------------+--------------+
| Absolute    | 0.45                     | 0.00 - 0.80     | [facility]       |              |
| Monocytes   |                          | K/uL            | LABORATORY |              |
+-------------+--------------------------+-----------------+------------+--------------+
| Eosinophils | 0.02                     | 0.00 - 0.50     | [facility]       |              |
| , Absolute  |                          | K/uL            | LABORATORY |              |
+-------------+--------------------------+-----------------+------------+--------------+
| Basophils,  | 0.01Comment: Testing     | 0.00 - 0.10     | [facility]       |              |
| Absolute    | performed at [facility], [address] | K/uL            | LABORATORY |              |
|             |  [place],        |                 |            |              |
|             | [place]  [address]     |                 |            |              |
+-------------+--------------------------+-----------------+------------+--------------+
 
 
 
+----------+
| Specimen |
+----------+
| Blood    |
+----------+
 
 
 
+-------------------+------------------+--------------------+----------------+
| Performing        | Address          | City/State/Zipcode | Phone Number   |
| Organization      |                  |                    |                |
+-------------------+------------------+--------------------+----------------+
|   [facility] |   [address] | [place] [address] |   [phone] |
+-------------------+------------------+--------------------+----------------+
 Basic Metabolic Panel (2019  4:06 AM PDT)
 
+-------------+--------------------------+-----------------+------------+--------------+
| Component   | Value                    | Ref Range       | Performed  | Pathologist  |
|             |                          |                 | At         | Signature    |
+-------------+--------------------------+-----------------+------------+--------------+
| Na          | 138                      | 135 - 145       | [facility]       |              |
|             |                          | mmol/L          | LABORATORY |              |
+-------------+--------------------------+-----------------+------------+--------------+
| K           | 4.4                      | 3.5 - 4.9       | [facility]       |              |
|             |                          | mmol/L          | LABORATORY |              |
+-------------+--------------------------+-----------------+------------+--------------+
| Cl          | 106                      | 99 - 109 mmol/L | [facility]       |              |
|             |                          |                 | LABORATORY |              |
+-------------+--------------------------+-----------------+------------+--------------+
| CO2         | 25                       | 23 - 32 mmol/L  | [facility]       |              |
|             |                          |                 | LABORATORY |              |
+-------------+--------------------------+-----------------+------------+--------------+
| Anion Gap   | 11                       | 5 - 20 mmol/L   | [facility]       |              |
|             |                          |                 | LABORATORY |              |
+-------------+--------------------------+-----------------+------------+--------------+
| Glucose     | 144 (H)                  | 65 - 99 mg/dL   | [facility]       |              |
|             |                          |                 | LABORATORY |              |
 
+-------------+--------------------------+-----------------+------------+--------------+
| BUN         | 16                       | 8 - 25 mg/dL    | [facility]       |              |
|             |                          |                 | LABORATORY |              |
+-------------+--------------------------+-----------------+------------+--------------+
| Creatinine  | 0.9                      | 0.70 - 1.30     | [facility]       |              |
|             |                          | mg/dL           | LABORATORY |              |
+-------------+--------------------------+-----------------+------------+--------------+
| BUN/Creatin | 18                       |                 | [facility]       |              |
| ine Ratio   |                          |                 | LABORATORY |              |
+-------------+--------------------------+-----------------+------------+--------------+
| Calcium     | 8.6                      | 8.5 - 10.5      | [facility]       |              |
|             |                          | mg/dL           | LABORATORY |              |
+-------------+--------------------------+-----------------+------------+--------------+
| Estimated   | >60Comment: GFR <60:     | >60             | [facility]       |              |
| GFR         | CHRONIC KIDNEY DISEASE,  | mL/min/1.73m2   | LABORATORY |              |
|             | IF FOUND OVER A 3 MONTH  |                 |            |              |
|             | PERIOD.GFR <15: KIDNEY   |                 |            |              |
|             | FAILURE.FOR       |                 |            |              |
|             | [employer]IC[employer], MULTIPLY THE  |                 |            |              |
|             | CALCULATED GFR BY        |                 |            |              |
|             | 1.210.This eGFR is       |                 |            |              |
|             | calculated using the     |                 |            |              |
|             | MDRD IDMS traceable      |                 |            |              |
|             | equation.Testing         |                 |            |              |
|             | performed at [facility], [address] |                 |            |              |
|             |  [address],        |                 |            |              |
|             | [place]   [address]    |                 |            |              |
+-------------+--------------------------+-----------------+------------+--------------+
 
 
 
+----------+
| Specimen |
+----------+
| Blood    |
+----------+
 
 
 
+-------------------+------------------+--------------------+----------------+
| Performing        | Address          | City/State/Zipcode | Phone Number   |
| Organization      |                  |                    |                |
+-------------------+------------------+--------------------+----------------+
|   [facility] |   [address] | [place] [address] |   [phone] |
+-------------------+------------------+--------------------+----------------+
 Lactic Acid (2019  9:09 AM PDT)
 
+-----------+-------------------------+------------+------------+--------------+
| Component | Value                   | Ref Range  | Performed  | Pathologist  |
|           |                         |            | At         | Signature    |
+-----------+-------------------------+------------+------------+--------------+
| Lactate,  | 1.6Comment: Testing     | 0.4 - 2.0  | [facility]       |              |
| Serum     | performed at [facility];[address]    | mmol/L     | LABORATORY |              |
|           | [address];[place], [place]  |            |            |              |
|           | [id number]                   |            |            |              |
+-----------+-------------------------+------------+------------+--------------+
 
 
 
+----------+
 
| Specimen |
+----------+
| Blood    |
+----------+
 
 
 
+-------------------+------------------+--------------------+----------------+
| Performing        | Address          | City/State/Zipcode | Phone Number   |
| Organization      |                  |                    |                |
+-------------------+------------------+--------------------+----------------+
|   [facility] |   [address] | [place] [address] |   [phone] |
+-------------------+------------------+--------------------+----------------+
 ECG 12 lead (2019  5:37 AM PDT)
 
+-------------+--------------------------+-----------+------------+--------------+
| Component   | Value                    | Ref Range | Performed  | Pathologist  |
|             |                          |           | At         | Signature    |
+-------------+--------------------------+-----------+------------+--------------+
| VENTRICULAR | 74                       | BPM       | [facility]  |              |
|  RATE EKG   |                          |           |            |              |
+-------------+--------------------------+-----------+------------+--------------+
| ATRIAL RATE | 74                       | BPM       | [facility]  |              |
+-------------+--------------------------+-----------+------------+--------------+
| P-R         | 214                      | ms        | [facility]  |              |
| INTERVAL    |                          |           |            |              |
+-------------+--------------------------+-----------+------------+--------------+
| QRS         | 90                       | ms        | [facility]  |              |
| DURATION    |                          |           |            |              |
+-------------+--------------------------+-----------+------------+--------------+
| Q-T         | 412                      | ms        | [facility]  |              |
| INTERVAL    |                          |           |            |              |
+-------------+--------------------------+-----------+------------+--------------+
| Q-T         | 458                      | ms        | [facility]  |              |
| INTERVAL    |                          |           |            |              |
| (CORRECTED) |                          |           |            |              |
+-------------+--------------------------+-----------+------------+--------------+
| P WAVE AXIS | 38                       | degrees   | [facility]  |              |
+-------------+--------------------------+-----------+------------+--------------+
| QRS AXIS    | -8                       | degrees   | [facility]  |              |
+-------------+--------------------------+-----------+------------+--------------+
| T AXIS      | 5                        | degrees   | [facility]  |              |
+-------------+--------------------------+-----------+------------+--------------+
| INTERPRETAT | Sinus rhythm with 1st    |           | [facility]  |              |
| ION TEXT    | degree A-V blockAbnormal |           |            |              |
|             |  ECGWhen compared with   |           |            |              |
|             | ECG of 17-SEP-2019       |           |            |              |
|             | 09:12,No significant     |           |            |              |
|             | change was               |           |            |              |
|             | foundConfirmed by        |           |            |              |
|             | Geovanni Bermudez MD     |           |            |              |
|             | (127) on 2019       |           |            |              |
|             | 5:46:12 PM               |           |            |              |
+-------------+--------------------------+-----------+------------+--------------+
 
 
 
+----------+
| Specimen |
+----------+
 
|          |
+----------+
 
 
 
+----------------------------------------------------------+--------------+
| Narrative                                                | Performed At |
+----------------------------------------------------------+--------------+
|   This result has an attachment that is not available.   |              |
+----------------------------------------------------------+--------------+
 
 
 
+--------------+---------+--------------------+--------------+
| Performing   | Address | City/State/Zipcode | Phone Number |
| Organization |         |                    |              |
+--------------+---------+--------------------+--------------+
|   [facility]  |         |                    |              |
+--------------+---------+--------------------+--------------+
 CBC with Differential (2019  4:40 AM PDT)
 
+-------------+--------------------------+-----------------+------------+--------------+
| Component   | Value                    | Ref Range       | Performed  | Pathologist  |
|             |                          |                 | At         | Signature    |
+-------------+--------------------------+-----------------+------------+--------------+
| WBC         | 13.98 (H)                | 3.80 - 11.00    | [facility]       |              |
|             |                          | K/uL            | LABORATORY |              |
+-------------+--------------------------+-----------------+------------+--------------+
| RBC         | 4.07 (L)                 | 4.20 - 5.70     | [facility]       |              |
|             |                          | M/uL            | LABORATORY |              |
+-------------+--------------------------+-----------------+------------+--------------+
| Hemoglobin  | 13.3                     | 13.2 - 17.0     | [facility]       |              |
|             |                          | g/dL            | LABORATORY |              |
+-------------+--------------------------+-----------------+------------+--------------+
| Hematocrit  | 39.6                     | 39.0 - 50.0 %   | [facility]       |              |
|             |                          |                 | LABORATORY |              |
+-------------+--------------------------+-----------------+------------+--------------+
| MCV         | 97.4                     | 80.0 - 100.0 fl | [facility]       |              |
|             |                          |                 | LABORATORY |              |
+-------------+--------------------------+-----------------+------------+--------------+
| MCH         | 32.8                     | 27.0 - 34.0 pg  | [facility]       |              |
|             |                          |                 | LABORATORY |              |
+-------------+--------------------------+-----------------+------------+--------------+
| MCHC        | 33.7                     | 32.0 - 35.5     | [facility]       |              |
|             |                          | g/dL            | LABORATORY |              |
+-------------+--------------------------+-----------------+------------+--------------+
| RDW-SD      | 47.3                     | 37 - 53 fl      | [facility]       |              |
|             |                          |                 | LABORATORY |              |
+-------------+--------------------------+-----------------+------------+--------------+
| Platelet    | 185                      | 150 - 400 K/uL  | [facility]       |              |
| Count       |                          |                 | LABORATORY |              |
+-------------+--------------------------+-----------------+------------+--------------+
| MPV         | 7.3                      | fl              | [facility]       |              |
|             |                          |                 | LABORATORY |              |
+-------------+--------------------------+-----------------+------------+--------------+
| Diff Type   | AUTOMATED                |                 | [facility]       |              |
|             |                          |                 | LABORATORY |              |
+-------------+--------------------------+-----------------+------------+--------------+
| %           | 55.68                    | %               | [facility]       |              |
| Neutrophils |                          |                 | LABORATORY |              |
 
+-------------+--------------------------+-----------------+------------+--------------+
| %           | 39.54                    | %               | [facility]       |              |
| Lymphocytes |                          |                 | LABORATORY |              |
+-------------+--------------------------+-----------------+------------+--------------+
| Monocyte %  | 4.55                     | %               | [facility]       |              |
|             |                          |                 | LABORATORY |              |
+-------------+--------------------------+-----------------+------------+--------------+
| Eosinophils | 0.05                     | %               | [facility]       |              |
|  %          |                          |                 | LABORATORY |              |
+-------------+--------------------------+-----------------+------------+--------------+
| Basophils % | 0.18                     | %               | [facility]       |              |
|             |                          |                 | LABORATORY |              |
+-------------+--------------------------+-----------------+------------+--------------+
| Neutrophils | 7.79 (H)                 | 1.90 - 7.40     | [facility]       |              |
| , Absolute  |                          | K/uL            | LABORATORY |              |
+-------------+--------------------------+-----------------+------------+--------------+
| Absolute    | 5.53 (H)                 | 1.00 - 3.90     | [facility]       |              |
| Lymphocytes |                          | K/uL            | LABORATORY |              |
+-------------+--------------------------+-----------------+------------+--------------+
| Absolute    | 0.64                     | 0.00 - 0.80     | [facility]       |              |
| Monocytes   |                          | K/uL            | LABORATORY |              |
+-------------+--------------------------+-----------------+------------+--------------+
| Eosinophils | 0.01                     | 0.00 - 0.50     | [facility]       |              |
| , Absolute  |                          | K/uL            | LABORATORY |              |
+-------------+--------------------------+-----------------+------------+--------------+
| Basophils,  | 0.03                     | 0.00 - 0.10     | [facility]       |              |
| Absolute    |                          | K/uL            | LABORATORY |              |
+-------------+--------------------------+-----------------+------------+--------------+
| RBC         | RBC AND PLT MORPHOLOGY   |                 | [facility]       |              |
| Morphology  | APPEAR NORMAL            |                 | LABORATORY |              |
+-------------+--------------------------+-----------------+------------+--------------+
| Platelet    | ADEQUATE                 |                 | [facility]       |              |
| Estimate    |                          |                 | LABORATORY |              |
+-------------+--------------------------+-----------------+------------+--------------+
| Comment     | SLIDE SCANNED, AGREES    |                 | [facility]       |              |
|             | WITH AUTOMATED           |                 | LABORATORY |              |
|             | RESULTS.Comment: Testing |                 |            |              |
|             |  performed at [facility]    |                 |            |              |
|             | [facility];[place], [place]   |                 |            |              |
|             | [id number]                    |                 |            |              |
+-------------+--------------------------+-----------------+------------+--------------+
 
 
 
+----------+
| Specimen |
+----------+
| Blood    |
+----------+
 
 
 
+-------------------+------------------+--------------------+----------------+
| Performing        | Address          | City/State/Zipcode | Phone Number   |
| Organization      |                  |                    |                |
+-------------------+------------------+--------------------+----------------+
|   [facility] |   [address] | [place] [address] |   [phone] |
+-------------------+------------------+--------------------+----------------+
 Basic Metabolic Panel (2019  4:40 AM PDT)
 
 
+-------------+--------------------------+-----------------+------------+--------------+
| Component   | Value                    | Ref Range       | Performed  | Pathologist  |
|             |                          |                 | At         | Signature    |
+-------------+--------------------------+-----------------+------------+--------------+
| Na          | 140                      | 135 - 145       | [facility]       |              |
|             |                          | mmol/L          | LABORATORY |              |
+-------------+--------------------------+-----------------+------------+--------------+
| K           | 4.5                      | 3.5 - 4.9       | [facility]       |              |
|             |                          | mmol/L          | LABORATORY |              |
+-------------+--------------------------+-----------------+------------+--------------+
| Cl          | 107                      | 99 - 109 mmol/L | [facility]       |              |
|             |                          |                 | LABORATORY |              |
+-------------+--------------------------+-----------------+------------+--------------+
| CO2         | 23                       | 23 - 32 mmol/L  | [facility]       |              |
|             |                          |                 | LABORATORY |              |
+-------------+--------------------------+-----------------+------------+--------------+
| Anion Gap   | 15                       | 5 - 20 mmol/L   | [facility]       |              |
|             |                          |                 | LABORATORY |              |
+-------------+--------------------------+-----------------+------------+--------------+
| Glucose     | 192 (H)                  | 65 - 99 mg/dL   | [facility]       |              |
|             |                          |                 | LABORATORY |              |
+-------------+--------------------------+-----------------+------------+--------------+
| BUN         | 17                       | 8 - 25 mg/dL    | [facility]       |              |
|             |                          |                 | LABORATORY |              |
+-------------+--------------------------+-----------------+------------+--------------+
| Creatinine  | 1.02                     | 0.70 - 1.30     | [facility]       |              |
|             |                          | mg/dL           | LABORATORY |              |
+-------------+--------------------------+-----------------+------------+--------------+
| BUN/Creatin | 17                       |                 | [facility]       |              |
| ine Ratio   |                          |                 | LABORATORY |              |
+-------------+--------------------------+-----------------+------------+--------------+
| Calcium     | 8.6                      | 8.5 - 10.5      | [facility]       |              |
|             |                          | mg/dL           | LABORATORY |              |
+-------------+--------------------------+-----------------+------------+--------------+
| Estimated   | >60Comment: GFR <60:     | >60             | [facility]       |              |
| GFR         | CHRONIC KIDNEY DISEASE,  | mL/min/1.73m2   | LABORATORY |              |
|             | IF FOUND OVER A 3 MONTH  |                 |            |              |
|             | PERIOD.GFR <15: KIDNEY   |                 |            |              |
|             | FAILURE.FOR       |                 |            |              |
|             | [employer], MULTIPLY THE  |                 |            |              |
|             | CALCULATED GFR BY        |                 |            |              |
|             | 1.210.This eGFR is       |                 |            |              |
|             | calculated using the     |                 |            |              |
|             | MDRD [facility] traceable      |                 |            |              |
|             | equation.Testing         |                 |            |              |
|             | performed at [facility]     |                 |            |              |
|             | [facility];[place], [place]   |                 |            |              |
|             | [id number]                    |                 |            |              |
+-------------+--------------------------+-----------------+------------+--------------+
 
 
 
+----------+
| Specimen |
+----------+
| Blood    |
+----------+
 
 
 
 
+-------------------+------------------+--------------------+----------------+
| Performing        | Address          | City/State/Zipcode | Phone Number   |
| Organization      |                  |                    |                |
+-------------------+------------------+--------------------+----------------+
|   [facility] |   [address] | [place] [address] |   [phone] |
+-------------------+------------------+--------------------+----------------+
 Surgical Pathology Exam (2019  1:50 PM PDT)
 
+--------------------+
| Specimen           |
+--------------------+
| Tissue - Arterial  |
| part (body         |
| structure)         |
+--------------------+
 
 
 
+-------------------------------------------------------------------------------------------
--------------------------------------------------------------------------------------------
----------------+-----------------+
| Narrative                                                                                 
                                                                                            
                | Performed At    |
+-------------------------------------------------------------------------------------------
--------------------------------------------------------------------------------------------
----------------+-----------------+
|   This result has an attachment that is not available.  SPECIMEN(S): A                    
                                                                                            
                |   [facility]  |
|  SUPERIOR MESENTERIC ARTERY PLAQUE SPECIMEN SOURCE:A. SUPERIOR                            
                                                                                            
                | INCYTE          |
| MESENTERIC ARTERY PLAQUE CLINICAL HISTORY:K55.1 (mesenteric ischemia,                     
                                                                                            
                |                 |
| chronic). FINAL PATHOLOGIC DIAGNOSIS:Superior mesenteric artery                           
                                                                                            
                |                 |
| plaque:-   Benign atherosclerotic plaque and focal fragments of benign                    
                                                                                            
                |                 |
|  fibrous tissue and smooth muscle. JVR:[facility]:C2NR MICROSCOPIC                               
                                                                                            
                |                 |
| EXAMINATION:Histologic sections of all submitted blocks are examined                      
                                                                                            
                |                 |
| by light microscopy.   These findings, together with the gross                            
                                                                                            
                |                 |
| examination, support the pathologic diagnosis. GROSS DESCRIPTION:The                      
                                                                                            
                |                 |
| specimen, labeled "JH, superior mesenteric artery plaque," is received                    
                                                                                            
                |                 |
|  in formalin and consists of three tan-pink and focally calcified                         
                                                                                            
                |                 |
 
| portions of tissue ranging from 1.0-2.5 cm in greatestdimension.                          
                                                                                            
                |                 |
|   Sectioning shows tan-pink tan-yellow and calcified cut surfaces with                    
                                                                                            
                |                 |
|  no discrete lesions identified.   Representative sections are                            
                                                                                            
                |                 |
| submitted in cassette (A1), following decalcification inDecal [email or web address]                     
                                                                                            
                |                 |
| (under the direct supervision of a pathologist) The Gross Description                     
                                                                                            
                |                 |
| was prepared using a voice recognition system.   The report was                           
                                                                                            
                |                 |
| reviewed for accuracy; however, sound-alike word errors, addition                         
                                                                                            
                |                 |
| and/or deletions may occur.   If there is anyquestion about this                          
                                                                                            
                |                 |
| report, please contact Client Services. PERFORMING LABORATORY:The                         
                                                                                            
                |                 |
| technical component was performed by [facility], [address] [place] [address]                     
                                                                                            
                |                 |
| [place] [address] (Medical Director: Liset Naidu MD; CLIA#                           
                                                                                            
                |                 |
| [id number]).    Professional interpretation was performed by[facility]                        
                                                                                            
                |                 |
| [facility], [facility]                     
                                                                                            
                |                 |
[place] [address] (Medical Director:   Liam GUZMAN                            
                                                                                            
                |                 |
| PADILLA Clarke; CLIA#:   [id number]). Diagnostician:   Mike Ryan                    
                                                                                            
                |                 |
|  MDPathologistElectronically Signed 2019                                            
                                                                                            
                |                 |
|The technical component was performed by [facility] [employer] [facility] [employer], [address] [place] [address] (Medical Director: Liset Naidu MD; CLIA# [id number]).    Professional interpretation w
as performed by |                 |
|[facility], [address] [place] [address] (Medical Director:   Liam Clarke M.D.; CLIA#:   [id number]).       
                |                 |
|                                                                                           
                                                                                            
                |                 |
|Diagnostician:   Mike Ryan MD                                                       
                                                                                            
                |                 |
 
|Pathologist                                                                                
                                                                                            
               |                 |
|Electronically Signed 2019                                                           
                                                                                            
                |                 |
|                                                                                           
                                                                                            
                |                 |
|                                                                                           
                                                                                            
                |                 |
+-------------------------------------------------------------------------------------------
--------------------------------------------------------------------------------------------
----------------+-----------------+
 
 
 
+-----------------+---------+--------------------+--------------+
| Performing      | Address | City/State/Zipcode | Phone Number |
| Organization    |         |                    |              |
+-----------------+---------+--------------------+--------------+
|   [facility]  |         |                    |              |
| [facility]          |         |                    |              |
+-----------------+---------+--------------------+--------------+
 Type and Screen (2019 11:03 AM PDT)
 
+-----------+------------------------+-----------+------------+--------------+
| Component | Value                  | Ref Range | Performed  | Pathologist  |
|           |                        |           | At         | Signature    |
+-----------+------------------------+-----------+------------+--------------+
| ABO Rh    | O POSITIVE             |           | [facility]       |              |
|           |                        |           | LABORATORY |              |
+-----------+------------------------+-----------+------------+--------------+
| Antibody  | NEGATIVE               |           | [facility]       |              |
| Screen    |                        |           | LABORATORY |              |
+-----------+------------------------+-----------+------------+--------------+
| BB BAND   | [id number]               |           | [facility]       |              |
|           |                        |           | LABORATORY |              |
+-----------+------------------------+-----------+------------+--------------+
| BB BAND   | Testing performed at   |           | [facility]       |              |
|           | [facility];[address]          |           | LABORATORY |              |
|           | [address]vd;[place] [address] |           |            |              |
+-----------+------------------------+-----------+------------+--------------+
 
 
 
+----------+
| Specimen |
+----------+
| Blood    |
+----------+
 
 
 
+-------------------+------------------+--------------------+----------------+
| Performing        | Address          | City/State/Zipcode | Phone Number   |
| Organization      |                  |                    |                |
+-------------------+------------------+--------------------+----------------+
|   [facility] |   [address] | [place] [address] |   [phone] |
 
+-------------------+------------------+--------------------+----------------+
 documented in this encounter
 
 Visit Diagnoses
 
 
+------------------------------------------------------------------------------------------+
| Diagnosis                                                                                |
+------------------------------------------------------------------------------------------+
|   Mesenteric ischemia, chronic (HCC) - Primary  Chronic vascular insufficiency of        |
| intestine                                                                                |
+------------------------------------------------------------------------------------------+
|   Hypertension, unspecified type                                                         |
+------------------------------------------------------------------------------------------+
|   Hyperlipidemia  Other and unspecified hyperlipidemia                                   |
+------------------------------------------------------------------------------------------+
|   Diabetes mellitus (HCC)  Type II or unspecified type diabetes mellitus without mention |
|  of complication, not stated as uncontrolled                                             |
+------------------------------------------------------------------------------------------+
 documented in this encounter
 
 Admitting Diagnoses
 
 
+-----------------------------------------------------------------------------------+
| Diagnosis                                                                         |
+-----------------------------------------------------------------------------------+
|   Mesenteric ischemia, chronic (HCC)  Chronic vascular insufficiency of intestine |
+-----------------------------------------------------------------------------------+
 documented in this encounter
 
 Administered Medications
 
 
+-----------------------------------+--------+----------+-------+------+------+
| Medication Order                  | MAR    | Action   | Dose  | Rate | Site |
|                                   | Action | Date     |       |      |      |
+-----------------------------------+--------+----------+-------+------+------+
|   aspirin EC tablet 81 mg  81 mg, | Given  | 20 | 81 mg |      |      |
|  Oral, DAILY, First dose on Sat   |        | 19  8:09 |       |      |      |
| 19 at 1100                   |        |  AM PDT  |       |      |      |
+-----------------------------------+--------+----------+-------+------+------+
 
 
 
+-------+----------+-------+---+---+
| Given | 20 | 81 mg |   |   |
|       | 19  8:29 |       |   |   |
|       |  AM PDT  |       |   |   |
+-------+----------+-------+---+---+
| Given | 20 | 81 mg |   |   |
|       | 19 10:23 |       |   |   |
|       |  AM PDT  |       |   |   |
+-------+----------+-------+---+---+
 
 
 
+---+---+
|   |   |
+---+---+
 
 
 
 
+-----------------------------------+---------+----------+---+----------+---+
|   balanced electrolytes in water  | New Bag | 20 |   | 50 mL/hr |   |
| (PLASMALYTE-148/NORMOSOL-R)       |         | 19 11:07 |   |          |   |
| infusion  at 50 mL/hr,            |         |  AM PDT  |   |          |   |
| Intravenous, CONTINUOUS, Starting |         |          |   |          |   |
|  Tue 19 at 1115, Pre-op      |         |          |   |          |   |
+-----------------------------------+---------+----------+---+----------+---+
 
 
 
+---+---+
|   |   |
+---+---+
 
 
 
+-----------------------------------+-------+----------+-------+---+---+
|   bisacodyl (DULCOLAX)            | Given | 20 | 10 mg |   |   |
| suppository 10 mg  10 mg, Rectal, |       | 19  8:11 |       |   |   |
|  DAILY PRN, Constipation,         |       |  AM PDT  |       |   |   |
| Starting Tue 19 at 1507, If  |       |          |       |   |   |
| all other bowel medications       |       |          |       |   |   |
| ineffective x 24 hours or not     |       |          |       |   |   |
| ordered,                          |       |          |       |   |   |
+-----------------------------------+-------+----------+-------+---+---+
 
 
 
+---+---+
|   |   |
+---+---+
 
 
 
+-----------------------------------+---------+----------+---+-------+---+
|   dextrose 5% and sodium chloride | New Bag | 20 |   | 125   |   |
|  0.45% with KCl 20 mEq/L (D5 1/2  |         | 19  7:35 |   | mL/hr |   |
| NS + KCL 20) infusion  at 125     |         |  PM PDT  |   |       |   |
| mL/hr, Intravenous, CONTINUOUS,   |         |          |   |       |   |
| Starting e 19 at 1600      |         |          |   |       |   |
+-----------------------------------+---------+----------+---+-------+---+
 
 
 
+---------+----------+---+-------+---+
| New Bag | 20 |   | 125   |   |
|         | 19 12:07 |   | mL/hr |   |
|         |  PM PDT  |   |       |   |
+---------+----------+---+-------+---+
| New Bag | 20 |   | 125   |   |
|         | 19  2:19 |   | mL/hr |   |
|         |  AM PDT  |   |       |   |
+---------+----------+---+-------+---+
 
 
 
+---+---+
 
|   |   |
+---+---+
 
 
 
+----------------------------------+-------+----------+-------+---+---+
|   hydroCHLOROthiazide tablet 25  | Given | 20 | 25 mg |   |   |
| mg  25 mg, Oral, DAILY, First    |       | 19  8:10 |       |   |   |
| dose on Sat 19 at 1100      |       |  AM PDT  |       |   |   |
+----------------------------------+-------+----------+-------+---+---+
 
 
 
+-------+----------+-------+---+---+
| Given | 20 | 25 mg |   |   |
|       | 19  8:30 |       |   |   |
|       |  AM PDT  |       |   |   |
+-------+----------+-------+---+---+
| Given | 20 | 25 mg |   |   |
|       | 19 10:23 |       |   |   |
|       |  AM PDT  |       |   |   |
+-------+----------+-------+---+---+
 
 
 
+---+---+
|   |   |
+---+---+
 
 
 
+-----------------------------------+-------+----------+---------+---+---+
|   HYDROcodone-acetaminophen       | Given | 20 | 2       |   |   |
| (NORCO) 5-325 mg per tablet 1-2   |       | 19 11:14 | tablets |   |   |
| tablet  1-2 tablet, Oral, EVERY 4 |       |  AM PDT  |         |   |   |
|  HOURS PRN, Pain, Starting Sun    |       |          |         |   |   |
| 19 at 0843                   |       |          |         |   |   |
+-----------------------------------+-------+----------+---------+---+---+
 
 
 
+-------+----------+---------+---+---+
| Given | 20 | 2       |   |   |
|       | 19  7:30 | tablets |   |   |
|       |  AM PDT  |         |   |   |
+-------+----------+---------+---+---+
| Given | 20 | 2       |   |   |
|       | 19 10:22 | tablets |   |   |
|       |  PM PDT  |         |   |   |
+-------+----------+---------+---+---+
 
 
 
+---+---+
|   |   |
+---+---+
 
 
 
+---------------------------------+-------+----------+------+---+---+
 
|   HYDROmorphone (DILAUDID)      | Given | 20 | 1 mg |   |   |
| injection 0.5-1 mg  0.5-1 mg,   |       | 19  8:30 |      |   |   |
| Intravenous, EVERY 1 HOUR PRN,  |       |  AM PDT  |      |   |   |
| Pain, Starting Tue 19 at   |       |          |      |   |   |
| 1535                            |       |          |      |   |   |
+---------------------------------+-------+----------+------+---+---+
 
 
 
+-------+----------+------+---+---+
| Given | 20 | 1 mg |   |   |
|       | 19  3:35 |      |   |   |
|       |  AM PDT  |      |   |   |
+-------+----------+------+---+---+
| Given | 20 | 1 mg |   |   |
|       | 19 10:05 |      |   |   |
|       |  PM PDT  |      |   |   |
+-------+----------+------+---+---+
 
 
 
+---+---+
|   |   |
+---+---+
 
 
 
+-----------------------------------+-------+----------+-------+---+---+
|   losartan (COZAAR) tablet 50 mg  | Given | 20 | 50 mg |   |   |
|  50 mg, Oral, DAILY, First dose   |       | 19  8:09 |       |   |   |
| on Sat 19 at 1100            |       |  AM PDT  |       |   |   |
+-----------------------------------+-------+----------+-------+---+---+
 
 
 
+-------+----------+-------+---+---+
| Given | 20 | 50 mg |   |   |
|       | 19  8:30 |       |   |   |
|       |  AM PDT  |       |   |   |
+-------+----------+-------+---+---+
| Given | 20 | 50 mg |   |   |
|       | 19 10:23 |       |   |   |
|       |  AM PDT  |       |   |   |
+-------+----------+-------+---+---+
 
 
 
+---+---+
|   |   |
+---+---+
 
 
 
+-----------------------------------+-------+----------+------+---+---+
|   morphine injection 1-2 mg  1-2  | Given | 20 | 2 mg |   |   |
| mg, Intravenous, EVERY 1 HOUR     |       | 19  3:14 |      |   |   |
| PRN, Pain, Starting Tue 19   |       |  PM PDT  |      |   |   |
| at 1507, If oral route not an     |       |          |      |   |   |
| option.  Slow IV push, not faster |       |          |      |   |   |
|  than 2mg/minute. First dose must |       |          |      |   |   |
 
|  be lowest dose, titrate to       |       |          |      |   |   |
| effective dose by repeat of       |       |          |      |   |   |
| lowest dose every 30 minutes prn  |       |          |      |   |   |
| pain, may not exceed maximum dose |       |          |      |   |   |
|  ordered per interval.  Use       |       |          |      |   |   |
| Pasero Sedation Scale.,           |       |          |      |   |   |
+-----------------------------------+-------+----------+------+---+---+
 
 
 
+---+---+
|   |   |
+---+---+
 
 
 
+-----------------------------------+-------+----------+-------+---+---+
|   pantoprazole (PROTONIX)         | Given | 20 | 40 mg |   |   |
| injection 40 mg  40 mg,           |       | 19  8:10 |       |   |   |
| Intravenous, DAILY, First dose on |       |  AM PDT  |       |   |   |
|  Wed 19 at 1100, If          |       |          |       |   |   |
| reconstituting, mix each 40 mg    |       |          |       |   |   |
| vial with 10 mL NS to make 4      |       |          |       |   |   |
| mg/mL., Indication: GERD          |       |          |       |   |   |
+-----------------------------------+-------+----------+-------+---+---+
 
 
 
+-------+----------+-------+---+---+
| Given | 20 | 40 mg |   |   |
|       | 19  8:30 |       |   |   |
|       |  AM PDT  |       |   |   |
+-------+----------+-------+---+---+
| Given | 20 | 40 mg |   |   |
|       | 19  8:00 |       |   |   |
|       |  AM PDT  |       |   |   |
+-------+----------+-------+---+---+
 
 
 
+---+---+
|   |   |
+---+---+
 
 
 
+-----------------------------------+---------+----------+---+----------+---+
|   sodium chloride 0.9% (NS)       | New Bag | 20 |   | 30 mL/hr |   |
| infusion  at 30 mL/hr,            |         | 19 11:34 |   |          |   |
| Intravenous, CONTINUOUS, Starting |         |  AM PDT  |   |          |   |
|  Tue 19 at 1200,             |         |          |   |          |   |
| Recovery/Phase I                  |         |          |   |          |   |
+-----------------------------------+---------+----------+---+----------+---+
 
 
 
+-------------------------+----------+---+---+---+
| Continued by Anesthesia | 20 |   |   |   |
|                         | 19 11:27 |   |   |   |
|                         |  AM PDT  |   |   |   |
 
+-------------------------+----------+---+---+---+
 
 
 
+---+---+
|   |   |
+---+---+
 documented in this encounter

## 2020-01-13 NOTE — XMS
Encounter Summary
  Created on: 2020
 
 Pasquale Nehemias Martinez
 External Reference #: [id number]
 : 43
 Sex: Male
 
 Demographics
 
 
+-----------------------+----------------------+
| Address               | [address]         |
|                       | [place]  [address] |
+-----------------------+----------------------+
| Home Phone            | [phone]      |
+-----------------------+----------------------+
| Preferred Language    | Unknown              |
+-----------------------+----------------------+
| Marital Status        |               |
+-----------------------+----------------------+
| Mosque Affiliation | [id number]                 |
+-----------------------+----------------------+
| Race                  | Unknown              |
+-----------------------+----------------------+
| Ethnic Group          | Unknown              |
+-----------------------+----------------------+
 
 
 Author
 
 
+--------------+--------------------------------------------+
| Author       | [facility]  |
|              | and [facility]                                |
+--------------+--------------------------------------------+
| Organization | [facility]  |
|              | and [place]                                |
+--------------+--------------------------------------------+
| Address      | Unknown                                    |
+--------------+--------------------------------------------+
| Phone        | Unavailable                                |
+--------------+--------------------------------------------+
 
 
 
 Support
 
 
+--------------+--------------+---------------------+-----------------+
| Name         | Relationship | Address             | Phone           |
+--------------+--------------+---------------------+-----------------+
| Rupa Giordano | ECON         | [address]             | [phone] |
|              |              | [place], [place]   |                 |
|              |              | [address]               |                 |
+--------------+--------------+---------------------+-----------------+
 
 
 
 
 Care Team Providers
 
 
+-----------------------+------+-------------+
| Care Team Member Name | Role | Phone       |
+-----------------------+------+-------------+
 PCP  | Unavailable |
+-----------------------+------+-------------+
 
 
 
 Reason for Visit
 
 
+---------+-----------+
| Reason  | Comments  |
+---------+-----------+
| Consult | abd pain  |
+---------+-----------+
 Evaluate & Treat (Routine)
 
+--------+--------+-----------+--------------+--------------+---------------+
| Status | Reason | Specialty | Diagnoses /  | Referred By  | Referred To   |
|        |        |           | Procedures   | Contact      | Contact       |
+--------+--------+-----------+--------------+--------------+---------------+
| Closed |        |           |   Diagnoses  |   Ferranco,  |   Sukhdeep Pardo, |
|        |        |           |  Lower       | Claudio DELEON,    |  MD  [address]     |
|        |        |           | abdominal    | ARNP  [address]     | [address] [place]   |
|        |        |           | pain,        | [address]   | [address]         |
|        |        |           | unspecified  | [address]    | [place], [place]  |
|        |        |           |              | [place]    | [address]    |
|        |        |           |              | [address]        | Phone:        |
|        |        |           |              | Phone:       | [phone]  |
|        |        |           |              | [phone] |  Fax:         |
|        |        |           |              |   Fax:       | [phone]  |
|        |        |           |              | [phone] |               |
+--------+--------+-----------+--------------+--------------+---------------+
 
 
 
 
 Encounter Details
 
 
+--------+---------+----------------------+----------------------+----------------------+
| Date   | Type    | Department           | Care Team            | Description          |
+--------+---------+----------------------+----------------------+----------------------+
| / | Office  |   [facility]      |   Sukhdeep Pardo MD     | Mesenteric ischemia, |
| 2019   | Visit   | VASCULAR SURGERY     | [address]     |  chronic (HCC)       |
|        |         | [address] | [facility] [place]  | (Primary Dx)         |
|        |         |  [place]  [facility] [place]     | [address]           |                      |
|        |         | [address]           | [phone]         |                      |
|        |         | [phone]         | [phone] (Fax)   |                      |
+--------+---------+----------------------+----------------------+----------------------+
 
 
 
 Social History
 
 
 
+---------------+-------+-----------+--------+------+
| Tobacco Use   | Types | Packs/Day | Years  | Date |
|               |       |           | Used   |      |
+---------------+-------+-----------+--------+------+
| Former Smoker |       |           |        |      |
+---------------+-------+-----------+--------+------+
 
 
 
+---------------------+---+---+---+
| Smokeless Tobacco:  |   |   |   |
| Never Used          |   |   |   |
+---------------------+---+---+---+
 
 
 
+------------------------------+
| Comments: quit 50 years ago  |
+------------------------------+
 
 
 
+------------------+---------------+
| Sex Assigned at  | Date Recorded |
| Birth            |               |
+------------------+---------------+
| Not on file      |               |
+------------------+---------------+
 
 
 
+----------------+-------------+-------------+
| Job Start Date | Occupation  | Industry    |
+----------------+-------------+-------------+
| Not on file    | Not on file | Not on file |
+----------------+-------------+-------------+
 
 
 
+----------------+--------------+------------+
| Travel History | Travel Start | Travel End |
+----------------+--------------+------------+
 
 
 
+-------------------------------------+
| No recent travel history available. |
+-------------------------------------+
 documented as of this encounter
 
 Last Filed Vital Signs
 
 
+-------------------+---------+----------------------+----------+
| Vital Sign        | Reading | Time Taken           | Comments |
+-------------------+---------+----------------------+----------+
| Blood Pressure    | 133/71  | 2019  8:53 AM  |          |
|                   |         | PDT                  |          |
+-------------------+---------+----------------------+----------+
 
| Pulse             | 66      | 2019  8:53 AM  |          |
|                   |         | PDT                  |          |
+-------------------+---------+----------------------+----------+
| Temperature       | -       | -                    |          |
+-------------------+---------+----------------------+----------+
| Respiratory Rate  | -       | -                    |          |
+-------------------+---------+----------------------+----------+
| Oxygen Saturation | 97%     | 2019  8:53 AM  |          |
|                   |         | PDT                  |          |
+-------------------+---------+----------------------+----------+
| Inhaled Oxygen    | -       | -                    |          |
| Concentration     |         |                      |          |
+-------------------+---------+----------------------+----------+
| Weight            | -       | -                    |          |
+-------------------+---------+----------------------+----------+
| Height            | -       | -                    |          |
+-------------------+---------+----------------------+----------+
| Body Mass Index   | -       | -                    |          |
+-------------------+---------+----------------------+----------+
 documented in this encounter
 
 Progress Notes
 Sukhdeep Pardo MD - 2019  9:00 AM PDTFormatting of this note might be different from the
 original.
 Subjective 
 Subjective 
 
 Mr. Giordano is a pleasant 74 y.o. male with no significant PMH who is referred to me for abd
ominal pain. Patient has been describing intermittent abdominal pain that radiates across hi
s abdomen to his back. Patient does not endorse any specific worsening pain associated with 
eating, however he does state that he has not been eating a lot. Patient has been watching h
is diet and has been trying to decrease his carbohydrates. Patient has lost 24 lbs over the 
last 2 months. The patient had a recent CT and MRI abdomen from [facility]. [facility]
ent denies any surgical intervention for this abdominal pain, however has had past abdominal
 surgeries. The patient indicates that his heart function is good. 
 
 History reviewed. No pertinent past medical history.
 
 History reviewed. No pertinent surgical history.
 
 Social History 
 
 Tobacco Use 
   Smoking status: Former Smoker 
   Smokeless tobacco: Never Used 
   Tobacco comment: quit 50 years ago  
 Substance Use Topics 
   Alcohol use: Not on file 
   Drug use: Not on file 
 
 History reviewed. No pertinent family history.
 
 Current Outpatient Medications on File Prior to Visit 
 Medication Sig Dispense Refill 
   ASPIRIN 81 PO Take 81 mg by mouth.   
   hydroCHLOROthiazide 25 mg tablet Take 25 mg by mouth Daily.   
   losartan (COZAAR) 50 mg tablet Take 50 mg by mouth Daily.   
   Multiple Vitamins-Minerals (CENTRUM SILVER 50+MEN PO) Take  by mouth.   
   simvastatin (ZOCOR) 10 mg tablet Take 10 mg by mouth nightly.   
 
 
 No current facility-administered medications on file prior to visit.  
 
 No Known Allergies
 
 Comprehensive ROS performed and pertinent items described in the HPI. 
 
  
 
 Objective 
 Objective 
 
 Vitals:reviewed
 CONSTITUTIONAL:  Conversant, well developed, NAD
 EYES: Anicteric sclerae, no lid drag, no proptosis
 RESP: Normal effort, regular, even, unlabored rate
 CV: No peripheral edema, rate regular
 SKIN: Pink, warm, dry without rash/lesion
 MS: ROM not limited, no digital cyanosis, normal gait
 NEURO: Cranial nerves II-XII grossly intact, A&O times 3
 PSYCH: appropriate affect, speech and tone, judgement and insight intact
 Vascular:  Palpable femoral pulses
 
  
 
 Assessment 
 Assessment and Plan 
 
 CT and MRI abdomen results from [facility] reviewed and discussed with the [facility]
ent. The patient's imaging is is concerning for mesenteric ischemia as he has short segment 
occlusion of the superior mesenteric artery. The patient does not have classic symptoms for 
mesenteric ischemia, however he does have concerning symptoms nonetheless of significant josette
ght loss and intermittent abdominal pain. Surgical options for treatment of the patient's me
senteric artery disease have been discussed with the patient and I have discussed my recomme
ndation of pursuing surgery to prevent worsening symptoms. Patient understands that this is 
a major surgery requiring extended hospital stay afterwards and he will be placed under gene
ral anesthesia. Discussed surgical option and mechanism of superior mesenteric artery endart
erectomy with the patient. We have discussed benefits and risks of this procedure, including
 bleeding, infection, heart attack, stroke, death. Patient is understanding and agreeable to
 superior mesenteric artery endarterectomy, and has signed consent for surgery on 19. MARIMAR carroll was instructed not to eat or drink fluids after 11:59 PM on 19. All questions an
d concerns addressed. Patient will follow up after surgery. Patient understands and is agree
able. 
 
  
 
 Attending Note: Documentation assistance provided by Dale Santiago (Scribe). Information sandeep
rded by the scribe has been reviewed and validated by me. I agree with its contents.
 
 Signed by: Dale Santiago, Juan Manuelibe 19, 16:57
 
 Sukhdeep Pardo MDElectronically signed by Sukhdeep Pardo MD at 2019  4:59 PM PDTdocumented in
 this encounter
 
 Plan of Treatment
 
 
+--------+-------------+------------------+----------------------+-------------+
| Date   | Type        | Specialty        | Care Team            | Description |
+--------+-------------+------------------+----------------------+-------------+
| / | Appointment | Radiology        |   Augustin Lopez DNP      |             |
 
|    |             |                  |  [place]     |             |
|        |             |                  | [place]  |             |
|        |             |                  | [id number] [phone]  |             |
|        |             |                  |  [phone] (Fax)  |             |
+--------+-------------+------------------+----------------------+-------------+
| / | Office      | Vascular Surgery |   Augustin Lopez DNP      |             |
|    | Visit       |                  | [address]     |             |
|        |             |                  | [place]  |             |
|        |             |                  | [address]  [phone]  |             |
|        |             |                  |  [phone] (Fax)  |             |
+--------+-------------+------------------+----------------------+-------------+
 
 
 
+-------------+------+--------+----------------------+----------------------+
| Name        | Type | Priori | Associated Diagnoses | Order Schedule       |
|             |      | ty     |                      |                      |
+-------------+------+--------+----------------------+----------------------+
| ECG 12 lead | ECG  | Routin |   Mesenteric         | 1 Occurrences        |
|             |      | e      | ischemia, chronic    | starting 2019  |
|             |      |        | (HCC)                | until 2020     |
+-------------+------+--------+----------------------+----------------------+
 documented as of this encounter
 
 Results
 XR Chest PA and Lateral (2019 10:15 AM PDT)
 
+----------+
| Specimen |
+----------+
|          |
+----------+
 
 
 
+------------------------------------------------------------------------+---------------+
| Narrative                                                              | Performed At  |
+------------------------------------------------------------------------+---------------+
|      CHEST PA AND LATERAL     CLINICAL INFORMATION:  Pre operative     |   [facility] |
| evaluation     COMPARISON:  CT ANGIOGRAPHY ABDOMEN AND PELVIS          |               |
| (2019);     FINDINGS:  There is a BB in the posteromedial right   |               |
| lower lobe, better defined on  CT abdomen 2019. the lungs are     |               |
| clear.   The heart size is normal  c..   Mild to moderate degenerative |               |
|  changes of the thoracic spine     IMPRESSION:  No active              |               |
| intrathoracic disease.           Signed by: PADILLA Singh Shawn  Sign   |               |
| Date/Time: 2019 10:27 AM                                         |               |
+------------------------------------------------------------------------+---------------+
 
 
 
+------------------------------------------------------------------------+
| Procedure Note                                                         |
+------------------------------------------------------------------------+
|   Cuong, Rad Results In - 2019 10:31 AM PDT                        |
| CHEST PA AND LATERAL                                                   |
|                                                                        |
| CLINICAL INFORMATION:                                                  |
| Pre operative evaluation                                               |
|                                                                        |
| COMPARISON:                                                            |
 
| CT ANGIOGRAPHY ABDOMEN AND PELVIS (2019);                         |
|                                                                        |
| FINDINGS:                                                              |
| There is a BB in the posteromedial right lower lobe, better defined on |
| CT abdomen 2019. the lungs are clear.  The heart size is normal   |
| c..  Mild to moderate degenerative changes of the thoracic spine       |
|                                                                        |
| IMPRESSION:                                                            |
| No active intrathoracic disease.                                       |
|                                                                        |
| Signed by: PADILLA Singh Shawn                                          |
| Sign Date/Time: 2019 10:27 AM                                    |
+------------------------------------------------------------------------+
 
 
 
+---------------+---------+--------------------+--------------+
| Performing    | Address | City/State/Zipcode | Phone Number |
| Organization  |         |                    |              |
+---------------+---------+--------------------+--------------+
|   [facility] |         |                    |              |
+---------------+---------+--------------------+--------------+
 documented in this encounter
 
 Visit Diagnoses
 
 
+------------------------------------------------------------------------------------+
| Diagnosis                                                                          |
+------------------------------------------------------------------------------------+
|   Mesenteric ischemia, chronic (HCC) - Primary  Chronic vascular insufficiency of  |
| intestine                                                                          |
+------------------------------------------------------------------------------------+
 documented in this encounter

## 2020-01-13 NOTE — XMS
Encounter Summary
  Created on: 2020
 
 Pasquale Nehemias Martinez
 External Reference #: [id number]
 : 43
 Sex: Male
 
 Demographics
 
 
+-----------------------+----------------------+
| Address               | [address]         |
|                       | [place]  [address] |
+-----------------------+----------------------+
| Home Phone            | [phone]      |
+-----------------------+----------------------+
| Preferred Language    | Unknown              |
+-----------------------+----------------------+
| Marital Status        |               |
+-----------------------+----------------------+
| Mosque Affiliation | [id number]                 |
+-----------------------+----------------------+
| Race                  | Unknown              |
+-----------------------+----------------------+
| Ethnic Group          | Unknown              |
+-----------------------+----------------------+
 
 
 Author
 
 
+--------------+--------------------------------------------+
| Author       | [facility]  |
|              | and [facility]                                |
+--------------+--------------------------------------------+
| Organization | [facility]  |
|              | and [place]                                |
+--------------+--------------------------------------------+
| Address      | Unknown                                    |
+--------------+--------------------------------------------+
| Phone        | Unavailable                                |
+--------------+--------------------------------------------+
 
 
 
 Support
 
 
+--------------+--------------+---------------------+-----------------+
| Name         | Relationship | Address             | Phone           |
+--------------+--------------+---------------------+-----------------+
| Rupa Giordano | ECON         | [address]             | [phone] |
|              |              | [address] [place] [address], [place]   |                 |
|              |              | [address]               |                 |
+--------------+--------------+---------------------+-----------------+
 
 
 
 
 Care Team Providers
 
 
+-----------------------+------+-------------+
| Care Team Member Name | Role | Phone       |
+-----------------------+------+-------------+
| No, Physician         | PCP  | Unavailable |
+-----------------------+------+-------------+
 
 
 
 Reason for Visit
 Auth/Cert
 
+--------+--------+-----------+--------------+--------------+--------------+
| Status | Reason | Specialty | Diagnoses /  | Referred By  | Referred To  |
|        |        |           | Procedures   | Contact      | Contact      |
+--------+--------+-----------+--------------+--------------+--------------+
|        |        |           |   Diagnoses  |              |              |
|        |        |           |  Mesenteric  |              |              |
|        |        |           | ischemia,    |              |              |
|        |        |           | chronic      |              |              |
|        |        |           | ([facility])        |              |              |
|        |        |           | Procedures   |              |              |
|        |        |           | REVASCULARIZ |              |              |
|        |        |           | ATION        |              |              |
|        |        |           | MESENTERIC   |              |              |
+--------+--------+-----------+--------------+--------------+--------------+
 
 
 
 
 Encounter Details
 
 
+--------+---------+----------------------+----------------------+--------------------+
| Date   | Type    | Department           | Care Team            | Description        |
+--------+---------+----------------------+----------------------+--------------------+
| / | Surgery |   [facility]    |   Sukhdeep Pardo MD     | REVASCULARIZATION  |
| 2019   |         | [facility]       | [address]     | MESENTERIC         |
|        |         | OPERATING ROOM  [address]  | [address]  [place], [place]  |                    |
|        |         | [place]           | [address]           |                    |
|        |         | [place], [place]         | [phone]         |                    |
|        |         | [address]           | [phone] (Fax)   |                    |
|        |         | [phone]         |                      |                    |
+--------+---------+----------------------+----------------------+--------------------+
 
 
 
 Social History
 
 
+---------------+-------+-----------+--------+------+
| Tobacco Use   | Types | Packs/Day | Years  | Date |
|               |       |           | Used   |      |
+---------------+-------+-----------+--------+------+
| Former Smoker |       |           |        |      |
+---------------+-------+-----------+--------+------+
 
 
 
 
+---------------------+---+---+---+
| Smokeless Tobacco:  |   |   |   |
| Former User         |   |   |   |
+---------------------+---+---+---+
 
 
 
+------------------------------+
| Comments: quit 50 years ago  |
+------------------------------+
 
 
 
+---------------+-------------+---------+----------+
| Alcohol Use   | Drinks/Week | oz/Week | Comments |
+---------------+-------------+---------+----------+
| Not Currently |             |         | 90m days |
+---------------+-------------+---------+----------+
 
 
 
+------------------+---------------+
| Sex Assigned at  | Date Recorded |
| Birth            |               |
+------------------+---------------+
| Not on file      |               |
+------------------+---------------+
 
 
 
+----------------+-------------+-------------+
| Job Start Date | Occupation  | Industry    |
+----------------+-------------+-------------+
| Not on file    | Not on file | Not on file |
+----------------+-------------+-------------+
 
 
 
+----------------+--------------+------------+
| Travel History | Travel Start | Travel End |
+----------------+--------------+------------+
 
 
 
+-------------------------------------+
| No recent travel history available. |
+-------------------------------------+
 documented as of this encounter
 
 Last Filed Vital Signs
 
 
+-------------------+----------------------+----------------------+----------+
| Vital Sign        | Reading              | Time Taken           | Comments |
+-------------------+----------------------+----------------------+----------+
| Blood Pressure    | 141/66               | 2019 10:42 AM  |          |
|                   |                      | PDT                  |          |
+-------------------+----------------------+----------------------+----------+
 
| Pulse             | 76                   | 2019  7:57 AM  |          |
|                   |                      | PDT                  |          |
+-------------------+----------------------+----------------------+----------+
| Temperature       | 36.9   C (98.5   F)  | 2019  7:57 AM  |          |
|                   |                      | PDT                  |          |
+-------------------+----------------------+----------------------+----------+
| Respiratory Rate  | 18                   | 2019  7:57 AM  |          |
|                   |                      | PDT                  |          |
+-------------------+----------------------+----------------------+----------+
| Oxygen Saturation | 98%                  | 2019  7:57 AM  |          |
|                   |                      | PDT                  |          |
+-------------------+----------------------+----------------------+----------+
| Inhaled Oxygen    | -                    | -                    |          |
| Concentration     |                      |                      |          |
+-------------------+----------------------+----------------------+----------+
| Weight            | 78.3 kg (172 lb 9.9  | 2019  3:50 AM  |          |
|                   | oz)                  | PDT                  |          |
+-------------------+----------------------+----------------------+----------+
| Height            | 168.9 cm (5' 6.5")   | 2019 10:43 AM  |          |
|                   |                      | PDT                  |          |
+-------------------+----------------------+----------------------+----------+
| Body Mass Index   | 27.44                | 2019 10:43 AM  |          |
|                   |                      | PDT                  |          |
+-------------------+----------------------+----------------------+----------+
 documented in this encounter
 
 Functional Status
 
 
+---------------------------------------------+----------+--------------------+
| Functional Status                           | Response | Date of Assessment |
+---------------------------------------------+----------+--------------------+
| Are you deaf or do you have serious         | No       | 2019         |
| difficulty hearing?                         |          |                    |
+---------------------------------------------+----------+--------------------+
| Are you blind or do you have serious        | No       | 2019         |
| difficulty seeing, even when wearing        |          |                    |
| glasses?                                    |          |                    |
+---------------------------------------------+----------+--------------------+
| Do you have serious difficulty walking or   | No       | 2019         |
| climbing stairs? (5 years old or older)     |          |                    |
+---------------------------------------------+----------+--------------------+
| Do you have difficulty dressing or bathing? | No       | 2019         |
|  (5 years old or older)                     |          |                    |
+---------------------------------------------+----------+--------------------+
| Because of a physical, mental, or emotional | No       | 2019         |
|  condition, do you have difficulty doing    |          |                    |
| errands alone such as visiting a doctor's   |          |                    |
| office or shopping? [15 years old or        |          |                    |
| older)]                                     |          |                    |
+---------------------------------------------+----------+--------------------+
 
 
 
+---------------------------------------------+----------+--------------------+
| Cognitive Status                            | Response | Date of Assessment |
+---------------------------------------------+----------+--------------------+
| Because of a physical, mental, or emotional | No       | 2019         |
|  condition, do you have serious difficulty  |          |                    |
| concentrating, remembering, or making       |          |                    |
 
| decisions? (5 years old or older)           |          |                    |
+---------------------------------------------+----------+--------------------+
 documented as of this encounter
 
 Discharge Summaries
 Billy Gutierres PA-C - 2019  8:36 AM PDTFormatting of this note might be different f
rom the original.
 Physician Discharge Summary 
 
 Patient ID:
 Nehemias Giordano
 [id number]
 74 y.o.
 1945
 
 Admit date: 2019
 
 Discharge date and time: 2019
 
 Admitting Physician: Sukhdeep Pardo MD 
 
 Discharge Physician: Billy Gutierres PA-C
 
 Admission Diagnoses: Mesenteric ischemia, chronic (HCC) [K55.1]
 
 Discharge Diagnoses: Same; Status post mesenteric artery revascularization
 
 Admission Condition: poor
 
 Discharged Condition: stable
 
 Indication for Admission: Symptomatic SMA stenosis
 
 Hospital Course: Patient was admitted on 2019 and underwent open SMA endarterectomy an
d patch angioplasty. Post operative course has been uneventful. Began passing flatus two day
s ago and diet has been advanced. Stable for discharge home.
 
 Consults: none
 
 Treatments: surgery: SMA endart and patch angioplasty
 
 Discharge Exam:
 Vitals:reviewed
 CONSTITUTIONAL:   Conversant, well developed, NAD
 EYES: Anicteric sclerae, no lid drag, no proptosis
 RESP: Normal effort, regular, even, unlabored rate
 CV: No peripheral edema, rate regular
 ABD:  Appropriate tenderness.  Hypoactive bowel tones
 SKIN: Pink, warm, dry without rash/lesion
 MS: ROM not limited, no digital cyanosis, normal gait
 NEURO: Conversant, A&O times 3
 PSYCH: appropriate affect, speech and tone, judgement and insight intact
 Vascular:  Palpable brachial pulses bilaterally. Abdominal binder in place. Staples intact.
 
 Disposition: home
 
 Patient Instructions: Keep wound clean and dry, ice to area for comfort and remove dressing
 in two days. Leave open to air. Monitor for signs of infection. 
  
 Discharge Medications 
 
  
 New Medications  
   Details 
 docusate sodium 100 mg capsule
  Take 1 capsule by mouth 2 times daily.
 aka:  COLACE
  
 HYDROcodone-acetaminophen 5-325 mg per tablet
  Take 1 tablet by mouth every 4 hours as needed for Pain.
 aka:  NORCO
  
  
 Unchanged Medications  
   Details 
 ASPIRIN 81 PO
  Take 81 mg by mouth.
  
 CENTRUM SILVER 50+MEN PO
  Take  by mouth.
  
 hydroCHLOROthiazide 25 mg tablet
  Take 25 mg by mouth Daily.
  
 losartan 50 mg tablet
  Take 50 mg by mouth Daily.
 aka:  COZAAR
  
 simvastatin 10 mg tablet
  Take 10 mg by mouth nightly.
 aka:  ZOCOR
  
  
 
 Plan: POD 6. Stable for discharge. Continue aspirin and statin therapy. Continue ambulation
 and pain management. Getting more aggressive with constipation. Patient understands to come
 back in with worsening abdominal pain and failure of bowel movement. 
 Activity: activity as tolerated, ambulate in house and no lifting, Driving, or Strenuous ex
ercise for 6 weeks
 Diet: cardiac diet
 Wound Care: keep wound clean and dry, ice to area for comfort and remove dressing in two da
ys. Leave open to air. Monitor for signs of infection. 
 
 Follow-up with Vascular clinic in 2 weeks.
 
 Signed:
 Billy Gutierres PA-C
 2019
 8:36
 Electronically signed by Billy Gutierres PA-C at 2019  8:42 AM PDTdocumented in this 
encounter
 
 Discharge Instructions
 Instructions Araceli Bartholomew RN - 2019Formatting of this note might be different fro
m the original.
 
 Constipation (Adult)
 Constipation means that you have bowel movements that are less frequent than usual. Stools 
often become very hard and difficult to pass.
 Constipation is very common. At some point in life, it affects almost everyone. Since every
one's bowel habits are different, what is constipation to one person may not be to another. 
 
Your healthcare provider may do tests to diagnose constipation. It depends on whathe or sh
efinds when evaluating you.
 
 Symptoms of constipation include:
  Abdominal pain
  Bloating
  Vomiting
  Painful bowel movements
  Itching, swelling, bleeding, or pain around the anus
 Causes
 Constipation can have many causes. These include:
  Diet low in fiber
  Too much dairy
  Not drinking enough liquids
  Lack of exercise or physical activity (especially true for older adults)
  Changes in lifestyle or daily routine, including pregnancy, aging, work, and travel
  Frequent use or misuse of laxatives
  Ignoring the urge to have a bowel movement or delaying it until later
  Medicines, such as certain prescription pain medicines, iron supplements, antacids, cert
ain antidepressants, and calcium supplements
  Diseases like irritable bowel syndrome, bowel obstructions, stroke, diabetes, thyroid di
sease, Parkinson disease, hemorrhoids, and colon cancer
 Complications
 Potential complications of constipation can include:
  Hemorrhoids
  Rectal bleeding from hemorrhoids or anal fissures(skin tears)
  Hernias
  Dependency on laxatives
  Chronic constipation
  Fecal impaction, a severe form of constipation in which a large amount of hard stool is 
in your rectum that you can't pass
  Bowel obstruction or perforation
 Home care
 All treatment should be done after talking with your healthcare provider. This is especiall
y true if you have another medical problems, are taking prescription medicines, or are an ol
deandre adult. Treatment most often involves lifestyle changes. You may also need medicines. You
r healthcare provider will tell you which will work best for you. Follow the advice below to
 help avoid this problem in the future.
 Lifestyle changes
 These lifestyle changes can help prevent constipation:
  Diet. Eat a high-fiber diet, with fresh fruit and vegetables, and reduce dairy intake, m
eats, and processed foods
  Fluids. It's important to get enough fluids each day. Drink plenty of water when you eat
 more fiber. If you are on diet that limits the amount of fluid you can have, talk about thi
s with your healthcare provider.
  Regular exercise. Check with your healthcare provider first.
 Medicines
 Take any medicines as directed. Some laxatives are safe to use only every now and then. Oth
ers can be taken on a regular basis. While laxatives don't cause bowel dependence, they are 
treating the symptoms. So your constipation may return if you don't make other changes. Talk
 with your healthcare provider or pharmacist if you have questions.
 Prescription pain medicines can cause constipation. If you are taking this kind of medicine
, ask your healthcare provider if you should also take a stool softener.
 Medicines you may take to treat constipation include:
  Fiber supplements
  Stool softeners
  Laxatives
  Enemas
  Rectal suppositories
 Follow-up care
 
 Follow up with your healthcare provider if symptoms don't get better in the next few days. 
You may need to have more tests or see a specialist.
 Call 911
 Call 911 if any of these occur:
  Trouble breathing
  Stiff, rigid abdomen that is severely painful to touch
  Confusion
  Fainting or loss of consciousness
  Rapid heart rate
  Chest pain
 When to seek medical advice
 Call your healthcare provider right away if any of these occur:
  Fever of 100.4F (38C) or higher, or as directed by your healthcare provider
  Failure to resume normal bowel movements
  Pain in your abdomen or back gets worse
  Nausea or vomiting
  Swelling in your abdomen
  Blood in the stool
  Black, tarry stool
  Involuntary weight loss
  Weakness
 Date Last Reviewed: 2018 [employer]. [address] [place] [address]. All righ
ts reserved. This information is not intended as a substitute for professional medical care.
 Always follow your healthcare professional's instructions.
 
 After Open Abdominal Superior Mesenteric Artery Surgery
 You have hadsurgery to repair SMA stenosis.
 Home care
 Recommendations for taking care of yourself at home include the following:
  Avoid strenuous activity for 4 to 6 weeks after your surgery.
  Ask your healthcare provider how long it will be before you can return to work.
  Gradually increase your activity. It may take some time for you to return to your normal
 activity level.
  Don  t drive for 2 weeks after surgery or while you are taking opioid pain medicine. As
k someone to take you to any appointments.
  Check your incision every day for signs of infection (swelling, redness, drainage, or wa
rmth).
  Keep your incision clean. Wash it gently with soap and water when you shower.
  Don  t lift anything heavier than 5 pounds for 2 weeks after surgery.
  Avoid sitting or standing for long periods without moving your legs and feet.
  Keep your feet up when you sit in a chair.
  Take your medicines exactly as directed.
 
 When to call your healthcare provider
 Call your healthcare provider right away if you have any of the following:
  Redness, pain, swelling, or drainage from your incision
  Fever of 100.4F (38C) or higher, or as directed by your healthcare provider
  Sudden coldness, pain, or paleness in your leg
  Loss of feeling in your legs
  Severe or sudden pain in your stomach
  Fail to pass gas
  Bloody bowel movements
  Prolonged constipation
  Nausea or vomiting
  Trouble breathing
  Pain or heaviness in your chest or arms 
 Date Last Reviewed: 2016 [employer]. [address] [place] [address]. All righ
ts reserved. This information is not intended as a substitute for professional medical care.
 
 Always follow your healthcare professional's instructions.
 
 
 documented in this encounter
 
 Medications at Time of Discharge
 
 
+----------------------+----------------------+-----------+---------+----------+----------+
| Medication           | Sig                  | Dispensed | Refills | Start    | End Date |
|                      |                      |           |         | Date     |          |
+----------------------+----------------------+-----------+---------+----------+----------+
|   ASPIRIN 81 PO      | Take 81 mg by mouth. |           | 0       |          |          |
+----------------------+----------------------+-----------+---------+----------+----------+
|   docusate sodium    | Take 1 capsule by    |   30      | 0       | / |          |
| (COLACE) 100 mg      | mouth 2 times daily. | capsule   |         | 19       |          |
| capsule              |                      |           |         |          |          |
+----------------------+----------------------+-----------+---------+----------+----------+
|                      | Take 25 mg by mouth  |           | 0       |          |          |
| hydroCHLOROthiazide  | Daily.               |           |         |          |          |
| 25 mg tablet         |                      |           |         |          |          |
+----------------------+----------------------+-----------+---------+----------+----------+
|                      | Take 1 tablet by     |   30      | 0       | 20 |          |
| HYDROcodone-acetamin | mouth every 4 hours  | tablet    |         | 19       |          |
| ophen (NORCO) 5-325  | as needed for Pain.  |           |         |          |          |
| mg per tablet        |                      |           |         |          |          |
+----------------------+----------------------+-----------+---------+----------+----------+
|   losartan (COZAAR)  | Take 50 mg by mouth  |           | 0       |          |          |
| 50 mg tablet         | Daily.               |           |         |          |          |
+----------------------+----------------------+-----------+---------+----------+----------+
|   Multiple           | Take  by mouth.      |           | 0       |          |          |
| Vitamins-Minerals    |                      |           |         |          |          |
| (CENTRUM SILVER      |                      |           |         |          |          |
| 50+MEN PO)           |                      |           |         |          |          |
+----------------------+----------------------+-----------+---------+----------+----------+
|   simvastatin        | Take 10 mg by mouth  |           | 0       |          |          |
| (ZOCOR) 10 mg tablet | nightly.             |           |         |          |          |
+----------------------+----------------------+-----------+---------+----------+----------+
 documented as of this encounter
 
 Progress Notes
 Araceli Bartholomew RN - 2019 11:37 AM PDTPatient discharged to home via private car. Ve
rbal and written instructions provided to patient, all questions addressed. Rx and all belon
gings gathered. IVs removed and bandages applied. Patient wheeled to car and discharged in s
table condition with family. 
 Electronically signed by Araceli Bartholomew RN at 2019 11:37 AM Gracy Mary RN 
- 2019  6:58 AM PDTPt passing gas but still no BM. Bowel tones: normoactive to hypoact
dave in upper quadrants, hypoactive in both lower quadrants.  Pt declined suppository twice, 
stating, "If I still haven't had a BM by the morning, I might do the suppository around 8."
 
 Pt set to dc home with wife @ 11 (lives in [place]).  
 
 Abdominal hydrocolloid dressing C/D/I, with no new drainage.  Pt splints appropriately when
 coughing.
 
 End of shift review complete.Electronically signed by Gracy Joe RN at 2019  8:
04 AM Beatrice Man RN - 2019  6:31 PM PDTPt advanced to full liquids, continues 
to pass gas but no BM yet. Bowel tones present. Will continue to monitor.
 
 Beatrice Goel RN Electronically signed by Beatrice Goel RN at 2019  6:32 PM Christopher Cho MD - 2019 12:46 PM PDTVascular Surgery
  still with no BM.  Just took 1 tab of Norco.  Seems to be controlling the pain.  He wants 
to wait till tomorrow. Discussed his care with his wife by phone.  Questions answered about 
diet, climbing 13 stairs to get into one level home. Wife will arrive by 11am with friend to
 help him home and into house.  Wants a walker for home as well. Ok to shower.Electronically
 signed by Christopher Man MD at 2019 12:48 PM Christopher Prince MD - 2019  8:46 AM PDTFormatting of this note might be different from the original.
 [facility]
 Service: Vascular Surgery
 Progress Note
 
 Hospital Day:   LOS: 5 days 
 Post-Op Day:  #5
 SUBJECTIVE
 
      Patient Summary:  S/p SMA endarterectomy for chronic mesenteric ischemia
 
      Events Overnight:    Abdominal binder in place. Passing gas, no stool.  No N/V. Thirst
y.
 
 Scheduled Medications 
   aspirin  81 mg Oral Daily 
   hydroCHLOROthiazide  25 mg Oral Daily 
   losartan  50 mg Oral Daily 
   pantoprazole  40 mg Intravenous Daily 
 
 Continuous Infusions 
   niCARdipine Stopped (19 1628) 
 
 PRN Medications
 acetaminophen, bisacodyl, HYDROcodone-acetaminophen, HYDROmorphone, insulin lispro, morphin
e, sodium chloride 0.9%
 
 OBJECTIVE
 Vital Signs:
 Vitals: 
  19 0715 
 BP: 155/74 
 Pulse: 71 
 Resp: 18 
 Temp: 36.7 C (98 F) 
 
 Physical Exam
 Vitals:reviewed
 CONSTITUTIONAL:  Alert/appropriate
 CV: No peripheral edema, rate regular
 ABD:  Incisional tenderness only. normal bowel tones
 DRSNG: dry, spotty strikethru.
 SKIN: Pink, warm, well hydrated
 LE: w/out calf tenderness. No edema. 
 
 DATA
 Recent Results (from the past 24 hour(s)) 
 Basic Metabolic Panel 
  Collection Time: 19  4:17 
 Result Value Ref Range 
  Na 135 135 - 145 mmol/L 
  K 3.8 3.5 - 4.9 mmol/L 
  Cl 102 99 - 109 mmol/L 
  CO2 26 23 - 32 mmol/L 
 
  Anion Gap 11 5 - 20 mmol/L 
  Glucose 140 (H) 65 - 99 mg/dL 
  BUN 8 8 - 25 mg/dL 
  Creatinine 0.9 0.70 - 1.30 mg/dL 
  BUN/Creatinine Ratio 9  
  Calcium 8.5 8.5 - 10.5 mg/dL 
  Estimated GFR >60 >60 mL/min/1.73m2 
 CBC with Differential 
  Collection Time: 19  4:17 
 Result Value Ref Range 
  WBC 5.40 3.80 - 11.00 K/uL 
  RBC 3.57 (L) 4.20 - 5.70 M/uL 
  Hemoglobin 12.1 (L) 13.2 - 17.0 g/dL 
  Hematocrit 34.5 (L) 39.0 - 50.0 % 
  MCV 96.7 80.0 - 100.0 fl 
  MCH 33.8 27.0 - 34.0 pg 
  MCHC 35.0 32.0 - 35.5 g/dL 
  RDW-SD 45.5 37 - 53 fl 
  Platelet Count 158 150 - 400 K/uL 
  MPV 7.5 fl 
  Diff Type AUTOMATED  
  % Neutrophils 55.42 % 
  % Lymphocytes 36.41 % 
  Monocyte % 6.61 % 
  Eosinophils % 1.23 % 
  Basophils % 0.33 % 
  Neutrophils, Absolute 3.00 1.90 - 7.40 K/uL 
  Absolute Lymphocytes 1.97 1.00 - 3.90 K/uL 
  Absolute Monocytes 0.36 0.00 - 0.80 K/uL 
  Eosinophils, Absolute 0.07 0.00 - 0.50 K/uL 
  Basophils, Absolute 0.02 0.00 - 0.10 K/uL 
 
 PROBLEM LIST
 
 Principal Problem:
   Mesenteric ischemia, chronic 
 Active Problems:
   Hypertension
   Hyperlipidemia
 
 ASSESSMENT & PLAN
 
 S/p SMA endarterectomy for chronic mesenteric ischemia.  POD5- ileus resolving. Will begin 
advance diet.  Continue ambulation.  Oral pain med, hold IV dilaudid. Home afternoon if tole
rates diet and pain controlled.   
 
 Disposition:  [facility]
 Code Status:  Full Code
 
 Christopher Man MD
 2019                                           [phone] signed by Mi
luciana Man MD at 2019 12:46 PM Gracy Mary RN - 2019  6:54
 AM PDTHourly rounding performed.  Pt passing gas but has not had BM yet. Ambulated 1 lap ar
ound unit. Cares & concerns will be passed on to dayshift RN.
 End of shift review complete.Electronically signed by Gracy Joe RN at 2019  7:
58 AM Natasha Ace DAVID RN - 2019  6:28 PM PDTA/O, up with supervision, passing gas t
his shift, still no BM, requesting dilaudid q2, plan to transition to PO pain meds tomorrow,
 advanced to clear liquids today, tolerating well
 
 Chart check complete
 
 
 Electronically signed by: Ace Mas RN 2019 18:30
 Electronically signed by Ace Mas RN at 2019  6:30 PM Christopher Prince MD - 2019  9:46 AM PDTFormatting of this note might be different from the [facility]
 Service: Vascular Surgery
 Progress Note
 
 Hospital Day:   LOS: 4 days 
 Post-Op Day:  3
 SUBJECTIVE
 
      Patient Summary:  S/p SMA endarterectomy for chronic mesenteric ischemia
 
      Events Overnight:  Transferred from ICU to floor. Abdominal binder in place. Passing g
as, no stool.  No N/V. Thirsty.
 
 Scheduled Medications 
   pantoprazole  40 mg Intravenous Daily 
 
 Continuous Infusions 
   dextrose 5% and sodium chloride 0.45% with KCl 20 mEq/L 125 mL/hr at 19 0219 
   niCARdipine Stopped (19 1628) 
 
 PRN Medications
 acetaminophen, bisacodyl, HYDROmorphone, insulin lispro, morphine, sodium chloride 0.9%
 
 OBJECTIVE
 Vital Signs:
 Vitals: 
  19 0807 
 BP: 174/85 
 Pulse: 83 
 Resp: 18 
 Temp: 37.9 C (100.2 F) 
 
 Physical Exam
 Vitals:reviewed
 CONSTITUTIONAL:  Alert/appropriate
 CV: No peripheral edema, rate regular
 ABD:  Incisional tenderness only. normal bowel tones
 DRSNG: dry, spotty strikethru.
 SKIN: Pink, warm, well hydrated
 LE: w/out calf tenderness. No edema. 
 
 DATA
 Recent Results (from the past 24 hour(s)) 
 Basic Metabolic Panel 
  Collection Time: 19  4:29 
 Result Value Ref Range 
  Na 135 135 - 145 mmol/L 
  K 4.0 3.5 - 4.9 mmol/L 
  Cl 103 99 - 109 mmol/L 
  CO2 25 23 - 32 mmol/L 
  Anion Gap 11 5 - 20 mmol/L 
  Glucose 140 (H) 65 - 99 mg/dL 
  BUN 9 8 - 25 mg/dL 
  Creatinine 0.8 0.70 - 1.30 mg/dL 
  BUN/Creatinine Ratio 11  
 
  Calcium 8.7 8.5 - 10.5 mg/dL 
  Estimated GFR >60 >60 mL/min/1.73m2 
 CBC with Differential 
  Collection Time: 19  4:29 
 Result Value Ref Range 
  WBC 5.54 3.80 - 11.00 K/uL 
  RBC 3.72 (L) 4.20 - 5.70 M/uL 
  Hemoglobin 12.3 (L) 13.2 - 17.0 g/dL 
  Hematocrit 36.0 (L) 39.0 - 50.0 % 
  MCV 96.8 80.0 - 100.0 fl 
  MCH 33.1 27.0 - 34.0 pg 
  MCHC 34.2 32.0 - 35.5 g/dL 
  RDW-SD 46.8 37 - 53 fl 
  Platelet Count 140 (L) 150 - 400 K/uL 
  MPV 7.4 fl 
  Diff Type AUTOMATED  
  % Neutrophils 57.00 % 
  % Lymphocytes 36.25 % 
  Monocyte % 5.29 % 
  Eosinophils % 1.03 % 
  Basophils % 0.43 % 
  Neutrophils, Absolute 3.16 1.90 - 7.40 K/uL 
  Absolute Lymphocytes 2.01 1.00 - 3.90 K/uL 
  Absolute Monocytes 0.29 0.00 - 0.80 K/uL 
  Eosinophils, Absolute 0.06 0.00 - 0.50 K/uL 
  Basophils, Absolute 0.02 0.00 - 0.10 K/uL 
 
 PROBLEM LIST
 
 Principal Problem:
   Mesenteric ischemia, chronic 
 Active Problems:
   Hypertension
   Hyperlipidemia
 
 ASSESSMENT & PLAN
 
 S/p SMA endarterectomy for chronic mesenteric ischemia.  POD4- ileus resolving. Will begin 
clear liquids.  Continue ambulation.  Resume po BP meds for sBP climbing. Wait on po pain me
d.  
 
 Disposition:  [facility]
 Code Status:  Full Code
 
 Christopher Man MD
 2019                                           [phone] signed by Mi
luciana Man MD at 2019  9:52 AM Lizzy Navarro RN - 2019  5:57
 PM PDTPt transferred to 9114, report received from LUZ MARIA Godfrey. Medicated for pain, see MAR
. Pt up walking this shift with FWW. Bowel tones hypoactive, but gurgling.
 
 Chart check complete.
 
 Lizzy Orantes RN
 Electronically signed by Lizzy Orantes RN at 2019  6:00 PM Billy Briceno PA- C - 2019  3:56 PM PDTFormatting of this note might be different from the original.
 [facility]
 Service: Vascular Surgery
 Progress Note
 
 Hospital Day:   LOS: 3 days 
 
 Post-Op Day:  3
 SUBJECTIVE
 
      Patient Summary:  S/p SMA endarterectomy for chronic mesenteric ischemia
 
      Events Overnight:  No events overnight. Abdominal binder in place. 
 
 Scheduled Medications 
   pantoprazole  40 mg Intravenous Daily 
 
 Continuous Infusions 
   dextrose 5% and sodium chloride 0.45% with KCl 20 mEq/L 125 mL/hr at 19 0819 
   niCARdipine Stopped (19 1628) 
 
 PRN Medications
 acetaminophen, bisacodyl, HYDROmorphone, insulin lispro, morphine, sodium chloride 0.9%
 
 OBJECTIVE
 Vital Signs:
 Vitals: 
  19 1456 
 BP: (!) 166/91 
 Pulse: 81 
 Resp: 16 
 Temp: 36.8 C (98.2 F) 
 
 Physical Exam
 Vitals:reviewed
 CONSTITUTIONAL:  Conversant, well developed, NAD
 EYES: Anicteric sclerae, no lid drag, no proptosis
 RESP: Normal effort, regular, even, unlabored rate
 CV: No peripheral edema, rate regular
 ABD:  Appropriate tenderness.  Hypoactive bowel tones
 SKIN: Pink, warm, dry without rash/lesion
 MS: ROM not limited, no digital cyanosis, normal gait
 NEURO: Conversant, A&O times 3
 PSYCH: appropriate affect, speech and tone, judgement and insight intact
 Vascular:  Palpable brachial pulses bilaterally. Abdominal binder in place. Staples intact.
 
 DATA
 Recent Results (from the past 24 hour(s)) 
 Basic Metabolic Panel 
  Collection Time: 19  4:23 
 Result Value Ref Range 
  Na 137 135 - 145 mmol/L 
  K 4.0 3.5 - 4.9 mmol/L 
  Cl 104 99 - 109 mmol/L 
  CO2 26 23 - 32 mmol/L 
  Anion Gap 11 5 - 20 mmol/L 
  Glucose 140 (H) 65 - 99 mg/dL 
  BUN 8 8 - 25 mg/dL 
  Creatinine 0.9 0.70 - 1.30 mg/dL 
  BUN/Creatinine Ratio 9  
  Calcium 8.4 (L) 8.5 - 10.5 mg/dL 
  Estimated GFR >60 >60 mL/min/1.73m2 
 CBC with Differential 
  Collection Time: 19  4:23 
 Result Value Ref Range 
  WBC 9.31 3.80 - 11.00 K/uL 
  RBC 3.81 (L) 4.20 - 5.70 M/uL 
 
  Hemoglobin 12.7 (L) 13.2 - 17.0 g/dL 
  Hematocrit 37.1 (L) 39.0 - 50.0 % 
  MCV 97.5 80.0 - 100.0 fl 
  MCH 33.5 27.0 - 34.0 pg 
  MCHC 34.3 32.0 - 35.5 g/dL 
  RDW-SD 46.8 37 - 53 fl 
  Platelet Count 162 150 - 400 K/uL 
  MPV 7.5 fl 
  Diff Type AUTOMATED  
  % Neutrophils 54.94 % 
  % Lymphocytes 39.44 % 
  Monocyte % 4.62 % 
  Eosinophils % 0.69 % 
  Basophils % 0.31 % 
  Neutrophils, Absolute 5.12 1.90 - 7.40 K/uL 
  Absolute Lymphocytes 3.67 1.00 - 3.90 K/uL 
  Absolute Monocytes 0.43 0.00 - 0.80 K/uL 
  Eosinophils, Absolute 0.06 0.00 - 0.50 K/uL 
  Basophils, Absolute 0.03 0.00 - 0.10 K/uL 
 
 PROBLEM LIST
 
 Principal Problem:
   Mesenteric ischemia, chronic 
 Active Problems:
   Hypertension
   Hyperlipidemia
 
 ASSESSMENT & PLAN
 
 S/p SMA endarterectomy for chronic mesenteric ischemia.  POD3.  Denies flatus still. Contin
ue strict NPO with swabs for comfort until return of bowel function.  Continue ambulation wi
th PT.  
 
 Disposition:  [facility]
 Code Status:  Full Code
 
 Billy Gutierres PA-C
 2019Electronically signed by Billy Gutierres PA-C at 2019  3:58 PM Bekah Carmona RN - 2019  2:51 PM PDTPt transferred to 9RP. Electronically signed by Hina Lord RN at 2019  2:52 PM Bekah Carmona RN - 2019  2:33 PM PD
TReport called to Lizett on 9RP. Will transfer pt at this time. Electronically signed by Nino Lord RN at 2019  2:33 PM Maye Hawkins RN - 2019  7:31 PM PDT
Patient worked with PT this morning and has sat in chair the remainder of the shift. Pt c/o 
occasional pain, worse with ambulation, coughing, or laughing. Medicated with 1mg Dilaudid x
 3 with relief. Patient made several trips to bathroom with use of 2WW and CGA. Abdominal bi
nder in place per JAILYN Sierra. Pt denies passing flatus this shift, continues to be NPO.
 Maye Guevara RN
 Electronically signed by Maye Guevara RN at 2019  7:33 PM Sukhdeep Castro MD -   6:56 AM PDTFormatting of this note might be different from the original.
 [facility]
 Service: Vascular Surgery
 Progress Note
 
 Hospital Day:   LOS: 2 days 
 Post-Op Day:  2 Days Post-Op
 SUBJECTIVE
 
      Patient Summary:  S/p SMA endarterectomy for chronic mesenteric ischemia
 
 
      Events Overnight:  No events overnight.  Pain well controlled with pain meds.  Ambulat
ed yesterday with assistance.  
 
 Scheduled Medications 
   pantoprazole  40 mg Intravenous Daily 
 
 Continuous Infusions 
   dextrose 5% and sodium chloride 0.45% with KCl 20 mEq/L 125 mL/hr at 19 2228 
   niCARdipine Stopped (19 1628) 
 
 PRN Medications
 acetaminophen, bisacodyl, HYDROmorphone, insulin lispro, morphine, sodium chloride 0.9%
 
 OBJECTIVE
 Vital Signs:
 Vitals: 
  19 0421 
 BP: 149/80 
 Pulse: 63 
 Resp: 16 
 Temp: 37.2 C (99 F) 
 
 Physical Exam
 Vitals:reviewed
 CONSTITUTIONAL:  Conversant, well developed, NAD
 EYES: Anicteric sclerae, no lid drag, no proptosis
 RESP: Normal effort, regular, even, unlabored rate
 CV: No peripheral edema, rate regular
 ABD:  Appropriate tenderness.  Hypoactive bowel tones
 SKIN: Pink, warm, dry without rash/lesion
 MS: ROM not limited, no digital cyanosis, normal gait
 NEURO: Conversant, A&O times 3
 PSYCH: appropriate affect, speech and tone, judgement and insight intact
 Vascular:  Palpable brachial pulses bilaterally. 
 
 DATA
 Recent Results (from the past 24 hour(s)) 
 Lactic Acid 
  Collection Time: 19  9:09 
 Result Value Ref Range 
  Lactate, Serum 1.6 0.4 - 2.0 mmol/L 
 Basic Metabolic Panel 
  Collection Time: 19  4:06 
 Result Value Ref Range 
  Na 138 135 - 145 mmol/L 
  K 4.4 3.5 - 4.9 mmol/L 
  Cl 106 99 - 109 mmol/L 
  CO2 25 23 - 32 mmol/L 
  Anion Gap 11 5 - 20 mmol/L 
  Glucose 144 (H) 65 - 99 mg/dL 
  BUN 16 8 - 25 mg/dL 
  Creatinine 0.9 0.70 - 1.30 mg/dL 
  BUN/Creatinine Ratio 18  
  Calcium 8.6 8.5 - 10.5 mg/dL 
  Estimated GFR >60 >60 mL/min/1.73m2 
 CBC with Differential 
  Collection Time: 19  4:06 
 Result Value Ref Range 
  WBC 10.14 3.80 - 11.00 K/uL 
  RBC 3.79 (L) 4.20 - 5.70 M/uL 
 
  Hemoglobin 12.6 (L) 13.2 - 17.0 g/dL 
  Hematocrit 37.1 (L) 39.0 - 50.0 % 
  MCV 97.8 80.0 - 100.0 fl 
  MCH 33.3 27.0 - 34.0 pg 
  MCHC 34.1 32.0 - 35.5 g/dL 
  RDW-SD 47.3 37 - 53 fl 
  Platelet Count 149 (L) 150 - 400 K/uL 
  MPV 7.4 fl 
  Diff Type AUTOMATED  
  % Neutrophils 63.73 % 
  % Lymphocytes 31.45 % 
  Monocyte % 4.45 % 
  Eosinophils % 0.24 % 
  Basophils % 0.13 % 
  Neutrophils, Absolute 6.46 1.90 - 7.40 K/uL 
  Absolute Lymphocytes 3.19 1.00 - 3.90 K/uL 
  Absolute Monocytes 0.45 0.00 - 0.80 K/uL 
  Eosinophils, Absolute 0.02 0.00 - 0.50 K/uL 
  Basophils, Absolute 0.01 0.00 - 0.10 K/uL 
 
 PROBLEM LIST
 
 Principal Problem:
   Mesenteric ischemia, chronic 
 Active Problems:
   Hypertension
   Hyperlipidemia
 
 ASSESSMENT & PLAN
 
 S/p SMA endarterectomy for chronic mesenteric ischemia.  Doing well.  Denies flatus. Contin
ue strict NPO with swabs for comfort until return of bowel function.  Continue ambulation wi
th PT.  
 
 Disposition:  [facility]
 Code Status:  Full Code
 
 Sukhdeep Pardo MD
 2019Electronically signed by Sukhdeep Pardo MD at 2019  6:59 AM Sukhdeep Castro MD - 
2019  9:10 AM PDTFormatting of this note might be different from the original.
 [facility]
 Service: Vascular Surgery
 Progress Note
 
 Hospital Day:   LOS: 1 day 
 Post-Op Day:  1 Day Post-Op
 SUBJECTIVE
 
      Patient Summary:  S/p SMA endarterectomy for chronic mesenteric ischemia
 
      Events Overnight:  No events overnight.  Pain well controlled with pain meds.  
 
 Scheduled Medications 
 
 Continuous Infusions 
   dextrose 5% and sodium chloride 0.45% with KCl 20 mEq/L 125 mL/hr at 19 0724 
   niCARdipine Stopped (19 1628) 
 
 PRN Medications
 acetaminophen, aluminum & magnesium hydroxide-simethicone, bisacodyl, calcium carbonate, do
 
cusate sodium, HYDROmorphone, insulin lispro, morphine, sodium chloride 0.9%
 
 OBJECTIVE
 Vital Signs:
 Vitals: 
  19 0800 
 BP: 128/68 
 Pulse: 78 
 Resp: 12 
 Temp: 37 C (98.6 F) 
 
 Physical Exam
 Vitals:reviewed
 CONSTITUTIONAL:  Conversant, well developed, NAD
 EYES: Anicteric sclerae, no lid drag, no proptosis
 RESP: Normal effort, regular, even, unlabored rate
 CV: No peripheral edema, rate regular
 SKIN: Pink, warm, dry without rash/lesion
 ABD:  Soft, appropriate tenderness, hypoactive bowel tones
 MS: ROM not limited, no digital cyanosis
 NEURO: Conversant, A&O times 3
 PSYCH: appropriate affect, speech and tone, judgement and insight intact
 
 DATA
 Recent Results (from the past 24 hour(s)) 
 Type and Screen 
  Collection Time: 19 11:03 
 Result Value Ref Range 
  ABO Rh O POSITIVE  
  Antibody Screen NEGATIVE  
  BB BAND [id number]  
  BB BAND   
   Testing performed at [facility];[address] [place] [address];[place] [address] 
 Basic Metabolic Panel 
  Collection Time: 19  4:40 
 Result Value Ref Range 
  Na 140 135 - 145 mmol/L 
  K 4.5 3.5 - 4.9 mmol/L 
  Cl 107 99 - 109 mmol/L 
  CO2 23 23 - 32 mmol/L 
  Anion Gap 15 5 - 20 mmol/L 
  Glucose 192 (H) 65 - 99 mg/dL 
  BUN 17 8 - 25 mg/dL 
  Creatinine 1.02 0.70 - 1.30 mg/dL 
  BUN/Creatinine Ratio 17  
  Calcium 8.6 8.5 - 10.5 mg/dL 
  Estimated GFR >60 >60 mL/min/1.73m2 
 CBC with Differential 
  Collection Time: 19  4:40 
 Result Value Ref Range 
  WBC 13.98 (H) 3.80 - 11.00 K/uL 
  RBC 4.07 (L) 4.20 - 5.70 M/uL 
  Hemoglobin 13.3 13.2 - 17.0 g/dL 
  Hematocrit 39.6 39.0 - 50.0 % 
  MCV 97.4 80.0 - 100.0 fl 
  MCH 32.8 27.0 - 34.0 pg 
  MCHC 33.7 32.0 - 35.5 g/dL 
  RDW-SD 47.3 37 - 53 fl 
  Platelet Count 185 150 - 400 K/uL 
  MPV 7.3 fl 
 
  Diff Type AUTOMATED  
  % Neutrophils 55.68 % 
  % Lymphocytes 39.54 % 
  Monocyte % 4.55 % 
  Eosinophils % 0.05 % 
  Basophils % 0.18 % 
  Neutrophils, Absolute 7.79 (H) 1.90 - 7.40 K/uL 
  Absolute Lymphocytes 5.53 (H) 1.00 - 3.90 K/uL 
  Absolute Monocytes 0.64 0.00 - 0.80 K/uL 
  Eosinophils, Absolute 0.01 0.00 - 0.50 K/uL 
  Basophils, Absolute 0.03 0.00 - 0.10 K/uL 
  RBC Morphology RBC AND PLT MORPHOLOGY APPEAR NORMAL  
  Platelet Estimate ADEQUATE  
  Comment SLIDE SCANNED, AGREES WITH AUTOMATED RESULTS.  
 ECG 12 lead 
  Collection Time: 19  6:38 
 Result Value Ref Range 
  INTERPRETATION TEXT Not Confirmed  
 
 PROBLEM LIST
 
 Principal Problem:
   Mesenteric ischemia, chronic 
 Active Problems:
   Hypertension
   Hyperlipidemia
 
 ASSESSMENT & PLAN
 
 S/p SMA endarterectomy for chronic mesenteric ischemia.  Doing well.  Transfer to cardiac u
nit today. Up out of bed with PT and ambulate with assist.  Keep NPO until return of bowel f
unction.  Discontinue yi catheter today.  
 
 Disposition:  [facility]
 Code Status:  Full Code
 
 Sukhdeep Pardo MD
 2019Electronically signed by Sukhdeep Pardo MD at 2019  9:15 AM Maye Forrester RN
 - 2019  6:30 PM PDTPt received after OR procedure with Dr Pardo.  His main complaint wa
s of abdominal pain.  He was also hypertensive.  Goal BP <160.  Once pain was better control
led, he no longer met the criteria for the nicardipene gtt.  He will be NPO until he passes 
gas which is anticipated to be 3-4 days.
 
 No blood, restraints, protocols, or signed and held orders.
 
 Morphine given x1 per parameters with no effect.
 Dilaudid given x3 per parameters with moderate effect.
 
 End of shift review complete. Electronically signed by Maye Amin RN at 2019  6:32
 PM PDTdocumented in this encounter
 
 Plan of Treatment
 
 
+--------+-------------+------------------+----------------------+-------------+
| Date   | Type        | Specialty        | Care Team            | Description |
+--------+-------------+------------------+----------------------+-------------+
| / | Appointment | Radiology        |   Augustin Lopez DNP      |             |
|    |             |                  | [address]     |             |
|        |             |                  | [place]  |             |
 
|        |             |                  | [id number] [phone]  |             |
|        |             |                  |  [phone] (Fax)  |             |
+--------+-------------+------------------+----------------------+-------------+
| / | Office      | Vascular Surgery |   Augustin Lopez DNP      |             |
|    | Visit       |                  | [address]     |             |
|        |             |                  | [place]  |             |
|        |             |                  | [id number]  [phone]  |             |
|        |             |                  |  [phone] (Fax)  |             |
+--------+-------------+------------------+----------------------+-------------+
 documented as of this encounter
 
 Procedures
 
 
+---------------------+--------+-------------+----------------------+----------------------+
| Procedure Name      | Priori | Date/Time   | Associated Diagnosis | Comments             |
|                     | ty     |             |                      |                      |
+---------------------+--------+-------------+----------------------+----------------------+
| CBC WITH            | Routin | 2019  |                      |   Results for this   |
| DIFFERENTIAL        | e      |  4:17 AM    |                      | procedure are in the |
|                     |        | PDT         |                      |  results section.    |
+---------------------+--------+-------------+----------------------+----------------------+
| BASIC METABOLIC     | Routin | 2019  |                      |   Results for this   |
| PANEL               | e      |  4:17 AM    |                      | procedure are in the |
|                     |        | PDT         |                      |  results section.    |
+---------------------+--------+-------------+----------------------+----------------------+
| CBC WITH            | Routin | 2019  |                      |   Results for this   |
| DIFFERENTIAL        | e      |  4:29 AM    |                      | procedure are in the |
|                     |        | PDT         |                      |  results section.    |
+---------------------+--------+-------------+----------------------+----------------------+
| BASIC METABOLIC     | Routin | 2019  |                      |   Results for this   |
| PANEL               | e      |  4:29 AM    |                      | procedure are in the |
|                     |        | PDT         |                      |  results section.    |
+---------------------+--------+-------------+----------------------+----------------------+
| CBC WITH            | Routin | 2019  |                      |   Results for this   |
| DIFFERENTIAL        | e      |  4:23 AM    |                      | procedure are in the |
|                     |        | PDT         |                      |  results section.    |
+---------------------+--------+-------------+----------------------+----------------------+
| BASIC METABOLIC     | Routin | 2019  |                      |   Results for this   |
| PANEL               | e      |  4:23 AM    |                      | procedure are in the |
|                     |        | PDT         |                      |  results section.    |
+---------------------+--------+-------------+----------------------+----------------------+
| CBC WITH            | Routin | 2019  |                      |   Results for this   |
| DIFFERENTIAL        | e      |  4:06 AM    |                      | procedure are in the |
|                     |        | PDT         |                      |  results section.    |
+---------------------+--------+-------------+----------------------+----------------------+
| BASIC METABOLIC     | Routin | 2019  |                      |   Results for this   |
| PANEL               | e      |  4:06 AM    |                      | procedure are in the |
|                     |        | PDT         |                      |  results section.    |
+---------------------+--------+-------------+----------------------+----------------------+
| LACTIC ACID         | STAT   | 2019  |                      |   Results for this   |
|                     |        |  9:09 AM    |                      | procedure are in the |
|                     |        | PDT         |                      |  results section.    |
+---------------------+--------+-------------+----------------------+----------------------+
| ECG 12 LEAD         | Routin | 2019  |                      |   Results for this   |
|                     | e      |  5:37 AM    |                      | procedure are in the |
|                     |        | PDT         |                      |  results section.    |
+---------------------+--------+-------------+----------------------+----------------------+
| CBC WITH            | Routin | 2019  |                      |   Results for this   |
| DIFFERENTIAL        | e      |  4:40 AM    |                      | procedure are in the |
 
|                     |        | PDT         |                      |  results section.    |
+---------------------+--------+-------------+----------------------+----------------------+
| BASIC METABOLIC     | Routin | 2019  |                      |   Results for this   |
| PANEL               | e      |  4:40 AM    |                      | procedure are in the |
|                     |        | PDT         |                      |  results section.    |
+---------------------+--------+-------------+----------------------+----------------------+
| SURGICAL PATHOLOGY  | Routin | 2019  |   Mesenteric         |   Results for this   |
| EXAM                | e      |  1:50 PM    | ischemia, chronic    | procedure are in the |
|                     |        | PDT         | ([facility])                |  results section.    |
+---------------------+--------+-------------+----------------------+----------------------+
| REVASCULARIZATION   |        | 2019  |   Mesenteric         |                      |
| MESENTERIC          |        | 11:12 AM    | ischemia, chronic    |                      |
|                     |        | PDT         | ([facility])                |                      |
+---------------------+--------+-------------+----------------------+----------------------+
| TYPE AND SCREEN     | STAT   | 2019  |                      |   Results for this   |
|                     |        | 11:03 AM    |                      | procedure are in the |
|                     |        | PDT         |                      |  results section.    |
+---------------------+--------+-------------+----------------------+----------------------+
 documented in this encounter
 
 Results
 CBC with Differential (2019  4:17 AM PDT)
 
+-------------+--------------------------+-----------------+------------+--------------+
| Component   | Value                    | Ref Range       | Performed  | Pathologist  |
|             |                          |                 | At         | Signature    |
+-------------+--------------------------+-----------------+------------+--------------+
| WBC         | 5.40                     | 3.80 - 11.00    | [facility]       |              |
|             |                          | K/uL            | LABORATORY |              |
+-------------+--------------------------+-----------------+------------+--------------+
| RBC         | 3.57 (L)                 | 4.20 - 5.70     | [facility]       |              |
|             |                          | M/uL            | LABORATORY |              |
+-------------+--------------------------+-----------------+------------+--------------+
| Hemoglobin  | 12.1 (L)                 | 13.2 - 17.0     | [facility]       |              |
|             |                          | g/dL            | LABORATORY |              |
+-------------+--------------------------+-----------------+------------+--------------+
| Hematocrit  | 34.5 (L)                 | 39.0 - 50.0 %   | [facility]       |              |
|             |                          |                 | LABORATORY |              |
+-------------+--------------------------+-----------------+------------+--------------+
| MCV         | 96.7                     | 80.0 - 100.0 fl | [facility]       |              |
|             |                          |                 | LABORATORY |              |
+-------------+--------------------------+-----------------+------------+--------------+
| MCH         | 33.8                     | 27.0 - 34.0 pg  | [facility]       |              |
|             |                          |                 | LABORATORY |              |
+-------------+--------------------------+-----------------+------------+--------------+
| MCHC        | 35.0                     | 32.0 - 35.5     | [facility]       |              |
|             |                          | g/dL            | LABORATORY |              |
+-------------+--------------------------+-----------------+------------+--------------+
| RDW-SD      | 45.5                     | 37 - 53 fl      | [facility]       |              |
|             |                          |                 | LABORATORY |              |
+-------------+--------------------------+-----------------+------------+--------------+
| Platelet    | 158                      | 150 - 400 K/uL  | [facility]       |              |
| Count       |                          |                 | LABORATORY |              |
+-------------+--------------------------+-----------------+------------+--------------+
| MPV         | 7.5                      | fl              | [facility]       |              |
|             |                          |                 | LABORATORY |              |
+-------------+--------------------------+-----------------+------------+--------------+
| Diff Type   | AUTOMATED                |                 | [facility]       |              |
|             |                          |                 | LABORATORY |              |
+-------------+--------------------------+-----------------+------------+--------------+
 
| %           | 55.42                    | %               | [facility]       |              |
| Neutrophils |                          |                 | LABORATORY |              |
+-------------+--------------------------+-----------------+------------+--------------+
| %           | 36.41                    | %               | [facility]       |              |
| Lymphocytes |                          |                 | LABORATORY |              |
+-------------+--------------------------+-----------------+------------+--------------+
| Monocyte %  | 6.61                     | %               | [facility]       |              |
|             |                          |                 | LABORATORY |              |
+-------------+--------------------------+-----------------+------------+--------------+
| Eosinophils | 1.23                     | %               | [facility]       |              |
|  %          |                          |                 | LABORATORY |              |
+-------------+--------------------------+-----------------+------------+--------------+
| Basophils % | 0.33                     | %               | [facility]       |              |
|             |                          |                 | LABORATORY |              |
+-------------+--------------------------+-----------------+------------+--------------+
| Neutrophils | 3.00                     | 1.90 - 7.40     | [facility]       |              |
| , Absolute  |                          | K/uL            | LABORATORY |              |
+-------------+--------------------------+-----------------+------------+--------------+
| Absolute    | 1.97                     | 1.00 - 3.90     | [facility]       |              |
| Lymphocytes |                          | K/uL            | LABORATORY |              |
+-------------+--------------------------+-----------------+------------+--------------+
| Absolute    | 0.36                     | 0.00 - 0.80     | [facility]       |              |
| Monocytes   |                          | K/uL            | LABORATORY |              |
+-------------+--------------------------+-----------------+------------+--------------+
| Eosinophils | 0.07                     | 0.00 - 0.50     | [facility]       |              |
| , Absolute  |                          | K/uL            | LABORATORY |              |
+-------------+--------------------------+-----------------+------------+--------------+
| Basophils,  | 0.02Comment: Testing     | 0.00 - 0.10     | [facility]       |              |
| Absolute    | performed at [facility], [address] | K/uL            | LABORATORY |              |
|             |  [address],        |                 |            |              |
|             | [place]  [id number]     |                 |            |              |
+-------------+--------------------------+-----------------+------------+--------------+
 
 
 
+----------+
| Specimen |
+----------+
| Blood    |
+----------+
 
 
 
+-------------------+------------------+--------------------+----------------+
| Performing        | Address          | City/State/Zipcode | Phone Number   |
| Organization      |                  |                    |                |
+-------------------+------------------+--------------------+----------------+
|   [facility] |   [address] | [place] [address] |   [phone] |
+-------------------+------------------+--------------------+----------------+
 Basic Metabolic Panel (2019  4:17 AM PDT)
 
+-------------+--------------------------+-----------------+------------+--------------+
| Component   | Value                    | Ref Range       | Performed  | Pathologist  |
|             |                          |                 | At         | Signature    |
+-------------+--------------------------+-----------------+------------+--------------+
| Na          | 135                      | 135 - 145       | [facility]       |              |
|             |                          | mmol/L          | LABORATORY |              |
+-------------+--------------------------+-----------------+------------+--------------+
| K           | 3.8                      | 3.5 - 4.9       | [facility]       |              |
|             |                          | mmol/L          | LABORATORY |              |
 
+-------------+--------------------------+-----------------+------------+--------------+
| Cl          | 102                      | 99 - 109 mmol/L | [facility]       |              |
|             |                          |                 | LABORATORY |              |
+-------------+--------------------------+-----------------+------------+--------------+
| CO2         | 26                       | 23 - 32 mmol/L  | [facility]       |              |
|             |                          |                 | LABORATORY |              |
+-------------+--------------------------+-----------------+------------+--------------+
| Anion Gap   | 11                       | 5 - 20 mmol/L   | [facility]       |              |
|             |                          |                 | LABORATORY |              |
+-------------+--------------------------+-----------------+------------+--------------+
| Glucose     | 140 (H)                  | 65 - 99 mg/dL   | [facility]       |              |
|             |                          |                 | LABORATORY |              |
+-------------+--------------------------+-----------------+------------+--------------+
| BUN         | 8                        | 8 - 25 mg/dL    | [facility]       |              |
|             |                          |                 | LABORATORY |              |
+-------------+--------------------------+-----------------+------------+--------------+
| Creatinine  | 0.9                      | 0.70 - 1.30     | [facility]       |              |
|             |                          | mg/dL           | LABORATORY |              |
+-------------+--------------------------+-----------------+------------+--------------+
| BUN/Creatin | 9                        |                 | [facility]       |              |
| ine Ratio   |                          |                 | LABORATORY |              |
+-------------+--------------------------+-----------------+------------+--------------+
| Calcium     | 8.5                      | 8.5 - 10.5      | [facility]       |              |
|             |                          | mg/dL           | LABORATORY |              |
+-------------+--------------------------+-----------------+------------+--------------+
| Estimated   | >60Comment: GFR <60:     | >60             | [facility]       |              |
| GFR         | CHRONIC KIDNEY DISEASE,  | mL/min/1.73m2   | LABORATORY |              |
|             | IF FOUND OVER A 3 MONTH  |                 |            |              |
|             | PERIOD.GFR <15: KIDNEY   |                 |            |              |
|             | FAILURE.FOR       |                 |            |              |
|             | [employer], MULTIPLY THE  |                 |            |              |
|             | CALCULATED GFR BY        |                 |            |              |
|             | 1.210.This eGFR is       |                 |            |              |
|             | calculated using the     |                 |            |              |
|             | MDRD IDMS traceable      |                 |            |              |
|             | equation.Testing         |                 |            |              |
|             | performed at [facility], [address] |                 |            |              |
|             |  [facility],        |                 |            |              |
|             | [place]   [address]    |                 |            |              |
+-------------+--------------------------+-----------------+------------+--------------+
 
 
 
+----------+
| Specimen |
+----------+
| Blood    |
+----------+
 
 
 
+-------------------+------------------+--------------------+----------------+
| Performing        | Address          | City/State/Zipcode | Phone Number   |
| Organization      |                  |                    |                |
+-------------------+------------------+--------------------+----------------+
|   [facility] |   [address] | [place] [address] |   [phone] |
+-------------------+------------------+--------------------+----------------+
 CBC with Differential (2019  4:29 AM PDT)
 
+-------------+--------------------------+-----------------+------------+--------------+
 
| Component   | Value                    | Ref Range       | Performed  | Pathologist  |
|             |                          |                 | At         | Signature    |
+-------------+--------------------------+-----------------+------------+--------------+
| WBC         | 5.54                     | 3.80 - 11.00    | [facility]       |              |
|             |                          | K/uL            | LABORATORY |              |
+-------------+--------------------------+-----------------+------------+--------------+
| RBC         | 3.72 (L)                 | 4.20 - 5.70     | [facility]       |              |
|             |                          | M/uL            | LABORATORY |              |
+-------------+--------------------------+-----------------+------------+--------------+
| Hemoglobin  | 12.3 (L)                 | 13.2 - 17.0     | [facility]       |              |
|             |                          | g/dL            | LABORATORY |              |
+-------------+--------------------------+-----------------+------------+--------------+
| Hematocrit  | 36.0 (L)                 | 39.0 - 50.0 %   | [facility]       |              |
|             |                          |                 | LABORATORY |              |
+-------------+--------------------------+-----------------+------------+--------------+
| MCV         | 96.8                     | 80.0 - 100.0 fl | [facility]       |              |
|             |                          |                 | LABORATORY |              |
+-------------+--------------------------+-----------------+------------+--------------+
| MCH         | 33.1                     | 27.0 - 34.0 pg  | [facility]       |              |
|             |                          |                 | LABORATORY |              |
+-------------+--------------------------+-----------------+------------+--------------+
| MCHC        | 34.2                     | 32.0 - 35.5     | [facility]       |              |
|             |                          | g/dL            | LABORATORY |              |
+-------------+--------------------------+-----------------+------------+--------------+
| RDW-SD      | 46.8                     | 37 - 53 fl      | [facility]       |              |
|             |                          |                 | LABORATORY |              |
+-------------+--------------------------+-----------------+------------+--------------+
| Platelet    | 140 (L)                  | 150 - 400 K/uL  | [facility]       |              |
| Count       |                          |                 | LABORATORY |              |
+-------------+--------------------------+-----------------+------------+--------------+
| MPV         | 7.4                      | fl              | [facility]       |              |
|             |                          |                 | LABORATORY |              |
+-------------+--------------------------+-----------------+------------+--------------+
| Diff Type   | AUTOMATED                |                 | [facility]       |              |
|             |                          |                 | LABORATORY |              |
+-------------+--------------------------+-----------------+------------+--------------+
| %           | 57.00                    | %               | [facility]       |              |
| Neutrophils |                          |                 | LABORATORY |              |
+-------------+--------------------------+-----------------+------------+--------------+
| %           | 36.25                    | %               | [facility]       |              |
| Lymphocytes |                          |                 | LABORATORY |              |
+-------------+--------------------------+-----------------+------------+--------------+
| Monocyte %  | 5.29                     | %               | [facility]       |              |
|             |                          |                 | LABORATORY |              |
+-------------+--------------------------+-----------------+------------+--------------+
| Eosinophils | 1.03                     | %               | [facility]       |              |
|  %          |                          |                 | LABORATORY |              |
+-------------+--------------------------+-----------------+------------+--------------+
| Basophils % | 0.43                     | %               | [facility]       |              |
|             |                          |                 | LABORATORY |              |
+-------------+--------------------------+-----------------+------------+--------------+
| Neutrophils | 3.16                     | 1.90 - 7.40     | [facility]       |              |
| , Absolute  |                          | K/uL            | LABORATORY |              |
+-------------+--------------------------+-----------------+------------+--------------+
| Absolute    | 2.01                     | 1.00 - 3.90     | [facility]       |              |
| Lymphocytes |                          | K/uL            | LABORATORY |              |
+-------------+--------------------------+-----------------+------------+--------------+
| Absolute    | 0.29                     | 0.00 - 0.80     | [facility]       |              |
| Monocytes   |                          | K/uL            | LABORATORY |              |
+-------------+--------------------------+-----------------+------------+--------------+
 
| Eosinophils | 0.06                     | 0.00 - 0.50     | [facility]       |              |
| , Absolute  |                          | K/uL            | LABORATORY |              |
+-------------+--------------------------+-----------------+------------+--------------+
| Basophils,  | 0.02Comment: Testing     | 0.00 - 0.10     | [facility]       |              |
| Absolute    | performed at [facility], [address] | K/uL            | LABORATORY |              |
|             |  [facility],        |                 |            |              |
|             | [place]  [id number]     |                 |            |              |
+-------------+--------------------------+-----------------+------------+--------------+
 
 
 
+----------+
| Specimen |
+----------+
| Blood    |
+----------+
 
 
 
+-------------------+------------------+--------------------+----------------+
| Performing        | Address          | City/State/Zipcode | Phone Number   |
| Organization      |                  |                    |                |
+-------------------+------------------+--------------------+----------------+
|   [facility] |   [address] | [place] [address] |   [phone] |
+-------------------+------------------+--------------------+----------------+
 Basic Metabolic Panel (2019  4:29 AM PDT)
 
+-------------+--------------------------+-----------------+------------+--------------+
| Component   | Value                    | Ref Range       | Performed  | Pathologist  |
|             |                          |                 | At         | Signature    |
+-------------+--------------------------+-----------------+------------+--------------+
| Na          | 135                      | 135 - 145       | [facility]       |              |
|             |                          | mmol/L          | LABORATORY |              |
+-------------+--------------------------+-----------------+------------+--------------+
| K           | 4.0                      | 3.5 - 4.9       | [facility]       |              |
|             |                          | mmol/L          | LABORATORY |              |
+-------------+--------------------------+-----------------+------------+--------------+
| Cl          | 103                      | 99 - 109 mmol/L | [facility]       |              |
|             |                          |                 | LABORATORY |              |
+-------------+--------------------------+-----------------+------------+--------------+
| CO2         | 25                       | 23 - 32 mmol/L  | [facility]       |              |
|             |                          |                 | LABORATORY |              |
+-------------+--------------------------+-----------------+------------+--------------+
| Anion Gap   | 11                       | 5 - 20 mmol/L   | [facility]       |              |
|             |                          |                 | LABORATORY |              |
+-------------+--------------------------+-----------------+------------+--------------+
| Glucose     | 140 (H)                  | 65 - 99 mg/dL   | [facility]       |              |
|             |                          |                 | LABORATORY |              |
+-------------+--------------------------+-----------------+------------+--------------+
| BUN         | 9                        | 8 - 25 mg/dL    | [facility]       |              |
|             |                          |                 | LABORATORY |              |
+-------------+--------------------------+-----------------+------------+--------------+
| Creatinine  | 0.8                      | 0.70 - 1.30     | [facility]       |              |
|             |                          | mg/dL           | LABORATORY |              |
+-------------+--------------------------+-----------------+------------+--------------+
| BUN/Creatin | 11                       |                 | [facility]       |              |
| ine Ratio   |                          |                 | LABORATORY |              |
+-------------+--------------------------+-----------------+------------+--------------+
| Calcium     | 8.7                      | 8.5 - 10.5      | [facility]       |              |
|             |                          | mg/dL           | LABORATORY |              |
 
+-------------+--------------------------+-----------------+------------+--------------+
| Estimated   | >60Comment: GFR <60:     | >60             | [facility]       |              |
| GFR         | CHRONIC KIDNEY DISEASE,  | mL/min/1.73m2   | LABORATORY |              |
|             | IF FOUND OVER A 3 MONTH  |                 |            |              |
|             | PERIOD.GFR <15: KIDNEY   |                 |            |              |
|             | FAILURE.FOR       |                 |            |              |
|             | AMERICANS, MULTIPLY THE  |                 |            |              |
|             | CALCULATED GFR BY        |                 |            |              |
|             | 1.210.This eGFR is       |                 |            |              |
|             | calculated using the     |                 |            |              |
|             | MDRD IDMS traceable      |                 |            |              |
|             | equation.Testing         |                 |            |              |
|             | performed at [facility], [address] |                 |            |              |
|             |  [facility],        |                 |            |              |
|             | [place]   [address]    |                 |            |              |
+-------------+--------------------------+-----------------+------------+--------------+
 
 
 
+----------+
| Specimen |
+----------+
| Blood    |
+----------+
 
 
 
+-------------------+------------------+--------------------+----------------+
| Performing        | Address          | City/State/Zipcode | Phone Number   |
| Organization      |                  |                    |                |
+-------------------+------------------+--------------------+----------------+
|   [facility] |   [address] | [place] [address] |   [phone] |
+-------------------+------------------+--------------------+----------------+
 CBC with Differential (2019  4:23 AM PDT)
 
+-------------+--------------------------+-----------------+------------+--------------+
| Component   | Value                    | Ref Range       | Performed  | Pathologist  |
|             |                          |                 | At         | Signature    |
+-------------+--------------------------+-----------------+------------+--------------+
| WBC         | 9.31                     | 3.80 - 11.00    | [facility]       |              |
|             |                          | K/uL            | LABORATORY |              |
+-------------+--------------------------+-----------------+------------+--------------+
| RBC         | 3.81 (L)                 | 4.20 - 5.70     | [facility]       |              |
|             |                          | M/uL            | LABORATORY |              |
+-------------+--------------------------+-----------------+------------+--------------+
| Hemoglobin  | 12.7 (L)                 | 13.2 - 17.0     | [facility]       |              |
|             |                          | g/dL            | LABORATORY |              |
+-------------+--------------------------+-----------------+------------+--------------+
| Hematocrit  | 37.1 (L)                 | 39.0 - 50.0 %   | [facility]       |              |
|             |                          |                 | LABORATORY |              |
+-------------+--------------------------+-----------------+------------+--------------+
| MCV         | 97.5                     | 80.0 - 100.0 fl | [facility]       |              |
|             |                          |                 | LABORATORY |              |
+-------------+--------------------------+-----------------+------------+--------------+
| MCH         | 33.5                     | 27.0 - 34.0 pg  | [facility]       |              |
|             |                          |                 | LABORATORY |              |
+-------------+--------------------------+-----------------+------------+--------------+
| MCHC        | 34.3                     | 32.0 - 35.5     | [facility]       |              |
|             |                          | g/dL            | LABORATORY |              |
+-------------+--------------------------+-----------------+------------+--------------+
 
| RDW-SD      | 46.8                     | 37 - 53 fl      | [facility]       |              |
|             |                          |                 | LABORATORY |              |
+-------------+--------------------------+-----------------+------------+--------------+
| Platelet    | 162                      | 150 - 400 K/uL  | [facility]       |              |
| Count       |                          |                 | LABORATORY |              |
+-------------+--------------------------+-----------------+------------+--------------+
| MPV         | 7.5                      | fl              | [facility]       |              |
|             |                          |                 | LABORATORY |              |
+-------------+--------------------------+-----------------+------------+--------------+
| Diff Type   | AUTOMATED                |                 | [facility]       |              |
|             |                          |                 | LABORATORY |              |
+-------------+--------------------------+-----------------+------------+--------------+
| %           | 54.94                    | %               | [facility]       |              |
| Neutrophils |                          |                 | LABORATORY |              |
+-------------+--------------------------+-----------------+------------+--------------+
| %           | 39.44                    | %               | [facility]       |              |
| Lymphocytes |                          |                 | LABORATORY |              |
+-------------+--------------------------+-----------------+------------+--------------+
| Monocyte %  | 4.62                     | %               | [facility]       |              |
|             |                          |                 | LABORATORY |              |
+-------------+--------------------------+-----------------+------------+--------------+
| Eosinophils | 0.69                     | %               | [facility]       |              |
|  %          |                          |                 | LABORATORY |              |
+-------------+--------------------------+-----------------+------------+--------------+
| Basophils % | 0.31                     | %               | [facility]       |              |
|             |                          |                 | LABORATORY |              |
+-------------+--------------------------+-----------------+------------+--------------+
| Neutrophils | 5.12                     | 1.90 - 7.40     | [facility]       |              |
| , Absolute  |                          | K/uL            | LABORATORY |              |
+-------------+--------------------------+-----------------+------------+--------------+
| Absolute    | 3.67                     | 1.00 - 3.90     | [facility]       |              |
| Lymphocytes |                          | K/uL            | LABORATORY |              |
+-------------+--------------------------+-----------------+------------+--------------+
| Absolute    | 0.43                     | 0.00 - 0.80     | [facility]       |              |
| Monocytes   |                          | K/uL            | LABORATORY |              |
+-------------+--------------------------+-----------------+------------+--------------+
| Eosinophils | 0.06                     | 0.00 - 0.50     | [facility]       |              |
| , Absolute  |                          | K/uL            | LABORATORY |              |
+-------------+--------------------------+-----------------+------------+--------------+
| Basophils,  | 0.03Comment: Testing     | 0.00 - 0.10     | [facility]       |              |
| Absolute    | performed at [facility], [address] | K/uL            | LABORATORY |              |
|             |  [address],        |                 |            |              |
|             | [place]  [id number]     |                 |            |              |
+-------------+--------------------------+-----------------+------------+--------------+
 
 
 
+----------+
| Specimen |
+----------+
| Blood    |
+----------+
 
 
 
+-------------------+------------------+--------------------+----------------+
| Performing        | Address          | City/State/Zipcode | Phone Number   |
| Organization      |                  |                    |                |
+-------------------+------------------+--------------------+----------------+
|   [facility] |   [address] | [place] [address] |   [phone] |
 
+-------------------+------------------+--------------------+----------------+
 Basic Metabolic Panel (2019  4:23 AM PDT)
 
+-------------+--------------------------+-----------------+------------+--------------+
| Component   | Value                    | Ref Range       | Performed  | Pathologist  |
|             |                          |                 | At         | Signature    |
+-------------+--------------------------+-----------------+------------+--------------+
| Na          | 137                      | 135 - 145       | [facility]       |              |
|             |                          | mmol/L          | LABORATORY |              |
+-------------+--------------------------+-----------------+------------+--------------+
| K           | 4.0                      | 3.5 - 4.9       | [facility]       |              |
|             |                          | mmol/L          | LABORATORY |              |
+-------------+--------------------------+-----------------+------------+--------------+
| Cl          | 104                      | 99 - 109 mmol/L | [facility]       |              |
|             |                          |                 | LABORATORY |              |
+-------------+--------------------------+-----------------+------------+--------------+
| CO2         | 26                       | 23 - 32 mmol/L  | [facility]       |              |
|             |                          |                 | LABORATORY |              |
+-------------+--------------------------+-----------------+------------+--------------+
| Anion Gap   | 11                       | 5 - 20 mmol/L   | [facility]       |              |
|             |                          |                 | LABORATORY |              |
+-------------+--------------------------+-----------------+------------+--------------+
| Glucose     | 140 (H)                  | 65 - 99 mg/dL   | [facility]       |              |
|             |                          |                 | LABORATORY |              |
+-------------+--------------------------+-----------------+------------+--------------+
| BUN         | 8                        | 8 - 25 mg/dL    | [facility]       |              |
|             |                          |                 | LABORATORY |              |
+-------------+--------------------------+-----------------+------------+--------------+
| Creatinine  | 0.9                      | 0.70 - 1.30     | [facility]       |              |
|             |                          | mg/dL           | LABORATORY |              |
+-------------+--------------------------+-----------------+------------+--------------+
| BUN/Creatin | 9                        |                 | [facility]       |              |
| ine Ratio   |                          |                 | LABORATORY |              |
+-------------+--------------------------+-----------------+------------+--------------+
| Calcium     | 8.4 (L)                  | 8.5 - 10.5      | [facility]       |              |
|             |                          | mg/dL           | LABORATORY |              |
+-------------+--------------------------+-----------------+------------+--------------+
| Estimated   | >60Comment: GFR <60:     | >60             | [facility]       |              |
| GFR         | CHRONIC KIDNEY DISEASE,  | mL/min/1.73m2   | LABORATORY |              |
|             | IF FOUND OVER A 3 MONTH  |                 |            |              |
|             | PERIOD.GFR <15: KIDNEY   |                 |            |              |
|             | FAILURE.FOR       |                 |            |              |
|             | AMERICANS, MULTIPLY THE  |                 |            |              |
|             | CALCULATED GFR BY        |                 |            |              |
|             | 1.210.This eGFR is       |                 |            |              |
|             | calculated using the     |                 |            |              |
|             | MDRD IDMS traceable      |                 |            |              |
|             | equation.Testing         |                 |            |              |
|             | performed at [facility], [address] |                 |            |              |
|             |  [facility],        |                 |            |              |
|             | [place]   [address]    |                 |            |              |
+-------------+--------------------------+-----------------+------------+--------------+
 
 
 
+----------+
| Specimen |
+----------+
| Blood    |
+----------+
 
 
 
 
+-------------------+------------------+--------------------+----------------+
| Performing        | Address          | City/State/Zipcode | Phone Number   |
| Organization      |                  |                    |                |
+-------------------+------------------+--------------------+----------------+
|   [facility] |   [address] | [place] [address] |   [phone] |
+-------------------+------------------+--------------------+----------------+
 CBC with Differential (2019  4:06 AM PDT)
 
+-------------+--------------------------+-----------------+------------+--------------+
| Component   | Value                    | Ref Range       | Performed  | Pathologist  |
|             |                          |                 | At         | Signature    |
+-------------+--------------------------+-----------------+------------+--------------+
| WBC         | 10.14                    | 3.80 - 11.00    | [facility]       |              |
|             |                          | K/uL            | LABORATORY |              |
+-------------+--------------------------+-----------------+------------+--------------+
| RBC         | 3.79 (L)                 | 4.20 - 5.70     | [facility]       |              |
|             |                          | M/uL            | LABORATORY |              |
+-------------+--------------------------+-----------------+------------+--------------+
| Hemoglobin  | 12.6 (L)                 | 13.2 - 17.0     | [facility]       |              |
|             |                          | g/dL            | LABORATORY |              |
+-------------+--------------------------+-----------------+------------+--------------+
| Hematocrit  | 37.1 (L)                 | 39.0 - 50.0 %   | [facility]       |              |
|             |                          |                 | LABORATORY |              |
+-------------+--------------------------+-----------------+------------+--------------+
| MCV         | 97.8                     | 80.0 - 100.0 fl | [facility]       |              |
|             |                          |                 | LABORATORY |              |
+-------------+--------------------------+-----------------+------------+--------------+
| MCH         | 33.3                     | 27.0 - 34.0 pg  | [facility]       |              |
|             |                          |                 | LABORATORY |              |
+-------------+--------------------------+-----------------+------------+--------------+
| MCHC        | 34.1                     | 32.0 - 35.5     | [facility]       |              |
|             |                          | g/dL            | LABORATORY |              |
+-------------+--------------------------+-----------------+------------+--------------+
| RDW-SD      | 47.3                     | 37 - 53 fl      | [facility]       |              |
|             |                          |                 | LABORATORY |              |
+-------------+--------------------------+-----------------+------------+--------------+
| Platelet    | 149 (L)                  | 150 - 400 K/uL  | [facility]       |              |
| Count       |                          |                 | LABORATORY |              |
+-------------+--------------------------+-----------------+------------+--------------+
| MPV         | 7.4                      | fl              | [facility]       |              |
|             |                          |                 | LABORATORY |              |
+-------------+--------------------------+-----------------+------------+--------------+
| Diff Type   | AUTOMATED                |                 | [facility]       |              |
|             |                          |                 | LABORATORY |              |
+-------------+--------------------------+-----------------+------------+--------------+
| %           | 63.73                    | %               | [facility]       |              |
| Neutrophils |                          |                 | LABORATORY |              |
+-------------+--------------------------+-----------------+------------+--------------+
| %           | 31.45                    | %               | [facility]       |              |
| Lymphocytes |                          |                 | LABORATORY |              |
+-------------+--------------------------+-----------------+------------+--------------+
| Monocyte %  | 4.45                     | %               | [facility]       |              |
|             |                          |                 | LABORATORY |              |
+-------------+--------------------------+-----------------+------------+--------------+
| Eosinophils | 0.24                     | %               | [facility]       |              |
|  %          |                          |                 | LABORATORY |              |
+-------------+--------------------------+-----------------+------------+--------------+
 
| Basophils % | 0.13                     | %               | [facility]       |              |
|             |                          |                 | LABORATORY |              |
+-------------+--------------------------+-----------------+------------+--------------+
| Neutrophils | 6.46                     | 1.90 - 7.40     | [facility]       |              |
| , Absolute  |                          | K/uL            | LABORATORY |              |
+-------------+--------------------------+-----------------+------------+--------------+
| Absolute    | 3.19                     | 1.00 - 3.90     | [facility]       |              |
| Lymphocytes |                          | K/uL            | LABORATORY |              |
+-------------+--------------------------+-----------------+------------+--------------+
| Absolute    | 0.45                     | 0.00 - 0.80     | [facility]       |              |
| Monocytes   |                          | K/uL            | LABORATORY |              |
+-------------+--------------------------+-----------------+------------+--------------+
| Eosinophils | 0.02                     | 0.00 - 0.50     | [facility]       |              |
| , Absolute  |                          | K/uL            | LABORATORY |              |
+-------------+--------------------------+-----------------+------------+--------------+
| Basophils,  | 0.01Comment: Testing     | 0.00 - 0.10     | [facility]       |              |
| Absolute    | performed at [facility], [address] | K/uL            | LABORATORY |              |
|             |  [address],        |                 |            |              |
|             | [place]  [id number]     |                 |            |              |
+-------------+--------------------------+-----------------+------------+--------------+
 
 
 
+----------+
| Specimen |
+----------+
| Blood    |
+----------+
 
 
 
+-------------------+------------------+--------------------+----------------+
| Performing        | Address          | City/State/Zipcode | Phone Number   |
| Organization      |                  |                    |                |
+-------------------+------------------+--------------------+----------------+
|   [facility] |   [address] | [place] [address] |   [phone] |
+-------------------+------------------+--------------------+----------------+
 Basic Metabolic Panel (2019  4:06 AM PDT)
 
+-------------+--------------------------+-----------------+------------+--------------+
| Component   | Value                    | Ref Range       | Performed  | Pathologist  |
|             |                          |                 | At         | Signature    |
+-------------+--------------------------+-----------------+------------+--------------+
| Na          | 138                      | 135 - 145       | [facility]       |              |
|             |                          | mmol/L          | LABORATORY |              |
+-------------+--------------------------+-----------------+------------+--------------+
| K           | 4.4                      | 3.5 - 4.9       | [facility]       |              |
|             |                          | mmol/L          | LABORATORY |              |
+-------------+--------------------------+-----------------+------------+--------------+
| Cl          | 106                      | 99 - 109 mmol/L | [facility]       |              |
|             |                          |                 | LABORATORY |              |
+-------------+--------------------------+-----------------+------------+--------------+
| CO2         | 25                       | 23 - 32 mmol/L  | [facility]       |              |
|             |                          |                 | LABORATORY |              |
+-------------+--------------------------+-----------------+------------+--------------+
| Anion Gap   | 11                       | 5 - 20 mmol/L   | [facility]       |              |
|             |                          |                 | LABORATORY |              |
+-------------+--------------------------+-----------------+------------+--------------+
| Glucose     | 144 (H)                  | 65 - 99 mg/dL   | [facility]       |              |
|             |                          |                 | LABORATORY |              |
 
+-------------+--------------------------+-----------------+------------+--------------+
| BUN         | 16                       | 8 - 25 mg/dL    | [facility]       |              |
|             |                          |                 | LABORATORY |              |
+-------------+--------------------------+-----------------+------------+--------------+
| Creatinine  | 0.9                      | 0.70 - 1.30     | [facility]       |              |
|             |                          | mg/dL           | LABORATORY |              |
+-------------+--------------------------+-----------------+------------+--------------+
| BUN/Creatin | 18                       |                 | [facility]       |              |
| ine Ratio   |                          |                 | LABORATORY |              |
+-------------+--------------------------+-----------------+------------+--------------+
| Calcium     | 8.6                      | 8.5 - 10.5      | [facility]       |              |
|             |                          | mg/dL           | LABORATORY |              |
+-------------+--------------------------+-----------------+------------+--------------+
| Estimated   | >60Comment: GFR <60:     | >60             | [facility]       |              |
| GFR         | CHRONIC KIDNEY DISEASE,  | mL/min/1.73m2   | LABORATORY |              |
|             | IF FOUND OVER A 3 MONTH  |                 |            |              |
|             | PERIOD.GFR <15: KIDNEY   |                 |            |              |
|             | FAILURE.FOR       |                 |            |              |
|             | AMERICANS, MULTIPLY THE  |                 |            |              |
|             | CALCULATED GFR BY        |                 |            |              |
|             | 1.210.This eGFR is       |                 |            |              |
|             | calculated using the     |                 |            |              |
|             | MDRD IDMS traceable      |                 |            |              |
|             | equation.Testing         |                 |            |              |
|             | performed at [facility], [address] |                 |            |              |
|             |  [address],        |                 |            |              |
|             | [place]   [id number]    |                 |            |              |
+-------------+--------------------------+-----------------+------------+--------------+
 
 
 
+----------+
| Specimen |
+----------+
| Blood    |
+----------+
 
 
 
+-------------------+------------------+--------------------+----------------+
| Performing        | Address          | City/State/Zipcode | Phone Number   |
| Organization      |                  |                    |                |
+-------------------+------------------+--------------------+----------------+
|   [facility] |   [address] | [place] [address] |   [phone] |
+-------------------+------------------+--------------------+----------------+
 Lactic Acid (2019  9:09 AM PDT)
 
+-----------+-------------------------+------------+------------+--------------+
| Component | Value                   | Ref Range  | Performed  | Pathologist  |
|           |                         |            | At         | Signature    |
+-----------+-------------------------+------------+------------+--------------+
| Lactate,  | 1.6Comment: Testing     | 0.4 - 2.0  | [facility]       |              |
| Serum     | performed at [facility];[address]    | mmol/L     | LABORATORY |              |
|           | [address];[place], [place]  |            |            |              |
|           | [id number]                   |            |            |              |
+-----------+-------------------------+------------+------------+--------------+
 
 
 
+----------+
 
| Specimen |
+----------+
| Blood    |
+----------+
 
 
 
+-------------------+------------------+--------------------+----------------+
| Performing        | Address          | City/State/Zipcode | Phone Number   |
| Organization      |                  |                    |                |
+-------------------+------------------+--------------------+----------------+
|   [facility] |   [address] | [place] [address] |   [phone] |
+-------------------+------------------+--------------------+----------------+
 ECG 12 lead (2019  5:37 AM PDT)
 
+-------------+--------------------------+-----------+------------+--------------+
| Component   | Value                    | Ref Range | Performed  | Pathologist  |
|             |                          |           | At         | Signature    |
+-------------+--------------------------+-----------+------------+--------------+
| VENTRICULAR | 74                       | BPM       | [facility]  |              |
|  RATE EKG   |                          |           |            |              |
+-------------+--------------------------+-----------+------------+--------------+
| ATRIAL RATE | 74                       | BPM       | [facility]  |              |
+-------------+--------------------------+-----------+------------+--------------+
| P-R         | 214                      | ms        | [facility]  |              |
| INTERVAL    |                          |           |            |              |
+-------------+--------------------------+-----------+------------+--------------+
| QRS         | 90                       | ms        | [facility]  |              |
| DURATION    |                          |           |            |              |
+-------------+--------------------------+-----------+------------+--------------+
| Q-T         | 412                      | ms        | [facility]  |              |
| INTERVAL    |                          |           |            |              |
+-------------+--------------------------+-----------+------------+--------------+
| Q-T         | 458                      | ms        | [facility]  |              |
| INTERVAL    |                          |           |            |              |
| (CORRECTED) |                          |           |            |              |
+-------------+--------------------------+-----------+------------+--------------+
| P WAVE AXIS | 38                       | degrees   | [facility]  |              |
+-------------+--------------------------+-----------+------------+--------------+
| QRS AXIS    | -8                       | degrees   | [facility]  |              |
+-------------+--------------------------+-----------+------------+--------------+
| T AXIS      | 5                        | degrees   | [facility]  |              |
+-------------+--------------------------+-----------+------------+--------------+
| INTERPRETAT | Sinus rhythm with 1st    |           | [facility]  |              |
| ION TEXT    | degree A-V blockAbnormal |           |            |              |
|             |  ECGWhen compared with   |           |            |              |
|             | ECG of 17-SEP-2019       |           |            |              |
|             | 09:12,No significant     |           |            |              |
|             | change was               |           |            |              |
|             | foundConfirmed by        |           |            |              |
|             | Geovanni Bermudez MD     |           |            |              |
|             | (127) on 2019       |           |            |              |
|             | 5:46:12 PM               |           |            |              |
+-------------+--------------------------+-----------+------------+--------------+
 
 
 
+----------+
| Specimen |
+----------+
 
|          |
+----------+
 
 
 
+----------------------------------------------------------+--------------+
| Narrative                                                | Performed At |
+----------------------------------------------------------+--------------+
|   This result has an attachment that is not available.   |              |
+----------------------------------------------------------+--------------+
 
 
 
+--------------+---------+--------------------+--------------+
| Performing   | Address | City/State/Zipcode | Phone Number |
| Organization |         |                    |              |
+--------------+---------+--------------------+--------------+
|   [facility]  |         |                    |              |
+--------------+---------+--------------------+--------------+
 CBC with Differential (2019  4:40 AM PDT)
 
+-------------+--------------------------+-----------------+------------+--------------+
| Component   | Value                    | Ref Range       | Performed  | Pathologist  |
|             |                          |                 | At         | Signature    |
+-------------+--------------------------+-----------------+------------+--------------+
| WBC         | 13.98 (H)                | 3.80 - 11.00    | [facility]       |              |
|             |                          | K/uL            | LABORATORY |              |
+-------------+--------------------------+-----------------+------------+--------------+
| RBC         | 4.07 (L)                 | 4.20 - 5.70     | [facility]       |              |
|             |                          | M/uL            | LABORATORY |              |
+-------------+--------------------------+-----------------+------------+--------------+
| Hemoglobin  | 13.3                     | 13.2 - 17.0     | [facility]       |              |
|             |                          | g/dL            | LABORATORY |              |
+-------------+--------------------------+-----------------+------------+--------------+
| Hematocrit  | 39.6                     | 39.0 - 50.0 %   | [facility]       |              |
|             |                          |                 | LABORATORY |              |
+-------------+--------------------------+-----------------+------------+--------------+
| MCV         | 97.4                     | 80.0 - 100.0 fl | [facility]       |              |
|             |                          |                 | LABORATORY |              |
+-------------+--------------------------+-----------------+------------+--------------+
| MCH         | 32.8                     | 27.0 - 34.0 pg  | [facility]       |              |
|             |                          |                 | LABORATORY |              |
+-------------+--------------------------+-----------------+------------+--------------+
| MCHC        | 33.7                     | 32.0 - 35.5     | [facility]       |              |
|             |                          | g/dL            | LABORATORY |              |
+-------------+--------------------------+-----------------+------------+--------------+
| RDW-SD      | 47.3                     | 37 - 53 fl      | [facility]       |              |
|             |                          |                 | LABORATORY |              |
+-------------+--------------------------+-----------------+------------+--------------+
| Platelet    | 185                      | 150 - 400 K/uL  | [facility]       |              |
| Count       |                          |                 | LABORATORY |              |
+-------------+--------------------------+-----------------+------------+--------------+
| MPV         | 7.3                      | fl              | [facility]       |              |
|             |                          |                 | LABORATORY |              |
+-------------+--------------------------+-----------------+------------+--------------+
| Diff Type   | AUTOMATED                |                 | [facility]       |              |
|             |                          |                 | LABORATORY |              |
+-------------+--------------------------+-----------------+------------+--------------+
| %           | 55.68                    | %               | [facility]       |              |
| Neutrophils |                          |                 | LABORATORY |              |
 
+-------------+--------------------------+-----------------+------------+--------------+
| %           | 39.54                    | %               | [facility]       |              |
| Lymphocytes |                          |                 | LABORATORY |              |
+-------------+--------------------------+-----------------+------------+--------------+
| Monocyte %  | 4.55                     | %               | [facility]       |              |
|             |                          |                 | LABORATORY |              |
+-------------+--------------------------+-----------------+------------+--------------+
| Eosinophils | 0.05                     | %               | [facility]       |              |
|  %          |                          |                 | LABORATORY |              |
+-------------+--------------------------+-----------------+------------+--------------+
| Basophils % | 0.18                     | %               | [facility]       |              |
|             |                          |                 | LABORATORY |              |
+-------------+--------------------------+-----------------+------------+--------------+
| Neutrophils | 7.79 (H)                 | 1.90 - 7.40     | [facility]       |              |
| , Absolute  |                          | K/uL            | LABORATORY |              |
+-------------+--------------------------+-----------------+------------+--------------+
| Absolute    | 5.53 (H)                 | 1.00 - 3.90     | [facility]       |              |
| Lymphocytes |                          | K/uL            | LABORATORY |              |
+-------------+--------------------------+-----------------+------------+--------------+
| Absolute    | 0.64                     | 0.00 - 0.80     | [facility]       |              |
| Monocytes   |                          | K/uL            | LABORATORY |              |
+-------------+--------------------------+-----------------+------------+--------------+
| Eosinophils | 0.01                     | 0.00 - 0.50     | [facility]       |              |
| , Absolute  |                          | K/uL            | LABORATORY |              |
+-------------+--------------------------+-----------------+------------+--------------+
| Basophils,  | 0.03                     | 0.00 - 0.10     | [facility]       |              |
| Absolute    |                          | K/uL            | LABORATORY |              |
+-------------+--------------------------+-----------------+------------+--------------+
| RBC         | RBC AND PLT MORPHOLOGY   |                 | [facility]       |              |
| Morphology  | APPEAR NORMAL            |                 | LABORATORY |              |
+-------------+--------------------------+-----------------+------------+--------------+
| Platelet    | ADEQUATE                 |                 | [facility]       |              |
| Estimate    |                          |                 | LABORATORY |              |
+-------------+--------------------------+-----------------+------------+--------------+
| Comment     | SLIDE SCANNED, AGREES    |                 | [facility]       |              |
|             | WITH AUTOMATED           |                 | LABORATORY |              |
|             | RESULTS.Comment: Testing |                 |            |              |
|             |  performed at [facility]    |                 |            |              |
|             | [facility];[place], [place]   |                 |            |              |
|             | [id number]                    |                 |            |              |
+-------------+--------------------------+-----------------+------------+--------------+
 
 
 
+----------+
| Specimen |
+----------+
| Blood    |
+----------+
 
 
 
+-------------------+------------------+--------------------+----------------+
| Performing        | Address          | City/State/Zipcode | Phone Number   |
| Organization      |                  |                    |                |
+-------------------+------------------+--------------------+----------------+
|   [facility] |   [address] | [place] [address] |   [phone] |
+-------------------+------------------+--------------------+----------------+
 Basic Metabolic Panel (2019  4:40 AM PDT)
 
 
+-------------+--------------------------+-----------------+------------+--------------+
| Component   | Value                    | Ref Range       | Performed  | Pathologist  |
|             |                          |                 | At         | Signature    |
+-------------+--------------------------+-----------------+------------+--------------+
| Na          | 140                      | 135 - 145       | [facility]       |              |
|             |                          | mmol/L          | LABORATORY |              |
+-------------+--------------------------+-----------------+------------+--------------+
| K           | 4.5                      | 3.5 - 4.9       | [facility]       |              |
|             |                          | mmol/L          | LABORATORY |              |
+-------------+--------------------------+-----------------+------------+--------------+
| Cl          | 107                      | 99 - 109 mmol/L | [facility]       |              |
|             |                          |                 | LABORATORY |              |
+-------------+--------------------------+-----------------+------------+--------------+
| CO2         | 23                       | 23 - 32 mmol/L  | [facility]       |              |
|             |                          |                 | LABORATORY |              |
+-------------+--------------------------+-----------------+------------+--------------+
| Anion Gap   | 15                       | 5 - 20 mmol/L   | [facility]       |              |
|             |                          |                 | LABORATORY |              |
+-------------+--------------------------+-----------------+------------+--------------+
| Glucose     | 192 (H)                  | 65 - 99 mg/dL   | [facility]       |              |
|             |                          |                 | LABORATORY |              |
+-------------+--------------------------+-----------------+------------+--------------+
| BUN         | 17                       | 8 - 25 mg/dL    | [facility]       |              |
|             |                          |                 | LABORATORY |              |
+-------------+--------------------------+-----------------+------------+--------------+
| Creatinine  | 1.02                     | 0.70 - 1.30     | [facility]       |              |
|             |                          | mg/dL           | LABORATORY |              |
+-------------+--------------------------+-----------------+------------+--------------+
| BUN/Creatin | 17                       |                 | [facility]       |              |
| ine Ratio   |                          |                 | LABORATORY |              |
+-------------+--------------------------+-----------------+------------+--------------+
| Calcium     | 8.6                      | 8.5 - 10.5      | [facility]       |              |
|             |                          | mg/dL           | LABORATORY |              |
+-------------+--------------------------+-----------------+------------+--------------+
| Estimated   | >60Comment: GFR <60:     | >60             | [facility]       |              |
| GFR         | CHRONIC KIDNEY DISEASE,  | mL/min/1.73m2   | LABORATORY |              |
|             | IF FOUND OVER A 3 MONTH  |                 |            |              |
|             | PERIOD.GFR <15: KIDNEY   |                 |            |              |
|             | FAILURE.FOR       |                 |            |              |
|             | [employer], MULTIPLY THE  |                 |            |              |
|             | CALCULATED GFR BY        |                 |            |              |
|             | 1.210.This eGFR is       |                 |            |              |
|             | calculated using the     |                 |            |              |
|             | MDRD [facility] traceable      |                 |            |              |
|             | equation.Testing         |                 |            |              |
|             | performed at [facility]     |                 |            |              |
|             | [facility];[place], [place]   |                 |            |              |
|             | [id number]                    |                 |            |              |
+-------------+--------------------------+-----------------+------------+--------------+
 
 
 
+----------+
| Specimen |
+----------+
| Blood    |
+----------+
 
 
 
 
+-------------------+------------------+--------------------+----------------+
| Performing        | Address          | City/State/Zipcode | Phone Number   |
| Organization      |                  |                    |                |
+-------------------+------------------+--------------------+----------------+
|   [facility] |   [address] | [place] [address] |   [phone] |
+-------------------+------------------+--------------------+----------------+
 Surgical Pathology Exam (2019  1:50 PM PDT)
 
+--------------------+
| Specimen           |
+--------------------+
| Tissue - Arterial  |
| part (body         |
| structure)         |
+--------------------+
 
 
 
+-------------------------------------------------------------------------------------------
--------------------------------------------------------------------------------------------
----------------+-----------------+
| Narrative                                                                                 
                                                                                            
                | Performed At    |
+-------------------------------------------------------------------------------------------
--------------------------------------------------------------------------------------------
----------------+-----------------+
|   This result has an attachment that is not available.  SPECIMEN(S): A                    
                                                                                            
                |   [facility]  |
|  SUPERIOR MESENTERIC ARTERY PLAQUE SPECIMEN SOURCE:A. SUPERIOR                            
                                                                                            
                | INCYTE          |
| MESENTERIC ARTERY PLAQUE CLINICAL HISTORY:K55.1 (mesenteric ischemia,                     
                                                                                            
                |                 |
| chronic). FINAL PATHOLOGIC DIAGNOSIS:Superior mesenteric artery                           
                                                                                            
                |                 |
| plaque:-   Benign atherosclerotic plaque and focal fragments of benign                    
                                                                                            
                |                 |
|  fibrous tissue and smooth muscle. JVR:[facility]:C2NR MICROSCOPIC                               
                                                                                            
                |                 |
| EXAMINATION:Histologic sections of all submitted blocks are examined                      
                                                                                            
                |                 |
| by light microscopy.   These findings, together with the gross                            
                                                                                            
                |                 |
| examination, support the pathologic diagnosis. GROSS DESCRIPTION:The                      
                                                                                            
                |                 |
| specimen, labeled "JH, superior mesenteric artery plaque," is received                    
                                                                                            
                |                 |
|  in formalin and consists of three tan-pink and focally calcified                         
                                                                                            
                |                 |
 
| portions of tissue ranging from 1.0-2.5 cm in greatestdimension.                          
                                                                                            
                |                 |
|   Sectioning shows tan-pink tan-yellow and calcified cut surfaces with                    
                                                                                            
                |                 |
|  no discrete lesions identified.   Representative sections are                            
                                                                                            
                |                 |
| submitted in cassette (A1), following decalcification inDecal [email or web address]                     
                                                                                            
                |                 |
| (under the direct supervision of a pathologist) The Gross Description                     
                                                                                            
                |                 |
| was prepared using a voice recognition system.   The report was                           
                                                                                            
                |                 |
| reviewed for accuracy; however, sound-alike word errors, addition                         
                                                                                            
                |                 |
| and/or deletions may occur.   If there is anyquestion about this                          
                                                                                            
                |                 |
| report, please contact Client Services. PERFORMING LABORATORY:The                         
                                                                                            
                |                 |
| technical component was performed by [facility], [address] [place] [address]                     
                                                                                            
                |                 |
| [place] [address] (Medical Director: Liset Naidu MD; CLIA#                           
                                                                                            
                |                 |
| [id number]).    Professional interpretation [facility] performed by[facility]                        
                                                                                            
                |                 |
| [facility], [facility] [address]                     
                                                                                            
                |                 |
[place] [address] (Medical Director:   Liam GUZMAN                            
                                                                                            
                |                 |
| PADILLA Clarke; CLIA#:   [id number]). Diagnostician:   Mike Ryan                    
                                                                                            
                |                 |
|  MDPathologistElectronically Signed 2019                                            
                                                                                            
                |                 |
|The technical component was performed by [facility], [address] [place] [address] (Medical Director: Liset Naidu MD; CLIA# [id number]).    Professional interpretation w
as performed by |                 |
|[facility], [facility], [facility] [place] [address] (Medical Director:   Liam Clarke M.D.; CLIA#:   [id number]).       
                |                 |
|                                                                                           
                                                                                            
                |                 |
|Diagnostician:   Mike Ryan MD                                                       
                                                                                            
                |                 |
 
|Pathologist                                                                                
                                                                                            
               |                 |
|Electronically Signed 2019                                                           
                                                                                            
                |                 |
|                                                                                           
                                                                                            
                |                 |
|                                                                                           
                                                                                            
                |                 |
+-------------------------------------------------------------------------------------------
--------------------------------------------------------------------------------------------
----------------+-----------------+
 
 
 
+-----------------+---------+--------------------+--------------+
| Performing      | Address | City/State/Zipcode | Phone Number |
| Organization    |         |                    |              |
+-----------------+---------+--------------------+--------------+
|   [facility]  |         |                    |              |
| [facility]          |         |                    |              |
+-----------------+---------+--------------------+--------------+
 Type and Screen (2019 11:03 AM PDT)
 
+-----------+------------------------+-----------+------------+--------------+
| Component | Value                  | Ref Range | Performed  | Pathologist  |
|           |                        |           | At         | Signature    |
+-----------+------------------------+-----------+------------+--------------+
| ABO Rh    | O POSITIVE             |           | [facility]       |              |
|           |                        |           | LABORATORY |              |
+-----------+------------------------+-----------+------------+--------------+
| Antibody  | NEGATIVE               |           | [facility]       |              |
| Screen    |                        |           | LABORATORY |              |
+-----------+------------------------+-----------+------------+--------------+
| BB BAND   | [id number]               |           | [facility]       |              |
|           |                        |           | LABORATORY |              |
+-----------+------------------------+-----------+------------+--------------+
| BB BAND   | Testing performed at   |           | [facility]       |              |
|           | [facility];[address]          |           | LABORATORY |              |
|           | [address];[place] [address] |           |            |              |
+-----------+------------------------+-----------+------------+--------------+
 
 
 
+----------+
| Specimen |
+----------+
| Blood    |
+----------+
 
 
 
+-------------------+------------------+--------------------+----------------+
| Performing        | Address          | City/State/Zipcode | Phone Number   |
| Organization      |                  |                    |                |
+-------------------+------------------+--------------------+----------------+
|   [facility] |   [address] | [place] [address] |   [phone] |
 
+-------------------+------------------+--------------------+----------------+
 documented in this encounter
 
 Visit Diagnoses
 
 
+-----------------------------------------------------------------------------------+
| Diagnosis                                                                         |
+-----------------------------------------------------------------------------------+
|   Mesenteric ischemia, chronic (HCC)  Chronic vascular insufficiency of intestine |
+-----------------------------------------------------------------------------------+
 documented in this encounter
 
 Admitting Diagnoses
 
 
+-----------------------------------------------------------------------------------+
| Diagnosis                                                                         |
+-----------------------------------------------------------------------------------+
|   Mesenteric ischemia, chronic (HCC)  Chronic vascular insufficiency of intestine |
+-----------------------------------------------------------------------------------+
 documented in this encounter
 
 Administered Medications
 
 
+-----------------------------------+--------+----------+-------+------+------+
| Medication Order                  | MAR    | Action   | Dose  | Rate | Site |
|                                   | Action | Date     |       |      |      |
+-----------------------------------+--------+----------+-------+------+------+
|   aspirin EC tablet 81 mg  81 mg, | Given  | 20 | 81 mg |      |      |
|  Oral, DAILY, First dose on Sat   |        | 19  8:09 |       |      |      |
| 19 at 1100                   |        |  AM PDT  |       |      |      |
+-----------------------------------+--------+----------+-------+------+------+
 
 
 
+-------+----------+-------+---+---+
| Given | 20 | 81 mg |   |   |
|       | 19  8:29 |       |   |   |
|       |  AM PDT  |       |   |   |
+-------+----------+-------+---+---+
| Given | 20 | 81 mg |   |   |
|       | 19 10:23 |       |   |   |
|       |  AM PDT  |       |   |   |
+-------+----------+-------+---+---+
 
 
 
+---+---+
|   |   |
+---+---+
 
 
 
+-----------------------------------+-------+----------+-------+---+---+
|   bisacodyl (DULCOLAX)            | Given | 20 | 10 mg |   |   |
| suppository 10 mg  10 mg, Rectal, |       | 19  8:11 |       |   |   |
|  DAILY PRN, Constipation,         |       |  AM PDT  |       |   |   |
| Starting Tue 19 at 1507, If  |       |          |       |   |   |
 
| all other bowel medications       |       |          |       |   |   |
| ineffective x 24 hours or not     |       |          |       |   |   |
| ordered,                          |       |          |       |   |   |
+-----------------------------------+-------+----------+-------+---+---+
 
 
 
+---+---+
|   |   |
+---+---+
 
 
 
+-------------------------------+-------+----------+---------+---+---+
|   heparin 5,000 units/mL      | Given | 20 | 10,000  |   |   |
| injection  PRN, Starting Tue  |       | 19 12:00 | Units   |   |   |
| 19 at 1200, Intra-op     |       |  PM PDT  |         |   |   |
+-------------------------------+-------+----------+---------+---+---+
 
 
 
+---+---+
|   |   |
+---+---+
 
 
 
+----------------------------------+-------+----------+-------+---+---+
|   hydroCHLOROthiazide tablet 25  | Given | 20 | 25 mg |   |   |
| mg  25 mg, Oral, DAILY, First    |       | 19  8:10 |       |   |   |
| dose on Sat 19 at 1100      |       |  AM PDT  |       |   |   |
+----------------------------------+-------+----------+-------+---+---+
 
 
 
+-------+----------+-------+---+---+
| Given | 20 | 25 mg |   |   |
|       | 19  8:30 |       |   |   |
|       |  AM PDT  |       |   |   |
+-------+----------+-------+---+---+
| Given | 20 | 25 mg |   |   |
|       | 19 10:23 |       |   |   |
|       |  AM PDT  |       |   |   |
+-------+----------+-------+---+---+
 
 
 
+---+---+
|   |   |
+---+---+
 
 
 
+-----------------------------------+-------+----------+---------+---+---+
|   HYDROcodone-acetaminophen       | Given | 20 | 2       |   |   |
| (NORCO) 5-325 mg per tablet 1-2   |       | 19 11:14 | tablets |   |   |
| tablet  1-2 tablet, Oral, EVERY 4 |       |  AM PDT  |         |   |   |
|  HOURS PRN, Pain, Starting Sun    |       |          |         |   |   |
| 19 at 0843                   |       |          |         |   |   |
+-----------------------------------+-------+----------+---------+---+---+
 
 
 
 
+-------+----------+---------+---+---+
| Given | 20 | 2       |   |   |
|       | 19  7:30 | tablets |   |   |
|       |  AM PDT  |         |   |   |
+-------+----------+---------+---+---+
| Given | 20 | 2       |   |   |
|       | 19 10:22 | tablets |   |   |
|       |  PM PDT  |         |   |   |
+-------+----------+---------+---+---+
 
 
 
+---+---+
|   |   |
+---+---+
 
 
 
+---------------------------------+-------+----------+------+---+---+
|   HYDROmorphone (DILAUDID)      | Given | 20 | 1 mg |   |   |
| injection 0.5-1 mg  0.5-1 mg,   |       | 19  8:30 |      |   |   |
| Intravenous, EVERY 1 HOUR PRN,  |       |  AM PDT  |      |   |   |
| Pain, Starting Tue 19 at   |       |          |      |   |   |
| 1535                            |       |          |      |   |   |
+---------------------------------+-------+----------+------+---+---+
 
 
 
+-------+----------+------+---+---+
| Given | 20 | 1 mg |   |   |
|       | 19  3:35 |      |   |   |
|       |  AM PDT  |      |   |   |
+-------+----------+------+---+---+
| Given | 20 | 1 mg |   |   |
|       | 19 10:05 |      |   |   |
|       |  PM PDT  |      |   |   |
+-------+----------+------+---+---+
 
 
 
+---+---+
|   |   |
+---+---+
 
 
 
+-----------------------------------+-------+----------+-------+---+---+
|   losartan (COZAAR) tablet 50 mg  | Given | 20 | 50 mg |   |   |
|  50 mg, Oral, DAILY, First dose   |       | 19  8:09 |       |   |   |
| on Sat 19 at 1100            |       |  AM PDT  |       |   |   |
+-----------------------------------+-------+----------+-------+---+---+
 
 
 
+-------+----------+-------+---+---+
| Given | 20 | 50 mg |   |   |
|       | 19  8:30 |       |   |   |
 
|       |  AM PDT  |       |   |   |
+-------+----------+-------+---+---+
| Given | 20 | 50 mg |   |   |
|       | 19 10:23 |       |   |   |
|       |  AM PDT  |       |   |   |
+-------+----------+-------+---+---+
 
 
 
+---+---+
|   |   |
+---+---+
 
 
 
+-----------------------------------+-------+----------+------+---+---+
|   morphine injection 1-2 mg  1-2  | Given | 20 | 2 mg |   |   |
| mg, Intravenous, EVERY 1 HOUR     |       | 19  3:14 |      |   |   |
| PRN, Pain, Starting Tue 19   |       |  PM PDT  |      |   |   |
| at 1507, If oral route not an     |       |          |      |   |   |
| option.  Slow IV push, not faster |       |          |      |   |   |
|  than 2mg/minute. First dose must |       |          |      |   |   |
|  be lowest dose, titrate to       |       |          |      |   |   |
| effective dose by repeat of       |       |          |      |   |   |
| lowest dose every 30 minutes prn  |       |          |      |   |   |
| pain, may not exceed maximum dose |       |          |      |   |   |
|  ordered per interval.  Use       |       |          |      |   |   |
| Pasero Sedation Scale.,           |       |          |      |   |   |
+-----------------------------------+-------+----------+------+---+---+
 
 
 
+---+---+
|   |   |
+---+---+
 
 
 
+-----------------------------------+-------+----------+-------+---+---+
|   pantoprazole (PROTONIX)         | Given | 20 | 40 mg |   |   |
| injection 40 mg  40 mg,           |       | 19  8:10 |       |   |   |
| Intravenous, DAILY, First dose on |       |  AM PDT  |       |   |   |
|  Wed 19 at 1100, If          |       |          |       |   |   |
| reconstituting, mix each 40 mg    |       |          |       |   |   |
| vial with 10 mL NS to make 4      |       |          |       |   |   |
| mg/mL., Indication: GERD          |       |          |       |   |   |
+-----------------------------------+-------+----------+-------+---+---+
 
 
 
+-------+----------+-------+---+---+
| Given | 20 | 40 mg |   |   |
|       | 19  8:30 |       |   |   |
|       |  AM PDT  |       |   |   |
+-------+----------+-------+---+---+
| Given | 20 | 40 mg |   |   |
|       | 19  8:00 |       |   |   |
|       |  AM PDT  |       |   |   |
+-------+----------+-------+---+---+
 
 
 
 
+---+---+
|   |   |
+---+---+
 
 
 
+--------------------------------+-------+----------+--------+---+----------+
|   thrombin (recombinant)       | Given | 20 | 5,000  |   | Surgical |
| (RECOTHROM) solution  PRN,     |       | 19 12:00 | Units  |   |  Site    |
| Starting Tue 19 at 1200,  |       |  PM PDT  |        |   |          |
| Intra-op                       |       |          |        |   |          |
+--------------------------------+-------+----------+--------+---+----------+
 
 
 
+---+---+
|   |   |
+---+---+
 documented in this encounter

## 2020-01-13 NOTE — XMS
Encounter Summary
  Created on: 2020
 
 Pasquale Nehemias Martinez
 External Reference #: [id number]
 : 43
 Sex: Male
 
 Demographics
 
 
+-----------------------+----------------------+
| Address               | [address]         |
|                       | [place]  [address] |
+-----------------------+----------------------+
| Home Phone            | [phone]      |
+-----------------------+----------------------+
| Preferred Language    | Unknown              |
+-----------------------+----------------------+
| Marital Status        |               |
+-----------------------+----------------------+
| Mosque Affiliation | [id number]                 |
+-----------------------+----------------------+
| Race                  | Unknown              |
+-----------------------+----------------------+
| Ethnic Group          | Unknown              |
+-----------------------+----------------------+
 
 
 Author
 
 
+--------------+--------------------------------------------+
| Author       | [facility]  |
|              | and [facility]                                |
+--------------+--------------------------------------------+
| Organization | [facility]  |
|              | and [place]                                |
+--------------+--------------------------------------------+
| Address      | Unknown                                    |
+--------------+--------------------------------------------+
| Phone        | Unavailable                                |
+--------------+--------------------------------------------+
 
 
 
 Support
 
 
+--------------+--------------+---------------------+-----------------+
| Name         | Relationship | Address             | Phone           |
+--------------+--------------+---------------------+-----------------+
| Rupa Giordano | [employer]         | [address]             | [phone] |
|              |              | [place]   |                 |
|              |              | [address]               |                 |
+--------------+--------------+---------------------+-----------------+
 
 
 
 
 Care Team Providers
 
 
+-----------------------+------+-------------+
| Care Team Member Name | Role | Phone       |
+-----------------------+------+-------------+
| No, Physician         | PCP  | Unavailable |
+-----------------------+------+-------------+
 
 
 
 Reason for Visit
 
 
+------------------+----------+
| Reason           | Comments |
+------------------+----------+
| Diagnostic Order |          |
+------------------+----------+
 
 
 
 Encounter Details
 
 
+--------+-----------+----------------------+----------------------+------------------+
| Date   | Type      | Department           | Care Team            | Description      |
+--------+-----------+----------------------+----------------------+------------------+
| / | Telephone |   [facility]      |   Libetrad Saxenamalu [facility],  | Diagnostic Order |
|    |           | VASCULAR SURGERY     | RN                   |                  |
|        |           | [address] |                      |                  |
|        |           |  [place]  [place]     |                      |                  |
|        |           | [address]           |                      |                  |
|        |           | [phone]         |                      |                  |
+--------+-----------+----------------------+----------------------+------------------+
 
 
 
 Social History
 
 
+---------------+-------+-----------+--------+------+
| Tobacco Use   | Types | Packs/Day | Years  | Date |
|               |       |           | Used   |      |
+---------------+-------+-----------+--------+------+
| Former Smoker |       |           |        |      |
+---------------+-------+-----------+--------+------+
 
 
 
+---------------------+---+---+---+
| Smokeless Tobacco:  |   |   |   |
| Former User         |   |   |   |
+---------------------+---+---+---+
 
 
 
+------------------------------+
| Comments: quit 50 years ago  |
 
+------------------------------+
 
 
 
+---------------+-------------+---------+----------+
| Alcohol Use   | Drinks/Week | oz/Week | Comments |
+---------------+-------------+---------+----------+
| Not Currently |             |         | 90m days |
+---------------+-------------+---------+----------+
 
 
 
+------------------+---------------+
| Sex Assigned at  | Date Recorded |
| Birth            |               |
+------------------+---------------+
| Not on file      |               |
+------------------+---------------+
 
 
 
+----------------+-------------+-------------+
| Job Start Date | Occupation  | Industry    |
+----------------+-------------+-------------+
| Not on file    | Not on file | Not on file |
+----------------+-------------+-------------+
 
 
 
+----------------+--------------+------------+
| Travel History | Travel Start | Travel End |
+----------------+--------------+------------+
 
 
 
+-------------------------------------+
| No recent travel history available. |
+-------------------------------------+
 documented as of this encounter
 
 Functional Status
 
 
+---------------------------------------------+----------+--------------------+
| Functional Status                           | Response | Date of Assessment |
+---------------------------------------------+----------+--------------------+
| Are you deaf or do you have serious         | No       | 2019         |
| difficulty hearing?                         |          |                    |
+---------------------------------------------+----------+--------------------+
| Are you blind or do you have serious        | No       | 2019         |
| difficulty seeing, even when wearing        |          |                    |
| glasses?                                    |          |                    |
+---------------------------------------------+----------+--------------------+
| Do you have serious difficulty walking or   | No       | 2019         |
| climbing stairs? (5 years old or older)     |          |                    |
+---------------------------------------------+----------+--------------------+
| Do you have difficulty dressing or bathing? | No       | 2019         |
|  (5 years old or older)                     |          |                    |
+---------------------------------------------+----------+--------------------+
| Because of a physical, mental, or emotional | No       | 2019         |
 
|  condition, do you have difficulty doing    |          |                    |
| errands alone such as visiting a doctor's   |          |                    |
| office or shopping? [15 years old or        |          |                    |
| older)]                                     |          |                    |
+---------------------------------------------+----------+--------------------+
 
 
 
+---------------------------------------------+----------+--------------------+
| Cognitive Status                            | Response | Date of Assessment |
+---------------------------------------------+----------+--------------------+
| Because of a physical, mental, or emotional | No       | 2019         |
|  condition, do you have serious difficulty  |          |                    |
| concentrating, remembering, or making       |          |                    |
| decisions? (5 years old or older)           |          |                    |
+---------------------------------------------+----------+--------------------+
 documented as of this encounter
 
 Plan of Treatment
 
 
+--------+-------------+------------------+----------------------+-------------+
| Date   | Type        | Specialty        | Care Team            | Description |
+--------+-------------+------------------+----------------------+-------------+
| / | Appointment | Radiology        |   Augustin Lopez DNP      |             |
| 2020   |             |                  | [address]     |             |
|        |             |                  | [place]  |             |
|        |             |                  | [id number]  [phone]  |             |
|        |             |                  |  [phone] (Fax)  |             |
+--------+-------------+------------------+----------------------+-------------+
| / | Office      | Vascular Surgery |   Augustin Lopez DNP      |             |
| 2020   | Visit       |                  | [address]     |             |
|        |             |                  | [place]  |             |
|        |             |                  | [id number] [phone]  |             |
|        |             |                  |  [phone] (Fax)  |             |
+--------+-------------+------------------+----------------------+-------------+
 documented as of this encounter
 
 Visit Diagnoses
 Not on filedocumented in this encounter

## 2020-01-13 NOTE — XMS
Encounter Summary
  Created on: 2020
 
 Pasquale Nehemias Martinez
 External Reference #: [id number]
 : 43
 Sex: Male
 
 Demographics
 
 
+-----------------------+----------------------+
| Address               | [address]         |
|                       | [place]  [address] |
+-----------------------+----------------------+
| Home Phone            | [phone]      |
+-----------------------+----------------------+
| Preferred Language    | Unknown              |
+-----------------------+----------------------+
| Marital Status        |               |
+-----------------------+----------------------+
| [employer] Affiliation | 1077                 |
+-----------------------+----------------------+
| Race                  | Unknown              |
+-----------------------+----------------------+
| Ethnic Group          | Unknown              |
+-----------------------+----------------------+
 
 
 Author
 
 
+--------------+--------------------------------------------+
| Author       | [facility]  |
|              | and [facility]                                |
+--------------+--------------------------------------------+
| Organization | [facility]  |
|              | and [place]                                |
+--------------+--------------------------------------------+
| Address      | Unknown                                    |
+--------------+--------------------------------------------+
| Phone        | Unavailable                                |
+--------------+--------------------------------------------+
 
 
 
 Support
 
 
+--------------+--------------+---------------------+-----------------+
| Name         | Relationship | Address             | Phone           |
+--------------+--------------+---------------------+-----------------+
| Rupa Giordano | ECON         | [address]             | [phone] |
|              |              | [facility] [place]   |                 |
|              |              | [address]               |                 |
+--------------+--------------+---------------------+-----------------+
 
 
 
 
 Care Team Providers
 
 
+-----------------------+------+-------------+
| Care Team Member Name | Role | Phone       |
+-----------------------+------+-------------+
| No, Physician         | PCP  | Unavailable |
+-----------------------+------+-------------+
 
 
 
 Reason for Referral
 Diagnostic/Screening (Routine)
 
+--------+--------+-----------+--------------+--------------+--------------+
| Status | Reason | Specialty | Diagnoses /  | Referred By  | Referred To  |
|        |        |           | Procedures   | Contact      | Contact      |
+--------+--------+-----------+--------------+--------------+--------------+
| Closed |        | Radiology |   Diagnoses  |   Erasmo         |              |
|        |        |           |  Mesenteric  | Vascular     |              |
|        |        |           | ischemia,    | Surgery      |              |
|        |        |           | chronic      | [address]         |              |
|        |        |           | ([facility])        | [place]  |              |
|        |        |           | Procedures   | [address]        |              |
|        |        |           | IR Angiogram | [place] |              |
|        |        |           |  Mesenteric  |  [address]  |              |
|        |        |           | Visceral     |  Phone:      |              |
|        |        |           |              | [phone] |              |
|        |        |           |              |   Fax:       |              |
|        |        |           |              | [phone] |              |
+--------+--------+-----------+--------------+--------------+--------------+
 
 
 
 
 Reason for Visit
 
 
+---------+----------+
| Reason  | Comments |
+---------+----------+
| Results |          |
+---------+----------+
 
 
 
 Encounter Details
 
 
+--------+-----------+----------------------+----------------------+-------------+
| Date   | Type      | Department           | Care Team            | Description |
+--------+-----------+----------------------+----------------------+-------------+
| / | Telephone |   [facility]      |   Briana Casper, | Results     |
| 2019   |           | VASCULAR SURGERY     |  RN                  |             |
|        |           | [address] |                      |             |
|        |           |  [place]  [place]     |                      |             |
|        |           | [address]           |                      |             |
|        |           | [phone]         |                      |             |
+--------+-----------+----------------------+----------------------+-------------+
 
 
 
 
 Social History
 
 
+---------------+-------+-----------+--------+------+
| Tobacco Use   | Types | Packs/Day | Years  | Date |
|               |       |           | Used   |      |
+---------------+-------+-----------+--------+------+
| Former Smoker |       |           |        |      |
+---------------+-------+-----------+--------+------+
 
 
 
+---------------------+---+---+---+
| Smokeless Tobacco:  |   |   |   |
| Former User         |   |   |   |
+---------------------+---+---+---+
 
 
 
+------------------------------+
| Comments: quit 50 years ago  |
+------------------------------+
 
 
 
+---------------+-------------+---------+----------+
| Alcohol Use   | Drinks/Week | oz/Week | Comments |
+---------------+-------------+---------+----------+
| Not Currently |             |         | 90m days |
+---------------+-------------+---------+----------+
 
 
 
+------------------+---------------+
| Sex Assigned at  | Date Recorded |
| Birth            |               |
+------------------+---------------+
| Not on file      |               |
+------------------+---------------+
 
 
 
+----------------+-------------+-------------+
| Job Start Date | Occupation  | Industry    |
+----------------+-------------+-------------+
| Not on file    | Not on file | Not on file |
+----------------+-------------+-------------+
 
 
 
+----------------+--------------+------------+
| Travel History | Travel Start | Travel End |
+----------------+--------------+------------+
 
 
 
+-------------------------------------+
 
| No recent travel history available. |
+-------------------------------------+
 documented as of this encounter
 
 Functional Status
 
 
+---------------------------------------------+----------+--------------------+
| Functional Status                           | Response | Date of Assessment |
+---------------------------------------------+----------+--------------------+
| Are you deaf or do you have serious         | No       | 2019         |
| difficulty hearing?                         |          |                    |
+---------------------------------------------+----------+--------------------+
| Are you blind or do you have serious        | No       | 2019         |
| difficulty seeing, even when wearing        |          |                    |
| glasses?                                    |          |                    |
+---------------------------------------------+----------+--------------------+
| Do you have serious difficulty walking or   | No       | 2019         |
| climbing stairs? (5 years old or older)     |          |                    |
+---------------------------------------------+----------+--------------------+
| Do you have difficulty dressing or bathing? | No       | 2019         |
|  (5 years old or older)                     |          |                    |
+---------------------------------------------+----------+--------------------+
| Because of a physical, mental, or emotional | No       | 2019         |
|  condition, do you have difficulty doing    |          |                    |
| errands alone such as visiting a doctor's   |          |                    |
| office or shopping? [15 years old or        |          |                    |
| older)]                                     |          |                    |
+---------------------------------------------+----------+--------------------+
 
 
 
+---------------------------------------------+----------+--------------------+
| Cognitive Status                            | Response | Date of Assessment |
+---------------------------------------------+----------+--------------------+
| Because of a physical, mental, or emotional | No       | 2019         |
|  condition, do you have serious difficulty  |          |                    |
| concentrating, remembering, or making       |          |                    |
| decisions? (5 years old or older)           |          |                    |
+---------------------------------------------+----------+--------------------+
 documented as of this encounter
 
 Plan of Treatment
 
 
+--------+-------------+------------------+----------------------+-------------+
| Date   | Type        | Specialty        | Care Team            | Description |
+--------+-------------+------------------+----------------------+-------------+
| / | Appointment | Radiology        |   Augustin Lopez DNP      |             |
|    |             |                  |  [place]     |             |
|        |             |                  | [place]  |             |
|        |             |                  | [address]  [phone]  |             |
|        |             |                  |  [phone] (Fax)  |             |
+--------+-------------+------------------+----------------------+-------------+
| / | Office      | Vascular Surgery |   Augustin Lopez DNP      |             |
|    | Visit       |                  | [address] [address]     |             |
|        |             |                  | [place]  |             |
|        |             |                  | [address]  [phone]  |             |
|        |             |                  |  [phone] (Fax)  |             |
+--------+-------------+------------------+----------------------+-------------+
 
 documented as of this encounter
 
 Results
 IR Angiogram Mesenteric Visceral (2019 10:03 AM PST)
 
+----------+
| Specimen |
+----------+
|          |
+----------+
 
 
 
+-----------------------------------------------------------------------+---------------+
| Impressions                                                           | Performed At  |
+-----------------------------------------------------------------------+---------------+
|      1.   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
| around large   branches.  2.   After angioplasty, moderate            |               |
| angiographic results.  3.   Not a good place for stent placement due  |               |
| to multiple large branches.                                           |               |
+-----------------------------------------------------------------------+---------------+
 
 
 
+----------------------------------------------------------------------------+--------------
-+
| Narrative                                                                  | Performed At 
 |
+----------------------------------------------------------------------------+--------------
-+
|   This result has an attachment that is not available.  [facility]          |   [facility]
 |
| AORTOGRAM PREOPERATIVE DIAGNOSIS: Chronic mesenteric ischemia              |              
 |
| POSTOPERATIVE DIAGNOSIS: Same PROCEDURE:   1. Moderate conscious           |              
 |
| sedation2. Ultrasound guided access of the right common femoral            |              
 |
| artery3. Aortogram4. Selective catheterization of superior mesenteric      |              
 |
| artery with mesenteric arteriogram5.   Superior mesenteric artery          |              
 |
| angioplasty using 7 x 40 mm angioplasty balloon SURGEON: Sukhdeep Pardo MD       |              
 |
| ASSISTANT: None ANESTHESIA: Moderate sedation and local anesthesia         |              
 |
|       Informed consent was obtained from the patient. Continuous           |              
 |
| cardiac monitoring was performed throughout the procedure. Conscious       |              
 |
| sedation was provided by the nursing staff during the procedure under      |              
 |
| my supervision.          Sedation time: 60 minutes                         |              
 |
|   Medications: 1 mg Versed IV, 50 Mcg Fentanyl IV ESTIMATED BLOOD          |              
 |
| LOSS: Minimal CONTRAST: 89 mL of Omnipaque 240 INDICATIONS: See            |              
 |
| preoperative history and physical FINDINGS: SMA was nearly occluded at     |              
 |
 
|  origin.   After angioplasty, there was still a focal area of stenosis     |              
 |
|  but this was not in a good place to stent due to multiple large           |              
 |
| branches around stenosis.   However, there was improved flow               |              
 |
| throughout SMA at end of case. DESCRIPTION OF PROCEDURE:   The patient     |              
 |
|  was properly identified and brought to the Cath Lab. The patient was      |              
 |
| placed supine on the catheter table and patient was given moderate         |              
 |
| sedation by nursing staff under my supervision. Patient's bilateral        |              
 |
| groins were then prepped and draped in the usual sterile fashion.          |              
 |
| Local anesthetic was given to the right groin and the right common         |              
 |
| femoral artery was accessed under ultrasound guidance using a              |              
 |
| micropuncture needle. A micropuncture sheath was inserted over a wire      |              
 |
| and up sized to a 4 Boliv[place] sheath. A Omni flush catheter was then          |              
 |
| inserted into the aorta and aortogram was then performed with the          |              
 |
| findings as described above. Next, an Amplatzer wire was then inserted     |              
 |
|  over the catheter and the 4 Bolivian sheath was removed. A 6.5 Bolivian       |              
 |
| durable sheath was then inserted over the wire with the tip of the         |              
 |
| sheath being placed into the proximal SMA. A vertebral catheter was        |              
 |
| inserted over the wire and the wire was then removed. A Glidewire was      |              
 |
| then placed into the vertebral catheter and used to cross through the      |              
 |
| SMA occlusion.   Next, a 260 fix core wire was then inserted over the      |              
 |
| catheter.   The SMA was then angioplastied using 7 x 40 mm angioplasty     |              
 |
|  balloon.   A final arteriogram was then performed through the sheath.     |              
 |
|  The steerable sheath was then removed and a Mynx closure device was       |              
 |
| then used on the right common femoral artery and hemostasis was            |              
 |
| ensured. The patient was then taken to the observation unit for            |              
 |
| further monitoring.                                                        |              
 |
|patient was given moderate sedation by nursing staff under my supervision.  |              
 |
|Patient's bilateral groins were then prepped and draped in the usual        |              
 |
|sterile fashion. Local anesthetic was given to the right groin and the      |              
 |
|right common femoral artery was accessed under ultrasound guidance using a  |              
 |
 
|micropuncture needle. A micropuncture sheath was inserted over a wire and   |              
 |
|up sized to a 4 Boliv[place] sheath. A Omni flush catheter was then inserted      |              
 |
|into the aorta and aortogram was then performed with the findings as        |              
 |
|described above. Next, an Amplatzer wire was then inserted over the         |              
 |
|catheter and the 4 Bolivian sheath was removed. A 6.5 Bolivian durable sheath   |              
 |
|was then inserted over the wire with the tip of the sheath being placed     |              
 |
|into the proximal SMA. A vertebral catheter was inserted over the wire and  |              
 |
|the wire was then removed. A Glidewire was then placed into the vertebral   |              
 |
|catheter and used to cross through the SMA occlusion.   Next, a 260 fix     |              
 |
|core wire was then inserted over the catheter.   The SMA was then           |              
 |
|angioplastied using 7 x 40 mm angioplasty balloon.   A final arteriogram    |              
 |
|was then performed through the sheath. The steerable sheath was then        |              
 |
|removed and a Mynx closure device was then used on the right common         |              
 |
|femoral artery and hemostasis was ensured. The patient was then taken to    |              
 |
|the observation unit for further monitoring.                                |              
 |
|                                                                            |              
 |
+----------------------------------------------------------------------------+--------------
-+
 
 
 
+---------------+---------+--------------------+--------------+
| Performing    | Address | [facility] | Phone Number |
| Organization  |         |                    |              |
+---------------+---------+--------------------+--------------+
|   [facility] |         |                    |              |
+---------------+---------+--------------------+--------------+
 documented in this encounter
 
 Visit Diagnoses
 
 
+------------------------------------------------------------------------------------+
| Diagnosis                                                                          |
+------------------------------------------------------------------------------------+
|   Mesenteric ischemia, chronic (HCC) - Primary  Chronic vascular insufficiency of  |
| intestine                                                                          |
+------------------------------------------------------------------------------------+
 documented in this encounter

## 2020-01-13 NOTE — XMS
Encounter Summary
  Created on: 2020
 
 Pasquale Nehemias Martinez
 External Reference #: [id number]
 : 43
 Sex: Male
 
 Demographics
 
 
+-----------------------+----------------------+
| Address               | [address]         |
|                       | [place]  [address] |
+-----------------------+----------------------+
| Home Phone            | [phone]      |
+-----------------------+----------------------+
| Preferred Language    | Unknown              |
+-----------------------+----------------------+
| Marital Status        |               |
+-----------------------+----------------------+
| Mormonism Affiliation | [id number]                 |
+-----------------------+----------------------+
| Race                  | Unknown              |
+-----------------------+----------------------+
| Ethnic Group          | Unknown              |
+-----------------------+----------------------+
 
 
 Author
 
 
+--------------+--------------------------------------------+
| Author       | formerly [facility]  |
|              | and [facility]                                |
+--------------+--------------------------------------------+
| Organization | formerly [facility]  |
|              | and [place]                                |
+--------------+--------------------------------------------+
| Address      | Unknown                                    |
+--------------+--------------------------------------------+
| Phone        | Unavailable                                |
+--------------+--------------------------------------------+
 
 
 
 Support
 
 
+--------------+--------------+---------------------+-----------------+
| Name         | Relationship | Address             | Phone           |
+--------------+--------------+---------------------+-----------------+
| Rupa Giordano | ECON         | [address]             | [phone] |
|              |              | [address] [place] [address], [place]   |                 |
|              |              | [id number]               |                 |
+--------------+--------------+---------------------+-----------------+
 
 
 
 
 Care Team Providers
 
 
+-----------------------+------+-------------+
| Care Team Member Name | Role | Phone       |
+-----------------------+------+-------------+
| No, Physician         | PCP  | Unavailable |
+-----------------------+------+-------------+
 
 
 
 Reason for Visit
 Auth/Cert
 
+--------+--------+-----------+--------------+--------------+--------------+
| Status | Reason | Specialty | Diagnoses /  | Referred By  | Referred To  |
|        |        |           | Procedures   | Contact      | Contact      |
+--------+--------+-----------+--------------+--------------+--------------+
|        |        |           |   Diagnoses  |              |              |
|        |        |           |  Mesenteric  |              |              |
|        |        |           | ischemia,    |              |              |
|        |        |           | chronic      |              |              |
|        |        |           | ([facility])        |              |              |
|        |        |           | Procedures   |              |              |
|        |        |           | REVASCULARIZ |              |              |
|        |        |           | ATION        |              |              |
|        |        |           | MESENTERIC   |              |              |
+--------+--------+-----------+--------------+--------------+--------------+
 
 
 
 
 Encounter Details
 
 
+--------+-----------+----------------------+----------------------+----------------------+
| Date   | Type      | Department           | Care Team            | Description          |
+--------+-----------+----------------------+----------------------+----------------------+
| / | Hospital  |   [facility]    |   Sukhdeep Pardo MD     | Mesenteric ischemia, |
| 2019 - | Encounter | [facility] | [address]     |  chronic (HCC);      |
|        |           |  CARE [address]LO[address]   | [address]  [place] [address]  | Mesenteric ischemia, |
| / |           | [place]           | [id number]           |  chronic ([facility]);      |
|    |           | [address] [address]         | [phone]         | Hypertension,        |
|        |           | [address]           | [phone] (Fax)   | unspecified type     |
|        |           | [phone]         |                      |                      |
+--------+-----------+----------------------+----------------------+----------------------+
 
 
 
 Social History
 
 
+---------------+-------+-----------+--------+------+
| Tobacco Use   | Types | Packs/Day | Years  | Date |
|               |       |           | Used   |      |
+---------------+-------+-----------+--------+------+
| Former Smoker |       |           |        |      |
+---------------+-------+-----------+--------+------+
 
 
 
 
+---------------------+---+---+---+
| Smokeless Tobacco:  |   |   |   |
| Former User         |   |   |   |
+---------------------+---+---+---+
 
 
 
+------------------------------+
| Comments: quit 50 years ago  |
+------------------------------+
 
 
 
+---------------+-------------+---------+----------+
| Alcohol Use   | Drinks/Week | oz/Week | Comments |
+---------------+-------------+---------+----------+
| Not Currently |             |         | 90m days |
+---------------+-------------+---------+----------+
 
 
 
+------------------+---------------+
| Sex Assigned at  | Date Recorded |
| Birth            |               |
+------------------+---------------+
| Not on file      |               |
+------------------+---------------+
 
 
 
+----------------+-------------+-------------+
| Job Start Date | Occupation  | Industry    |
+----------------+-------------+-------------+
| Not on file    | Not on file | Not on file |
+----------------+-------------+-------------+
 
 
 
+----------------+--------------+------------+
| Travel History | Travel Start | Travel End |
+----------------+--------------+------------+
 
 
 
+-------------------------------------+
| No recent travel history available. |
+-------------------------------------+
 documented as of this encounter
 
 Last Filed Vital Signs
 
 
+-------------------+----------------------+----------------------+----------+
| Vital Sign        | Reading              | Time Taken           | Comments |
+-------------------+----------------------+----------------------+----------+
| Blood Pressure    | 141/66               | 2019 10:42 AM  |          |
|                   |                      | PDT                  |          |
+-------------------+----------------------+----------------------+----------+
 
| Pulse             | 76                   | 2019  7:57 AM  |          |
|                   |                      | PDT                  |          |
+-------------------+----------------------+----------------------+----------+
| Temperature       | 36.9   C (98.5   F)  | 2019  7:57 AM  |          |
|                   |                      | PDT                  |          |
+-------------------+----------------------+----------------------+----------+
| Respiratory Rate  | 18                   | 2019  7:57 AM  |          |
|                   |                      | PDT                  |          |
+-------------------+----------------------+----------------------+----------+
| Oxygen Saturation | 98%                  | 2019  7:57 AM  |          |
|                   |                      | PDT                  |          |
+-------------------+----------------------+----------------------+----------+
| Inhaled Oxygen    | -                    | -                    |          |
| Concentration     |                      |                      |          |
+-------------------+----------------------+----------------------+----------+
| Weight            | 78.3 kg (172 lb 9.9  | 2019  3:50 AM  |          |
|                   | oz)                  | PDT                  |          |
+-------------------+----------------------+----------------------+----------+
| Height            | 168.9 cm (5' 6.5")   | 2019 10:43 AM  |          |
|                   |                      | PDT                  |          |
+-------------------+----------------------+----------------------+----------+
| Body Mass Index   | 27.44                | 2019 10:43 AM  |          |
|                   |                      | PDT                  |          |
+-------------------+----------------------+----------------------+----------+
 documented in this encounter
 
 Functional Status
 
 
+---------------------------------------------+----------+--------------------+
| Functional Status                           | Response | Date of Assessment |
+---------------------------------------------+----------+--------------------+
| Are you deaf or do you have serious         | No       | 2019         |
| difficulty hearing?                         |          |                    |
+---------------------------------------------+----------+--------------------+
| Are you blind or do you have serious        | No       | 2019         |
| difficulty seeing, even when wearing        |          |                    |
| glasses?                                    |          |                    |
+---------------------------------------------+----------+--------------------+
| Do you have serious difficulty walking or   | No       | 2019         |
| climbing stairs? (5 years old or older)     |          |                    |
+---------------------------------------------+----------+--------------------+
| Do you have difficulty dressing or bathing? | No       | 2019         |
|  (5 years old or older)                     |          |                    |
+---------------------------------------------+----------+--------------------+
| Because of a physical, mental, or emotional | No       | 2019         |
|  condition, do you have difficulty doing    |          |                    |
| errands alone such as visiting a doctor's   |          |                    |
| office or shopping? [15 years old or        |          |                    |
| older)]                                     |          |                    |
+---------------------------------------------+----------+--------------------+
 
 
 
+---------------------------------------------+----------+--------------------+
| Cognitive Status                            | Response | Date of Assessment |
+---------------------------------------------+----------+--------------------+
| Because of a physical, mental, or emotional | No       | 2019         |
|  condition, do you have serious difficulty  |          |                    |
| concentrating, remembering, or making       |          |                    |
 
| decisions? (5 years old or older)           |          |                    |
+---------------------------------------------+----------+--------------------+
 documented as of this encounter
 
 Discharge Summaries
 Billy Gutierres PA-C - 2019  8:36 AM PDTFormatting of this note might be different f
rom the original.
 Physician Discharge Summary 
 
 Patient ID:
 Nehemias Giordano
 [id number]
 74 y.o.
 1945
 
 Admit date: 2019
 
 Discharge date and time: 2019
 
 Admitting Physician: Sukhdeep Pardo MD 
 
 Discharge Physician: Billy Gutierres PA-C
 
 Admission Diagnoses: Mesenteric ischemia, chronic (HCC) [K55.1]
 
 Discharge Diagnoses: Same; Status post mesenteric artery revascularization
 
 Admission Condition: poor
 
 Discharged Condition: stable
 
 Indication for Admission: Symptomatic SMA stenosis
 
 Hospital Course: Patient was admitted on 2019 and underwent open SMA endarterectomy an
d patch angioplasty. Post operative course has been uneventful. Began passing flatus two day
s ago and diet has been advanced. Stable for discharge home.
 
 Consults: none
 
 Treatments: surgery: SMA endart and patch angioplasty
 
 Discharge Exam:
 Vitals:reviewed
 CONSTITUTIONAL:   Conversant, well developed, NAD
 EYES: Anicteric sclerae, no lid drag, no proptosis
 RESP: Normal effort, regular, even, unlabored rate
 CV: No peripheral edema, rate regular
 ABD:  Appropriate tenderness.  Hypoactive bowel tones
 SKIN: Pink, warm, dry without rash/lesion
 MS: ROM not limited, no digital cyanosis, normal gait
 NEURO: Conversant, A&O times 3
 PSYCH: appropriate affect, speech and tone, judgement and insight intact
 Vascular:  Palpable brachial pulses bilaterally. Abdominal binder in place. Staples intact.
 
 Disposition: home
 
 Patient Instructions: Keep wound clean and dry, ice to area for comfort and remove dressing
 in two days. Leave open to air. Monitor for signs of infection. 
  
 Discharge Medications 
 
  
 New Medications  
   Details 
 docusate sodium 100 mg capsule
  Take 1 capsule by mouth 2 times daily.
 aka:  COLACE
  
 HYDROcodone-acetaminophen 5-325 mg per tablet
  Take 1 tablet by mouth every 4 hours as needed for Pain.
 aka:  NORCO
  
  
 Unchanged Medications  
   Details 
 ASPIRIN 81 PO
  Take 81 mg by mouth.
  
 CENTRUM SILVER 50+MEN PO
  Take  by mouth.
  
 hydroCHLOROthiazide 25 mg tablet
  Take 25 mg by mouth Daily.
  
 losartan 50 mg tablet
  Take 50 mg by mouth Daily.
 aka:  COZAAR
  
 simvastatin 10 mg tablet
  Take 10 mg by mouth nightly.
 aka:  ZOCOR
  
  
 
 Plan: POD 6. Stable for discharge. Continue aspirin and statin therapy. Continue ambulation
 and pain management. Getting more aggressive with constipation. Patient understands to come
 back in with worsening abdominal pain and failure of bowel movement. 
 Activity: activity as tolerated, ambulate in house and no lifting, Driving, or Strenuous ex
ercise for 6 weeks
 Diet: cardiac diet
 Wound Care: keep wound clean and dry, ice to area for comfort and remove dressing in two da
ys. Leave open to air. Monitor for signs of infection. 
 
 Follow-up with Vascular clinic in 2 weeks.
 
 Signed:
 Billy Gutierres PA-C
 2019
 8:36
 Electronically signed by Billy Gutierres PA-C at 2019  8:42 AM PDTdocumented in this 
encounter
 
 Discharge Instructions
 Instructions Araceli Bartholomew RN - 2019Formatting of this note might be different fro
m the original.
 
 Constipation (Adult)
 Constipation means that you have bowel movements that are less frequent than usual. Stools 
often become very hard and difficult to pass.
 Constipation is very common. At some point in life, it affects almost everyone. Since every
one's bowel habits are different, what is constipation to one person may not be to another. 
 
Your healthcare provider may do tests to diagnose constipation. It depends on whathe or sh
efinds when evaluating you.
 
 Symptoms of constipation include:
  Abdominal pain
  Bloating
  Vomiting
  Painful bowel movements
  Itching, swelling, bleeding, or pain around the anus
 Causes
 Constipation can have many causes. These include:
  Diet low in fiber
  Too much dairy
  Not drinking enough liquids
  Lack of exercise or physical activity (especially true for older adults)
  Changes in lifestyle or daily routine, including pregnancy, aging, work, and travel
  Frequent use or misuse of laxatives
  Ignoring the urge to have a bowel movement or delaying it until later
  Medicines, such as certain prescription pain medicines, iron supplements, antacids, cert
ain antidepressants, and calcium supplements
  Diseases like irritable bowel syndrome, bowel obstructions, stroke, diabetes, thyroid di
sease, Parkinson disease, hemorrhoids, and colon cancer
 Complications
 Potential complications of constipation can include:
  Hemorrhoids
  Rectal bleeding from hemorrhoids or anal fissures(skin tears)
  Hernias
  Dependency on laxatives
  Chronic constipation
  Fecal impaction, a severe form of constipation in which a large amount of hard stool is 
in your rectum that you can't pass
  Bowel obstruction or perforation
 Home care
 All treatment should be done after talking with your healthcare provider. This is especiall
y true if you have another medical problems, are taking prescription medicines, or are an ol
deandre adult. Treatment most often involves lifestyle changes. You may also need medicines. You
r healthcare provider will tell you which will work best for you. Follow the advice below to
 help avoid this problem in the future.
 Lifestyle changes
 These lifestyle changes can help prevent constipation:
  Diet. Eat a high-fiber diet, with fresh fruit and vegetables, and reduce dairy intake, m
eats, and processed foods
  Fluids. It's important to get enough fluids each day. Drink plenty of water when you eat
 more fiber. If you are on diet that limits the amount of fluid you can have, talk about thi
s with your healthcare provider.
  Regular exercise. Check with your healthcare provider first.
 Medicines
 Take any medicines as directed. Some laxatives are safe to use only every now and then. Oth
ers can be taken on a regular basis. While laxatives don't cause bowel dependence, they are 
treating the symptoms. So your constipation may return if you don't make other changes. Talk
 with your healthcare provider or pharmacist if you have questions.
 Prescription pain medicines can cause constipation. If you are taking this kind of medicine
, ask your healthcare provider if you should also take a stool softener.
 Medicines you may take to treat constipation include:
  Fiber supplements
  Stool softeners
  Laxatives
  Enemas
  Rectal suppositories
 Follow-up care
 
 Follow up with your healthcare provider if symptoms don't get better in the next few days. 
You may need to have more tests or see a specialist.
 Call 911
 Call 911 if any of these occur:
  Trouble breathing
  Stiff, rigid abdomen that is severely painful to touch
  Confusion
  Fainting or loss of consciousness
  Rapid heart rate
  Chest pain
 When to seek medical advice
 Call your healthcare provider right away if any of these occur:
  Fever of 100.4F (38C) or higher, or as directed by your healthcare provider
  Failure to resume normal bowel movements
  Pain in your abdomen or back gets worse
  Nausea or vomiting
  Swelling in your abdomen
  Blood in the stool
  Black, tarry stool
  Involuntary weight loss
  Weakness
 Date Last Reviewed: 2018-2018 [employer]. [address] [place] [address]. All righ
ts reserved. This information is not intended as a substitute for professional medical care.
 Always follow your healthcare professional's instructions.
 
 After Open Abdominal Superior Mesenteric Artery Surgery
 You have hadsurgery to repair SMA stenosis.
 Home care
 Recommendations for taking care of yourself at home include the following:
  Avoid strenuous activity for 4 to 6 weeks after your surgery.
  Ask your healthcare provider how long it will be before you can return to work.
  Gradually increase your activity. It may take some time for you to return to your normal
 activity level.
  Don  t drive for 2 weeks after surgery or while you are taking opioid pain medicine. As
k someone to take you to any appointments.
  Check your incision every day for signs of infection (swelling, redness, drainage, or wa
rmth).
  Keep your incision clean. Wash it gently with soap and water when you shower.
  Don  t lift anything heavier than 5 pounds for 2 weeks after surgery.
  Avoid sitting or standing for long periods without moving your legs and feet.
  Keep your feet up when you sit in a chair.
  Take your medicines exactly as directed.
 
 When to call your healthcare provider
 Call your healthcare provider right away if you have any of the following:
  Redness, pain, swelling, or drainage from your incision
  Fever of 100.4F (38C) or higher, or as directed by your healthcare provider
  Sudden coldness, pain, or paleness in your leg
  Loss of feeling in your legs
  Severe or sudden pain in your stomach
  Fail to pass gas
  Bloody bowel movements
  Prolonged constipation
  Nausea or vomiting
  Trouble breathing
  Pain or heaviness in your chest or arms 
 Date Last Reviewed: 2016-2018 [employer] [address] [place] [address], [place] [address]. All righ
ts reserved. This information is not intended as a substitute for professional medical care.
 
 Always follow your healthcare professional's instructions.
 
 
 documented in this encounter
 
 Medications at Time of Discharge
 
 
+----------------------+----------------------+-----------+---------+----------+----------+
| Medication           | Sig                  | Dispensed | Refills | Start    | End Date |
|                      |                      |           |         | Date     |          |
+----------------------+----------------------+-----------+---------+----------+----------+
|   ASPIRIN 81 PO      | Take 81 mg by mouth. |           | 0       |          |          |
+----------------------+----------------------+-----------+---------+----------+----------+
|   docusate sodium    | Take 1 capsule by    |   30      | 0       | / |          |
| (COLACE) 100 mg      | mouth 2 times daily. | capsule   |         | 19       |          |
| capsule              |                      |           |         |          |          |
+----------------------+----------------------+-----------+---------+----------+----------+
|                      | Take 25 mg by mouth  |           | 0       |          |          |
| hydroCHLOROthiazide  | Daily.               |           |         |          |          |
| 25 mg tablet         |                      |           |         |          |          |
+----------------------+----------------------+-----------+---------+----------+----------+
|                      | Take 1 tablet by     |   30      | 0       | / |          |
| HYDROcodone-acetamin | mouth every 4 hours  | tablet    |         | 19       |          |
| ophen (NORCO) 5-325  | as needed for Pain.  |           |         |          |          |
| mg per tablet        |                      |           |         |          |          |
+----------------------+----------------------+-----------+---------+----------+----------+
|   losartan (COZAAR)  | Take 50 mg by mouth  |           | 0       |          |          |
| 50 mg tablet         | Daily.               |           |         |          |          |
+----------------------+----------------------+-----------+---------+----------+----------+
|   Multiple           | Take  by mouth.      |           | 0       |          |          |
| Vitamins-Minerals    |                      |           |         |          |          |
| (CENTRUM SILVER      |                      |           |         |          |          |
| 50+MEN PO)           |                      |           |         |          |          |
+----------------------+----------------------+-----------+---------+----------+----------+
|   simvastatin        | Take 10 mg by mouth  |           | 0       |          |          |
| (ZOCOR) 10 mg tablet | nightly.             |           |         |          |          |
+----------------------+----------------------+-----------+---------+----------+----------+
 documented as of this encounter
 
 Progress Notes
 Araceli Bartholomew RN - 2019 11:37 AM PDTPatient discharged to home via private car. Ve
rbal and written instructions provided to patient, all questions addressed. Rx and all belon
gings gathered. IVs removed and bandages applied. Patient wheeled to car and discharged in s
table condition with family. 
 Electronically signed by Araceli Bartholomew RN at 2019 11:37 AM Gracy Mary RN 
- 2019  6:58 AM PDTPt passing gas but still no BM. Bowel tones: normoactive to hypoact
dave in upper quadrants, hypoactive in both lower quadrants.  Pt declined suppository twice, 
stating, "If I still haven't had a BM by the morning, I might do the suppository around 8."
 
 Pt set to dc home with wife @ 11 (lives in [place]).  
 
 Abdominal hydrocolloid dressing C/D/I, with no new drainage.  Pt splints appropriately when
 coughing.
 
 End of shift review complete.Electronically signed by Gracy Joe RN at 2019  8:
04 AM Beatrice Man RN - 2019  6:31 PM PDTPt advanced to full liquids, continues 
to pass gas but no BM yet. Bowel tones present. Will continue to monitor.
 
 Beatrice Goel RN Electronically signed by Beatrice Goel RN at 2019  6:32 PM Christopher Cho MD - 2019 12:46 PM PDTVascular Surgery
  still with no BM.  Just took 1 tab of Norco.  Seems to be controlling the pain.  He wants 
to wait till tomorrow. Discussed his care with his wife by phone.  Questions answered about 
diet, climbing 13 stairs to get into one level home. Wife will arrive by 11am with friend to
 help him home and into house.  Wants a walker for home as well. Ok to shower.Electronically
 signed by Christopher Man MD at 2019 12:48 PM Christopher Prince MD - 2019  8:46 AM PDTFormatting of this note might be different from the original.
 [facility]
 Service: Vascular Surgery
 Progress Note
 
 Hospital Day:   LOS: 5 days 
 Post-Op Day:  #5
 SUBJECTIVE
 
      Patient Summary:  S/p SMA endarterectomy for chronic mesenteric ischemia
 
      Events Overnight:    Abdominal binder in place. Passing gas, no stool.  No N/V. Thirst
y.
 
 Scheduled Medications 
   aspirin  81 mg Oral Daily 
   hydroCHLOROthiazide  25 mg Oral Daily 
   losartan  50 mg Oral Daily 
   pantoprazole  40 mg Intravenous Daily 
 
 Continuous Infusions 
   niCARdipine Stopped (19 1628) 
 
 PRN Medications
 acetaminophen, bisacodyl, HYDROcodone-acetaminophen, HYDROmorphone, insulin lispro, morphin
e, sodium chloride 0.9%
 
 OBJECTIVE
 Vital Signs:
 Vitals: 
  19 0715 
 BP: 155/74 
 Pulse: 71 
 Resp: 18 
 Temp: 36.7 C (98 F) 
 
 Physical Exam
 Vitals:reviewed
 CONSTITUTIONAL:  Alert/appropriate
 CV: No peripheral edema, rate regular
 ABD:  Incisional tenderness only. normal bowel tones
 DRSNG: dry, spotty strikethru.
 SKIN: Pink, warm, well hydrated
 LE: w/out calf tenderness. No edema. 
 
 DATA
 Recent Results (from the past 24 hour(s)) 
 Basic Metabolic Panel 
  Collection Time: 19  4:17 
 Result Value Ref Range 
  Na 135 135 - 145 mmol/L 
  K 3.8 3.5 - 4.9 mmol/L 
  Cl 102 99 - 109 mmol/L 
  CO2 26 23 - 32 mmol/L 
 
  Anion Gap 11 5 - 20 mmol/L 
  Glucose 140 (H) 65 - 99 mg/dL 
  BUN 8 8 - 25 mg/dL 
  Creatinine 0.9 0.70 - 1.30 mg/dL 
  BUN/Creatinine Ratio 9  
  Calcium 8.5 8.5 - 10.5 mg/dL 
  Estimated GFR >60 >60 mL/min/1.73m2 
 CBC with Differential 
  Collection Time: 19  4:17 
 Result Value Ref Range 
  WBC 5.40 3.80 - 11.00 K/uL 
  RBC 3.57 (L) 4.20 - 5.70 M/uL 
  Hemoglobin 12.1 (L) 13.2 - 17.0 g/dL 
  Hematocrit 34.5 (L) 39.0 - 50.0 % 
  MCV 96.7 80.0 - 100.0 fl 
  MCH 33.8 27.0 - 34.0 pg 
  MCHC 35.0 32.0 - 35.5 g/dL 
  RDW-SD 45.5 37 - 53 fl 
  Platelet Count 158 150 - 400 K/uL 
  MPV 7.5 fl 
  Diff Type AUTOMATED  
  % Neutrophils 55.42 % 
  % Lymphocytes 36.41 % 
  Monocyte % 6.61 % 
  Eosinophils % 1.23 % 
  Basophils % 0.33 % 
  Neutrophils, Absolute 3.00 1.90 - 7.40 K/uL 
  Absolute Lymphocytes 1.97 1.00 - 3.90 K/uL 
  Absolute Monocytes 0.36 0.00 - 0.80 K/uL 
  Eosinophils, Absolute 0.07 0.00 - 0.50 K/uL 
  Basophils, Absolute 0.02 0.00 - 0.10 K/uL 
 
 PROBLEM LIST
 
 Principal Problem:
   Mesenteric ischemia, chronic 
 Active Problems:
   Hypertension
   Hyperlipidemia
 
 ASSESSMENT & PLAN
 
 S/p SMA endarterectomy for chronic mesenteric ischemia.  POD5- ileus resolving. Will begin 
advance diet.  Continue ambulation.  Oral pain med, hold IV dilaudid. Home afternoon if tole
rates diet and pain controlled.   
 
 Disposition:  [facility]
 Code Status:  Full Code
 
 Christopher Man MD
 2019                                           [phone] signed by Merry Man MD at 2019 12:46 PM PDTGracy Joe, RN - 2019  6:54
 AM PDTHourly rounding performed.  Pt passing gas but has not had BM yet. Ambulated 1 lap ar
ound unit. Cares & concerns will be passed on to daysotilia RN.
 End of shift review complete.Electronically signed by Gracy Joe RN at 2019  7:
58 AM Ace Kaur RN - 2019  6:28 PM PDTA/O, up with supervision, passing gas t
his shift, still no BM, requesting dilaudid q2, plan to transition to PO pain meds tomorrow,
 advanced to clear liquids today, tolerating well
 
 Chart check complete
 
 
 Electronically signed by: Ace Mas RN 2019 18:30
 Electronically signed by Ace Mas RN at 2019  6:30 PM Christopher Prince MD - 2019  9:46 AM PDTFormatting of this note might be different from the [facility]
 Service: Vascular Surgery
 Progress Note
 
 Hospital Day:   LOS: 4 days 
 Post-Op Day:  3
 SUBJECTIVE
 
      Patient Summary:  S/p SMA endarterectomy for chronic mesenteric ischemia
 
      Events Overnight:  Transferred from ICU to floor. Abdominal binder in place. Passing g
as, no stool.  No N/V. Thirsty.
 
 Scheduled Medications 
   pantoprazole  40 mg Intravenous Daily 
 
 Continuous Infusions 
   dextrose 5% and sodium chloride 0.45% with KCl 20 mEq/L 125 mL/hr at 19 0219 
   niCARdipine Stopped (19 1628) 
 
 PRN Medications
 acetaminophen, bisacodyl, HYDROmorphone, insulin lispro, morphine, sodium chloride 0.9%
 
 OBJECTIVE
 Vital Signs:
 Vitals: 
  19 0807 
 BP: 174/85 
 Pulse: 83 
 Resp: 18 
 Temp: 37.9 C (100.2 F) 
 
 Physical Exam
 Vitals:reviewed
 CONSTITUTIONAL:  Alert/appropriate
 CV: No peripheral edema, rate regular
 ABD:  Incisional tenderness only. normal bowel tones
 DRSNG: dry, spotty strikethru.
 SKIN: Pink, warm, well hydrated
 LE: w/out calf tenderness. No edema. 
 
 DATA
 Recent Results (from the past 24 hour(s)) 
 Basic Metabolic Panel 
  Collection Time: 19  4:29 
 Result Value Ref Range 
  Na 135 135 - 145 mmol/L 
  K 4.0 3.5 - 4.9 mmol/L 
  Cl 103 99 - 109 mmol/L 
  CO2 25 23 - 32 mmol/L 
  Anion Gap 11 5 - 20 mmol/L 
  Glucose 140 (H) 65 - 99 mg/dL 
  BUN 9 8 - 25 mg/dL 
  Creatinine 0.8 0.70 - 1.30 mg/dL 
  BUN/Creatinine Ratio 11  
 
  Calcium 8.7 8.5 - 10.5 mg/dL 
  Estimated GFR >60 >60 mL/min/1.73m2 
 CBC with Differential 
  Collection Time: 19  4:29 
 Result Value Ref Range 
  WBC 5.54 3.80 - 11.00 K/uL 
  RBC 3.72 (L) 4.20 - 5.70 M/uL 
  Hemoglobin 12.3 (L) 13.2 - 17.0 g/dL 
  Hematocrit 36.0 (L) 39.0 - 50.0 % 
  MCV 96.8 80.0 - 100.0 fl 
  MCH 33.1 27.0 - 34.0 pg 
  MCHC 34.2 32.0 - 35.5 g/dL 
  RDW-SD 46.8 37 - 53 fl 
  Platelet Count 140 (L) 150 - 400 K/uL 
  MPV 7.4 fl 
  Diff Type AUTOMATED  
  % Neutrophils 57.00 % 
  % Lymphocytes 36.25 % 
  Monocyte % 5.29 % 
  Eosinophils % 1.03 % 
  Basophils % 0.43 % 
  Neutrophils, Absolute 3.16 1.90 - 7.40 K/uL 
  Absolute Lymphocytes 2.01 1.00 - 3.90 K/uL 
  Absolute Monocytes 0.29 0.00 - 0.80 K/uL 
  Eosinophils, Absolute 0.06 0.00 - 0.50 K/uL 
  Basophils, Absolute 0.02 0.00 - 0.10 K/uL 
 
 PROBLEM LIST
 
 Principal Problem:
   Mesenteric ischemia, chronic 
 Active Problems:
   Hypertension
   Hyperlipidemia
 
 ASSESSMENT & PLAN
 
 S/p SMA endarterectomy for chronic mesenteric ischemia.  POD4- ileus resolving. Will begin 
clear liquids.  Continue ambulation.  Resume po BP meds for sBP climbing. Wait on po pain me
d.  
 
 Disposition:  [facility]
 Code Status:  Full Code
 
 Christopher Man MD
 2019                                           [phone] signed by Merry Man MD at 2019  9:52 AM Lizzy Navarro RN - 2019  5:57
 PM PDTPt transferred to 91, report received from LUZ MARIA Godfrey. Medicated for pain, see MAR
. Pt up walking this shift with FWW. Bowel tones hypoactive, but gurgling.
 
 Chart check complete.
 
 Lizzy Orantes RN
 Electronically signed by Lizzy Orantes RN at 2019  6:00 PM Billy Briceno PA- C - 2019  3:56 PM PDTFormatting of this note might be different from the original.
 [facility]
 Service: Vascular Surgery
 Progress Note
 
 Hospital Day:   LOS: 3 days 
 
 Post-Op Day:  3
 SUBJECTIVE
 
      Patient Summary:  S/p SMA endarterectomy for chronic mesenteric ischemia
 
      Events Overnight:  No events overnight. Abdominal binder in place. 
 
 Scheduled Medications 
   pantoprazole  40 mg Intravenous Daily 
 
 Continuous Infusions 
   dextrose 5% and sodium chloride 0.45% with KCl 20 mEq/L 125 mL/hr at 19 0819 
   niCARdipine Stopped (19 1628) 
 
 PRN Medications
 acetaminophen, bisacodyl, HYDROmorphone, insulin lispro, morphine, sodium chloride 0.9%
 
 OBJECTIVE
 Vital Signs:
 Vitals: 
  19 1456 
 BP: (!) 166/91 
 Pulse: 81 
 Resp: 16 
 Temp: 36.8 C (98.2 F) 
 
 Physical Exam
 Vitals:reviewed
 CONSTITUTIONAL:  Conversant, well developed, NAD
 EYES: Anicteric sclerae, no lid drag, no proptosis
 RESP: Normal effort, regular, even, unlabored rate
 CV: No peripheral edema, rate regular
 ABD:  Appropriate tenderness.  Hypoactive bowel tones
 SKIN: Pink, warm, dry without rash/lesion
 MS: ROM not limited, no digital cyanosis, normal gait
 NEURO: Conversant, A&O times 3
 PSYCH: appropriate affect, speech and tone, judgement and insight intact
 Vascular:  Palpable brachial pulses bilaterally. Abdominal binder in place. Staples intact.
 
 DATA
 Recent Results (from the past 24 hour(s)) 
 Basic Metabolic Panel 
  Collection Time: 19  4:23 
 Result Value Ref Range 
  Na 137 135 - 145 mmol/L 
  K 4.0 3.5 - 4.9 mmol/L 
  Cl 104 99 - 109 mmol/L 
  CO2 26 23 - 32 mmol/L 
  Anion Gap 11 5 - 20 mmol/L 
  Glucose 140 (H) 65 - 99 mg/dL 
  BUN 8 8 - 25 mg/dL 
  Creatinine 0.9 0.70 - 1.30 mg/dL 
  BUN/Creatinine Ratio 9  
  Calcium 8.4 (L) 8.5 - 10.5 mg/dL 
  Estimated GFR >60 >60 mL/min/1.73m2 
 CBC with Differential 
  Collection Time: 19  4:23 
 Result Value Ref Range 
  WBC 9.31 3.80 - 11.00 K/uL 
  RBC 3.81 (L) 4.20 - 5.70 M/uL 
 
  Hemoglobin 12.7 (L) 13.2 - 17.0 g/dL 
  Hematocrit 37.1 (L) 39.0 - 50.0 % 
  MCV 97.5 80.0 - 100.0 fl 
  MCH 33.5 27.0 - 34.0 pg 
  MCHC 34.3 32.0 - 35.5 g/dL 
  RDW-SD 46.8 37 - 53 fl 
  Platelet Count 162 150 - 400 K/uL 
  MPV 7.5 fl 
  Diff Type AUTOMATED  
  % Neutrophils 54.94 % 
  % Lymphocytes 39.44 % 
  Monocyte % 4.62 % 
  Eosinophils % 0.69 % 
  Basophils % 0.31 % 
  Neutrophils, Absolute 5.12 1.90 - 7.40 K/uL 
  Absolute Lymphocytes 3.67 1.00 - 3.90 K/uL 
  Absolute Monocytes 0.43 0.00 - 0.80 K/uL 
  Eosinophils, Absolute 0.06 0.00 - 0.50 K/uL 
  Basophils, Absolute 0.03 0.00 - 0.10 K/uL 
 
 PROBLEM LIST
 
 Principal Problem:
   Mesenteric ischemia, chronic 
 Active Problems:
   Hypertension
   Hyperlipidemia
 
 ASSESSMENT & PLAN
 
 S/p SMA endarterectomy for chronic mesenteric ischemia.  POD3.  Denies flatus still. Contin
ue strict NPO with swabs for comfort until return of bowel function.  Continue ambulation wi
th PT.  
 
 Disposition:  [facility]
 Code Status:  Full Code
 
 Billy Gutierres PA-C
 2019Electronically signed by Billy Gutierres PA-C at 2019  3:58 PM Bekah Carmona RN - 2019  2:51 PM PDTPt transferred to 9RP. Electronically signed by Hina Lord RN at 2019  2:52 PM Bekah Carmona RN - 2019  2:33 PM PD
[facility]eport called to [facility] on 9RP. Will transfer pt at this time. Electronically signed by Nino Lord RN at 2019  2:33 PM PDTMaye Guevara RN - 2019  7:31 PM PDT
Patient worked with PT this morning and has sat in chair the remainder of the shift. Pt c/o 
occasional pain, worse with ambulation, coughing, or laughing. Medicated with 1mg Dilaudid x
 3 with relief. Patient made several trips to bathroom with use of 2WW and CGA. Abdominal bi
nder in place per JAILYN Sierra. Pt denies passing flatus this shift, continues to be NPO.
 Maye Guevara RN
 Electronically signed by Maye Guevara RN at 2019  7:33 PM Sukhdeep Castro MD -   6:56 AM PDTFormatting of this note might be different from the original.
 [facility]
 Service: Vascular Surgery
 Progress Note
 
 Hospital Day:   LOS: 2 days 
 Post-Op Day:  2 Days Post-Op
 SUBJECTIVE
 
      Patient Summary:  S/p SMA endarterectomy for chronic mesenteric ischemia
 
 
      Events Overnight:  No events overnight.  Pain well controlled with pain meds.  Ambulat
ed yesterday with assistance.  
 
 Scheduled Medications 
   pantoprazole  40 mg Intravenous Daily 
 
 Continuous Infusions 
   dextrose 5% and sodium chloride 0.45% with KCl 20 mEq/L 125 mL/hr at 19 2228 
   niCARdipine Stopped (19 1628) 
 
 PRN Medications
 acetaminophen, bisacodyl, HYDROmorphone, insulin lispro, morphine, sodium chloride 0.9%
 
 OBJECTIVE
 Vital Signs:
 Vitals: 
  19 0421 
 BP: 149/80 
 Pulse: 63 
 Resp: 16 
 Temp: 37.2 C (99 F) 
 
 Physical Exam
 Vitals:reviewed
 CONSTITUTIONAL:  Conversant, well developed, NAD
 EYES: Anicteric sclerae, no lid drag, no proptosis
 RESP: Normal effort, regular, even, unlabored rate
 CV: No peripheral edema, rate regular
 ABD:  Appropriate tenderness.  Hypoactive bowel tones
 SKIN: Pink, warm, dry without rash/lesion
 MS: ROM not limited, no digital cyanosis, normal gait
 NEURO: Conversant, A&O times 3
 PSYCH: appropriate affect, speech and tone, judgement and insight intact
 Vascular:  Palpable brachial pulses bilaterally. 
 
 DATA
 Recent Results (from the past 24 hour(s)) 
 Lactic Acid 
  Collection Time: 19  9:09 
 Result Value Ref Range 
  Lactate, Serum 1.6 0.4 - 2.0 mmol/L 
 Basic Metabolic Panel 
  Collection Time: 19  4:06 
 Result Value Ref Range 
  Na 138 135 - 145 mmol/L 
  K 4.4 3.5 - 4.9 mmol/L 
  Cl 106 99 - 109 mmol/L 
  CO2 25 23 - 32 mmol/L 
  Anion Gap 11 5 - 20 mmol/L 
  Glucose 144 (H) 65 - 99 mg/dL 
  BUN 16 8 - 25 mg/dL 
  Creatinine 0.9 0.70 - 1.30 mg/dL 
  BUN/Creatinine Ratio 18  
  Calcium 8.6 8.5 - 10.5 mg/dL 
  Estimated GFR >60 >60 mL/min/1.73m2 
 CBC with Differential 
  Collection Time: 19  4:06 
 Result Value Ref Range 
  WBC 10.14 3.80 - 11.00 K/uL 
  RBC 3.79 (L) 4.20 - 5.70 M/uL 
 
  Hemoglobin 12.6 (L) 13.2 - 17.0 g/dL 
  Hematocrit 37.1 (L) 39.0 - 50.0 % 
  MCV 97.8 80.0 - 100.0 fl 
  MCH 33.3 27.0 - 34.0 pg 
  MCHC 34.1 32.0 - 35.5 g/dL 
  RDW-SD 47.3 37 - 53 fl 
  Platelet Count 149 (L) 150 - 400 K/uL 
  MPV 7.4 fl 
  Diff Type AUTOMATED  
  % Neutrophils 63.73 % 
  % Lymphocytes 31.45 % 
  Monocyte % 4.45 % 
  Eosinophils % 0.24 % 
  Basophils % 0.13 % 
  Neutrophils, Absolute 6.46 1.90 - 7.40 K/uL 
  Absolute Lymphocytes 3.19 1.00 - 3.90 K/uL 
  Absolute Monocytes 0.45 0.00 - 0.80 K/uL 
  Eosinophils, Absolute 0.02 0.00 - 0.50 K/uL 
  Basophils, Absolute 0.01 0.00 - 0.10 K/uL 
 
 PROBLEM LIST
 
 Principal Problem:
   Mesenteric ischemia, chronic 
 Active Problems:
   Hypertension
   Hyperlipidemia
 
 ASSESSMENT & PLAN
 
 S/p SMA endarterectomy for chronic mesenteric ischemia.  Doing well.  Denies flatus. Contin
ue strict NPO with swabs for comfort until return of bowel function.  Continue ambulation wi
th PT.  
 
 Disposition:  [facility]
 Code Status:  Full Code
 
 Sukhdeep Pardo MD
 2019Electronically signed by Sukhdeep Pardo MD at 2019  6:59 AM Sukhdeep Castro MD - 
2019  9:10 AM PDTFormatting of this note might be different from the original.
 [facility]
 Service: Vascular Surgery
 Progress Note
 
 Hospital Day:   LOS: 1 day 
 Post-Op Day:  1 Day Post-Op
 SUBJECTIVE
 
      Patient Summary:  S/p SMA endarterectomy for chronic mesenteric ischemia
 
      Events Overnight:  No events overnight.  Pain well controlled with pain meds.  
 
 Scheduled Medications 
 
 Continuous Infusions 
   dextrose 5% and sodium chloride 0.45% with KCl 20 mEq/L 125 mL/hr at 19 0724 
   niCARdipine Stopped (19 1628) 
 
 PRN Medications
 acetaminophen, aluminum & magnesium hydroxide-simethicone, bisacodyl, calcium carbonate, do
 
cusate sodium, HYDROmorphone, insulin lispro, morphine, sodium chloride 0.9%
 
 OBJECTIVE
 Vital Signs:
 Vitals: 
  19 0800 
 BP: 128/68 
 Pulse: 78 
 Resp: 12 
 Temp: 37 C (98.6 F) 
 
 Physical Exam
 Vitals:reviewed
 CONSTITUTIONAL:  Conversant, well developed, NAD
 EYES: Anicteric sclerae, no lid drag, no proptosis
 RESP: Normal effort, regular, even, unlabored rate
 CV: No peripheral edema, rate regular
 SKIN: Pink, warm, dry without rash/lesion
 ABD:  Soft, appropriate tenderness, hypoactive bowel tones
 MS: ROM not limited, no digital cyanosis
 NEURO: Conversant, A&O times 3
 PSYCH: appropriate affect, speech and tone, judgement and insight intact
 
 DATA
 Recent Results (from the past 24 hour(s)) 
 Type and Screen 
  Collection Time: 19 11:03 
 Result Value Ref Range 
  ABO Rh O POSITIVE  
  Antibody Screen NEGATIVE  
  BB BAND [id number]  
  BB BAND   
   [place] [facility] [place] [place] [address];[place] [address] 
 Basic Metabolic Panel 
  Collection Time: 19  4:40 
 Result Value Ref Range 
  Na 140 135 - 145 mmol/L 
  K 4.5 3.5 - 4.9 mmol/L 
  Cl 107 99 - 109 mmol/L 
  CO2 23 23 - 32 mmol/L 
  Anion Gap 15 5 - 20 mmol/L 
  Glucose 192 (H) 65 - 99 mg/dL 
  BUN 17 8 - 25 mg/dL 
  Creatinine 1.02 0.70 - 1.30 mg/dL 
  BUN/Creatinine Ratio 17  
  Calcium 8.6 8.5 - 10.5 mg/dL 
  Estimated GFR >60 >60 mL/min/1.73m2 
 CBC with Differential 
  Collection Time: 19  4:40 
 Result Value Ref Range 
  WBC 13.98 (H) 3.80 - 11.00 K/uL 
  RBC 4.07 (L) 4.20 - 5.70 M/uL 
  Hemoglobin 13.3 13.2 - 17.0 g/dL 
  Hematocrit 39.6 39.0 - 50.0 % 
  MCV 97.4 80.0 - 100.0 fl 
  MCH 32.8 27.0 - 34.0 pg 
  MCHC 33.7 32.0 - 35.5 g/dL 
  RDW-SD 47.3 37 - 53 fl 
  Platelet Count 185 150 - 400 K/uL 
  MPV 7.3 fl 
 
  Diff Type AUTOMATED  
  % Neutrophils 55.68 % 
  % Lymphocytes 39.54 % 
  Monocyte % 4.55 % 
  Eosinophils % 0.05 % 
  Basophils % 0.18 % 
  Neutrophils, Absolute 7.79 (H) 1.90 - 7.40 K/uL 
  Absolute Lymphocytes 5.53 (H) 1.00 - 3.90 K/uL 
  Absolute Monocytes 0.64 0.00 - 0.80 K/uL 
  Eosinophils, Absolute 0.01 0.00 - 0.50 K/uL 
  Basophils, Absolute 0.03 0.00 - 0.10 K/uL 
  RBC Morphology RBC AND PLT MORPHOLOGY APPEAR NORMAL  
  Platelet Estimate ADEQUATE  
  Comment SLIDE SCANNED, AGREES WITH AUTOMATED RESULTS.  
 ECG 12 lead 
  Collection Time: 19  6:38 
 Result Value Ref Range 
  INTERPRETATION TEXT Not Confirmed  
 
 PROBLEM LIST
 
 Principal Problem:
   Mesenteric ischemia, chronic 
 Active Problems:
   Hypertension
   Hyperlipidemia
 
 ASSESSMENT & PLAN
 
 S/p SMA endarterectomy for chronic mesenteric ischemia.  Doing well.  Transfer to cardiac u
nit today. Up out of bed with PT and ambulate with assist.  Keep NPO until return of bowel f
unction.  Discontinue yi catheter today.  
 
 Disposition:  [facility]
 Code Status:  Full Code
 
 Sukhdeep Pardo MD
 2019Electronically signed by Sukhdeep Pardo MD at 2019  9:15 AM PDTStdiane, Maye VELOZ RN
 - 2019  6:30 PM PDTPt received after OR procedure with Dr Pardo.  His main complaint wa
s of abdominal pain.  He was also hypertensive.  Goal BP <160.  Once pain was better control
led, he no longer met the criteria for the nicardipene gtt.  He will be NPO until he passes 
gas which is anticipated to be 3-4 days.
 
 No blood, restraints, protocols, or signed and held orders.
 
 Morphine given x1 per parameters with no effect.
 Dilaudid given x3 per parameters with moderate effect.
 
 End of shift review complete. Electronically signed by Maye Amin RN at 2019  6:32
 PM PDTdocumented in this encounter
 
 Plan of Treatment
 
 
+--------+-------------+------------------+----------------------+-------------+
| Date   | Type        | Specialty        | Care Team            | Description |
+--------+-------------+------------------+----------------------+-------------+
| / | Appointment | Radiology        |   Augustin Lopez DNP      |             |
|    |             |                  | [address]     |             |
|        |             |                  | [place]  |             |
 
|        |             |                  | [address] [phone]  |             |
|        |             |                  |  [phone] (Fax)  |             |
+--------+-------------+------------------+----------------------+-------------+
| / | Office      | Vascular Surgery |   Augustin Lopez DNP      |             |
2020   | Visit       |                  | [address]     |             |
|        |             |                  | [place]  |             |
|        |             |                  | [address] [phone]  |             |
|        |             |                  |  [phone] (Fax)  |             |
+--------+-------------+------------------+----------------------+-------------+
 documented as of this encounter
 
 Procedures
 
 
+---------------------+--------+-------------+----------------------+----------------------+
| Procedure Name      | Priori | Date/Time   | Associated Diagnosis | Comments             |
|                     | ty     |             |                      |                      |
+---------------------+--------+-------------+----------------------+----------------------+
| CBC WITH            | Routin | 2019  |                      |   Results for this   |
| DIFFERENTIAL        | e      |  4:17 AM    |                      | procedure are in the |
|                     |        | PDT         |                      |  results section.    |
+---------------------+--------+-------------+----------------------+----------------------+
| BASIC METABOLIC     | Routin | 2019  |                      |   Results for this   |
| PANEL               | e      |  4:17 AM    |                      | procedure are in the |
|                     |        | PDT         |                      |  results section.    |
+---------------------+--------+-------------+----------------------+----------------------+
| CBC WITH            | Routin | 2019  |                      |   Results for this   |
| DIFFERENTIAL        | e      |  4:29 AM    |                      | procedure are in the |
|                     |        | PDT         |                      |  results section.    |
+---------------------+--------+-------------+----------------------+----------------------+
| BASIC METABOLIC     | Routin | 2019  |                      |   Results for this   |
| PANEL               | e      |  4:29 AM    |                      | procedure are in the |
|                     |        | PDT         |                      |  results section.    |
+---------------------+--------+-------------+----------------------+----------------------+
| CBC WITH            | Routin | 2019  |                      |   Results for this   |
| DIFFERENTIAL        | e      |  4:23 AM    |                      | procedure are in the |
|                     |        | PDT         |                      |  results section.    |
+---------------------+--------+-------------+----------------------+----------------------+
| BASIC METABOLIC     | Routin | 2019  |                      |   Results for this   |
| PANEL               | e      |  4:23 AM    |                      | procedure are in the |
|                     |        | PDT         |                      |  results section.    |
+---------------------+--------+-------------+----------------------+----------------------+
| CBC WITH            | Routin | 2019  |                      |   Results for this   |
| DIFFERENTIAL        | e      |  4:06 AM    |                      | procedure are in the |
|                     |        | PDT         |                      |  results section.    |
+---------------------+--------+-------------+----------------------+----------------------+
| BASIC METABOLIC     | Routin | 2019  |                      |   Results for this   |
| PANEL               | e      |  4:06 AM    |                      | procedure are in the |
|                     |        | PDT         |                      |  results section.    |
+---------------------+--------+-------------+----------------------+----------------------+
| LACTIC ACID         | STAT   | 2019  |                      |   Results for this   |
|                     |        |  9:09 AM    |                      | procedure are in the |
|                     |        | PDT         |                      |  results section.    |
+---------------------+--------+-------------+----------------------+----------------------+
| ECG 12 LEAD         | Routin | 2019  |                      |   Results for this   |
|                     | e      |  5:37 AM    |                      | procedure are in the |
|                     |        | PDT         |                      |  results section.    |
+---------------------+--------+-------------+----------------------+----------------------+
| CBC WITH            | Routin | 2019  |                      |   Results for this   |
| DIFFERENTIAL        | e      |  4:40 AM    |                      | procedure are in the |
 
|                     |        | PDT         |                      |  results section.    |
+---------------------+--------+-------------+----------------------+----------------------+
| BASIC METABOLIC     | Routin | 2019  |                      |   Results for this   |
| PANEL               | e      |  4:40 AM    |                      | procedure are in the |
|                     |        | PDT         |                      |  results section.    |
+---------------------+--------+-------------+----------------------+----------------------+
| SURGICAL PATHOLOGY  | Routin | 2019  |   [facility]         |   Results for this   |
| EXAM                | e      |  1:50 PM    | ischemia, chronic    | procedure are in the |
|                     |        | PDT         | (HCC)                |  results section.    |
+---------------------+--------+-------------+----------------------+----------------------+
| REVASCULARIZATION   |        | 2019  |   [facility]         |                      |
| [facility]          |        | 11:12 AM    | ischemia, chronic    |                      |
|                     |        | PDT         | (HCC)                |                      |
+---------------------+--------+-------------+----------------------+----------------------+
| TYPE AND SCREEN     | STAT   | 2019  |                      |   Results for this   |
|                     |        | 11:03 AM    |                      | procedure are in the |
|                     |        | PDT         |                      |  results section.    |
+---------------------+--------+-------------+----------------------+----------------------+
 documented in this encounter
 
 Results
 CBC with Differential (2019  4:17 AM PDT)
 
+-------------+--------------------------+-----------------+------------+--------------+
| Component   | Value                    | Ref Range       | Performed  | Pathologist  |
|             |                          |                 | At         | Signature    |
+-------------+--------------------------+-----------------+------------+--------------+
| WBC         | 5.40                     | 3.80 - 11.00    | [facility]       |              |
|             |                          | K/uL            | LABORATORY |              |
+-------------+--------------------------+-----------------+------------+--------------+
| RBC         | 3.57 (L)                 | 4.20 - 5.70     | [facility]       |              |
|             |                          | M/uL            | LABORATORY |              |
+-------------+--------------------------+-----------------+------------+--------------+
| Hemoglobin  | 12.1 (L)                 | 13.2 - 17.0     | [facility]       |              |
|             |                          | g/dL            | LABORATORY |              |
+-------------+--------------------------+-----------------+------------+--------------+
| Hematocrit  | 34.5 (L)                 | 39.0 - 50.0 %   | [facility]       |              |
|             |                          |                 | LABORATORY |              |
+-------------+--------------------------+-----------------+------------+--------------+
| MCV         | 96.7                     | 80.0 - 100.0 fl | [facility]       |              |
|             |                          |                 | LABORATORY |              |
+-------------+--------------------------+-----------------+------------+--------------+
| MCH         | 33.8                     | 27.0 - 34.0 pg  | [facility]       |              |
|             |                          |                 | LABORATORY |              |
+-------------+--------------------------+-----------------+------------+--------------+
| MCHC        | 35.0                     | 32.0 - 35.5     | [facility]       |              |
|             |                          | g/dL            | LABORATORY |              |
+-------------+--------------------------+-----------------+------------+--------------+
| RDW-SD      | 45.5                     | 37 - 53 fl      | [facility]       |              |
|             |                          |                 | LABORATORY |              |
+-------------+--------------------------+-----------------+------------+--------------+
| Platelet    | 158                      | 150 - 400 K/uL  | [facility]       |              |
| Count       |                          |                 | LABORATORY |              |
+-------------+--------------------------+-----------------+------------+--------------+
| MPV         | 7.5                      | fl              | [facility]       |              |
|             |                          |                 | LABORATORY |              |
+-------------+--------------------------+-----------------+------------+--------------+
| Diff Type   | AUTOMATED                |                 | [facility]       |              |
|             |                          |                 | LABORATORY |              |
+-------------+--------------------------+-----------------+------------+--------------+
 
| %           | 55.42                    | %               | [facility]       |              |
| Neutrophils |                          |                 | LABORATORY |              |
+-------------+--------------------------+-----------------+------------+--------------+
| %           | 36.41                    | %               | [facility]       |              |
| Lymphocytes |                          |                 | LABORATORY |              |
+-------------+--------------------------+-----------------+------------+--------------+
| Monocyte %  | 6.61                     | %               | [facility]       |              |
|             |                          |                 | LABORATORY |              |
+-------------+--------------------------+-----------------+------------+--------------+
| Eosinophils | 1.23                     | %               | [facility]       |              |
|  %          |                          |                 | LABORATORY |              |
+-------------+--------------------------+-----------------+------------+--------------+
| Basophils % | 0.33                     | %               | [facility]       |              |
|             |                          |                 | LABORATORY |              |
+-------------+--------------------------+-----------------+------------+--------------+
| Neutrophils | 3.00                     | 1.90 - 7.40     | [facility]       |              |
| , Absolute  |                          | K/uL            | LABORATORY |              |
+-------------+--------------------------+-----------------+------------+--------------+
| Absolute    | 1.97                     | 1.00 - 3.90     | [facility]       |              |
| Lymphocytes |                          | K/uL            | LABORATORY |              |
+-------------+--------------------------+-----------------+------------+--------------+
| Absolute    | 0.36                     | 0.00 - 0.80     | [facility]       |              |
| Monocytes   |                          | K/uL            | LABORATORY |              |
+-------------+--------------------------+-----------------+------------+--------------+
| Eosinophils | 0.07                     | 0.00 - 0.50     | [facility]       |              |
| , Absolute  |                          | K/uL            | LABORATORY |              |
+-------------+--------------------------+-----------------+------------+--------------+
| Basophils,  | 0.02Comment: Testing     | 0.00 - 0.10     | [facility]       |              |
| Absolute    | performed at [facility], [address] | K/uL            | LABORATORY |              |
|             |  [address],        |                 |            |              |
|             | [place]  [id number]     |                 |            |              |
+-------------+--------------------------+-----------------+------------+--------------+
 
 
 
+----------+
| Specimen |
+----------+
| Blood    |
+----------+
 
 
 
+-------------------+------------------+--------------------+----------------+
| Performing        | Address          | City/State/Zipcode | Phone Number   |
| Organization      |                  |                    |                |
+-------------------+------------------+--------------------+----------------+
|   [facility] |   [address] | [place] [address] |   [phone] |
+-------------------+------------------+--------------------+----------------+
 Basic Metabolic Panel (2019  4:17 AM PDT)
 
+-------------+--------------------------+-----------------+------------+--------------+
| Component   | Value                    | Ref Range       | Performed  | Pathologist  |
|             |                          |                 | At         | Signature    |
+-------------+--------------------------+-----------------+------------+--------------+
| Na          | 135                      | 135 - 145       | [facility]       |              |
|             |                          | mmol/L          | LABORATORY |              |
+-------------+--------------------------+-----------------+------------+--------------+
| K           | 3.8                      | 3.5 - 4.9       | [facility]       |              |
|             |                          | mmol/L          | LABORATORY |              |
 
+-------------+--------------------------+-----------------+------------+--------------+
| Cl          | 102                      | 99 - 109 mmol/L | [facility]       |              |
|             |                          |                 | LABORATORY |              |
+-------------+--------------------------+-----------------+------------+--------------+
| CO2         | 26                       | 23 - 32 mmol/L  | [facility]       |              |
|             |                          |                 | LABORATORY |              |
+-------------+--------------------------+-----------------+------------+--------------+
| Anion Gap   | 11                       | 5 - 20 mmol/L   | [facility]       |              |
|             |                          |                 | LABORATORY |              |
+-------------+--------------------------+-----------------+------------+--------------+
| Glucose     | 140 (H)                  | 65 - 99 mg/dL   | [facility]       |              |
|             |                          |                 | LABORATORY |              |
+-------------+--------------------------+-----------------+------------+--------------+
| BUN         | 8                        | 8 - 25 mg/dL    | [facility]       |              |
|             |                          |                 | LABORATORY |              |
+-------------+--------------------------+-----------------+------------+--------------+
| Creatinine  | 0.9                      | 0.70 - 1.30     | [facility]       |              |
|             |                          | mg/dL           | LABORATORY |              |
+-------------+--------------------------+-----------------+------------+--------------+
| BUN/Creatin | 9                        |                 | [facility]       |              |
| ine Ratio   |                          |                 | LABORATORY |              |
+-------------+--------------------------+-----------------+------------+--------------+
| Calcium     | 8.5                      | 8.5 - 10.5      | [facility]       |              |
|             |                          | mg/dL           | LABORATORY |              |
+-------------+--------------------------+-----------------+------------+--------------+
| Estimated   | >60Comment: GFR <60:     | >60             | [facility]       |              |
| GFR         | CHRONIC KIDNEY DISEASE,  | mL/min/1.73m2   | LABORATORY |              |
|             | IF FOUND OVER A 3 MONTH  |                 |            |              |
|             | PERIOD.GFR <15: KIDNEY   |                 |            |              |
|             | FAILURE.FOR       |                 |            |              |
|             | [employer], MULTIPLY THE  |                 |            |              |
|             | CALCULATED GFR BY        |                 |            |              |
|             | 1.210.This eGFR is       |                 |            |              |
|             | calculated using the     |                 |            |              |
|             | MDRD IDMS traceable      |                 |            |              |
|             | equation.Testing         |                 |            |              |
|             | performed at [facility], [address] |                 |            |              |
|             |  [facility],        |                 |            |              |
|             | [place]   [address]    |                 |            |              |
+-------------+--------------------------+-----------------+------------+--------------+
 
 
 
+----------+
| Specimen |
+----------+
| Blood    |
+----------+
 
 
 
+-------------------+------------------+--------------------+----------------+
| Performing        | Address          | City/State/Zipcode | Phone Number   |
| Organization      |                  |                    |                |
+-------------------+------------------+--------------------+----------------+
|   [facility] |   [address] | [place] [address] |   [phone] |
+-------------------+------------------+--------------------+----------------+
 CBC with Differential (2019  4:29 AM PDT)
 
+-------------+--------------------------+-----------------+------------+--------------+
 
| Component   | Value                    | Ref Range       | Performed  | Pathologist  |
|             |                          |                 | At         | Signature    |
+-------------+--------------------------+-----------------+------------+--------------+
| WBC         | 5.54                     | 3.80 - 11.00    | [facility]       |              |
|             |                          | K/uL            | LABORATORY |              |
+-------------+--------------------------+-----------------+------------+--------------+
| RBC         | 3.72 (L)                 | 4.20 - 5.70     | [facility]       |              |
|             |                          | M/uL            | LABORATORY |              |
+-------------+--------------------------+-----------------+------------+--------------+
| Hemoglobin  | 12.3 (L)                 | 13.2 - 17.0     | [facility]       |              |
|             |                          | g/dL            | LABORATORY |              |
+-------------+--------------------------+-----------------+------------+--------------+
| Hematocrit  | 36.0 (L)                 | 39.0 - 50.0 %   | [facility]       |              |
|             |                          |                 | LABORATORY |              |
+-------------+--------------------------+-----------------+------------+--------------+
| MCV         | 96.8                     | 80.0 - 100.0 fl | [facility]       |              |
|             |                          |                 | LABORATORY |              |
+-------------+--------------------------+-----------------+------------+--------------+
| MCH         | 33.1                     | 27.0 - 34.0 pg  | [facility]       |              |
|             |                          |                 | LABORATORY |              |
+-------------+--------------------------+-----------------+------------+--------------+
| MCHC        | 34.2                     | 32.0 - 35.5     | [facility]       |              |
|             |                          | g/dL            | LABORATORY |              |
+-------------+--------------------------+-----------------+------------+--------------+
| RDW-SD      | 46.8                     | 37 - 53 fl      | [facility]       |              |
|             |                          |                 | LABORATORY |              |
+-------------+--------------------------+-----------------+------------+--------------+
| Platelet    | 140 (L)                  | 150 - 400 K/uL  | [facility]       |              |
| Count       |                          |                 | LABORATORY |              |
+-------------+--------------------------+-----------------+------------+--------------+
| MPV         | 7.4                      | fl              | [facility]       |              |
|             |                          |                 | LABORATORY |              |
+-------------+--------------------------+-----------------+------------+--------------+
| Diff Type   | AUTOMATED                |                 | [facility]       |              |
|             |                          |                 | LABORATORY |              |
+-------------+--------------------------+-----------------+------------+--------------+
| %           | 57.00                    | %               | [facility]       |              |
| Neutrophils |                          |                 | LABORATORY |              |
+-------------+--------------------------+-----------------+------------+--------------+
| %           | 36.25                    | %               | [facility]       |              |
| Lymphocytes |                          |                 | LABORATORY |              |
+-------------+--------------------------+-----------------+------------+--------------+
| Monocyte %  | 5.29                     | %               | [facility]       |              |
|             |                          |                 | LABORATORY |              |
+-------------+--------------------------+-----------------+------------+--------------+
| Eosinophils | 1.03                     | %               | [facility]       |              |
|  %          |                          |                 | LABORATORY |              |
+-------------+--------------------------+-----------------+------------+--------------+
| Basophils % | 0.43                     | %               | [facility]       |              |
|             |                          |                 | LABORATORY |              |
+-------------+--------------------------+-----------------+------------+--------------+
| Neutrophils | 3.16                     | 1.90 - 7.40     | [facility]       |              |
| , Absolute  |                          | K/uL            | LABORATORY |              |
+-------------+--------------------------+-----------------+------------+--------------+
| Absolute    | 2.01                     | 1.00 - 3.90     | [facility]       |              |
| Lymphocytes |                          | K/uL            | LABORATORY |              |
+-------------+--------------------------+-----------------+------------+--------------+
| Absolute    | 0.29                     | 0.00 - 0.80     | [facility]       |              |
| Monocytes   |                          | K/uL            | LABORATORY |              |
+-------------+--------------------------+-----------------+------------+--------------+
 
| Eosinophils | 0.06                     | 0.00 - 0.50     | [facility]       |              |
| , Absolute  |                          | K/uL            | LABORATORY |              |
+-------------+--------------------------+-----------------+------------+--------------+
| Basophils,  | 0.02Comment: Testing     | 0.00 - 0.10     | [facility]       |              |
| Absolute    | performed at [facility], [address] | K/uL            | LABORATORY |              |
|             |  [address],        |                 |            |              |
|             | [place]  [address]     |                 |            |              |
+-------------+--------------------------+-----------------+------------+--------------+
 
 
 
+----------+
| Specimen |
+----------+
| Blood    |
+----------+
 
 
 
+-------------------+------------------+--------------------+----------------+
| Performing        | Address          | City/State/Zipcode | Phone Number   |
| Organization      |                  |                    |                |
+-------------------+------------------+--------------------+----------------+
|   [facility] |   [address] | [place] [address] |   [phone] |
+-------------------+------------------+--------------------+----------------+
 Basic Metabolic Panel (2019  4:29 AM PDT)
 
+-------------+--------------------------+-----------------+------------+--------------+
| Component   | Value                    | Ref Range       | Performed  | Pathologist  |
|             |                          |                 | At         | Signature    |
+-------------+--------------------------+-----------------+------------+--------------+
| Na          | 135                      | 135 - 145       | [facility]       |              |
|             |                          | mmol/L          | LABORATORY |              |
+-------------+--------------------------+-----------------+------------+--------------+
| K           | 4.0                      | 3.5 - 4.9       | [facility]       |              |
|             |                          | mmol/L          | LABORATORY |              |
+-------------+--------------------------+-----------------+------------+--------------+
| Cl          | 103                      | 99 - 109 mmol/L | [facility]       |              |
|             |                          |                 | LABORATORY |              |
+-------------+--------------------------+-----------------+------------+--------------+
| CO2         | 25                       | 23 - 32 mmol/L  | [facility]       |              |
|             |                          |                 | LABORATORY |              |
+-------------+--------------------------+-----------------+------------+--------------+
| Anion Gap   | 11                       | 5 - 20 mmol/L   | [facility]       |              |
|             |                          |                 | LABORATORY |              |
+-------------+--------------------------+-----------------+------------+--------------+
| Glucose     | 140 (H)                  | 65 - 99 mg/dL   | [facility]       |              |
|             |                          |                 | LABORATORY |              |
+-------------+--------------------------+-----------------+------------+--------------+
| BUN         | 9                        | 8 - 25 mg/dL    | [facility]       |              |
|             |                          |                 | LABORATORY |              |
+-------------+--------------------------+-----------------+------------+--------------+
| Creatinine  | 0.8                      | 0.70 - 1.30     | [facility]       |              |
|             |                          | mg/dL           | LABORATORY |              |
+-------------+--------------------------+-----------------+------------+--------------+
| BUN/Creatin | 11                       |                 | [facility]       |              |
| ine Ratio   |                          |                 | LABORATORY |              |
+-------------+--------------------------+-----------------+------------+--------------+
| Calcium     | 8.7                      | 8.5 - 10.5      | [facility]       |              |
|             |                          | mg/dL           | LABORATORY |              |
 
+-------------+--------------------------+-----------------+------------+--------------+
| Estimated   | >60Comment: GFR <60:     | >60             | [facility]       |              |
| GFR         | CHRONIC KIDNEY DISEASE,  | mL/min/1.73m2   | LABORATORY |              |
|             | IF FOUND OVER A 3 MONTH  |                 |            |              |
|             | PERIOD.GFR <15: KIDNEY   |                 |            |              |
|             | FAILURE.FOR       |                 |            |              |
|             | AMERICANS, MULTIPLY THE  |                 |            |              |
|             | CALCULATED GFR BY        |                 |            |              |
|             | 1.210.This eGFR is       |                 |            |              |
|             | calculated using the     |                 |            |              |
|             | MDRD IDMS traceable      |                 |            |              |
|             | equation.Testing         |                 |            |              |
|             | performed at [facility], [address] |                 |            |              |
|             |  [facility],        |                 |            |              |
|             | [place]   [address]    |                 |            |              |
+-------------+--------------------------+-----------------+------------+--------------+
 
 
 
+----------+
| Specimen |
+----------+
| Blood    |
+----------+
 
 
 
+-------------------+------------------+--------------------+----------------+
| Performing        | Address          | City/State/Zipcode | Phone Number   |
| Organization      |                  |                    |                |
+-------------------+------------------+--------------------+----------------+
|   [facility] |   [address] | [place] [address] |   [phone] |
+-------------------+------------------+--------------------+----------------+
 CBC with Differential (2019  4:23 AM PDT)
 
+-------------+--------------------------+-----------------+------------+--------------+
| Component   | Value                    | Ref Range       | Performed  | Pathologist  |
|             |                          |                 | At         | Signature    |
+-------------+--------------------------+-----------------+------------+--------------+
| WBC         | 9.31                     | 3.80 - 11.00    | [facility]       |              |
|             |                          | K/uL            | LABORATORY |              |
+-------------+--------------------------+-----------------+------------+--------------+
| RBC         | 3.81 (L)                 | 4.20 - 5.70     | [facility]       |              |
|             |                          | M/uL            | LABORATORY |              |
+-------------+--------------------------+-----------------+------------+--------------+
| Hemoglobin  | 12.7 (L)                 | 13.2 - 17.0     | [facility]       |              |
|             |                          | g/dL            | LABORATORY |              |
+-------------+--------------------------+-----------------+------------+--------------+
| Hematocrit  | 37.1 (L)                 | 39.0 - 50.0 %   | [facility]       |              |
|             |                          |                 | LABORATORY |              |
+-------------+--------------------------+-----------------+------------+--------------+
| MCV         | 97.5                     | 80.0 - 100.0 fl | [facility]       |              |
|             |                          |                 | LABORATORY |              |
+-------------+--------------------------+-----------------+------------+--------------+
| MCH         | 33.5                     | 27.0 - 34.0 pg  | [facility]       |              |
|             |                          |                 | LABORATORY |              |
+-------------+--------------------------+-----------------+------------+--------------+
| MCHC        | 34.3                     | 32.0 - 35.5     | [facility]       |              |
|             |                          | g/dL            | LABORATORY |              |
+-------------+--------------------------+-----------------+------------+--------------+
 
| RDW-SD      | 46.8                     | 37 - 53 fl      | [facility]       |              |
|             |                          |                 | LABORATORY |              |
+-------------+--------------------------+-----------------+------------+--------------+
| Platelet    | 162                      | 150 - 400 K/uL  | [facility]       |              |
| Count       |                          |                 | LABORATORY |              |
+-------------+--------------------------+-----------------+------------+--------------+
| MPV         | 7.5                      | fl              | [facility]       |              |
|             |                          |                 | LABORATORY |              |
+-------------+--------------------------+-----------------+------------+--------------+
| Diff Type   | AUTOMATED                |                 | [facility]       |              |
|             |                          |                 | LABORATORY |              |
+-------------+--------------------------+-----------------+------------+--------------+
| %           | 54.94                    | %               | [facility]       |              |
| Neutrophils |                          |                 | LABORATORY |              |
+-------------+--------------------------+-----------------+------------+--------------+
| %           | 39.44                    | %               | [facility]       |              |
| Lymphocytes |                          |                 | LABORATORY |              |
+-------------+--------------------------+-----------------+------------+--------------+
| Monocyte %  | 4.62                     | %               | [facility]       |              |
|             |                          |                 | LABORATORY |              |
+-------------+--------------------------+-----------------+------------+--------------+
| Eosinophils | 0.69                     | %               | [facility]       |              |
|  %          |                          |                 | LABORATORY |              |
+-------------+--------------------------+-----------------+------------+--------------+
| Basophils % | 0.31                     | %               | [facility]       |              |
|             |                          |                 | LABORATORY |              |
+-------------+--------------------------+-----------------+------------+--------------+
| Neutrophils | 5.12                     | 1.90 - 7.40     | [facility]       |              |
| , Absolute  |                          | K/uL            | LABORATORY |              |
+-------------+--------------------------+-----------------+------------+--------------+
| Absolute    | 3.67                     | 1.00 - 3.90     | [facility]       |              |
| Lymphocytes |                          | K/uL            | LABORATORY |              |
+-------------+--------------------------+-----------------+------------+--------------+
| Absolute    | 0.43                     | 0.00 - 0.80     | [facility]       |              |
| Monocytes   |                          | K/uL            | LABORATORY |              |
+-------------+--------------------------+-----------------+------------+--------------+
| Eosinophils | 0.06                     | 0.00 - 0.50     | [facility]       |              |
| , Absolute  |                          | K/uL            | LABORATORY |              |
+-------------+--------------------------+-----------------+------------+--------------+
| Basophils,  | 0.03Comment: Testing     | 0.00 - 0.10     | [facility]       |              |
| Absolute    | performed at [facility], [address] | K/uL            | LABORATORY |              |
|             |  [address],        |                 |            |              |
|             | [place]  [address]     |                 |            |              |
+-------------+--------------------------+-----------------+------------+--------------+
 
 
 
+----------+
| Specimen |
+----------+
| Blood    |
+----------+
 
 
 
+-------------------+------------------+--------------------+----------------+
| Performing        | Address          | City/State/Zipcode | Phone Number   |
| Organization      |                  |                    |                |
+-------------------+------------------+--------------------+----------------+
|   [facility] |   [address] | [place] [address] |   [phone] |
 
+-------------------+------------------+--------------------+----------------+
 Basic Metabolic Panel (2019  4:23 AM PDT)
 
+-------------+--------------------------+-----------------+------------+--------------+
| Component   | Value                    | Ref Range       | Performed  | Pathologist  |
|             |                          |                 | At         | Signature    |
+-------------+--------------------------+-----------------+------------+--------------+
| Na          | 137                      | 135 - 145       | [facility]       |              |
|             |                          | mmol/L          | LABORATORY |              |
+-------------+--------------------------+-----------------+------------+--------------+
| K           | 4.0                      | 3.5 - 4.9       | [facility]       |              |
|             |                          | mmol/L          | LABORATORY |              |
+-------------+--------------------------+-----------------+------------+--------------+
| Cl          | 104                      | 99 - 109 mmol/L | [facility]       |              |
|             |                          |                 | LABORATORY |              |
+-------------+--------------------------+-----------------+------------+--------------+
| CO2         | 26                       | 23 - 32 mmol/L  | [facility]       |              |
|             |                          |                 | LABORATORY |              |
+-------------+--------------------------+-----------------+------------+--------------+
| Anion Gap   | 11                       | 5 - 20 mmol/L   | [facility]       |              |
|             |                          |                 | LABORATORY |              |
+-------------+--------------------------+-----------------+------------+--------------+
| Glucose     | 140 (H)                  | 65 - 99 mg/dL   | [facility]       |              |
|             |                          |                 | LABORATORY |              |
+-------------+--------------------------+-----------------+------------+--------------+
| BUN         | 8                        | 8 - 25 mg/dL    | [facility]       |              |
|             |                          |                 | LABORATORY |              |
+-------------+--------------------------+-----------------+------------+--------------+
| Creatinine  | 0.9                      | 0.70 - 1.30     | [facility]       |              |
|             |                          | mg/dL           | LABORATORY |              |
+-------------+--------------------------+-----------------+------------+--------------+
| BUN/Creatin | 9                        |                 | [facility]       |              |
| ine Ratio   |                          |                 | LABORATORY |              |
+-------------+--------------------------+-----------------+------------+--------------+
| Calcium     | 8.4 (L)                  | 8.5 - 10.5      | [facility]       |              |
|             |                          | mg/dL           | LABORATORY |              |
+-------------+--------------------------+-----------------+------------+--------------+
| Estimated   | >60Comment: GFR <60:     | >60             | [facility]       |              |
| GFR         | CHRONIC KIDNEY DISEASE,  | mL/min/1.73m2   | LABORATORY |              |
|             | IF FOUND OVER A 3 MONTH  |                 |            |              |
|             | PERIOD.GFR <15: KIDNEY   |                 |            |              |
|             | FAILURE.FOR       |                 |            |              |
|             | AMERICANS, MULTIPLY THE  |                 |            |              |
|             | CALCULATED GFR BY        |                 |            |              |
|             | 1.210.This eGFR is       |                 |            |              |
|             | calculated using the     |                 |            |              |
|             | MDRD [facility] traceable      |                 |            |              |
|             | equation.Testing         |                 |            |              |
|             | performed at [facility], [address] |                 |            |              |
|             |  [facility],        |                 |            |              |
|             | [place]   [address]    |                 |            |              |
+-------------+--------------------------+-----------------+------------+--------------+
 
 
 
+----------+
| Specimen |
+----------+
| Blood    |
+----------+
 
 
 
 
+-------------------+------------------+--------------------+----------------+
| Performing        | Address          | City/State/Zipcode | Phone Number   |
| Organization      |                  |                    |                |
+-------------------+------------------+--------------------+----------------+
|   [facility] |   [address] | [place] [address] |   [phone] |
+-------------------+------------------+--------------------+----------------+
 CBC with Differential (2019  4:06 AM PDT)
 
+-------------+--------------------------+-----------------+------------+--------------+
| Component   | Value                    | Ref Range       | Performed  | Pathologist  |
|             |                          |                 | At         | Signature    |
+-------------+--------------------------+-----------------+------------+--------------+
| WBC         | 10.14                    | 3.80 - 11.00    | [facility]       |              |
|             |                          | K/uL            | LABORATORY |              |
+-------------+--------------------------+-----------------+------------+--------------+
| RBC         | 3.79 (L)                 | 4.20 - 5.70     | [facility]       |              |
|             |                          | M/uL            | LABORATORY |              |
+-------------+--------------------------+-----------------+------------+--------------+
| Hemoglobin  | 12.6 (L)                 | 13.2 - 17.0     | [facility]       |              |
|             |                          | g/dL            | LABORATORY |              |
+-------------+--------------------------+-----------------+------------+--------------+
| Hematocrit  | 37.1 (L)                 | 39.0 - 50.0 %   | [facility]       |              |
|             |                          |                 | LABORATORY |              |
+-------------+--------------------------+-----------------+------------+--------------+
| MCV         | 97.8                     | 80.0 - 100.0 fl | [facility]       |              |
|             |                          |                 | LABORATORY |              |
+-------------+--------------------------+-----------------+------------+--------------+
| MCH         | 33.3                     | 27.0 - 34.0 pg  | [facility]       |              |
|             |                          |                 | LABORATORY |              |
+-------------+--------------------------+-----------------+------------+--------------+
| MCHC        | 34.1                     | 32.0 - 35.5     | [facility]       |              |
|             |                          | g/dL            | LABORATORY |              |
+-------------+--------------------------+-----------------+------------+--------------+
| RDW-SD      | 47.3                     | 37 - 53 fl      | [facility]       |              |
|             |                          |                 | LABORATORY |              |
+-------------+--------------------------+-----------------+------------+--------------+
| Platelet    | 149 (L)                  | 150 - 400 K/uL  | [facility]       |              |
| Count       |                          |                 | LABORATORY |              |
+-------------+--------------------------+-----------------+------------+--------------+
| MPV         | 7.4                      | fl              | [facility]       |              |
|             |                          |                 | LABORATORY |              |
+-------------+--------------------------+-----------------+------------+--------------+
| Diff Type   | AUTOMATED                |                 | [facility]       |              |
|             |                          |                 | LABORATORY |              |
+-------------+--------------------------+-----------------+------------+--------------+
| %           | 63.73                    | %               | [facility]       |              |
| Neutrophils |                          |                 | LABORATORY |              |
+-------------+--------------------------+-----------------+------------+--------------+
| %           | 31.45                    | %               | [facility]       |              |
| Lymphocytes |                          |                 | LABORATORY |              |
+-------------+--------------------------+-----------------+------------+--------------+
| Monocyte %  | 4.45                     | %               | [facility]       |              |
|             |                          |                 | LABORATORY |              |
+-------------+--------------------------+-----------------+------------+--------------+
| Eosinophils | 0.24                     | %               | [facility]       |              |
|  %          |                          |                 | LABORATORY |              |
+-------------+--------------------------+-----------------+------------+--------------+
 
| Basophils % | 0.13                     | %               | [facility]       |              |
|             |                          |                 | LABORATORY |              |
+-------------+--------------------------+-----------------+------------+--------------+
| Neutrophils | 6.46                     | 1.90 - 7.40     | [facility]       |              |
| , Absolute  |                          | K/uL            | LABORATORY |              |
+-------------+--------------------------+-----------------+------------+--------------+
| Absolute    | 3.19                     | 1.00 - 3.90     | [facility]       |              |
| Lymphocytes |                          | K/uL            | LABORATORY |              |
+-------------+--------------------------+-----------------+------------+--------------+
| Absolute    | 0.45                     | 0.00 - 0.80     | [facility]       |              |
| Monocytes   |                          | K/uL            | LABORATORY |              |
+-------------+--------------------------+-----------------+------------+--------------+
| Eosinophils | 0.02                     | 0.00 - 0.50     | [facility]       |              |
| , Absolute  |                          | K/uL            | LABORATORY |              |
+-------------+--------------------------+-----------------+------------+--------------+
| Basophils,  | 0.01Comment: Testing     | 0.00 - 0.10     | [facility]       |              |
| Absolute    | performed at [facility], [address] | K/uL            | LABORATORY |              |
|             |  [place] [address],        |                 |            |              |
|             | [place]  [address]     |                 |            |              |
+-------------+--------------------------+-----------------+------------+--------------+
 
 
 
+----------+
| Specimen |
+----------+
| Blood    |
+----------+
 
 
 
+-------------------+------------------+--------------------+----------------+
| Performing        | Address          | City/State/Zipcode | Phone Number   |
| Organization      |                  |                    |                |
+-------------------+------------------+--------------------+----------------+
|   [facility] |   [address] | [place] [address] |   [phone] |
+-------------------+------------------+--------------------+----------------+
 Basic Metabolic Panel (2019  4:06 AM PDT)
 
+-------------+--------------------------+-----------------+------------+--------------+
| Component   | Value                    | Ref Range       | Performed  | Pathologist  |
|             |                          |                 | At         | Signature    |
+-------------+--------------------------+-----------------+------------+--------------+
| Na          | 138                      | 135 - 145       | [facility]       |              |
|             |                          | mmol/L          | LABORATORY |              |
+-------------+--------------------------+-----------------+------------+--------------+
| K           | 4.4                      | 3.5 - 4.9       | [facility]       |              |
|             |                          | mmol/L          | LABORATORY |              |
+-------------+--------------------------+-----------------+------------+--------------+
| Cl          | 106                      | 99 - 109 mmol/L | [facility]       |              |
|             |                          |                 | LABORATORY |              |
+-------------+--------------------------+-----------------+------------+--------------+
| CO2         | 25                       | 23 - 32 mmol/L  | [facility]       |              |
|             |                          |                 | LABORATORY |              |
+-------------+--------------------------+-----------------+------------+--------------+
| Anion Gap   | 11                       | 5 - 20 mmol/L   | [facility]       |              |
|             |                          |                 | LABORATORY |              |
+-------------+--------------------------+-----------------+------------+--------------+
| Glucose     | 144 (H)                  | 65 - 99 mg/dL   | [facility]       |              |
|             |                          |                 | LABORATORY |              |
 
+-------------+--------------------------+-----------------+------------+--------------+
| BUN         | 16                       | 8 - 25 mg/dL    | [facility]       |              |
|             |                          |                 | LABORATORY |              |
+-------------+--------------------------+-----------------+------------+--------------+
| Creatinine  | 0.9                      | 0.70 - 1.30     | [facility]       |              |
|             |                          | mg/dL           | LABORATORY |              |
+-------------+--------------------------+-----------------+------------+--------------+
| BUN/Creatin | 18                       |                 | [facility]       |              |
| ine Ratio   |                          |                 | LABORATORY |              |
+-------------+--------------------------+-----------------+------------+--------------+
| Calcium     | 8.6                      | 8.5 - 10.5      | [facility]       |              |
|             |                          | mg/dL           | LABORATORY |              |
+-------------+--------------------------+-----------------+------------+--------------+
| Estimated   | >60Comment: GFR <60:     | >60             | [facility]       |              |
| GFR         | CHRONIC KIDNEY DISEASE,  | mL/min/1.73m2   | LABORATORY |              |
|             | IF FOUND OVER A 3 MONTH  |                 |            |              |
|             | PERIOD.GFR <15: KIDNEY   |                 |            |              |
|             | FAILURE.FOR       |                 |            |              |
|             | [employer]IC[employer], MULTIPLY THE  |                 |            |              |
|             | CALCULATED GFR BY        |                 |            |              |
|             | 1.210.This eGFR is       |                 |            |              |
|             | calculated using the     |                 |            |              |
|             | MDRD IDMS traceable      |                 |            |              |
|             | equation.Testing         |                 |            |              |
|             | performed at [facility], [address] |                 |            |              |
|             |  [address],        |                 |            |              |
|             | [place]   [address]    |                 |            |              |
+-------------+--------------------------+-----------------+------------+--------------+
 
 
 
+----------+
| Specimen |
+----------+
| Blood    |
+----------+
 
 
 
+-------------------+------------------+--------------------+----------------+
| Performing        | Address          | City/State/Zipcode | Phone Number   |
| Organization      |                  |                    |                |
+-------------------+------------------+--------------------+----------------+
|   [facility] |   [address] | [place] [address] |   [phone] |
+-------------------+------------------+--------------------+----------------+
 Lactic Acid (2019  9:09 AM PDT)
 
+-----------+-------------------------+------------+------------+--------------+
| Component | Value                   | Ref Range  | Performed  | Pathologist  |
|           |                         |            | At         | Signature    |
+-----------+-------------------------+------------+------------+--------------+
| Lactate,  | 1.6Comment: Testing     | 0.4 - 2.0  | [facility]       |              |
| Serum     | performed [place] [facility] [place]    | mmol/L     | LABORATORY |              |
|           | [address];[place], [place]  |            |            |              |
|           | [address]                   |            |            |              |
+-----------+-------------------------+------------+------------+--------------+
 
 
 
+----------+
 
| Specimen |
+----------+
| Blood    |
+----------+
 
 
 
+-------------------+------------------+--------------------+----------------+
| Performing        | Address          | City/State/Zipcode | Phone Number   |
| Organization      |                  |                    |                |
+-------------------+------------------+--------------------+----------------+
|   [facility] |   [address] | [place] [address] |   [phone] |
+-------------------+------------------+--------------------+----------------+
 ECG 12 lead (2019  5:37 AM PDT)
 
+-------------+--------------------------+-----------+------------+--------------+
| Component   | Value                    | Ref Range | Performed  | Pathologist  |
|             |                          |           | At         | Signature    |
+-------------+--------------------------+-----------+------------+--------------+
| VENTRICULAR | 74                       | BPM       | [facility]  |              |
|  RATE EKG   |                          |           |            |              |
+-------------+--------------------------+-----------+------------+--------------+
| ATRIAL RATE | 74                       | BPM       | [facility]  |              |
+-------------+--------------------------+-----------+------------+--------------+
| P-R         | 214                      | ms        | [facility]  |              |
| INTERVAL    |                          |           |            |              |
+-------------+--------------------------+-----------+------------+--------------+
| QRS         | 90                       | ms        | [facility]  |              |
| DURATION    |                          |           |            |              |
+-------------+--------------------------+-----------+------------+--------------+
| Q-T         | 412                      | ms        | [facility]  |              |
| INTERVAL    |                          |           |            |              |
+-------------+--------------------------+-----------+------------+--------------+
| Q-T         | 458                      | ms        | [facility]  |              |
| INTERVAL    |                          |           |            |              |
| (CORRECTED) |                          |           |            |              |
+-------------+--------------------------+-----------+------------+--------------+
| P WAVE AXIS | 38                       | degrees   | [facility]  |              |
+-------------+--------------------------+-----------+------------+--------------+
| QRS AXIS    | -8                       | degrees   | [facility]  |              |
+-------------+--------------------------+-----------+------------+--------------+
| T AXIS      | 5                        | degrees   | [facility]  |              |
+-------------+--------------------------+-----------+------------+--------------+
| INTERPRETAT | Sinus rhythm with 1st    |           | [facility]  |              |
| ION TEXT    | degree A-V blockAbnormal |           |            |              |
|             |  ECGWhen compared with   |           |            |              |
|             | ECG of 17-SEP-2019       |           |            |              |
|             | 09:12,No significant     |           |            |              |
|             | change was               |           |            |              |
|             | foundConfirmed by        |           |            |              |
|             | Geovanni Bermudez MD     |           |            |              |
|             | (127) on 2019       |           |            |              |
|             | 5:46:12 PM               |           |            |              |
+-------------+--------------------------+-----------+------------+--------------+
 
 
 
+----------+
| Specimen |
+----------+
 
|          |
+----------+
 
 
 
+----------------------------------------------------------+--------------+
| Narrative                                                | Performed At |
+----------------------------------------------------------+--------------+
|   This result has an attachment that is not available.   |              |
+----------------------------------------------------------+--------------+
 
 
 
+--------------+---------+--------------------+--------------+
| Performing   | Address | City/State/Zipcode | Phone Number |
| Organization |         |                    |              |
+--------------+---------+--------------------+--------------+
|   [facility]  |         |                    |              |
+--------------+---------+--------------------+--------------+
 CBC with Differential (2019  4:40 AM PDT)
 
+-------------+--------------------------+-----------------+------------+--------------+
| Component   | Value                    | Ref Range       | Performed  | Pathologist  |
|             |                          |                 | At         | Signature    |
+-------------+--------------------------+-----------------+------------+--------------+
| WBC         | 13.98 (H)                | 3.80 - 11.00    | [facility]       |              |
|             |                          | K/uL            | LABORATORY |              |
+-------------+--------------------------+-----------------+------------+--------------+
| RBC         | 4.07 (L)                 | 4.20 - 5.70     | [facility]       |              |
|             |                          | M/uL            | LABORATORY |              |
+-------------+--------------------------+-----------------+------------+--------------+
| Hemoglobin  | 13.3                     | 13.2 - 17.0     | [facility]       |              |
|             |                          | g/dL            | LABORATORY |              |
+-------------+--------------------------+-----------------+------------+--------------+
| Hematocrit  | 39.6                     | 39.0 - 50.0 %   | [facility]       |              |
|             |                          |                 | LABORATORY |              |
+-------------+--------------------------+-----------------+------------+--------------+
| MCV         | 97.4                     | 80.0 - 100.0 fl | [facility]       |              |
|             |                          |                 | LABORATORY |              |
+-------------+--------------------------+-----------------+------------+--------------+
| MCH         | 32.8                     | 27.0 - 34.0 pg  | [facility]       |              |
|             |                          |                 | LABORATORY |              |
+-------------+--------------------------+-----------------+------------+--------------+
| MCHC        | 33.7                     | 32.0 - 35.5     | [facility]       |              |
|             |                          | g/dL            | LABORATORY |              |
+-------------+--------------------------+-----------------+------------+--------------+
| RDW-SD      | 47.3                     | 37 - 53 fl      | [facility]       |              |
|             |                          |                 | LABORATORY |              |
+-------------+--------------------------+-----------------+------------+--------------+
| Platelet    | 185                      | 150 - 400 K/uL  | [facility]       |              |
| Count       |                          |                 | LABORATORY |              |
+-------------+--------------------------+-----------------+------------+--------------+
| MPV         | 7.3                      | fl              | [facility]       |              |
|             |                          |                 | LABORATORY |              |
+-------------+--------------------------+-----------------+------------+--------------+
| Diff Type   | AUTOMATED                |                 | [facility]       |              |
|             |                          |                 | LABORATORY |              |
+-------------+--------------------------+-----------------+------------+--------------+
| %           | 55.68                    | %               | [facility]       |              |
| Neutrophils |                          |                 | LABORATORY |              |
 
+-------------+--------------------------+-----------------+------------+--------------+
| %           | 39.54                    | %               | [facility]       |              |
| Lymphocytes |                          |                 | LABORATORY |              |
+-------------+--------------------------+-----------------+------------+--------------+
| Monocyte %  | 4.55                     | %               | [facility]       |              |
|             |                          |                 | LABORATORY |              |
+-------------+--------------------------+-----------------+------------+--------------+
| Eosinophils | 0.05                     | %               | [facility]       |              |
|  %          |                          |                 | LABORATORY |              |
+-------------+--------------------------+-----------------+------------+--------------+
| Basophils % | 0.18                     | %               | [facility]       |              |
|             |                          |                 | LABORATORY |              |
+-------------+--------------------------+-----------------+------------+--------------+
| Neutrophils | 7.79 (H)                 | 1.90 - 7.40     | [facility]       |              |
| , Absolute  |                          | K/uL            | LABORATORY |              |
+-------------+--------------------------+-----------------+------------+--------------+
| Absolute    | 5.53 (H)                 | 1.00 - 3.90     | [facility]       |              |
| Lymphocytes |                          | K/uL            | LABORATORY |              |
+-------------+--------------------------+-----------------+------------+--------------+
| Absolute    | 0.64                     | 0.00 - 0.80     | [facility]       |              |
| Monocytes   |                          | K/uL            | LABORATORY |              |
+-------------+--------------------------+-----------------+------------+--------------+
| Eosinophils | 0.01                     | 0.00 - 0.50     | [facility]       |              |
| , Absolute  |                          | K/uL            | LABORATORY |              |
+-------------+--------------------------+-----------------+------------+--------------+
| Basophils,  | 0.03                     | 0.00 - 0.10     | [facility]       |              |
| Absolute    |                          | K/uL            | LABORATORY |              |
+-------------+--------------------------+-----------------+------------+--------------+
| RBC         | RBC AND PLT MORPHOLOGY   |                 | [facility]       |              |
| Morphology  | APPEAR NORMAL            |                 | LABORATORY |              |
+-------------+--------------------------+-----------------+------------+--------------+
| Platelet    | ADEQUATE                 |                 | [facility]       |              |
| Estimate    |                          |                 | LABORATORY |              |
+-------------+--------------------------+-----------------+------------+--------------+
| Comment     | SLIDE SCANNED, AGREES    |                 | [facility]       |              |
|             | WITH AUTOMATED           |                 | LABORATORY |              |
|             | RESULTS.Comment: Testing |                 |            |              |
|             |  performed [place] [facility] [place]    |                 |            |              |
|             | [facility];[place], [place]   |                 |            |              |
|             | [id number]                    |                 |            |              |
+-------------+--------------------------+-----------------+------------+--------------+
 
 
 
+----------+
| Specimen |
+----------+
| Blood    |
+----------+
 
 
 
+-------------------+------------------+--------------------+----------------+
| Performing        | Address          | City/State/Zipcode | Phone Number   |
| Organization      |                  |                    |                |
+-------------------+------------------+--------------------+----------------+
|   [facility] |   [address] | [place] [address] |   [phone] |
+-------------------+------------------+--------------------+----------------+
 Basic Metabolic Panel (2019  4:40 AM PDT)
 
 
+-------------+--------------------------+-----------------+------------+--------------+
| Component   | Value                    | Ref Range       | Performed  | Pathologist  |
|             |                          |                 | At         | Signature    |
+-------------+--------------------------+-----------------+------------+--------------+
| Na          | 140                      | 135 - 145       | [facility]       |              |
|             |                          | mmol/L          | LABORATORY |              |
+-------------+--------------------------+-----------------+------------+--------------+
| K           | 4.5                      | 3.5 - 4.9       | [facility]       |              |
|             |                          | mmol/L          | LABORATORY |              |
+-------------+--------------------------+-----------------+------------+--------------+
| Cl          | 107                      | 99 - 109 mmol/L | [facility]       |              |
|             |                          |                 | LABORATORY |              |
+-------------+--------------------------+-----------------+------------+--------------+
| CO2         | 23                       | 23 - 32 mmol/L  | [facility]       |              |
|             |                          |                 | LABORATORY |              |
+-------------+--------------------------+-----------------+------------+--------------+
| Anion Gap   | 15                       | 5 - 20 mmol/L   | [facility]       |              |
|             |                          |                 | LABORATORY |              |
+-------------+--------------------------+-----------------+------------+--------------+
| Glucose     | 192 (H)                  | 65 - 99 mg/dL   | [facility]       |              |
|             |                          |                 | LABORATORY |              |
+-------------+--------------------------+-----------------+------------+--------------+
| BUN         | 17                       | 8 - 25 mg/dL    | [facility]       |              |
|             |                          |                 | LABORATORY |              |
+-------------+--------------------------+-----------------+------------+--------------+
| Creatinine  | 1.02                     | 0.70 - 1.30     | [facility]       |              |
|             |                          | mg/dL           | LABORATORY |              |
+-------------+--------------------------+-----------------+------------+--------------+
| BUN/Creatin | 17                       |                 | [facility]       |              |
| ine Ratio   |                          |                 | LABORATORY |              |
+-------------+--------------------------+-----------------+------------+--------------+
| Calcium     | 8.6                      | 8.5 - 10.5      | [facility]       |              |
|             |                          | mg/dL           | LABORATORY |              |
+-------------+--------------------------+-----------------+------------+--------------+
| Estimated   | >60Comment: GFR <60:     | >60             | [facility]       |              |
| GFR         | CHRONIC KIDNEY DISEASE,  | mL/min/1.73m2   | LABORATORY |              |
|             | IF FOUND OVER A 3 MONTH  |                 |            |              |
|             | PERIOD.GFR <15: KIDNEY   |                 |            |              |
|             | FAILURE.FOR       |                 |            |              |
|             | [employer], MULTIPLY THE  |                 |            |              |
|             | CALCULATED GFR BY        |                 |            |              |
|             | 1.210.This eGFR is       |                 |            |              |
|             | calculated using the     |                 |            |              |
|             | MDRD [facility] traceable      |                 |            |              |
|             | equation.Testing         |                 |            |              |
|             | performed [place] [facility] [place]     |                 |            |              |
|             | [facility];[place], [place]   |                 |            |              |
|             | [id number]                    |                 |            |              |
+-------------+--------------------------+-----------------+------------+--------------+
 
 
 
+----------+
| Specimen |
+----------+
| Blood    |
+----------+
 
 
 
 
+-------------------+------------------+--------------------+----------------+
| Performing        | Address          | City/State/Zipcode | Phone Number   |
| Organization      |                  |                    |                |
+-------------------+------------------+--------------------+----------------+
|   [facility] |   [address] | [place] [address] |   [phone] |
+-------------------+------------------+--------------------+----------------+
 Surgical Pathology Exam (2019  1:50 PM PDT)
 
+--------------------+
| Specimen           |
+--------------------+
| Tissue - Arterial  |
| part (body         |
| structure)         |
+--------------------+
 
 
 
+-------------------------------------------------------------------------------------------
--------------------------------------------------------------------------------------------
----------------+-----------------+
| Narrative                                                                                 
                                                                                            
                | Performed At    |
+-------------------------------------------------------------------------------------------
--------------------------------------------------------------------------------------------
----------------+-----------------+
|   This result has an attachment that is not available.  SPECIMEN(S): A                    
                                                                                            
                |   [place] [facility]  |
|  SUPERIOR MESENTERIC ARTERY PLAQUE SPECIMEN SOURCE:A. SUPERIOR                            
                                                                                            
                | INCYTE          |
| MESENTERIC ARTERY PLAQUE CLINICAL HISTORY:K55.1 (mesenteric ischemia,                     
                                                                                            
                |                 |
| chronic). FINAL PATHOLOGIC DIAGNOSIS:Superior mesenteric artery                           
                                                                                            
                |                 |
| plaque:-   Benign atherosclerotic plaque and focal fragments of benign                    
                                                                                            
                |                 |
|  fibrous tissue and smooth muscle. JVR:[facility]:C2NR MICROSCOPIC                               
                                                                                            
                |                 |
| EXAMINATION:Histologic sections of all submitted blocks are examined                      
                                                                                            
                |                 |
| by light microscopy.   These findings, together with the gross                            
                                                                                            
                |                 |
| examination, support the pathologic diagnosis. GROSS DESCRIPTION:The                      
                                                                                            
                |                 |
| specimen, labeled "JH, superior mesenteric artery plaque," is received                    
                                                                                            
                |                 |
|  in formalin and consists of three tan-pink and focally calcified                         
                                                                                            
                |                 |
 
| portions of tissue ranging from 1.0-2.5 cm in greatestdimension.                          
                                                                                            
                |                 |
|   Sectioning shows tan-pink tan-yellow and calcified cut surfaces with                    
                                                                                            
                |                 |
|  no discrete lesions identified.   Representative sections are                            
                                                                                            
                |                 |
| submitted in cassette (A1), following decalcification in[employer]al Stat[employer]AR                     
                                                                                            
                |                 |
| (under the direct supervision of a pathologist) The Gross Description                     
                                                                                            
                |                 |
| was prepared using a voice recognition system.   The report was                           
                                                                                            
                |                 |
| reviewed for accuracy; however, sound-alike word errors, addition                         
                                                                                            
                |                 |
| and/or deletions may occur.   If there is anyquestion about this                          
                                                                                            
                |                 |
| report, please contact Client Services. PERFORMING LABORATORY:The                         
                                                                                            
                |                 |
| technical component was performed by [facility], [address] [place] [address]                     
                                                                                            
                |                 |
| [place] [address] (Medical Director: Liset Naidu MD; CLIA#                           
                                                                                            
                |                 |
| [id number]).    Professional interpretation was performed by[employer]                        
                                                                                            
                |                 |
| [facility], [facility]                     
                                                                                            
                |                 |
[place] [address] (Medical Director:   Liam GUZMAN                            
                                                                                            
                |                 |
| PADILLA Clarke; CLIA#:   [id number]). Diagnostician:   Mike Ryan                    
                                                                                            
                |                 |
|  MDPathologistElectronically Signed 2019                                            
                                                                                            
                |                 |
|The technical component was performed by [facility], [address] [place] [address] (Medical Director: Liset Naidu MD; CLIA# [id number]).    Professional interpretation w
as performed by |                 |
|[facility], [address] [place] [address] (Medical Director:   Liam Clarke M.D.; CLIA#:   [id number]).       
                |                 |
|                                                                                           
                                                                                            
                |                 |
|Diagnostician:   Mike Ryan MD                                                       
                                                                                            
                |                 |
 
|Pathologist                                                                                
                                                                                            
               |                 |
|Electronically Signed 2019                                                           
                                                                                            
                |                 |
|                                                                                           
                                                                                            
                |                 |
|                                                                                           
                                                                                            
                |                 |
+-------------------------------------------------------------------------------------------
--------------------------------------------------------------------------------------------
----------------+-----------------+
 
 
 
+-----------------+---------+--------------------+--------------+
| Performing      | Address | City/State/Zipcode | Phone Number |
| Organization    |         |                    |              |
+-----------------+---------+--------------------+--------------+
|   [facility]  |         |                    |              |
| [facility]          |         |                    |              |
+-----------------+---------+--------------------+--------------+
 Type and Screen (2019 11:03 AM PDT)
 
+-----------+------------------------+-----------+------------+--------------+
| Component | Value                  | Ref Range | Performed  | Pathologist  |
|           |                        |           | At         | Signature    |
+-----------+------------------------+-----------+------------+--------------+
| ABO Rh    | O POSITIVE             |           | [facility]       |              |
|           |                        |           | LABORATORY |              |
+-----------+------------------------+-----------+------------+--------------+
| Antibody  | NEGATIVE               |           | [facility]       |              |
| Screen    |                        |           | LABORATORY |              |
+-----------+------------------------+-----------+------------+--------------+
| BB BAND   | [id number]               |           | [facility]       |              |
|           |                        |           | LABORATORY |              |
+-----------+------------------------+-----------+------------+--------------+
| [facility]   | Testing performed at   |           | [facility]       |              |
|           | [facility];[address]          |           | LABORATORY |              |
|           | Blvd;[place] [address] |           |            |              |
+-----------+------------------------+-----------+------------+--------------+
 
 
 
+----------+
| Specimen |
+----------+
| Blood    |
+----------+
 
 
 
+-------------------+------------------+--------------------+----------------+
| Performing        | Address          | City/State/Zipcode | Phone Number   |
| Organization      |                  |                    |                |
+-------------------+------------------+--------------------+----------------+
|   [facility] |   [address] | [place] [address] |   [phone] |
 
+-------------------+------------------+--------------------+----------------+
 documented in this encounter
 
 Visit Diagnoses
 
 
+------------------------------------------------------------------------------------------+
| Diagnosis                                                                                |
+------------------------------------------------------------------------------------------+
|   Mesenteric ischemia, chronic (HCC) - Primary  Chronic vascular insufficiency of        |
| intestine                                                                                |
+------------------------------------------------------------------------------------------+
|   Hypertension, unspecified type                                                         |
+------------------------------------------------------------------------------------------+
|   Hyperlipidemia  Other and unspecified hyperlipidemia                                   |
+------------------------------------------------------------------------------------------+
|   Diabetes mellitus (HCC)  Type II or unspecified type diabetes mellitus without mention |
|  of complication, not stated as uncontrolled                                             |
+------------------------------------------------------------------------------------------+
 documented in this encounter
 
 Admitting Diagnoses
 
 
+-----------------------------------------------------------------------------------+
| Diagnosis                                                                         |
+-----------------------------------------------------------------------------------+
|   Mesenteric ischemia, chronic (HCC)  Chronic vascular insufficiency of intestine |
+-----------------------------------------------------------------------------------+
 documented in this encounter
 
 Administered Medications
 
 
+-----------------------------------+--------+----------+-------+------+------+
| Medication Order                  | MAR    | Action   | Dose  | Rate | Site |
|                                   | Action | Date     |       |      |      |
+-----------------------------------+--------+----------+-------+------+------+
|   aspirin EC tablet 81 mg  81 mg, | Given  | 20 | 81 mg |      |      |
|  Oral, DAILY, First dose on Sat   |        | 19  8:09 |       |      |      |
| 19 at 1100                   |        |  AM PDT  |       |      |      |
+-----------------------------------+--------+----------+-------+------+------+
 
 
 
+-------+----------+-------+---+---+
| Given | 20 | 81 mg |   |   |
|       | 19  8:29 |       |   |   |
|       |  AM PDT  |       |   |   |
+-------+----------+-------+---+---+
| Given | 20 | 81 mg |   |   |
|       | 19 10:23 |       |   |   |
|       |  AM PDT  |       |   |   |
+-------+----------+-------+---+---+
 
 
 
+---+---+
|   |   |
+---+---+
 
 
 
 
+-----------------------------------+---------+----------+---+----------+---+
|   balanced electrolytes in water  | New Bag | 20 |   | 50 mL/hr |   |
| (PLASMALYTE-148/NORMOSOL-R)       |         | 19 11:07 |   |          |   |
| infusion  at 50 mL/hr,            |         |  AM PDT  |   |          |   |
| Intravenous, CONTINUOUS, Starting |         |          |   |          |   |
|  Tue 19 at 1115, Pre-op      |         |          |   |          |   |
+-----------------------------------+---------+----------+---+----------+---+
 
 
 
+---+---+
|   |   |
+---+---+
 
 
 
+-----------------------------------+-------+----------+-------+---+---+
|   bisacodyl (DULCOLAX)            | Given | 20 | 10 mg |   |   |
| suppository 10 mg  10 mg, Rectal, |       | 19  8:11 |       |   |   |
|  DAILY PRN, Constipation,         |       |  AM PDT  |       |   |   |
| Starting Tue 19 at 1507, If  |       |          |       |   |   |
| all other bowel medications       |       |          |       |   |   |
| ineffective x 24 hours or not     |       |          |       |   |   |
| ordered,                          |       |          |       |   |   |
+-----------------------------------+-------+----------+-------+---+---+
 
 
 
+---+---+
|   |   |
+---+---+
 
 
 
+-----------------------------------+---------+----------+---+-------+---+
|   dextrose 5% and sodium chloride | New Bag | 20 |   | 125   |   |
|  0.45% with KCl 20 mEq/L (D5 1/2  |         | 19  7:35 |   | mL/hr |   |
| NS + KCL 20) infusion  at 125     |         |  PM PDT  |   |       |   |
| mL/hr, Intravenous, CONTINUOUS,   |         |          |   |       |   |
| Starting e 19 at 1600      |         |          |   |       |   |
+-----------------------------------+---------+----------+---+-------+---+
 
 
 
+---------+----------+---+-------+---+
| New Bag | 20 |   | 125   |   |
|         | 19 12:07 |   | mL/hr |   |
|         |  PM PDT  |   |       |   |
+---------+----------+---+-------+---+
| New Bag | 20 |   | 125   |   |
|         | 19  2:19 |   | mL/hr |   |
|         |  AM PDT  |   |       |   |
+---------+----------+---+-------+---+
 
 
 
+---+---+
 
|   |   |
+---+---+
 
 
 
+----------------------------------+-------+----------+-------+---+---+
|   hydroCHLOROthiazide tablet 25  | Given | 20 | 25 mg |   |   |
| mg  25 mg, Oral, DAILY, First    |       | 19  8:10 |       |   |   |
| dose on Sat 19 at 1100      |       |  AM PDT  |       |   |   |
+----------------------------------+-------+----------+-------+---+---+
 
 
 
+-------+----------+-------+---+---+
| Given | 20 | 25 mg |   |   |
|       | 19  8:30 |       |   |   |
|       |  AM PDT  |       |   |   |
+-------+----------+-------+---+---+
| Given | 20 | 25 mg |   |   |
|       | 19 10:23 |       |   |   |
|       |  AM PDT  |       |   |   |
+-------+----------+-------+---+---+
 
 
 
+---+---+
|   |   |
+---+---+
 
 
 
+-----------------------------------+-------+----------+---------+---+---+
|   HYDROcodone-acetaminophen       | Given | 20 | 2       |   |   |
| (NORCO) 5-325 mg per tablet 1-2   |       | 19 11:14 | tablets |   |   |
| tablet  1-2 tablet, Oral, EVERY 4 |       |  AM PDT  |         |   |   |
|  HOURS PRN, Pain, Starting Sun    |       |          |         |   |   |
| 19 at 0843                   |       |          |         |   |   |
+-----------------------------------+-------+----------+---------+---+---+
 
 
 
+-------+----------+---------+---+---+
| Given | 20 | 2       |   |   |
|       | 19  7:30 | tablets |   |   |
|       |  AM PDT  |         |   |   |
+-------+----------+---------+---+---+
| Given | 20 | 2       |   |   |
|       | 19 10:22 | tablets |   |   |
|       |  PM PDT  |         |   |   |
+-------+----------+---------+---+---+
 
 
 
+---+---+
|   |   |
+---+---+
 
 
 
+---------------------------------+-------+----------+------+---+---+
 
|   HYDROmorphone (DILAUDID)      | Given | 20 | 1 mg |   |   |
| injection 0.5-1 mg  0.5-1 mg,   |       | 19  8:30 |      |   |   |
| Intravenous, EVERY 1 HOUR PRN,  |       |  AM PDT  |      |   |   |
| Pain, Starting Tue 19 at   |       |          |      |   |   |
| 1535                            |       |          |      |   |   |
+---------------------------------+-------+----------+------+---+---+
 
 
 
+-------+----------+------+---+---+
| Given | 20 | 1 mg |   |   |
|       | 19  3:35 |      |   |   |
|       |  AM PDT  |      |   |   |
+-------+----------+------+---+---+
| Given | 20 | 1 mg |   |   |
|       | 19 10:05 |      |   |   |
|       |  PM PDT  |      |   |   |
+-------+----------+------+---+---+
 
 
 
+---+---+
|   |   |
+---+---+
 
 
 
+-----------------------------------+-------+----------+-------+---+---+
|   losartan (COZAAR) tablet 50 mg  | Given | 20 | 50 mg |   |   |
|  50 mg, Oral, DAILY, First dose   |       | 19  8:09 |       |   |   |
| on Sat 19 at 1100            |       |  AM PDT  |       |   |   |
+-----------------------------------+-------+----------+-------+---+---+
 
 
 
+-------+----------+-------+---+---+
| Given | 20 | 50 mg |   |   |
|       | 19  8:30 |       |   |   |
|       |  AM PDT  |       |   |   |
+-------+----------+-------+---+---+
| Given | 20 | 50 mg |   |   |
|       | 19 10:23 |       |   |   |
|       |  AM PDT  |       |   |   |
+-------+----------+-------+---+---+
 
 
 
+---+---+
|   |   |
+---+---+
 
 
 
+-----------------------------------+-------+----------+------+---+---+
|   morphine injection 1-2 mg  1-2  | Given | 20 | 2 mg |   |   |
| mg, Intravenous, EVERY 1 HOUR     |       | 19  3:14 |      |   |   |
| PRN, Pain, Starting Tue 19   |       |  PM PDT  |      |   |   |
| at 1507, If oral route not an     |       |          |      |   |   |
| option.  Slow IV push, not faster |       |          |      |   |   |
|  than 2mg/minute. First dose must |       |          |      |   |   |
 
|  be lowest dose, titrate to       |       |          |      |   |   |
| effective dose by repeat of       |       |          |      |   |   |
| lowest dose every 30 minutes prn  |       |          |      |   |   |
| pain, may not exceed maximum dose |       |          |      |   |   |
|  ordered per interval.  Use       |       |          |      |   |   |
| Pasero Sedation Scale.,           |       |          |      |   |   |
+-----------------------------------+-------+----------+------+---+---+
 
 
 
+---+---+
|   |   |
+---+---+
 
 
 
+-----------------------------------+-------+----------+-------+---+---+
|   pantoprazole (PROTONIX)         | Given | 20 | 40 mg |   |   |
| injection 40 mg  40 mg,           |       | 19  8:10 |       |   |   |
| Intravenous, DAILY, First dose on |       |  AM PDT  |       |   |   |
|  Wed 19 at 1100, If          |       |          |       |   |   |
| reconstituting, mix each 40 mg    |       |          |       |   |   |
| vial with 10 mL NS to make 4      |       |          |       |   |   |
| mg/mL., Indication: GERD          |       |          |       |   |   |
+-----------------------------------+-------+----------+-------+---+---+
 
 
 
+-------+----------+-------+---+---+
| Given | 20 | 40 mg |   |   |
|       | 19  8:30 |       |   |   |
|       |  AM PDT  |       |   |   |
+-------+----------+-------+---+---+
| Given | 20 | 40 mg |   |   |
|       | 19  8:00 |       |   |   |
|       |  AM PDT  |       |   |   |
+-------+----------+-------+---+---+
 
 
 
+---+---+
|   |   |
+---+---+
 
 
 
+-----------------------------------+---------+----------+---+----------+---+
|   sodium chloride 0.9% (NS)       | New Bag | 20 |   | 30 mL/hr |   |
| infusion  at 30 mL/hr,            |         | 19 11:34 |   |          |   |
| Intravenous, CONTINUOUS, Starting |         |  AM PDT  |   |          |   |
|  Tue 19 at 1200,             |         |          |   |          |   |
| Recovery/Phase I                  |         |          |   |          |   |
+-----------------------------------+---------+----------+---+----------+---+
 
 
 
+-------------------------+----------+---+---+---+
| Continued by Anesthesia | 20 |   |   |   |
|                         | 19 11:27 |   |   |   |
|                         |  AM PDT  |   |   |   |
 
+-------------------------+----------+---+---+---+
 
 
 
+---+---+
|   |   |
+---+---+
 documented in this encounter

## 2020-01-13 NOTE — XMS
Encounter Summary
  Created on: 2020
 
 Pasquale Nehmeias Martinez
 External Reference #: [id number]
 : 43
 Sex: Male
 
 Demographics
 
 
+-----------------------+----------------------+
| Address               | [address]         |
|                       | [place]  [address] |
+-----------------------+----------------------+
| Home Phone            | [phone]      |
+-----------------------+----------------------+
| Preferred Language    | Unknown              |
+-----------------------+----------------------+
| Marital Status        |               |
+-----------------------+----------------------+
| Nondenominational Affiliation | [id number]                 |
+-----------------------+----------------------+
| Race                  | Unknown              |
+-----------------------+----------------------+
| Ethnic Group          | Unknown              |
+-----------------------+----------------------+
 
 
 Author
 
 
+--------------+--------------------------------------------+
| Author       | [facility]  |
|              | and [facility]                                |
+--------------+--------------------------------------------+
| Organization | [facility]  |
|              | and [place]                                |
+--------------+--------------------------------------------+
| Address      | Unknown                                    |
+--------------+--------------------------------------------+
| Phone        | Unavailable                                |
+--------------+--------------------------------------------+
 
 
 
 Support
 
 
+--------------+--------------+---------------------+-----------------+
| Name         | Relationship | Address             | Phone           |
+--------------+--------------+---------------------+-----------------+
| Rupa Giordano | ECON         | [address]             | [phone] |
|              |              | [place]   |                 |
|              |              | [address]               |                 |
+--------------+--------------+---------------------+-----------------+
 
 
 
 
 Care Team Providers
 
 
+-----------------------+------+-------------+
| Care Team Member Name | Role | Phone       |
+-----------------------+------+-------------+
| No, Physician         | PCP  | Unavailable |
+-----------------------+------+-------------+
 
 
 
 Reason for Referral
 Diagnostic/Screening (Routine)
 
+------------+--------+-----------+--------------+--------------+---------------+
| Status     | Reason | Specialty | Diagnoses /  | Referred By  | Referred To   |
|            |        |           | Procedures   | Contact      | Contact       |
+------------+--------+-----------+--------------+--------------+---------------+
| Authorized |        |           |   Diagnoses  |   Augustin Lopez,  |   [facility]  |
|            |        |           |  Mesenteric  | DNP  [address]    | HOSPITAL      |
|            |        |           | ischemia,    | [address]  | [address]       |
|            |        |           | chronic      |  [address]       | [place] [address]   |
|            |        |           | ([facility])  H/O   | [address] | [place] |
|            |        |           | endarterecto |  [address]       |  [address]   |
|            |        |           | my           | Phone:       | Phone:        |
|            |        |           | Procedures   | [phone] | [phone]  |
|            |        |           | CT Angiogram |   Fax:       |  Fax:         |
|            |        |           |  Abdomen     | [phone] | [phone]  |
|            |        |           | Pelvis w     |              |               |
|            |        |           | Contrast     |              |               |
+------------+--------+-----------+--------------+--------------+---------------+
 
 
 
 
 Reason for Visit
 
 
+-----------+----------+
| Reason    | Comments |
+-----------+----------+
| Follow-up |          |
+-----------+----------+
 
 
 
 Encounter Details
 
 
+--------+---------+----------------------+----------------------+----------------------+
| Date   | Type    | Department           | Care Team            | Description          |
+--------+---------+----------------------+----------------------+----------------------+
| 10/23/ | Office  |   [facility]      |   Augustin Lopez, ZAY      | Mesenteric ischemia, |
| 2019   | Visit   | VASCULAR SURGERY     | [address]     |  chronic (HCC)       |
|        |         | [address] | [address]  [place], [place]  | (Primary Dx); H/O    |
|        |         |  E  [place], [place]     | [address]  [phone]  | endarterectomy       |
|        |         | [address]           |  [phone] (Fax)  |                      |
|        |         | [phone]         |                      |                      |
+--------+---------+----------------------+----------------------+----------------------+
 
 
 
 
 Social History
 
 
+---------------+-------+-----------+--------+------+
| Tobacco Use   | Types | Packs/Day | Years  | Date |
|               |       |           | Used   |      |
+---------------+-------+-----------+--------+------+
| Former Smoker |       |           |        |      |
+---------------+-------+-----------+--------+------+
 
 
 
+---------------------+---+---+---+
| Smokeless Tobacco:  |   |   |   |
| Former User         |   |   |   |
+---------------------+---+---+---+
 
 
 
+------------------------------+
| Comments: quit 50 years ago  |
+------------------------------+
 
 
 
+---------------+-------------+---------+----------+
| Alcohol Use   | Drinks/Week | oz/Week | Comments |
+---------------+-------------+---------+----------+
| Not Currently |             |         | 90m days |
+---------------+-------------+---------+----------+
 
 
 
+------------------+---------------+
| Sex Assigned at  | Date Recorded |
| Birth            |               |
+------------------+---------------+
| Not on file      |               |
+------------------+---------------+
 
 
 
+----------------+-------------+-------------+
| Job Start Date | Occupation  | Industry    |
+----------------+-------------+-------------+
| Not on file    | Not on file | Not on file |
+----------------+-------------+-------------+
 
 
 
+----------------+--------------+------------+
| Travel History | Travel Start | Travel End |
+----------------+--------------+------------+
 
 
 
+-------------------------------------+
 
| No recent travel history available. |
+-------------------------------------+
 documented as of this encounter
 
 Last Filed Vital Signs
 
 
+-------------------+----------------------+----------------------+----------+
| Vital Sign        | Reading              | Time Taken           | Comments |
+-------------------+----------------------+----------------------+----------+
| Blood Pressure    | 141/87               | 10/23/2019  9:22 AM  |          |
|                   |                      | PDT                  |          |
+-------------------+----------------------+----------------------+----------+
| Pulse             | 69                   | 10/23/2019  9:22 AM  |          |
|                   |                      | PDT                  |          |
+-------------------+----------------------+----------------------+----------+
| Temperature       | -                    | -                    |          |
+-------------------+----------------------+----------------------+----------+
| Respiratory Rate  | -                    | -                    |          |
+-------------------+----------------------+----------------------+----------+
| Oxygen Saturation | 100%                 | 10/23/2019  9:22 AM  |          |
|                   |                      | PDT                  |          |
+-------------------+----------------------+----------------------+----------+
| Inhaled Oxygen    | -                    | -                    |          |
| Concentration     |                      |                      |          |
+-------------------+----------------------+----------------------+----------+
| Weight            | 73.5 kg (162 lb 1.6  | 10/23/2019  9:22 AM  |          |
|                   | oz)                  | PDT                  |          |
+-------------------+----------------------+----------------------+----------+
| Height            | -                    | -                    |          |
+-------------------+----------------------+----------------------+----------+
| Body Mass Index   | 25.77                | 2019 10:43 AM  |          |
|                   |                      | PDT                  |          |
+-------------------+----------------------+----------------------+----------+
 documented in this encounter
 
 Functional Status
 
 
+---------------------------------------------+----------+--------------------+
| Functional Status                           | Response | Date of Assessment |
+---------------------------------------------+----------+--------------------+
| Are you deaf or do you have serious         | No       | 2019         |
| difficulty hearing?                         |          |                    |
+---------------------------------------------+----------+--------------------+
| Are you blind or do you have serious        | No       | 2019         |
| difficulty seeing, even when wearing        |          |                    |
| glasses?                                    |          |                    |
+---------------------------------------------+----------+--------------------+
| Do you have serious difficulty walking or   | No       | 2019         |
| climbing stairs? (5 years old or older)     |          |                    |
+---------------------------------------------+----------+--------------------+
| Do you have difficulty dressing or bathing? | No       | 2019         |
|  (5 years old or older)                     |          |                    |
+---------------------------------------------+----------+--------------------+
| Because of a physical, mental, or emotional | No       | 2019         |
|  condition, do you have difficulty doing    |          |                    |
| errands alone such as visiting a doctor's   |          |                    |
| office or shopping? [15 years old or        |          |                    |
| older)]                                     |          |                    |
 
+---------------------------------------------+----------+--------------------+
 
 
 
+---------------------------------------------+----------+--------------------+
| Cognitive Status                            | Response | Date of Assessment |
+---------------------------------------------+----------+--------------------+
| Because of a physical, mental, or emotional | No       | 2019         |
|  condition, do you have serious difficulty  |          |                    |
| concentrating, remembering, or making       |          |                    |
| decisions? (5 years old or older)           |          |                    |
+---------------------------------------------+----------+--------------------+
 documented as of this encounter
 
 Progress Notes
 Augustin Lopez DNP - 10/23/2019 10:30 AM [facility]
 [address] [place] [address]
 Office: [phone] Fax: [phone]
 
 DATE OF VISIT: 10/23/2019
 PATIENT NAME: Nehemias Giordano
 : 1945; AGE: 74 y.o.; Sex:M 
 PHONE  NUMBER:  [phone]  (Home)
 MRN: [id number]; 
 PROVIDER: Augustin Lopez DNP
 PRIMARY  CARE  /  REFERRING  PHYSICIAN:  No ref. provider found  /  No Physician on file  /
 p 
 
 REASON  FOR  EVALUATION  /  CHIEF  COMPLAINT: Vascular Surgery Postoperative Visit for SMA 
endarterectomy 
 
 The patient presents today for a Vascular Surgery Postoperative Visit.
 
 Mr. Giordano is a pleasant 74 y.o. male with no significant PMH who is referred to me for abd
ominal pain. Patient has been describing intermittent abdominal pain that radiates across hi
s abdomen to his back. Patient does not endorse any specific worsening pain associated with 
eating, however he does state that he has not been eating a lot. Patient has been watching h
is diet and has been trying to decrease his carbohydrates. Patient has lost 24 lbs over the 
last 2 months. The patient had a recent CT and MRI abdomen from [facility]. Elyse
ent denies any surgical intervention for this abdominal pain, however has had past abdominal
 surgeries. Due to chronic mesenteric ischemia, he is status post SMA endarterectomy with karsten
vine pericardial patch on 2019. The patient reports his weight has been stable, appetit
e is fair, eating more, slightly constipated. Pain is controlled with current analgesics. Me
dications being used: acetaminophen. The patient denies  fever, wound drainage, increasing r
edness, pus, increasing pain, increasing swelling. Wife reports he is not drinking enough wa
ter and has been sedentary after surgery. Physical examination revealed surgical incision wh
ich is healing well without signs of infection. He has been able to ambulate without difficu
lty. He has chronic back pain. 
 
 VITAL SIGNS: /87  | Pulse 69  | Wt 73.5 kg (162 lb 1.6 oz)  | SpO2 100%  | BMI 25.77 
kg/m  
 
 PHYSICAL EXAM: 
 Constitutional: Well nourished, no signs of distress 
 Cardiovascular: Normal rate, regular rhythm.
 Pulmonary/Chest: No respiratory distress. No adventitious sounds. 
 Abdominal: Soft. No abdominal distension or tenderness. 
 Musculoskeletal: Normal range of motion. 
 Extremities: No edema, cyanosis or clubbing.
 Neurological: He is alert and oriented. No muscle weakness and normal gait.
 
 VASCULAR: Palpable femoralpulses were present bilaterally with palpable bilateral popli
teal pulses. Palpable pulses were present in bilateral dorsalis pedis and poster
ior tibial artery arteries. Palpable bilateral carotid, radial, brachial pulses. Surgical  i
ncision  wounds  are healing well without signs of infection.
 
 Assessment & Plan:
 Chronic mesenteric ischemia with status post SMA endarterectomy - The patient is doing well
. All staples removed today. Wound care discussed with patient, monitor for signs of infecti
on. Patient unable to tolerate abdominal ultrasound today. We will need to obtain a CTA for 
postop evaluation, order sent to [facility]. Continue aspirin daily. I've explained to th
e patient that in the event he experiences mesenteric ischemia as evidenced by post-prandial
 abdominal pain, appetite change, or unintentional weight loss, the patient should contact m
e immediately to return to my clinic or go to nearby emergency room right away.
 
 Hypertension - The patient is advised to maintain his antihypertensive medication to keep s
ystolic blood pressure target level of 130-140 mm Hg and diastolic blood pressure level of 7
0-80 mm Hg. The patient is advised that uncontrolled hypertension can accelerate atheroscler
otic disease progression. 
 
 Dyslipidemia - The patient is advised to undergo lipid level evaluation with fasting blood 
test every 6 months with target LDL level of less than 70 mg/dL. The patient is advised to c
ontinue taking antiplatelet daily for life to reduce the risk of myocardial infarction and p
eripheral arterial disease ( He is currently on aspirin). Additionally, the patient is infor
med that hyperlipidemia can accelerate atherosclerotic disease progression and negatively af
fect the outcome of lower leg vascular interventions. 
 
 Augustin Lopez DNP
 Electronically signed by Augustin Lopez DNP at 10/23/2019 11:16 AM PDTdocumented in this encounte
r
 
 Plan of Treatment
 
 
+--------+-------------+------------------+----------------------+-------------+
| Date   | Type        | Specialty        | Care Team            | Description |
+--------+-------------+------------------+----------------------+-------------+
| / | Appointment | Radiology        |   Augustin Lopez DNP      |             |
|    |             |                  |  [address]     |             |
|        |             |                  | [facility] [place]  |             |
|        |             |                  | [id number] [phone]  |             |
|        |             |                  |  [phone] (Fax)  |             |
+--------+-------------+------------------+----------------------+-------------+
| / | Office      | Vascular Surgery |   Augustin Lopez DNP      |             |
|    | Visit       |                  | [address]     |             |
|        |             |                  | [address]  [place]  |             |
|        |             |                  | [address]  [phone]  |             |
|        |             |                  |  [phone] (Fax)  |             |
+--------+-------------+------------------+----------------------+-------------+
 
 
 
+----------------------+---------+--------+----------------------+----------------------+
| Name                 | Type    | Priori | Associated Diagnoses | Order Schedule       |
|                      |         | ty     |                      |                      |
+----------------------+---------+--------+----------------------+----------------------+
| CT Angiogram Abdomen | Imaging | Routin |   Mesenteric         | Expected: 10/23/2019 |
|  Pelvis w Contrast   |         | e      | ischemia, chronic    |  (Approximate),      |
|                      |         |        | (HCC)  H/O           | Expires: 10/23/2020  |
|                      |         |        | endarterectomy       |                      |
+----------------------+---------+--------+----------------------+----------------------+
 
| Creatinine           | Lab     | Routin |   Mesenteric         | Expected: 10/23/2019 |
|                      |         | e      | ischemia, chronic    |  (Approximate),      |
|                      |         |        | (HCC)  H/O           | Expires: 10/23/2020  |
|                      |         |        | endarterectomy       |                      |
+----------------------+---------+--------+----------------------+----------------------+
 documented as of this encounter
 
 Visit Diagnoses
 
 
+------------------------------------------------------------------------------------+
| Diagnosis                                                                          |
+------------------------------------------------------------------------------------+
|   Mesenteric ischemia, chronic (HCC) - Primary  Chronic vascular insufficiency of  |
| intestine                                                                          |
+------------------------------------------------------------------------------------+
|   H/O endarterectomy  Personal history of surgery to other organs                  |
+------------------------------------------------------------------------------------+
 documented in this encounter

## 2020-01-13 NOTE — XMS
Encounter Summary
  Created on: 2020
 
 Pasquale Nehemias Martinez
 External Reference #: [id number]
 : 43
 Sex: Male
 
 Demographics
 
 
+-----------------------+----------------------+
| Address               | [address]         |
|                       | [place]  [address] |
+-----------------------+----------------------+
| Home Phone            | [phone]      |
+-----------------------+----------------------+
| Preferred Language    | Unknown              |
+-----------------------+----------------------+
| Marital Status        |               |
+-----------------------+----------------------+
| Jainism Affiliation | [id number]                 |
+-----------------------+----------------------+
| Race                  | Unknown              |
+-----------------------+----------------------+
| Ethnic Group          | Unknown              |
+-----------------------+----------------------+
 
 
 Author
 
 
+--------------+--------------------------------------------+
| Author       | [facility]  |
|              | and [place]                                |
+--------------+--------------------------------------------+
| Organization | [facility]  |
|              | and [place]                                |
+--------------+--------------------------------------------+
| Address      | Unknown                                    |
+--------------+--------------------------------------------+
| Phone        | Unavailable                                |
+--------------+--------------------------------------------+
 
 
 
 Support
 
 
+--------------+--------------+---------------------+-----------------+
| Name         | Relationship | Address             | Phone           |
+--------------+--------------+---------------------+-----------------+
| Rupa Giordano | E[employer]         | [address]             | [phone] |
|              |              | [place]   |                 |
|              |              | [address]               |                 |
+--------------+--------------+---------------------+-----------------+
 
 
 
 
 Care Team Providers
 
 
+-----------------------+------+-------------+
| Care Team Member Name | Role | Phone       |
+-----------------------+------+-------------+
| No, Physician         | PCP  | Unavailable |
+-----------------------+------+-------------+
 
 
 
 Reason for Visit
 
 
+--------+--------------+
| Reason | Comments     |
+--------+--------------+
| Other  | instructions |
+--------+--------------+
 
 
 
 Encounter Details
 
 
+--------+-----------+----------------------+----------------------+----------------------+
| Date   | Type      | Department           | Care Team            | Description          |
+--------+-----------+----------------------+----------------------+----------------------+
| 10/22/ | Telephone |   [facility]      |   Augustin Lopez DNP      | Other (instructions) |
| 2019   |           | VASCULAR SURGERY     | [address]     |                      |
|        |           | [address] | [address]  [place], [place]  |                      |
|        |           |  E  [place], [place]     | [address]  [phone]  |                      |
|        |           | [address]           |  [phone] (Fax)  |                      |
|        |           | [phone]         |                      |                      |
+--------+-----------+----------------------+----------------------+----------------------+
 
 
 
 Social History
 
 
+---------------+-------+-----------+--------+------+
| Tobacco Use   | Types | Packs/Day | Years  | Date |
|               |       |           | Used   |      |
+---------------+-------+-----------+--------+------+
| Former Smoker |       |           |        |      |
+---------------+-------+-----------+--------+------+
 
 
 
+---------------------+---+---+---+
| Smokeless Tobacco:  |   |   |   |
| Former User         |   |   |   |
+---------------------+---+---+---+
 
 
 
+------------------------------+
| Comments: quit 50 years ago  |
 
+------------------------------+
 
 
 
+---------------+-------------+---------+----------+
| Alcohol Use   | Drinks/Week | oz/Week | Comments |
+---------------+-------------+---------+----------+
| Not Currently |             |         | 90m days |
+---------------+-------------+---------+----------+
 
 
 
+------------------+---------------+
| Sex Assigned at  | Date Recorded |
| Birth            |               |
+------------------+---------------+
| Not on file      |               |
+------------------+---------------+
 
 
 
+----------------+-------------+-------------+
| Job Start Date | Occupation  | Industry    |
+----------------+-------------+-------------+
| Not on file    | Not on file | Not on file |
+----------------+-------------+-------------+
 
 
 
+----------------+--------------+------------+
| Travel History | Travel Start | Travel End |
+----------------+--------------+------------+
 
 
 
+-------------------------------------+
| No recent travel history available. |
+-------------------------------------+
 documented as of this encounter
 
 Functional Status
 
 
+---------------------------------------------+----------+--------------------+
| Functional Status                           | Response | Date of Assessment |
+---------------------------------------------+----------+--------------------+
| Are you deaf or do you have serious         | No       | 2019         |
| difficulty hearing?                         |          |                    |
+---------------------------------------------+----------+--------------------+
| Are you blind or do you have serious        | No       | 2019         |
| difficulty seeing, even when wearing        |          |                    |
| glasses?                                    |          |                    |
+---------------------------------------------+----------+--------------------+
| Do you have serious difficulty walking or   | No       | 2019         |
| climbing stairs? (5 years old or older)     |          |                    |
+---------------------------------------------+----------+--------------------+
| Do you have difficulty dressing or bathing? | No       | 2019         |
|  (5 years old or older)                     |          |                    |
+---------------------------------------------+----------+--------------------+
| Because of a physical, mental, or emotional | No       | 2019         |
 
|  condition, do you have difficulty doing    |          |                    |
| errands alone such as visiting a doctor's   |          |                    |
| office or shopping? [15 years old or        |          |                    |
| older)]                                     |          |                    |
+---------------------------------------------+----------+--------------------+
 
 
 
+---------------------------------------------+----------+--------------------+
| Cognitive Status                            | Response | Date of Assessment |
+---------------------------------------------+----------+--------------------+
| Because of a physical, mental, or emotional | No       | 2019         |
|  condition, do you have serious difficulty  |          |                    |
| concentrating, remembering, or making       |          |                    |
| decisions? (5 years old or older)           |          |                    |
+---------------------------------------------+----------+--------------------+
 documented as of this encounter
 
 Plan of Treatment
 
 
+--------+-------------+------------------+----------------------+-------------+
| Date   | Type        | Specialty        | Care Team            | Description |
+--------+-------------+------------------+----------------------+-------------+
| / | Appointment | Radiology        |   Augustin Lopez DNP      |             |
|    |             |                  | [address]     |             |
|        |             |                  | [place]  |             |
|        |             |                  | [address]  [phone]  |             |
|        |             |                  |  [phone] (Fax)  |             |
+--------+-------------+------------------+----------------------+-------------+
| / | Office      | Vascular Surgery |   Augustin Lopez DNP      |             |
|    | Visit       |                  | [address]     |             |
|        |             |                  | [place]  |             |
|        |             |                  | [id number] [phone]  |             |
|        |             |                  |  [phone] (Fax)  |             |
+--------+-------------+------------------+----------------------+-------------+
 documented as of this encounter
 
 Visit Diagnoses
 Not on filedocumented in this encounter

## 2020-01-13 NOTE — NUR
PT UP TO BR WITH SBA, GAIT STEADY. PT BACK TO BED, DESHAUN WELL. NO C/O DIZZINESS
OR BEING LIGHT HEADED. ASSESSMENT COMPLETE. MEDICATED WITH PRN FOR 6/10 ABD
PAIN. IVF INFUSING. PT ORIENTED TO ROOM AND NURSE CALL LIGHT. NO QUESTIONS OR
CONCERNS AT THIS TIME. CALL LIGHT IN REACH.

## 2020-01-13 NOTE — XMS
Encounter Summary
  Created on: 2020
 
 Pasquale Nehemias Martinez
 External Reference #: [id number]
 : 43
 Sex: Male
 
 Demographics
 
 
+-----------------------+----------------------+
| Address               | [address]         |
|                       | [place]  [address] |
+-----------------------+----------------------+
| Home Phone            | [phone]      |
+-----------------------+----------------------+
| Preferred Language    | Unknown              |
+-----------------------+----------------------+
| Marital Status        |               |
+-----------------------+----------------------+
| Christian Affiliation | [id number]                 |
+-----------------------+----------------------+
| Race                  | Unknown              |
+-----------------------+----------------------+
| Ethnic Group          | Unknown              |
+-----------------------+----------------------+
 
 
 Author
 
 
+--------------+--------------------------------------------+
| Author       | [facility]  |
|              | and [facility]                                |
+--------------+--------------------------------------------+
| Organization | [facility]  |
|              | and [place]                                |
+--------------+--------------------------------------------+
| Address      | Unknown                                    |
+--------------+--------------------------------------------+
| Phone        | Unavailable                                |
+--------------+--------------------------------------------+
 
 
 
 Support
 
 
+--------------+--------------+---------------------+-----------------+
| Name         | Relationship | Address             | Phone           |
+--------------+--------------+---------------------+-----------------+
| Rupa Giordano | ECON         | [address]             | [phone] |
|              |              | [place]   |                 |
|              |              | [id number]               |                 |
+--------------+--------------+---------------------+-----------------+
 
 
 
 
 Care Team Providers
 
 
+-----------------------+------+-------------+
| Care Team Member Name | Role | Phone       |
+-----------------------+------+-------------+
 PCP  | Unavailable |
+-----------------------+------+-------------+
 
 
 
 Encounter Details
 
 
+--------+-------------+------------------+--------------------+-------------+
| Date   | Type        | Department       | Care Team          | Description |
+--------+-------------+------------------+--------------------+-------------+
| / | Orders Only |   [facility]     |   C[facility]mitz    |             |
| 2016   |             | CONVERSION  [address]  | [address]   |             |
|        |             | [address]       | [address]   |             |
|        |             | [place]     | [place]      |             |
|        |             | [address]       | [address]         |             |
|        |             | [phone]     | [phone]       |             |
|        |             |                  | [phone] (Fax) |             |
+--------+-------------+------------------+--------------------+-------------+
 
 
 
 Social History
 
 
+----------------+-------+-----------+--------+------+
| Tobacco Use    | Types | Packs/Day | Years  | Date |
|                |       |           | Used   |      |
+----------------+-------+-----------+--------+------+
| Never Assessed |       |           |        |      |
+----------------+-------+-----------+--------+------+
 
 
 
+------------------+---------------+
| Sex Assigned at  | Date Recorded |
| Birth            |               |
+------------------+---------------+
| Not on file      |               |
+------------------+---------------+
 
 
 
+----------------+-------------+-------------+
| Job Start Date | Occupation  | Industry    |
+----------------+-------------+-------------+
| Not on file    | Not on file | Not on file |
+----------------+-------------+-------------+
 
 
 
+----------------+--------------+------------+
| Travel History | Travel Start | Travel End |
 
+----------------+--------------+------------+
 
 
 
+-------------------------------------+
| No recent travel history available. |
+-------------------------------------+
 documented as of this encounter
 
 Plan of Treatment
 
 
+--------+-------------+------------------+----------------------+-------------+
| Date   | Type        | Specialty        | Care Team            | Description |
+--------+-------------+------------------+----------------------+-------------+
| / | Appointment | Radiology        |   Augustin Lopez DNP      |             |
|    |             |                  | [address]     |             |
|        |             |                  | [place]  |             |
|        |             |                  | [address]  [phone]  |             |
|        |             |                  |  [phone] (Fax)  |             |
+--------+-------------+------------------+----------------------+-------------+
| / | Office      | Vascular Surgery |   Augustin Lopez DNP      |             |
|    | Visit       |                  | [address]     |             |
|        |             |                  | [place]  |             |
|        |             |                  | [address]  [phone]  |             |
|        |             |                  |  [phone] (Fax)  |             |
+--------+-------------+------------------+----------------------+-------------+
 documented as of this encounter
 
 Procedures
 
 
+----------------------+--------+-------------+----------------------+----------------------
+
| Procedure Name       | Priori | Date/Time   | Associated Diagnosis | Comments             
|
|                      | ty     |             |                      |                      
|
+----------------------+--------+-------------+----------------------+----------------------
+
| ECHO INTERPRETATION  | Routin | 2016  |                      |   Results for this   
|
| OF OUTSIDE FILMS     | e      |  9:32 AM    |                      | procedure are in the 
|
|                      |        | PST         |                      |  results section.    
|
+----------------------+--------+-------------+----------------------+----------------------
+
 documented in this encounter
 
 Results
 ECHO Interpretation of Outside Films (2016  9:32 AM PST)
 
+----------+
| Specimen |
+----------+
|          |
+----------+
 
 
 
 
+------------------------------------------------------------------------+--------------+
| Impressions                                                            | Performed At |
+------------------------------------------------------------------------+--------------+
|   1. The left ventricle is normal in size, mild increase in wall       |              |
| thickness and hyperdynamic systolic function EF>70%.  2. The diastolic |              |
|  filling pattern indicates impaired relaxation consistent with mild    |              |
| dysfunction (Grade I).  3. The right ventricle is normal in size and   |              |
| function.  4. Trace tricuspid regurgitation with no pulmonary          |              |
| hypertension.  5. Mild degenerative changes in the aortic and mitral   |              |
| valve.  6. There is no pericardial effusion.                           |              |
+------------------------------------------------------------------------+--------------+
 
 
 
+------------------------------------------------------------------------+--------------+
| Narrative                                                              | Performed At |
+------------------------------------------------------------------------+--------------+
|      Patient Name:   Nehemias Giordano  YOB: 1945        |              |
| Accession:   [id number]     Performing Physician:   Geovanni Mayes MD  |              |
|  _______________________________________________________________       |              |
| INDICATIONS  -----------  Murmur     CONCLUSIONS  -----------  1. The  |              |
| left ventricle is normal in size, mild increase in wall thickness and  |              |
| hyperdynamic systolic function EF>70%.  2. The diastolic filling       |              |
| pattern indicates impaired relaxation consistent with mild dysfunction |              |
|  (Grade I).  3. The right ventricle is normal in size and function.    |              |
| 4. Trace tricuspid regurgitation with no pulmonary hypertension.  5.   |              |
| Mild degenerative changes in the aortic and mitral valve.  6. There is |              |
|  no pericardial effusion.     FINDINGS  --------  ECG rhythm: Sinus    |              |
| rhythm.  Study: A 2-dimensional transthoracic echocardiogram with      |              |
| m-mode, spectral and color flow Doppler was perfomed.  Study: This was |              |
|  a technically adequate study.  Left Ventricle: Left ventricular       |              |
| systolic function is hyperdynamic with an estimated EF of >70%.  Left  |              |
| Ventricle: The left ventricle cavity size is normal.  Left Ventricle:  |              |
| There is mild concentric left ventricular hypertrophy.  Left           |              |
| Ventricle: No regional wall motion abnormalities.  Left Ventricle: The |              |
|  diastolic filling pattern indicates impaired relaxation consistent    |              |
| with mild dysfunction (Grade I).  Right Ventricle: The right ventricle |              |
|  is normal in size and function.  Left Atrium: The left atrium is      |              |
| normal in size.  Right Atrium: The right atrium is normal in size.     |              |
| Aortic Valve: The aortic valve is mildly calcified.  Aortic Valve:     |              |
| There is no evidence of aortic stenosis.  Mitral Valve: No mitral      |              |
| regurgitation.  Mitral Valve: Mild mitral annular calcification        |              |
| present.  Tricuspid Valve: The tricuspid valve appears structurally    |              |
| normal.  Tricuspid Valve: Trace tricuspid regurgitation present.       |              |
| Tricuspid Valve: The right ventricular systolic pressure (pulmonary    |              |
| artery systolic pressure), as measured by Doppler, is 12.26mmHg.       |              |
| Pulmonic Valve: The pulmonic valve was not well visualized.  Pulmonic  |              |
| Valve: Mild degenerative changes in the aortic and mitral valve.       |              |
| Pericardium: There is no pericardial effusion.  Pericardium: No        |              |
| pleural effusion seen.  IVC/Hepatic Veins: The IVC is small (<1.5cm)   |              |
| and collapses with sniff, consistent with central venous pressures of  |              |
| 0-5mmHg.  Aorta: The aortic root, ascending aorta and aortic arch are  |              |
| normal.     MEASUREMENTS  -------------  Ao asc:    3.48 cm  IVC:      |              |
| 1.36 cm  LA Major:    5.02 cm  EDV(Teich):    82.80 ml  IVSd:    1.2   |              |
| cm  LVIDd:    4.29 cm  LVPWd:    1.1 cm  LVOT Area:    3.09 cm2  LVOT  |              |
| Diam:    1.98 cm  %FS:    38.10 %  EF(Teich):    68.61 %  ESV(Teich):  |              |
|    25.98 ml  LVIDs:    2.65 cm  SV(Teich):    56.81 ml  RA Major:      |              |
| 4.99 cm  RV Major:    5.66 cm  RVIDd:    2.37 cm  LVEF MOD A2C:        |              |
| 69.91 %  SV MOD A2C:    52.88 ml  LVEF MOD A4C:    70.34 %  SV MOD     |              |
 
| A4C:    57.70 ml  EF Biplane:    70.25 %  LVEDV MOD BP:    80.73 ml    |              |
| LVESV MOD BP:    24.01 ml  LVEDV MOD A2C:    75.63 ml  LVLd A2C:       |              |
| 7.56 cm  LVEDV MOD A4C:    82.03 ml  LVLd A4C:    7.97 cm  LVESV MOD   |              |
| A2C:    22.75 ml  LVLs A2C:    6.43 cm  LVESV MOD A4C:    24.33 ml     |              |
| LVLs A4C:    6.05 cm  LAESV(A-L):    43.21 ml  LAESV Index (A-L):      |              |
| 21.39 ml/m2  LAAs A2C:    15.21 cm2  LAESV A-L A2C:    41.14 ml  LALs  |              |
| A2C:    4.77 cm  LAAs A4C:    15.98 cm2  LAESV A-L A4C:    42.14 ml    |              |
| LALs A4C:    5.14 cm  RAAs:    10.97 cm2  RAESV A-L:    19.03 ml       |              |
| RAESV MOD:    18.53 ml  RALs:    5.37 cm  Ao Diam:    3.47 cm  LA      |              |
| Diam:    3.82 cm  LA/Ao:    1.10  AV maxP.35 mmHg  AV meanPG:   |              |
|    4.59 mmHg  AV Vmax:    1.35 m/s  AV Vmean:    1.01 m/s  AV VTI:     |              |
| 31.29 cm  KATHLEEN Vmax:    2.49 cm2  KATHLEEN (VTI):    2.22 cm2  AVAI Vmax:    |              |
|  0.00 cm2/m2  AVAI (VTI):    0.00 cm2/m2  LVOT maxP.79 mmHg     |              |
| LVOT meanP.41 mmHg  LVSI Dopp:    34.51 ml/m2  LVSV Dopp:       |              |
| 69.71 ml  LVOT Vmax:    1.09 m/s  LVOT Vmean:    0.71 m/s  LVOT VTI:   |              |
|    22.54 cm  MV A Pancho:    0.96 m/s  MV DecT:    282.18 ms  MV E Pancho:   |              |
|    1.11 m/s  MV E/A Ratio:    1.14  MV PHT:    81.83 ms  MVA By PHT:   |              |
|    2.68 cm2  Septal e':    0.06 m/s  Septal E/e':    16.53  Lateral    |              |
| e':    0.07 m/s  Lateral E/e':    14.35  RAP:    5 mmHg  RVSP:         |              |
| 12.25 mmHg  TR maxP.25 mmHg  TR Vmax:    1.34 m/s               |              |
| Sonographer: [email or web address]  Authenticated by:   Geovanni Mayes MD  Report      |              |
| Date/Time: --                                                          |              |
+------------------------------------------------------------------------+--------------+
 
 
 
+-------------------------------------------------------------------------------------------
-------------------------+
| Procedure Note                                                                            
                         |
+-------------------------------------------------------------------------------------------
-------------------------+
|   Antonio Hutchins Conversion - 2019 10:39 PM PDT   Patient Name:  Jennifer Giordano     
                         |
| Birth:  1945 Accession:  [id number] Performing Physician:  Geovanni Mayes           
                         |
| MD_______________________________________________________________INDICATIONS-----------M  
                         |
| urmur CONCLUSIONS-----------1. The left ventricle is normal in size, mild increase in     
                         |
| wall thickness and hyperdynamic systolic function EF>70%.2. The diastolic filling         
                         |
| pattern indicates impaired relaxation consistent with mild dysfunction (Grade I).3. The   
                         |
| right ventricle is normal in size and function.4. Trace tricuspid regurgitation with no   
                         |
| pulmonary hypertension.5. Mild degenerative changes in the aortic and mitral valve.6.     
                         |
| There is no pericardial effusion. FINDINGS--------ECG rhythm: Sinus rhythm.Study: A       
                         |
| 2-dimensional transthoracic echocardiogram with m-mode, spectral and color flow Doppler   
                         |
| was perfomed.Study: This was a technically adequate study.Left Ventricle: Left            
                         |
| ventricular systolic function is hyperdynamic with an estimated EF of >70%.Left           
                         |
| Ventricle: The left ventricle cavity size is normal.Left Ventricle: There is mild         
                         |
| concentric left ventricular hypertrophy.Left Ventricle: No regional wall motion           
                         |
 
| abnormalities.Left Ventricle: The diastolic filling pattern indicates impaired            
                         |
| relaxation consistent with mild dysfunction (Grade I).Right Ventricle: The right          
                         |
| ventricle is normal in size and function.Left Atrium: The left atrium is normal in        
                         |
| size.Right Atrium: The right atrium is normal in size.Aortic Valve: The aortic valve is   
                         |
| mildly calcified.Aortic Valve: There is no evidence of aortic stenosis.Mitral Valve: No   
                         |
| mitral regurgitation.Mitral Valve: Mild mitral annular calcification present.Tricuspid    
                         |
| Valve: The tricuspid valve appears structurally normal.Tricuspid Valve: Trace tricuspid   
                         |
| regurgitation present.Tricuspid Valve: The right ventricular systolic pressure            
                         |
| (pulmonary artery systolic pressure), as measured by Doppler, is 12.26mmHg.Pulmonic       
                         |
| Valve: The pulmonic valve was not well visualized.Pulmonic Valve: Mild degenerative       
                         |
| changes in the aortic and mitral valve.Pericardium: There is no pericardial               
                         |
| effusion.Pericardium: No pleural effusion seen.IVC/Hepatic Veins: The IVC is small        
                         |
| (<1.5cm) and collapses with sniff, consistent with central venous pressures of            
                         |
| 0-5mmHg.Aorta: The aortic root, ascending aorta and aortic arch are normal.               
                         |
| MEASUREMENTS-------------Ao asc:   3.48 cmIVC:   1.36 cmLA Major:   5.02 cmEDV(Teich):    
                         |
|  82.80 mlIVSd:   1.2 cmLVIDd:   4.29 cmLVPWd:   1.1 cmLVOT Area:   3.09 [id number] Diam:     
                         |
| 1.98 cm%FS:   38.10 %EF(Teich):   68.61 %ESV(Teich):   25.98 mlLVIDs:   2.65              
                         |
| cmSV(Teich):   56.81 mlRA Major:   4.99 cmRV Major:   5.66 cmRVIDd:   2.37 cmLVEF MOD     
                         |
| A2C:   69.91 %SV MOD A2C:   52.88 mlLVEF MOD A4C:   70.34 %SV MOD A4C:   57.70 mlEF       
                         |
| Biplane:   70.25 %LVEDV MOD BP:   80.73 mlLVESV MOD BP:   24.01 mlLVEDV MOD A2C:   75.63  
                         |
|  mlLVLd A2C:   7.56 cmLVEDV MOD A4C:   82.03 mlLVLd A4C:   7.97 cmLVESV MOD A2C:   22.75  
                         |
|  mlLVLs A2C:   6.43 cmLVESV MOD A4C:   24.33 mlLVLs A4C:   6.05 cmLAESV(A-L):   43.21     
                         |
| mlLAESV Index (A-L):   21.39 ml/m2LAAs A2C:   15.21 [id number] A-L A2C:   41.14 mlLALs      
                         |
| A2C:   4.77 cmLAAs A4C:   15.98 [id number] A-L A4C:   42.14 mlLALs A4C:   5.14 cmRAAs:      
                         |
| 10.97 [id number] A-L:   19.03 mlRAESV MOD:   18.53 mlRALs:   5.37 cmAo Diam:   3.47 cmLA    
                         |
| Diam:   3.82 cmLA/Ao:   1.10AV maxP.35 mmHgAV meanP.59 mmHgAV Vmax:   1.35    
                         |
| m/Tigist Vmean:   1.01 m/Tigist VTI:   31.29 cmAVA Vmax:   2.49 cm2AVA (VTI):   2.22 dl0PSUW    
                         |
| Vmax:   0.00 cm2/m2AVAI (VTI):   0.00 cm2/m2LVOT maxP.79 mmHgLVOT meanP.41    
                         |
| mmHgLVSI Dopp:   34.51 ml/m2LVSV Dopp:   69.71 mlLVOT Vmax:   1.09 m/sLVOT Vmean:   0.71  
                         |
|  m/sLVOT VTI:   22.54 cmMV A Pancho:   0.96 m/sMV DecT:   282.18 msMV E Pancho:   1.11 m/sMV    
                         |
 
| E/A Ratio:   1.14MV PHT:   81.83 msMVA By PHT:   2.68 [id number] e':   0.06 m/sSeptal      
                         |
| E/e':   16.53Lateral e':   0.07 m/sLateral E/e':   14.35RAP:   5 mmHgRVSP:   12.25        
                         |
| mmHgTR maxP.25 mmHgTR Vmax:   1.34 m/s Sonographer: DHAuthenticated by:  Gerber,  
                         |
|  [facility] Date/Time: -- IMPRESSION: 1. The left ventricle is normal in size,      
                         |
| mild increase in wall thickness and hyperdynamic systolic function EF>70%.2. The          
                         |
| diastolic filling pattern indicates impaired relaxation consistent with mild dysfunction  
                         |
|  (Grade I).3. The right ventricle is normal in size and function.4. Trace tricuspid       
                         |
| regurgitation with no pulmonary hypertension.5. Mild degenerative changes in the aortic   
                         |
| and mitral valve.6. There is no pericardial effusion.                                     
                         |
|EDV(Teich):   82.80 ml                                                                     
                         |
|IVSd:   1.2 cm                                                                             
                         |
|LVIDd:   4.29 cm                                                                           
                         |
|LVPWd:   1.1 cm                                                                            
                         |
|LVOT Area:   3.09 cm2                                                                      
                         |
|LVOT Diam:   1.98 cm                                                                       
                         |
|%FS:   38.10 %                                                                             
                         |
|EF(Teich):   68.61 %                                                                       
                         |
|ESV(Teich):   25.98 ml                                                                     
                         |
|LVIDs:   2.65 cm                                                                           
                         |
|SV(Teich):   56.81 ml                                                                      
                         |
|RA Major:   4.99 cm                                                                        
                         |
|RV Major:   5.66 cm                                                                        
                         |
|RVIDd:   2.37 cm                                                                           
                         |
|LVEF MOD A2C:   69.91 %                                                                    
                         |
|SV MOD A2C:   52.88 ml                                                                     
                         |
|LVEF MOD A4C:   70.34 %                                                                    
                         |
|SV MOD A4C:   57.70 ml                                                                     
                         |
|EF Biplane:   70.25 %                                                                      
                         |
|LVEDV MOD BP:   80.73 ml                                                                   
                         |
|LVESV MOD BP:   24.01 ml                                                                   
                         |
 
|LVEDV MOD A2C:   75.63 ml                                                                  
                         |
|LVLd A2C:   7.56 cm                                                                        
                         |
|LVEDV MOD A4C:   82.03 ml                                                                  
                         |
|LVLd A4C:   7.97 cm                                                                        
                         |
|LVESV MOD A2C:   22.75 ml                                                                  
                         |
|LVLs A2C:   6.43 cm                                                                        
                         |
|LVESV MOD A4C:   24.33 ml                                                                  
                         |
|LVLs A4C:   6.05 cm                                                                        
                         |
|LAESV(A-L):   43.21 ml                                                                     
                         |
|LAESV Index (A-L):   21.39 ml/m2                                                           
                         |
|LAAs A2C:   15.21 cm2                                                                      
                         |
|LAESV A-L A2C:   41.14 ml                                                                  
                         |
|LALs A2C:   4.77 cm                                                                        
                         |
|LAAs A4C:   15.98 cm2                                                                      
                         |
|LAESV A-L A4C:   42.14 ml                                                                  
                         |
|LALs A4C:   5.14 cm                                                                        
                         |
|RAAs:   10.97 cm2                                                                          
                         |
|RAESV A-L:   19.03 ml                                                                      
                         |
|RAESV MOD:   18.53 ml                                                                      
                         |
|RALs:   5.37 cm                                                                            
                         |
|Ao Diam:   3.47 cm                                                                         
                         |
|LA Diam:   3.82 cm                                                                         
                         |
|LA/Ao:   1.10                                                                              
                         |
|AV maxP.35 mmHg                                                                      
                         |
|AV meanP.59 mmHg                                                                     
                         |
|AV Vmax:   1.35 m/s                                                                        
                         |
|AV Vmean:   1.01 m/s                                                                       
                         |
|AV VTI:   31.29 cm                                                                         
                         |
|KATHLEEN Vmax:   2.49 cm2                                                                       
                         |
|KATHLEEN (VTI):   2.22 cm2                                                                      
                         |
 
|AVAI Vmax:   0.00 cm2/m2                                                                   
                         |
|AVAI (VTI):   0.00 cm2/m2                                                                  
                         |
|LVOT maxP.79 mmHg                                                                    
                         |
|LVOT meanP.41 mmHg                                                                   
                         |
|LVSI Dopp:   34.51 ml/m2                                                                   
                         |
|LVSV Dopp:   69.71 ml                                                                      
                         |
|LVOT Vmax:   1.09 m/s                                                                      
                         |
|LVOT Vmean:   0.71 m/s                                                                     
                         |
|LVOT VTI:   22.54 cm                                                                       
                         |
|MV A Pancho:   0.96 m/s                                                                       
                         |
|MV DecT:   282.18 ms                                                                       
                         |
|MV E Pancho:   1.11 m/s                                                                       
                         |
|MV E/A Ratio:   1.14                                                                       
                         |
|MV PHT:   81.83 ms                                                                         
                         |
|MVA By PHT:   2.68 cm2                                                                     
                         |
|Septal e':   0.06 m/s                                                                      
                         |
|Septal E/e':   16.53                                                                       
                         |
|Lateral e':   0.07 m/s                                                                     
                         |
|Lateral E/e':   14.35                                                                      
                         |
|RAP:   5 mmHg                                                                              
                         |
|RVSP:   12.25 mmHg                                                                         
                         |
|TR maxP.25 mmHg                                                                      
                         |
|TR Vmax:   1.34 m/s                                                                        
                         |
|Sonographer:                                                                             
                         |
|Authenticated by:  Geovanni Mayes MD                                                    
                         |
|Report Date/Time: --                                                                       
                         |
|IMPRESSION:                                                                                
                         |
|1. The left ventricle is normal in size, mild increase in wall thickness and hyperdynamic s
ystolic function EF>70%. |
 
|2. The diastolic filling pattern indicates impaired relaxation consistent with mild dysfunc
tion (Grade I).          |
|3. The right ventricle is normal in size and function.                                     
                         |
|4. Trace tricuspid regurgitation with no pulmonary hypertension.                           
                         |
|5. 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                               
                         |
|6. There is no pericardial effusion.                                                       
                         |
+-------------------------------------------------------------------------------------------
-------------------------+
 documented in this encounter
 
 Visit Diagnoses
 Not on filedocumented in this encounter

## 2020-01-13 NOTE — XMS
Encounter Summary
  Created on: 2020
 
 Pasquale Nehemias Martinez
 External Reference #: [id number]
 : 43
 Sex: Male
 
 Demographics
 
 
+-----------------------+----------------------+
| Address               | [address]         |
|                       | [place]  [address] |
+-----------------------+----------------------+
| Home Phone            | [phone]      |
+-----------------------+----------------------+
| Preferred Language    | Unknown              |
+-----------------------+----------------------+
| Marital Status        |               |
+-----------------------+----------------------+
| Evangelical Affiliation | [id number]                 |
+-----------------------+----------------------+
| Race                  | Unknown              |
+-----------------------+----------------------+
| Ethnic Group          | Unknown              |
+-----------------------+----------------------+
 
 
 Author
 
 
+--------------+--------------------------------------------+
| Author       | [facility]  |
|              | and [facility]                                |
+--------------+--------------------------------------------+
| Organization | [facility]  |
|              | and [place]                                |
+--------------+--------------------------------------------+
| Address      | Unknown                                    |
+--------------+--------------------------------------------+
| Phone        | Unavailable                                |
+--------------+--------------------------------------------+
 
 
 
 Support
 
 
+--------------+--------------+---------------------+-----------------+
| Name         | Relationship | Address             | Phone           |
+--------------+--------------+---------------------+-----------------+
| Rupa Giordano | ECON         | [address]             | [phone] |
|              |              | [place]   |                 |
|              |              | [address]               |                 |
+--------------+--------------+---------------------+-----------------+
 
 
 
 
 Care Team Providers
 
 
+-----------------------+------+-------------+
| Care Team Member Name | Role | Phone       |
+-----------------------+------+-------------+
| No, Physician         | PCP  | Unavailable |
+-----------------------+------+-------------+
 
 
 
 Reason for Visit
 
 
+-------------+----------+
| Reason      | Comments |
+-------------+----------+
| Appointment |          |
+-------------+----------+
 
 
 
 Encounter Details
 
 
+--------+-----------+----------------------+----------------------+-------------+
| Date   | Type      | Department           | Care Team            | Description |
+--------+-----------+----------------------+----------------------+-------------+
| / | Telephone |   [facility]      |   Briana Casper Coates, | Appointment |
| 2019   |           | VASCULAR SURGERY     |  RN                  |             |
|        |           | [address] |                      |             |
|        |           |  E  [place]     |                      |             |
|        |           | [address]           |                      |             |
|        |           | [phone]         |                      |             |
+--------+-----------+----------------------+----------------------+-------------+
 
 
 
 Social History
 
 
+---------------+-------+-----------+--------+------+
| Tobacco Use   | Types | Packs/Day | Years  | Date |
|               |       |           | Used   |      |
+---------------+-------+-----------+--------+------+
| Former Smoker |       |           |        |      |
+---------------+-------+-----------+--------+------+
 
 
 
+---------------------+---+---+---+
| Smokeless Tobacco:  |   |   |   |
| Former User         |   |   |   |
+---------------------+---+---+---+
 
 
 
+------------------------------+
| Comments: quit 50 years ago  |
 
+------------------------------+
 
 
 
+---------------+-------------+---------+----------+
| Alcohol Use   | Drinks/Week | oz/Week | Comments |
+---------------+-------------+---------+----------+
| Not Currently |             |         | 90m days |
+---------------+-------------+---------+----------+
 
 
 
+------------------+---------------+
| Sex Assigned at  | Date Recorded |
| Birth            |               |
+------------------+---------------+
| Not on file      |               |
+------------------+---------------+
 
 
 
+----------------+-------------+-------------+
| Job Start Date | Occupation  | Industry    |
+----------------+-------------+-------------+
| Not on file    | Not on file | Not on file |
+----------------+-------------+-------------+
 
 
 
+----------------+--------------+------------+
| Travel History | Travel Start | Travel End |
+----------------+--------------+------------+
 
 
 
+-------------------------------------+
| No recent travel history available. |
+-------------------------------------+
 documented as of this encounter
 
 Functional Status
 
 
+---------------------------------------------+----------+--------------------+
| Functional Status                           | Response | Date of Assessment |
+---------------------------------------------+----------+--------------------+
| Are you deaf or do you have serious         | No       | 2019         |
| difficulty hearing?                         |          |                    |
+---------------------------------------------+----------+--------------------+
| Are you blind or do you have serious        | No       | 2019         |
| difficulty seeing, even when wearing        |          |                    |
| glasses?                                    |          |                    |
+---------------------------------------------+----------+--------------------+
| Do you have serious difficulty walking or   | No       | 2019         |
| climbing stairs? (5 years old or older)     |          |                    |
+---------------------------------------------+----------+--------------------+
| Do you have difficulty dressing or bathing? | No       | 2019         |
|  (5 years old or older)                     |          |                    |
+---------------------------------------------+----------+--------------------+
| Because of a physical, mental, or emotional | No       | 2019         |
 
|  condition, do you have difficulty doing    |          |                    |
| errands alone such as visiting a doctor's   |          |                    |
| office or shopping? [15 years old or        |          |                    |
| older)]                                     |          |                    |
+---------------------------------------------+----------+--------------------+
 
 
 
+---------------------------------------------+----------+--------------------+
| Cognitive Status                            | Response | Date of Assessment |
+---------------------------------------------+----------+--------------------+
| Because of a physical, mental, or emotional | No       | 2019         |
|  condition, do you have serious difficulty  |          |                    |
| concentrating, remembering, or making       |          |                    |
| decisions? (5 years old or older)           |          |                    |
+---------------------------------------------+----------+--------------------+
 documented as of this encounter
 
 Plan of Treatment
 
 
+--------+-------------+------------------+----------------------+-------------+
| Date   | Type        | Specialty        | Care Team            | Description |
+--------+-------------+------------------+----------------------+-------------+
| / | Appointment | Radiology        |   Augustin Lopez DNP      |             |
|    |             |                  | [address]     |             |
|        |             |                  | [place]  |             |
|        |             |                  | [id number] [phone]  |             |
|        |             |                  |  [phone] (Fax)  |             |
+--------+-------------+------------------+----------------------+-------------+
| / | Office      | Vascular Surgery |   Augustin Lopez DNP      |             |
| 2020   | Visit       |                  | [address]     |             |
|        |             |                  | [place]  |             |
|        |             |                  | [address]  [phone]  |             |
|        |             |                  |  [phone] (Fax)  |             |
+--------+-------------+------------------+----------------------+-------------+
 documented as of this encounter
 
 Visit Diagnoses
 Not on filedocumented in this encounter

## 2020-01-13 NOTE — XMS
Encounter Summary
  Created on: 2020
 
 Pasquale Nehemias Martinez
 External Reference #: [id number]
 : 43
 Sex: Male
 
 Demographics
 
 
+-----------------------+----------------------+
| Address               | [address]         |
|                       | [place]  [address] |
+-----------------------+----------------------+
| Home Phone            | [phone]      |
+-----------------------+----------------------+
| Preferred Language    | Unknown              |
+-----------------------+----------------------+
| Marital Status        |               |
+-----------------------+----------------------+
| Muslim Affiliation | [id number]                 |
+-----------------------+----------------------+
| Race                  | Unknown              |
+-----------------------+----------------------+
| Ethnic Group          | Unknown              |
+-----------------------+----------------------+
 
 
 Author
 
 
+--------------+--------------------------------------------+
| Author       | [facility]  |
|              | and [place]                                |
+--------------+--------------------------------------------+
| Organization | [facility]  |
|              | and [place]                                |
+--------------+--------------------------------------------+
| Address      | Unknown                                    |
+--------------+--------------------------------------------+
| Phone        | Unavailable                                |
+--------------+--------------------------------------------+
 
 
 
 Support
 
 
+--------------+--------------+---------------------+-----------------+
| Name         | Relationship | Address             | Phone           |
+--------------+--------------+---------------------+-----------------+
| Rupa Giordano | ECON         | [address]             | [phone] |
|              |              | [place], [place]   |                 |
|              |              | [address]               |                 |
+--------------+--------------+---------------------+-----------------+
 
 
 
 
 Care Team Providers
 
 
+-----------------------+------+-------------+
| Care Team Member Name | Role | Phone       |
+-----------------------+------+-------------+
 PCP  | Unavailable |
+-----------------------+------+-------------+
 
 
 
 Reason for Visit
 
 
+---------+-----------+
| Reason  | Comments  |
+---------+-----------+
| Consult | abd pain  |
+---------+-----------+
 Evaluate & Treat (Routine)
 
+--------+--------+-----------+--------------+--------------+---------------+
| Status | Reason | Specialty | Diagnoses /  | Referred By  | Referred To   |
|        |        |           | Procedures   | Contact      | Contact       |
+--------+--------+-----------+--------------+--------------+---------------+
| Closed |        |           |   Diagnoses  |   Ferranco,  |   Sukhdeep Pardo, |
|        |        |           |  Lower       | Claudio DELEON,    |  MD  [address]     |
|        |        |           | abdominal    | ARNP  [address]     | [address]   |
|        |        |           | pain,        | [address]   | [address]         |
|        |        |           | unspecified  | [address]    | [place], [place]  |
|        |        |           |              | [place]    | [address]    |
|        |        |           |              | [address]        | Phone:        |
|        |        |           |              | Phone:       | [phone]  |
|        |        |           |              | [phone] |  Fax:         |
|        |        |           |              |   Fax:       | [phone]  |
|        |        |           |              | [phone] |               |
+--------+--------+-----------+--------------+--------------+---------------+
 
 
 
 
 Encounter Details
 
 
+--------+---------+----------------------+----------------------+----------------------+
| Date   | Type    | Department           | Care Team            | Description          |
+--------+---------+----------------------+----------------------+----------------------+
| / | Office  |   [facility]      |   Sukhdeep Pardo MD     | Mesenteric ischemia, |
| 2019   | Visit   | VASCULAR SURGERY     | [address]     |  chronic (HCC)       |
|        |         | [address] | [facility] [place]  | (Primary Dx)         |
|        |         |  [place]  [facility] [place]     | [address]           |                      |
|        |         | [address]           | [phone]         |                      |
|        |         | [phone]         | [phone] (Fax)   |                      |
+--------+---------+----------------------+----------------------+----------------------+
 
 
 
 Social History
 
 
 
+---------------+-------+-----------+--------+------+
| Tobacco Use   | Types | Packs/Day | Years  | Date |
|               |       |           | Used   |      |
+---------------+-------+-----------+--------+------+
| Former Smoker |       |           |        |      |
+---------------+-------+-----------+--------+------+
 
 
 
+---------------------+---+---+---+
| Smokeless Tobacco:  |   |   |   |
| Never Used          |   |   |   |
+---------------------+---+---+---+
 
 
 
+------------------------------+
| Comments: quit 50 years ago  |
+------------------------------+
 
 
 
+------------------+---------------+
| Sex Assigned at  | Date Recorded |
| Birth            |               |
+------------------+---------------+
| Not on file      |               |
+------------------+---------------+
 
 
 
+----------------+-------------+-------------+
| Job Start Date | Occupation  | Industry    |
+----------------+-------------+-------------+
| Not on file    | Not on file | Not on file |
+----------------+-------------+-------------+
 
 
 
+----------------+--------------+------------+
| Travel History | Travel Start | Travel End |
+----------------+--------------+------------+
 
 
 
+-------------------------------------+
| No recent travel history available. |
+-------------------------------------+
 documented as of this encounter
 
 Last Filed Vital Signs
 
 
+-------------------+---------+----------------------+----------+
| Vital Sign        | Reading | Time Taken           | Comments |
+-------------------+---------+----------------------+----------+
| Blood Pressure    | 133/71  | 2019  8:53 AM  |          |
|                   |         | PDT                  |          |
+-------------------+---------+----------------------+----------+
 
| Pulse             | 66      | 2019  8:53 AM  |          |
|                   |         | PDT                  |          |
+-------------------+---------+----------------------+----------+
| Temperature       | -       | -                    |          |
+-------------------+---------+----------------------+----------+
| Respiratory Rate  | -       | -                    |          |
+-------------------+---------+----------------------+----------+
| Oxygen Saturation | 97%     | 2019  8:53 AM  |          |
|                   |         | PDT                  |          |
+-------------------+---------+----------------------+----------+
| Inhaled Oxygen    | -       | -                    |          |
| Concentration     |         |                      |          |
+-------------------+---------+----------------------+----------+
| Weight            | -       | -                    |          |
+-------------------+---------+----------------------+----------+
| Height            | -       | -                    |          |
+-------------------+---------+----------------------+----------+
| Body Mass Index   | -       | -                    |          |
+-------------------+---------+----------------------+----------+
 documented in this encounter
 
 Progress Notes
 Sukhdeep Pardo MD - 2019  9:00 AM PDTFormatting of this note might be different from the
 original.
 Subjective 
 Subjective 
 
 Mr. Giordano is a pleasant 74 y.o. male with no significant PMH who is referred to me for abd
ominal pain. Patient has been describing intermittent abdominal pain that radiates across hi
s abdomen to his back. Patient does not endorse any specific worsening pain associated with 
eating, however he does state that he has not been eating a lot. Patient has been watching h
is diet and has been trying to decrease his carbohydrates. Patient has lost 24 lbs over the 
last 2 months. The patient had a recent CT and MRI abdomen from [facility]. [facility]
ent denies any surgical intervention for this abdominal pain, however has had past abdominal
 surgeries. The patient indicates that his heart function is good. 
 
 History reviewed. No pertinent past medical history.
 
 History reviewed. No pertinent surgical history.
 
 Social History 
 
 Tobacco Use 
   Smoking status: Former Smoker 
   Smokeless tobacco: Never Used 
   Tobacco comment: quit 50 years ago  
 Substance Use Topics 
   Alcohol use: Not on file 
   Drug use: Not on file 
 
 History reviewed. No pertinent family history.
 
 Current Outpatient Medications on File Prior to Visit 
 Medication Sig Dispense Refill 
   ASPIRIN 81 PO Take 81 mg by mouth.   
   hydroCHLOROthiazide 25 mg tablet Take 25 mg by mouth Daily.   
   losartan (COZAAR) 50 mg tablet Take 50 mg by mouth Daily.   
   Multiple Vitamins-Minerals (CENTRUM SILVER 50+MEN PO) Take  by mouth.   
   simvastatin (ZOCOR) 10 mg tablet Take 10 mg by mouth nightly.   
 
 
 No current facility-administered medications on file prior to visit.  
 
 No Known Allergies
 
 Comprehensive ROS performed and pertinent items described in the HPI. 
 
  
 
 Objective 
 Objective 
 
 Vitals:reviewed
 CONSTITUTIONAL:  Conversant, well developed, NAD
 EYES: Anicteric sclerae, no lid drag, no proptosis
 RESP: Normal effort, regular, even, unlabored rate
 CV: No peripheral edema, rate regular
 SKIN: Pink, warm, dry without rash/lesion
 MS: ROM not limited, no digital cyanosis, normal gait
 NEURO: Cranial nerves II-XII grossly intact, A&O times 3
 PSYCH: appropriate affect, speech and tone, judgement and insight intact
 Vascular:  Palpable femoral pulses
 
  
 
 Assessment 
 Assessment and Plan 
 
 CT and MRI abdomen results from [facility] reviewed and discussed with the [facility]
ent. The patient's imaging is is concerning for mesenteric ischemia as he has short segment 
occlusion of the superior mesenteric artery. The patient does not have classic symptoms for 
mesenteric ischemia, however he does have concerning symptoms nonetheless of significant josette
ght loss and intermittent abdominal pain. Surgical options for treatment of the patient's me
senteric artery disease have been discussed with the patient and I have discussed my recomme
ndation of pursuing surgery to prevent worsening symptoms. Patient understands that this is 
a major surgery requiring extended hospital stay afterwards and he will be placed under gene
ral anesthesia. Discussed surgical option and mechanism of superior mesenteric artery endart
erectomy with the patient. We have discussed benefits and risks of this procedure, including
 bleeding, infection, heart attack, stroke, death. Patient is understanding and agreeable to
 superior mesenteric artery endarterectomy, and has signed consent for surgery on 19. MARIMAR carroll was instructed not to eat or drink fluids after 11:59 PM on 19. All questions an
d concerns addressed. Patient will follow up after surgery. Patient understands and is agree
able. 
 
  
 
 Attending Note: Documentation assistance provided by Dale Santiago (Scribe). Information sandeep
rded by the scribe has been reviewed and validated by me. I agree with its contents.
 
 Signed by: Dale Santiago, Juan Manuelibe 19, 16:57
 
 Sukhdeep Pardo MDElectronically signed by Sukhdeep Pardo MD at 2019  4:59 PM PDTdocumented in
 this encounter
 
 Plan of Treatment
 
 
+--------+-------------+------------------+----------------------+-------------+
| Date   | Type        | Specialty        | Care Team            | Description |
+--------+-------------+------------------+----------------------+-------------+
| / | Appointment | Radiology        |   Augustin Lopez DNP      |             |
 
|    |             |                  |  [place]     |             |
|        |             |                  | [place]  |             |
|        |             |                  | [id number] [phone]  |             |
|        |             |                  |  [phone] (Fax)  |             |
+--------+-------------+------------------+----------------------+-------------+
| / | Office      | Vascular Surgery |   Augustin Lopez DNP      |             |
|    | Visit       |                  | [address]     |             |
|        |             |                  | [place]  |             |
|        |             |                  | [address]  [phone]  |             |
|        |             |                  |  [phone] (Fax)  |             |
+--------+-------------+------------------+----------------------+-------------+
 
 
 
+-------------+------+--------+----------------------+----------------------+
| Name        | Type | Priori | Associated Diagnoses | Order Schedule       |
|             |      | ty     |                      |                      |
+-------------+------+--------+----------------------+----------------------+
| ECG 12 lead | ECG  | Routin |   Mesenteric         | 1 Occurrences        |
|             |      | e      | ischemia, chronic    | starting 2019  |
|             |      |        | (HCC)                | until 2020     |
+-------------+------+--------+----------------------+----------------------+
 documented as of this encounter
 
 Results
 XR Chest PA and Lateral (2019 10:15 AM PDT)
 
+----------+
| Specimen |
+----------+
|          |
+----------+
 
 
 
+------------------------------------------------------------------------+---------------+
| Narrative                                                              | Performed At  |
+------------------------------------------------------------------------+---------------+
|      CHEST PA AND LATERAL     CLINICAL INFORMATION:  Pre operative     |   [facility] |
| evaluation     COMPARISON:  CT ANGIOGRAPHY ABDOMEN AND PELVIS          |               |
| (2019);     FINDINGS:  There is a BB in the posteromedial right   |               |
| lower lobe, better defined on  CT abdomen 2019. the lungs are     |               |
| clear.   The heart size is normal  c..   Mild to moderate degenerative |               |
|  changes of the thoracic spine     IMPRESSION:  No active              |               |
| intrathoracic disease.           Signed by: PADILLA Singh Shawn  Sign   |               |
| Date/Time: 2019 10:27 AM                                         |               |
+------------------------------------------------------------------------+---------------+
 
 
 
+------------------------------------------------------------------------+
| Procedure Note                                                         |
+------------------------------------------------------------------------+
|   Cuong, Rad Results In - 2019 10:31 AM PDT                        |
| CHEST PA AND LATERAL                                                   |
|                                                                        |
| CLINICAL INFORMATION:                                                  |
| Pre operative evaluation                                               |
|                                                                        |
| COMPARISON:                                                            |
 
| CT ANGIOGRAPHY ABDOMEN AND PELVIS (2019);                         |
|                                                                        |
| FINDINGS:                                                              |
| There is a BB in the posteromedial right lower lobe, better defined on |
| CT abdomen 2019. the lungs are clear.  The heart size is normal   |
| c..  Mild to moderate degenerative changes of the thoracic spine       |
|                                                                        |
| IMPRESSION:                                                            |
| No active intrathoracic disease.                                       |
|                                                                        |
| Signed by: PADILLA Singh Shawn                                          |
| Sign Date/Time: 2019 10:27 AM                                    |
+------------------------------------------------------------------------+
 
 
 
+---------------+---------+--------------------+--------------+
| Performing    | Address | City/State/Zipcode | Phone Number |
| Organization  |         |                    |              |
+---------------+---------+--------------------+--------------+
|   [facility] |         |                    |              |
+---------------+---------+--------------------+--------------+
 documented in this encounter
 
 Visit Diagnoses
 
 
+------------------------------------------------------------------------------------+
| Diagnosis                                                                          |
+------------------------------------------------------------------------------------+
|   Mesenteric ischemia, chronic (HCC) - Primary  Chronic vascular insufficiency of  |
| intestine                                                                          |
+------------------------------------------------------------------------------------+
 documented in this encounter

## 2020-01-13 NOTE — XMS
Encounter Summary
  Created on: 2020
 
 Pasquale Nehemias Martinez
 External Reference #: [id number]
 : 43
 Sex: Male
 
 Demographics
 
 
+-----------------------+----------------------+
| Address               | [address]         |
|                       | [place]  [address] |
+-----------------------+----------------------+
| Home Phone            | [phone]      |
+-----------------------+----------------------+
| Preferred Language    | Unknown              |
+-----------------------+----------------------+
| Marital Status        |               |
+-----------------------+----------------------+
| Druze Affiliation | 1077                 |
+-----------------------+----------------------+
| Race                  | Unknown              |
+-----------------------+----------------------+
| Ethnic Group          | Unknown              |
+-----------------------+----------------------+
 
 
 Author
 
 
+--------------+--------------------------------------------+
| Author       | [facility]  |
|              | and [facility]                                |
+--------------+--------------------------------------------+
| Organization | [facility]  |
|              | and [place]                                |
+--------------+--------------------------------------------+
| Address      | Unknown                                    |
+--------------+--------------------------------------------+
| Phone        | Unavailable                                |
+--------------+--------------------------------------------+
 
 
 
 Support
 
 
+--------------+--------------+---------------------+-----------------+
| Name         | Relationship | Address             | Phone           |
+--------------+--------------+---------------------+-----------------+
| Rupa Giordano | [employer]         | [address]             | [phone] |
|              |              | [place]   |                 |
|              |              | [id number]               |                 |
+--------------+--------------+---------------------+-----------------+
 
 
 
 
 Care Team Providers
 
 
+-----------------------+------+-------------+
| Care Team Member Name | Role | Phone       |
+-----------------------+------+-------------+
| No, Physician         | PCP  | Unavailable |
+-----------------------+------+-------------+
 
 
 
 Reason for Visit
 
 
+----------------------+----------+
| Reason               | Comments |
+----------------------+----------+
| Appointment Question |          |
+----------------------+----------+
 
 
 
 Encounter Details
 
 
+--------+-----------+----------------------+----------------------+----------------------+
| Date   | Type      | Department           | Care Team            | Description          |
+--------+-----------+----------------------+----------------------+----------------------+
| / | Telephone |   [facility]      |   Briana Casper Coates, | Appointment Question |
| 2020   |           | VASCULAR SURGERY     |  RN                  |                      |
|        |           | [address] |                      |                      |
|        |           |  [address]  [place]     |                      |                      |
|        |           | [id number]           |                      |                      |
|        |           | [phone]         |                      |                      |
+--------+-----------+----------------------+----------------------+----------------------+
 
 
 
 Social History
 
 
+---------------+-------+-----------+--------+------+
| Tobacco Use   | Types | Packs/Day | Years  | Date |
|               |       |           | Used   |      |
+---------------+-------+-----------+--------+------+
| Former Smoker |       |           |        |      |
+---------------+-------+-----------+--------+------+
 
 
 
+---------------------+---+---+---+
| Smokeless Tobacco:  |   |   |   |
| Former User         |   |   |   |
+---------------------+---+---+---+
 
 
 
+------------------------------+
| Comments: quit 50 years ago  |
 
+------------------------------+
 
 
 
+---------------+-------------+---------+----------+
| Alcohol Use   | Drinks/Week | oz/Week | Comments |
+---------------+-------------+---------+----------+
| Not Currently |             |         | 90m days |
+---------------+-------------+---------+----------+
 
 
 
+------------------+---------------+
| Sex Assigned at  | Date Recorded |
| Birth            |               |
+------------------+---------------+
| Not on file      |               |
+------------------+---------------+
 
 
 
+----------------+-------------+-------------+
| Job Start Date | Occupation  | Industry    |
+----------------+-------------+-------------+
| Not on file    | Not on file | Not on file |
+----------------+-------------+-------------+
 
 
 
+----------------+--------------+------------+
| Travel History | Travel Start | Travel End |
+----------------+--------------+------------+
 
 
 
+-------------------------------------+
| No recent travel history available. |
+-------------------------------------+
 documented as of this encounter
 
 Functional Status
 
 
+---------------------------------------------+----------+--------------------+
| Functional Status                           | Response | Date of Assessment |
+---------------------------------------------+----------+--------------------+
| Are you deaf or do you have serious         | No       | 2019         |
| difficulty hearing?                         |          |                    |
+---------------------------------------------+----------+--------------------+
| Are you blind or do you have serious        | No       | 2019         |
| difficulty seeing, even when wearing        |          |                    |
| glasses?                                    |          |                    |
+---------------------------------------------+----------+--------------------+
| Do you have serious difficulty walking or   | No       | 2019         |
| climbing stairs? (5 years old or older)     |          |                    |
+---------------------------------------------+----------+--------------------+
| Do you have difficulty dressing or bathing? | No       | 2019         |
|  (5 years old or older)                     |          |                    |
+---------------------------------------------+----------+--------------------+
| Because of a physical, mental, or emotional | No       | 2019         |
 
|  condition, do you have difficulty doing    |          |                    |
| errands alone such as visiting a doctor's   |          |                    |
| office or shopping? [15 years old or        |          |                    |
| older)]                                     |          |                    |
+---------------------------------------------+----------+--------------------+
 
 
 
+---------------------------------------------+----------+--------------------+
| Cognitive Status                            | Response | Date of Assessment |
+---------------------------------------------+----------+--------------------+
| Because of a physical, mental, or emotional | No       | 2019         |
|  condition, do you have serious difficulty  |          |                    |
| concentrating, remembering, or making       |          |                    |
| decisions? (5 years old or older)           |          |                    |
+---------------------------------------------+----------+--------------------+
 documented as of this encounter
 
 Plan of Treatment
 
 
+--------+-------------+------------------+----------------------+-------------+
| Date   | Type        | Specialty        | Care Team            | Description |
+--------+-------------+------------------+----------------------+-------------+
| / | Appointment | Radiology        |   Augustin Lopez DNP      |             |
|    |             |                  | [address]     |             |
|        |             |                  | [place]  |             |
|        |             |                  | [address]  [phone]  |             |
|        |             |                  |  [phone] (Fax)  |             |
+--------+-------------+------------------+----------------------+-------------+
| / | Office      | Vascular Surgery |   Augustin Lopez DNP      |             |
|    | Visit       |                  | [address]     |             |
|        |             |                  | [place]  |             |
|        |             |                  | [id number] [phone]  |             |
|        |             |                  |  [phone] (Fax)  |             |
+--------+-------------+------------------+----------------------+-------------+
 documented as of this encounter
 
 Visit Diagnoses
 Not on filedocumented in this encounter

## 2020-01-13 NOTE — XMS
Encounter Summary
  Created on: 2020
 
 Pasquale Nehemias Martinez
 External Reference #: [id number]
 : 43
 Sex: Male
 
 Demographics
 
 
+-----------------------+----------------------+
| Address               | [address]         |
|                       | [place]  [address] |
+-----------------------+----------------------+
| Home Phone            | [phone]      |
+-----------------------+----------------------+
| Preferred Language    | Unknown              |
+-----------------------+----------------------+
| Marital Status        |               |
+-----------------------+----------------------+
| Jewish Affiliation | [id number]                 |
+-----------------------+----------------------+
| Race                  | Unknown              |
+-----------------------+----------------------+
| Ethnic Group          | Unknown              |
+-----------------------+----------------------+
 
 
 Author
 
 
+--------------+--------------------------------------------+
| Author       | [facility]  |
|              | and [facility]                                |
+--------------+--------------------------------------------+
| Organization | [facility]  |
|              | and [place]                                |
+--------------+--------------------------------------------+
| Address      | Unknown                                    |
+--------------+--------------------------------------------+
| Phone        | Unavailable                                |
+--------------+--------------------------------------------+
 
 
 
 Support
 
 
+--------------+--------------+---------------------+-----------------+
| Name         | Relationship | Address             | Phone           |
+--------------+--------------+---------------------+-----------------+
| Rupa Giordano | ECON         | [address]             | [phone] |
|              |              | [place], [place]   |                 |
|              |              | [address]               |                 |
+--------------+--------------+---------------------+-----------------+
 
 
 
 
 Care Team Providers
 
 
+-----------------------+------+-------------+
| Care Team Member Name | Role | Phone       |
+-----------------------+------+-------------+
| No, Physician         | PCP  | Unavailable |
+-----------------------+------+-------------+
 
 
 
 Reason for Referral
 Diagnostic/Screening (Routine)
 
+--------+--------+-----------+--------------+--------------+--------------+
| Status | Reason | Specialty | Diagnoses /  | Referred By  | Referred To  |
|        |        |           | Procedures   | Contact      | Contact      |
+--------+--------+-----------+--------------+--------------+--------------+
| Closed |        | Radiology |   Diagnoses  |   Augustin Lopez,  |              |
|        |        |           |  Mesenteric  | DNP  [address]    |              |
|        |        |           | ischemia,    | [address]  |              |
|        |        |           | chronic      |  [address]       |              |
|        |        |           | ([facility])  H/O   | [place] |              |
|        |        |           | endarterecto |  [id number]       |              |
|        |        |           | my           | Phone:       |              |
|        |        |           | Procedures   | [phone] |              |
|        |        |           | VAS          |   Fax:       |              |
|        |        |           | Mesenteric   | [phone] |              |
|        |        |           | Arterial     |              |              |
|        |        |           | Duplex       |              |              |
+--------+--------+-----------+--------------+--------------+--------------+
 
 
 
 
 Reason for Visit
 
 
+-----------+----------+
| Reason    | Comments |
+-----------+----------+
| Follow-up |          |
+-----------+----------+
 
 
 
 Encounter Details
 
 
+--------+---------+----------------------+----------------------+----------------------+
| Date   | Type    | Department           | Care Team            | Description          |
+--------+---------+----------------------+----------------------+----------------------+
| / | Office  |   [facility]      |   Augustin Lopez DNP      | Mesenteric ischemia, |
|    | Visit   | VASCULAR SURGERY     | [address]     |  chronic (HCC)       |
|        |         | [address] | [place]  | (Primary Dx); H/O    |
|        |         |  E  [place], [place]     | [address]  [phone]  | endarterectomy; S/P  |
|        |         | [address]           |  [phone] (Fax)  | angioplasty          |
|        |         | [phone]         |                      |                      |
+--------+---------+----------------------+----------------------+----------------------+
 
 
 
 
 Social History
 
 
+---------------+-------+-----------+--------+------+
| Tobacco Use   | Types | Packs/Day | Years  | Date |
|               |       |           | Used   |      |
+---------------+-------+-----------+--------+------+
| Former Smoker |       |           |        |      |
+---------------+-------+-----------+--------+------+
 
 
 
+---------------------+---+---+---+
| Smokeless Tobacco:  |   |   |   |
| Former User         |   |   |   |
+---------------------+---+---+---+
 
 
 
+------------------------------+
| Comments: quit 50 years ago  |
+------------------------------+
 
 
 
+---------------+-------------+---------+----------+
| Alcohol Use   | Drinks/Week | oz/Week | Comments |
+---------------+-------------+---------+----------+
| Not Currently |             |         | 90m days |
+---------------+-------------+---------+----------+
 
 
 
+------------------+---------------+
| Sex Assigned at  | Date Recorded |
| Birth            |               |
+------------------+---------------+
| Not on file      |               |
+------------------+---------------+
 
 
 
+----------------+-------------+-------------+
| Job Start Date | Occupation  | Industry    |
+----------------+-------------+-------------+
| Not on file    | Not on file | Not on file |
+----------------+-------------+-------------+
 
 
 
+----------------+--------------+------------+
| Travel History | Travel Start | Travel End |
+----------------+--------------+------------+
 
 
 
+-------------------------------------+
 
| No recent travel history available. |
+-------------------------------------+
 documented as of this encounter
 
 Last Filed Vital Signs
 
 
+-------------------+---------+----------------------+----------+
| Vital Sign        | Reading | Time Taken           | Comments |
+-------------------+---------+----------------------+----------+
| Blood Pressure    | 126/79  | 2020 10:14 AM  |          |
|                   |         | PST                  |          |
+-------------------+---------+----------------------+----------+
| Pulse             | 64      | 2020 10:14 AM  |          |
|                   |         | PST                  |          |
+-------------------+---------+----------------------+----------+
| Temperature       | -       | -                    |          |
+-------------------+---------+----------------------+----------+
| Respiratory Rate  | -       | -                    |          |
+-------------------+---------+----------------------+----------+
| Oxygen Saturation | 100%    | 2020 10:14 AM  |          |
|                   |         | PST                  |          |
+-------------------+---------+----------------------+----------+
| Inhaled Oxygen    | -       | -                    |          |
| Concentration     |         |                      |          |
+-------------------+---------+----------------------+----------+
| Weight            | -       | -                    |          |
+-------------------+---------+----------------------+----------+
| Height            | -       | -                    |          |
+-------------------+---------+----------------------+----------+
| Body Mass Index   | -       | -                    |          |
+-------------------+---------+----------------------+----------+
 documented in this encounter
 
 Functional Status
 
 
+---------------------------------------------+----------+--------------------+
| Functional Status                           | Response | Date of Assessment |
+---------------------------------------------+----------+--------------------+
| Are you deaf or do you have serious         | No       | 2019         |
| difficulty hearing?                         |          |                    |
+---------------------------------------------+----------+--------------------+
| Are you blind or do you have serious        | No       | 2019         |
| difficulty seeing, even when wearing        |          |                    |
| glasses?                                    |          |                    |
+---------------------------------------------+----------+--------------------+
| Do you have serious difficulty walking or   | No       | 2019         |
| climbing stairs? (5 years old or older)     |          |                    |
+---------------------------------------------+----------+--------------------+
| Do you have difficulty dressing or bathing? | No       | 2019         |
|  (5 years old or older)                     |          |                    |
+---------------------------------------------+----------+--------------------+
| Because of a physical, mental, or emotional | No       | 2019         |
|  condition, do you have difficulty doing    |          |                    |
| errands alone such as visiting a doctor's   |          |                    |
| office or shopping? [15 years old or        |          |                    |
| older)]                                     |          |                    |
+---------------------------------------------+----------+--------------------+
 
 
 
 
+---------------------------------------------+----------+--------------------+
| Cognitive Status                            | Response | Date of Assessment |
+---------------------------------------------+----------+--------------------+
| Because of a physical, mental, or emotional | No       | 2019         |
|  condition, do you have serious difficulty  |          |                    |
| concentrating, remembering, or making       |          |                    |
| decisions? (5 years old or older)           |          |                    |
+---------------------------------------------+----------+--------------------+
 documented as of this encounter
 
 Progress Notes
 Augustin Lopez DNP - 2020 10:30 AM [facility]
 [address] [place] [address]
 Office: [phone] Fax: [phone]
 
 DATE OF VISIT: 2020
 PATIENT NAME: Nehemias Giordano
 : 1945; AGE: 74 y.o.; Sex:M 
 PHONE  NUMBER:  [phone]  (Home)
 MRN: [id number]; 
 PROVIDER: Augustin Lopez DNP
 PRIMARY  CARE  /  REFERRING  PHYSICIAN:  No ref. provider found  /  No Physician on file  /
 p 
 
 REASON  FOR  EVALUATION  /  CHIEF  COMPLAINT: Vascular Surgery Postoperative Visit for SMA 
endarterectomy and balloon angioplasty 
 
 The patient presents today for a Vascular Surgery Postoperative Visit.
 
 Mr. Giordano is a pleasant 74 y.o. male with no significant PMH who is referred to me for abd
ominal pain. Patient has been describing intermittent abdominal pain that radiates across hi
s abdomen to his back. Patient does not endorse any specific worsening pain associated with 
eating, however he does state that he has not been eating a lot. Patient has been watching h
is diet and has been trying to decrease his carbohydrates. Patient has lost 24 lbs over the 
last 2 months. The patient had a recent CT and MRI abdomen from [facility]. Elyse
ent denies any surgical intervention for this abdominal pain, however has had past abdominal
 surgeries. Due to chronic mesenteric ischemia, he is status post SMA endarterectomy with karsten
vine pericardial patch on 2019. 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 
balloon angioplasty of SMA on 2019. After angioplasty, there was still a focal area of 
stenosis but this was not in a good place to stent due to multiple large branches around albino
nosis. However, there was improved flow throughout SMA at end of case.The patient reports hi
s weight has been stable around 150 Ibs, appetite is fair. He reports he has been feeling ga
ssy and has frequent BM's. He eats cereal in the morning with added fiber. He also has hemor
rhoids, which he has been applying OTC cream. His wife reports he is not drinking enough sven
er and has been sedentary after surgery. He denies abdominal pain during or after meals. Phy
sical examination revealed surgical incision is healed, right groin access site is healed. BELINDA castaneda has been able to ambulate without difficulty. He has chronic back pain. He is taking aspir
in, plavix and statin daily. 
 
 VITAL SIGNS: /79  | Pulse 64  | SpO2 100%  
 
 PHYSICAL EXAM: 
 Constitutional: Well nourished, no signs of distress 
 Cardiovascular: Normal rate, regular rhythm.
 Pulmonary/Chest: No respiratory distress. No adventitious sounds. 
 Abdominal: Soft. No abdominal distension or tenderness. 
 Musculoskeletal: Normal range of motion. 
 Extremities: No edema, cyanosis or clubbing.
 
 Neurological: He is alert and oriented. No muscle weakness and normal gait.
 VASCULAR: Palpable femoralpulses were present bilaterally with palpable bilateral popli
teal pulses. Palpable pulses were present in bilateral dorsalis pedis and poster
ior tibial artery arteries. Palpable bilateral carotid, radial, brachial pulses. 
 
 Assessment & Plan:
 Chronic mesenteric ischemia with status post SMA endarterectomy and balloon angioplasty - C
ontinue aspirin and plavix daily. I've explained to the patient that in the event he experie
nces mesenteric ischemia as evidenced by post-prandial abdominal pain, appetite change, or u
nintentional weight loss, the patient should contact me immediately to return to my clinic o
r go to nearby emergency room right away. Recommend the patient to stop taking added fiber a
nd drink more water. RN will call in 1 month to check on the patient. Follow up with me in 3
 months with mesenteric arterial ultrasound. 
 
 Hypertension - The patient is advised to maintain his antihypertensive medication to keep s
ystolic blood pressure target level of 130-140 mm Hg and diastolic blood pressure level of 7
0-80 mm Hg. The patient is advised that uncontrolled hypertension can accelerate atheroscler
otic disease progression. 
 
 Dyslipidemia - The patient is advised to undergo lipid level evaluation with fasting blood 
test every 6 months with target LDL level of less than 70 mg/dL. The patient is advised to c
ontinue taking antiplatelet daily for life to reduce the risk of myocardial infarction and p
eripheral arterial disease ( He is currently on aspirin). Additionally, the patient is infor
med that hyperlipidemia can accelerate atherosclerotic disease progression and negatively af
fect the outcome of lower leg vascular interventions. 
 
 Augustin Lopez DNPElectronically signed by Augustin Lopez DNP at 2020  4:06 PM PSTdocumented in t
his encounter
 
 Plan of Treatment
 
 
+--------+-------------+------------------+----------------------+-------------+
| Date   | Type        | Specialty        | Care Team            | Description |
+--------+-------------+------------------+----------------------+-------------+
| / | Appointment | Radiology        |   Augustin Lopez DNP      |             |
2020   |             |                  | [address]     |             |
|        |             |                  | [place]  |             |
|        |             |                  | [id number] [phone]  |             |
|        |             |                  |  [phone] (Fax)  |             |
+--------+-------------+------------------+----------------------+-------------+
| / | Office      | Vascular Surgery |   Augustin Lopez DNP      |             |
2020   | Visit       |                  | [address]     |             |
|        |             |                  | [place]  |             |
|        |             |                  | [address]  [phone]  |             |
|        |             |                  |  [phone] (Fax)  |             |
+--------+-------------+------------------+----------------------+-------------+
 
 
 
+-----------------+---------+--------+----------------------+----------------------+
| Name            | Type    | Priori | Associated Diagnoses | Order Schedule       |
|                 |         | ty     |                      |                      |
+-----------------+---------+--------+----------------------+----------------------+
| VAS Mesenteric  | Imaging | Routin |   Mesenteric         | Expected: 2020 |
| Arterial Duplex |         | e      | ischemia, chronic    |  (Approximate),      |
|                 |         |        | (HCC)  H/O           | Expires: 2021  |
|                 |         |        | endarterectomy       |                      |
+-----------------+---------+--------+----------------------+----------------------+
 documented as of this encounter
 
 
 Visit Diagnoses
 
 
+---------------------------------------------------------------------------------------+
| Diagnosis                                                                             |
+---------------------------------------------------------------------------------------+
|   Mesenteric ischemia, chronic (HCC) - Primary  Chronic vascular insufficiency of     |
| intestine                                                                             |
+---------------------------------------------------------------------------------------+
|   H/O endarterectomy  Personal history of surgery to other organs                     |
+---------------------------------------------------------------------------------------+
|   S/P angioplasty  Postsurgical percutaneous transluminal coronary angioplasty status |
+---------------------------------------------------------------------------------------+
 documented in this encounter

## 2020-01-13 NOTE — XMS
Encounter Summary
  Created on: 2020
 
 Pasquale Nehemias Martinez
 External Reference #: [id number]
 : 43
 Sex: Male
 
 Demographics
 
 
+-----------------------+----------------------+
| Address               | [address]         |
|                       | [place]  [address] |
+-----------------------+----------------------+
| Home Phone            | [phone]      |
+-----------------------+----------------------+
| Preferred Language    | Unknown              |
+-----------------------+----------------------+
| Marital Status        |               |
+-----------------------+----------------------+
| Buddhism Affiliation | 1077                 |
+-----------------------+----------------------+
| Race                  | Unknown              |
+-----------------------+----------------------+
| Ethnic Group          | Unknown              |
+-----------------------+----------------------+
 
 
 Author
 
 
+--------------+--------------------------------------------+
| Author       | [facility]  |
|              | and [facility]                                |
+--------------+--------------------------------------------+
| Organization | [facility]  |
|              | and [place]                                |
+--------------+--------------------------------------------+
| Address      | Unknown                                    |
+--------------+--------------------------------------------+
| Phone        | Unavailable                                |
+--------------+--------------------------------------------+
 
 
 
 Support
 
 
+--------------+--------------+---------------------+-----------------+
| Name         | Relationship | Address             | Phone           |
+--------------+--------------+---------------------+-----------------+
| Rupa Giordano | ECON         | [address]             | [phone] |
|              |              | [place]   |                 |
|              |              | [address]               |                 |
+--------------+--------------+---------------------+-----------------+
 
 
 
 
 Care Team Providers
 
 
+-----------------------+------+-------------+
| Care Team Member Name | Role | Phone       |
+-----------------------+------+-------------+
| No, Physician         | PCP  | Unavailable |
+-----------------------+------+-------------+
 
 
 
 Reason for Visit
 
 
+--------+--------------+
| Reason | Comments     |
+--------+--------------+
| Other  | instructions |
+--------+--------------+
 
 
 
 Encounter Details
 
 
+--------+-----------+----------------------+----------------------+----------------------+
| Date   | Type      | Department           | Care Team            | Description          |
+--------+-----------+----------------------+----------------------+----------------------+
| 10/22/ | Telephone |   [facility]      |   Augustin Lopez DNP      | Other (instructions) |
| 2019   |           | VASCULAR SURGERY     | [address]     |                      |
|        |           | [address] | [address]  [place], [place]  |                      |
|        |           |  E  [place]     | [address]  [phone]  |                      |
|        |           | [address]           |  [phone] (Fax)  |                      |
|        |           | [phone]         |                      |                      |
+--------+-----------+----------------------+----------------------+----------------------+
 
 
 
 Social History
 
 
+---------------+-------+-----------+--------+------+
| Tobacco Use   | Types | Packs/Day | Years  | Date |
|               |       |           | Used   |      |
+---------------+-------+-----------+--------+------+
| Former Smoker |       |           |        |      |
+---------------+-------+-----------+--------+------+
 
 
 
+---------------------+---+---+---+
| Smokeless Tobacco:  |   |   |   |
| Former User         |   |   |   |
+---------------------+---+---+---+
 
 
 
+------------------------------+
| Comments: quit 50 years ago  |
 
+------------------------------+
 
 
 
+---------------+-------------+---------+----------+
| Alcohol Use   | Drinks/Week | oz/Week | Comments |
+---------------+-------------+---------+----------+
| Not Currently |             |         | 90m days |
+---------------+-------------+---------+----------+
 
 
 
+------------------+---------------+
| Sex Assigned at  | Date Recorded |
| Birth            |               |
+------------------+---------------+
| Not on file      |               |
+------------------+---------------+
 
 
 
+----------------+-------------+-------------+
| Job Start Date | Occupation  | Industry    |
+----------------+-------------+-------------+
| Not on file    | Not on file | Not on file |
+----------------+-------------+-------------+
 
 
 
+----------------+--------------+------------+
| Travel History | Travel Start | Travel End |
+----------------+--------------+------------+
 
 
 
+-------------------------------------+
| No recent travel history available. |
+-------------------------------------+
 documented as of this encounter
 
 Functional Status
 
 
+---------------------------------------------+----------+--------------------+
| Functional Status                           | Response | Date of Assessment |
+---------------------------------------------+----------+--------------------+
| Are you deaf or do you have serious         | No       | 2019         |
| difficulty hearing?                         |          |                    |
+---------------------------------------------+----------+--------------------+
| Are you blind or do you have serious        | No       | 2019         |
| difficulty seeing, even when wearing        |          |                    |
| glasses?                                    |          |                    |
+---------------------------------------------+----------+--------------------+
| Do you have serious difficulty walking or   | No       | 2019         |
| climbing stairs? (5 years old or older)     |          |                    |
+---------------------------------------------+----------+--------------------+
| Do you have difficulty dressing or bathing? | No       | 2019         |
|  (5 years old or older)                     |          |                    |
+---------------------------------------------+----------+--------------------+
| Because of a physical, mental, or emotional | No       | 2019         |
 
|  condition, do you have difficulty doing    |          |                    |
| errands alone such as visiting a doctor's   |          |                    |
| office or shopping? [15 years old or        |          |                    |
| older)]                                     |          |                    |
+---------------------------------------------+----------+--------------------+
 
 
 
+---------------------------------------------+----------+--------------------+
| Cognitive Status                            | Response | Date of Assessment |
+---------------------------------------------+----------+--------------------+
| Because of a physical, mental, or emotional | No       | 2019         |
|  condition, do you have serious difficulty  |          |                    |
| concentrating, remembering, or making       |          |                    |
| decisions? (5 years old or older)           |          |                    |
+---------------------------------------------+----------+--------------------+
 documented as of this encounter
 
 Plan of Treatment
 
 
+--------+-------------+------------------+----------------------+-------------+
| Date   | Type        | Specialty        | Care Team            | Description |
+--------+-------------+------------------+----------------------+-------------+
| / | Appointment | Radiology        |   Augustin Lopez DNP      |             |
|    |             |                  | [address]     |             |
|        |             |                  | [place]  |             |
|        |             |                  | [address]  [phone]  |             |
|        |             |                  |  [phone] (Fax)  |             |
+--------+-------------+------------------+----------------------+-------------+
| / | Office      | Vascular Surgery |   Augustin Lopez DNP      |             |
|    | Visit       |                  | [address]     |             |
|        |             |                  | [place]  |             |
|        |             |                  | [id number] [phone]  |             |
|        |             |                  |  [phone] (Fax)  |             |
+--------+-------------+------------------+----------------------+-------------+
 documented as of this encounter
 
 Visit Diagnoses
 Not on filedocumented in this encounter

## 2020-01-13 NOTE — NUR
PT ADMITTED TO ROOM 119 FROM ED, A/O, ROOM AIR, ABLE TO SELF TRANSFER FROM
STRETCHER TO BED.  PLEASANT, AWARE OF WHY HE IS HERE, HAPPY THAT HE WASN'T
"TRANSFERED" OUT.  REQUESTED FOOD.  IS FROM HOME WITH HIS WIFE.

## 2020-01-13 NOTE — XMS
Encounter Summary
  Created on: 2020
 
 Pasquale Nehemias Martinez
 External Reference #: [id number]
 : 43
 Sex: Male
 
 Demographics
 
 
+-----------------------+----------------------+
| Address               | [address]         |
|                       | [place]  [address] |
+-----------------------+----------------------+
| Home Phone            | [phone]      |
+-----------------------+----------------------+
| Preferred Language    | Unknown              |
+-----------------------+----------------------+
| Marital Status        |               |
+-----------------------+----------------------+
| [employer] Affiliation | [id number]                 |
+-----------------------+----------------------+
| Race                  | Unknown              |
+-----------------------+----------------------+
| Ethnic Group          | Unknown              |
+-----------------------+----------------------+
 
 
 Author
 
 
+--------------+--------------------------------------------+
| Author       | [facility]  |
|              | and [facility]                                |
+--------------+--------------------------------------------+
| Organization | [facility]  |
|              | and [place]                                |
+--------------+--------------------------------------------+
| Address      | Unknown                                    |
+--------------+--------------------------------------------+
| Phone        | Unavailable                                |
+--------------+--------------------------------------------+
 
 
 
 Support
 
 
+--------------+--------------+---------------------+-----------------+
| Name         | Relationship | Address             | Phone           |
+--------------+--------------+---------------------+-----------------+
| Rupa Giordano | ECON         | [address]             | [phone] |
|              |              | [address] [place] [address], [place]   |                 |
|              |              | [address]               |                 |
+--------------+--------------+---------------------+-----------------+
 
 
 
 
 Care Team Providers
 
 
+-----------------------+------+-------------+
| Care Team Member Name | Role | Phone       |
+-----------------------+------+-------------+
| No, Physician         | PCP  | Unavailable |
+-----------------------+------+-------------+
 
 
 
 Reason for Visit
 Auth/Cert
 
+--------+--------+-----------+--------------+--------------+--------------+
| Status | Reason | Specialty | Diagnoses /  | Referred By  | Referred To  |
|        |        |           | Procedures   | Contact      | Contact      |
+--------+--------+-----------+--------------+--------------+--------------+
|        |        |           |   Diagnoses  |              |              |
|        |        |           |  Mesenteric  |              |              |
|        |        |           | ischemia,    |              |              |
|        |        |           | chronic      |              |              |
|        |        |           | ([facility])        |              |              |
|        |        |           | Procedures   |              |              |
|        |        |           | REVASCULARIZ |              |              |
|        |        |           | ATION        |              |              |
|        |        |           | MESENTERIC   |              |              |
+--------+--------+-----------+--------------+--------------+--------------+
 
 
 
 
 Encounter Details
 
 
+--------+-------------+----------------------+----------------------+-------------+
| Date   | Type        | Department           | Care Team            | Description |
+--------+-------------+----------------------+----------------------+-------------+
| / | Anesthesia  |   [facility]    |   Alex Maxwell MD  |             |
| 2019   | [facility]       |  [address]      |             |
|        |             | OPERATING ROOM  [address]  | [place] [address]   |             |
|        |             | [address]           | [phone]         |             |
|        |             | [place], [place]         | [phone] (Fax)   |             |
|        |             | [id number]           | Calvin Aguayo MD  [address]  |             |
|        |             | [phone]         | [address]           |             |
|        |             |                      | [place] [address]   |             |
+--------+-------------+----------------------+----------------------+-------------+
 
 
 
 Anesthesia Record
 
 
+--------------------+------------------+-------------------+----------------------+
| Procedure Name     | Responsible      | Anesthesia Start  | Anesthesia Stop Time |
|                    | Anesthesiologist | Time              |                      |
+--------------------+------------------+-------------------+----------------------+
| REVASCULARIZATION  | Alex Mawxell MD | 19 1127     | 19 1508        |
| MESENTERIC (N/A    |                  |                   |                      |
 
| Abdomen)           |                  |                   |                      |
+--------------------+------------------+-------------------+----------------------+
 
 
 
+----+---+-------------+-------------------------------------------------------------------+
| Da | T | Event       | Comment                                                           |
| te | i |             |                                                                   |
|    | m |             |                                                                   |
|    | e |             |                                                                   |
+----+---+-------------+-------------------------------------------------------------------+
| 09 | 1 | An Start    | Reassessment prior to anesthesia induction/procedure.             |
| /2 | 1 |             |                                                                   |
| 4/ | 2 |             |                                                                   |
| 20 | 7 |             |                                                                   |
| 19 |   |             |                                                                   |
+----+---+-------------+-------------------------------------------------------------------+
|    | 1 | An          |                                                                   |
|    | 1 | Induction   |                                                                   |
|    | 3 |             |                                                                   |
|    | 2 |             |                                                                   |
+----+---+-------------+-------------------------------------------------------------------+
|    | 1 | An          |                                                                   |
|    | 1 | Intubation  |                                                                   |
|    | 3 |             |                                                                   |
|    | 5 |             |                                                                   |
+----+---+-------------+-------------------------------------------------------------------+
|    | 1 | Anesthesia  |                                                                   |
|    | 1 | Ready       |                                                                   |
|    | 4 |             |                                                                   |
|    | 3 |             |                                                                   |
+----+---+-------------+-------------------------------------------------------------------+
|    | 1 | First       |                                                                   |
|    | 1 | Inc/Proc St |                                                                   |
|    | 5 |             |                                                                   |
|    | 1 |             |                                                                   |
+----+---+-------------+-------------------------------------------------------------------+
|    | 1 | Quick Note  | incision                                                          |
|    | 1 |             |                                                                   |
|    | 5 |             |                                                                   |
|    | 3 |             |                                                                   |
+----+---+-------------+-------------------------------------------------------------------+
|    | 1 | [place]     |                                                                   |
|    | 1 | 43-degrees  |                                                                   |
|    | 5 |             |                                                                   |
|    | 8 |             |                                                                   |
+----+---+-------------+-------------------------------------------------------------------+
|    | 1 | Quick Note  | Surgical stop                                                     |
|    | 4 |             |                                                                   |
|    | 3 |             |                                                                   |
|    | 4 |             |                                                                   |
+----+---+-------------+-------------------------------------------------------------------+
|    | 1 | Extubation/ |                                                                   |
|    | 4 | Airway LDA  |                                                                   |
|    | 4 | Removal     |                                                                   |
|    | 6 |             |                                                                   |
+----+---+-------------+-------------------------------------------------------------------+
|    | 1 | Quick Note  | SBP 190s right now-->pressure transducer fell on floor falsely    |
|    | 4 |             | elevating pressure                                                |
|    | 4 |             |                                                                   |
 
|    | 7 |             |                                                                   |
+----+---+-------------+-------------------------------------------------------------------+
|    | 1 | an stop     |                                                                   |
|    | 4 | data        |                                                                   |
|    | 5 |             |                                                                   |
|    | 2 |             |                                                                   |
+----+---+-------------+-------------------------------------------------------------------+
|    | 1 | An Stop     | Patient handed off to recovery nurse.  Electronically signed by:  |
|    | 5 |             | Alex Maxwell MD at 2019 15:08                               |
|    | 0 |             |                                                                   |
|    | 8 |             |                                                                   |
+----+---+-------------+-------------------------------------------------------------------+
 
 
 
+------+
| Meds |
+------+
 
 
 
+--------------------------------+-------------+
| Name                           | Total       |
+--------------------------------+-------------+
| fentaNYL                       | 200 mcg     |
+--------------------------------+-------------+
| lidocaine 2%                   | 50 mg       |
+--------------------------------+-------------+
| propofol                       | 140 mg      |
+--------------------------------+-------------+
| rocuronium                     | 50 mg       |
+--------------------------------+-------------+
| dexamethasone                  | 4 mg        |
+--------------------------------+-------------+
| ondansetron                    | 4 mg        |
+--------------------------------+-------------+
| neostigmine                    | 4 mg        |
+--------------------------------+-------------+
| glycopyrrolate                 | 0.4 mg      |
+--------------------------------+-------------+
| HYDROmorphone                  | 2 mg        |
+--------------------------------+-------------+
| hydrALAZINE                    | 30 mg       |
+--------------------------------+-------------+
| remifentanil                   | 602.18 mcg  |
+--------------------------------+-------------+
| heparin                        | 6,000 Units |
+--------------------------------+-------------+
| protamine                      | 30 mg       |
+--------------------------------+-------------+
| labetalol (TRANDATE) injection | 10 mg       |
+--------------------------------+-------------+
| Plasmalyte                     | 1,750 mL    |
+--------------------------------+-------------+
| sodium chloride 0.9% (NS)      | 1,000 mL    |
| infusion                       |             |
+--------------------------------+-------------+
 
 
 
 
+-----------------------+
| Name                  |
+-----------------------+
| N2O Flow Rate (L/Min) |
+-----------------------+
| O2 Flow Rate (L/Min)  |
+-----------------------+
| Insp O2               |
+-----------------------+
| Exp N2O               |
+-----------------------+
| Exp CHRISTINA               |
+-----------------------+
| Air Flow Rate (L/Min) |
+-----------------------+
 
 
 
+-----------------------------------+
| No blood administrations on file. |
+-----------------------------------+
 
 
 
+--------+-----------------------------------+----------------------+----------------------+
| Type   | Details                           | Placement            | Removal              |
+--------+-----------------------------------+----------------------+----------------------+
| Periph | 19; 1107; Left; Anterior    | 19 1107 by     | 19 0821 by     |
| eral   | (palmar), Distal; Forearm;        | Yolanda Trujillo RN  | Araceli Bartholomew RN   |
| IV     | over-the-needle catheter system;  |                      |                      |
|        | 18 gauge; site care per           |                      |                      |
|        | policy/procedure; 19; 0821  |                      |                      |
+--------+-----------------------------------+----------------------+----------------------+
| Airway | Placement Date: 19;         | 19 1135 by Calvin | 19 1446 by     |
|        | Placement Time: 1135; Mask        |  JOANN Aguayo MD           | Alex Maxwell MD     |
|        | Ventilation: EZ; Airway Grade: 4; |                      |                      |
|        |  Successful Technique: video      |                      |                      |
|        | scope; Laryngoscope Blade Size:   |                      |                      |
|        | 3; Airway Type: endotracheal;     |                      |                      |
|        | Size: 7.5; Position: Right;       |                      |                      |
|        | Airway Tube Secured At: 23; Tube  |                      |                      |
|        | Reference Point: lip; Removal     |                      |                      |
|        | Date: 19; Removal Time:     |                      |                      |
|        | 1446                              |                      |                      |
+--------+-----------------------------------+----------------------+----------------------+
| Arteri | 19; 1139; Alcohol; No;      | 19 1139 by Calvin | 19 0420 by     |
| al     | Left; radial artery; 20 gauge;    |  JOANN Aguayo MD           | Octavio Henry,  |
| Line   | continuous blood pressure         |                      | RN                   |
|        | monitoring, frequent blood gas    |                      |                      |
|        | measurement; catheter not patent; |                      |                      |
|        |  19; 0420                   |                      |                      |
+--------+-----------------------------------+----------------------+----------------------+
| Urethr | 19; 1140; indicated due to  | 19 1140 by     | 19 1545 by     |
| al     | specific surgical procedure; All  | Airam Gould RN     | Kaylie Hess V,  |
| Cathet | elements; All elements; All       |                      | RN                   |
| er     | elements; indwelling single lumen |                      |                      |
|        |  catheter; latex; 16; None; 1; 5; |                      |                      |
|        |  10; other (see comments) (Under  |                      |                      |
|        | general anesthesia); drainage bag |                      |                      |
|        |  to dependent drainage; 19; |                      |                      |
 
|        |  1545                             |                      |                      |
+--------+-----------------------------------+----------------------+----------------------+
| Periph | 19; 1148; Right; Proximal;  | 19 1148 by Calvin | 19 220 by     |
| eral   | Forearm; over-the-needle catheter |  JOANN Aguayo MD           | Eliseo Campbell RN  |
| IV     |  system; 18 gauge; 1; other (see  |                      |                      |
|        | comments); infiltrated; 19; |                      |                      |
|        |  2200                             |                      |                      |
+--------+-----------------------------------+----------------------+----------------------+
| Wound  | 19; 1512; Incision;         | 19 1512 by     | 19 1124 by     |
|        | abdomen; 19; 1124           | Shadi Quigley RN  | Araceli Bartholomew RN   |
+--------+-----------------------------------+----------------------+----------------------+
 documented in this encounter
 
 Social History
 
 
+---------------+-------+-----------+--------+------+
| Tobacco Use   | Types | Packs/Day | Years  | Date |
|               |       |           | Used   |      |
+---------------+-------+-----------+--------+------+
| Former Smoker |       |           |        |      |
+---------------+-------+-----------+--------+------+
 
 
 
+---------------------+---+---+---+
| Smokeless Tobacco:  |   |   |   |
| Former User         |   |   |   |
+---------------------+---+---+---+
 
 
 
+------------------------------+
| Comments: quit 50 years ago  |
+------------------------------+
 
 
 
+---------------+-------------+---------+----------+
| Alcohol Use   | Drinks/Week | oz/Week | Comments |
+---------------+-------------+---------+----------+
| Not Currently |             |         | 90m days |
+---------------+-------------+---------+----------+
 
 
 
+------------------+---------------+
| Sex Assigned at  | Date Recorded |
| Birth            |               |
+------------------+---------------+
| Not on file      |               |
+------------------+---------------+
 
 
 
+----------------+-------------+-------------+
| Job Start Date | Occupation  | Industry    |
+----------------+-------------+-------------+
| Not on file    | Not on file | Not on file |
+----------------+-------------+-------------+
 
 
 
 
+----------------+--------------+------------+
| Travel History | Travel Start | Travel End |
+----------------+--------------+------------+
 
 
 
+-------------------------------------+
| No recent travel history available. |
+-------------------------------------+
 documented as of this encounter
 
 Plan of Treatment
 
 
+--------+-------------+------------------+----------------------+-------------+
| Date   | Type        | Specialty        | Care Team            | Description |
+--------+-------------+------------------+----------------------+-------------+
| / | Appointment | Radiology        |   Augustin Lopez DNP      |             |
|    |             |                  | [address]     |             |
|        |             |                  | [place]  |             |
|        |             |                  | [address]  [phone]  |             |
|        |             |                  |  [phone] (Fax)  |             |
+--------+-------------+------------------+----------------------+-------------+
| / | Office      | Vascular Surgery |   Augustin Lopez DNP      |             |
|    | Visit       |                  | [address]     |             |
|        |             |                  | [place]  |             |
|        |             |                  | [address]  [phone]  |             |
|        |             |                  |  [phone] (Fax)  |             |
+--------+-------------+------------------+----------------------+-------------+
 documented as of this encounter
 
 Visit Diagnoses
 Not on filedocumented in this encounter
 
 Administered Medications
 
 
+-----------------------------------+---------+----------+------+------+------+
| Medication Order                  | MAR     | Action   | Dose | Rate | Site |
|                                   | Action  | Date     |      |      |      |
+-----------------------------------+---------+----------+------+------+------+
|   balanced electrolytes in water  | New Bag | 20 |      |      |      |
| (PLASMALYTE-148/NORMOSOL-R)       |         | 19 11:32 |      |      |      |
| infusion  Intravenous, CONTINUOUS |         |  AM PDT  |      |      |      |
|  PRN, Starting 19 at     |         |          |      |      |      |
| 1132, Anesthesia Intra-op         |         |          |      |      |      |
+-----------------------------------+---------+----------+------+------+------+
 
 
 
+---+---+
|   |   |
+---+---+
 
 
 
+--------------------------------+-------+----------+------+---+---+
 
|   dexamethasone (DECADRON) 4   | Given | 20 | 4 mg |   |   |
| mg/mL injection  Intravenous,  |       | 19 11:32 |      |   |   |
| PRN, Starting 19 at   |       |  AM PDT  |      |   |   |
| 1132, Anesthesia Intra-op      |       |          |      |   |   |
+--------------------------------+-------+----------+------+---+---+
 
 
 
+---+---+
|   |   |
+---+---+
 
 
 
+---------------------------------+-------+----------+---------+---+---+
|   fentaNYL (PF) injection       | Given | 20 | 100 mcg |   |   |
| Intravenous, PRN, Starting Tue  |       | 19 12:02 |         |   |   |
| 19 at 1132, Anesthesia     |       |  PM PDT  |         |   |   |
| Intra-op                        |       |          |         |   |   |
+---------------------------------+-------+----------+---------+---+---+
 
 
 
+-------+----------+---------+---+---+
| Given | 20 | 100 mcg |   |   |
|       | 19 11:32 |         |   |   |
|       |  AM PDT  |         |   |   |
+-------+----------+---------+---+---+
 
 
 
+---+---+
|   |   |
+---+---+
 
 
 
+--------------------------------+-------+----------+--------+---+---+
|   glycopyrrolate (ROBINUL)     | Given | 20 | 0.4 mg |   |   |
| injection  Intravenous, PRN,   |       | 19  2:33 |        |   |   |
| Starting Tue 19 at 1433,  |       |  PM PDT  |        |   |   |
| Anesthesia Intra-op            |       |          |        |   |   |
+--------------------------------+-------+----------+--------+---+---+
 
 
 
+---+---+
|   |   |
+---+---+
 
 
 
+--------------------------------+-------+----------+--------+---+---+
|   heparin 1,000 units/mL       | Given | 20 | 6,000  |   |   |
| injection  Intravenous, PRN,   |       | 19  1:32 | Units  |   |   |
| Starting Tue 19 at 1332,  |       |  PM PDT  |        |   |   |
| Anesthesia Intra-op            |       |          |        |   |   |
+--------------------------------+-------+----------+--------+---+---+
 
 
 
 
+---+---+
|   |   |
+---+---+
 
 
 
+--------------------------------+-------+----------+-------+---+---+
|   hydrALAZINE (APRESOLINE)     | Given | 20 | 10 mg |   |   |
| injection  Intravenous, PRN,   |       | 19  2:48 |       |   |   |
| Starting Tue 19 at 1210,  |       |  PM PDT  |       |   |   |
| Anesthesia Intra-op            |       |          |       |   |   |
+--------------------------------+-------+----------+-------+---+---+
 
 
 
+-------+----------+-------+---+---+
| Given | 20 | 10 mg |   |   |
|       | 19  2:43 |       |   |   |
|       |  PM PDT  |       |   |   |
+-------+----------+-------+---+---+
| Given | 20 | 10 mg |   |   |
|       | 19 12:10 |       |   |   |
|       |  PM PDT  |       |   |   |
+-------+----------+-------+---+---+
 
 
 
+---+---+
|   |   |
+---+---+
 
 
 
+--------------------------------+-------+----------+------+---+---+
|   HYDROmorphone (DILAUDID) 2   | Given | 20 | 1 mg |   |   |
| mg/mL injection  Intravenous,  |       | 19 12:07 |      |   |   |
| PRN, Starting e 19 at   |       |  PM PDT  |      |   |   |
| 1158, Anesthesia Intra-op      |       |          |      |   |   |
+--------------------------------+-------+----------+------+---+---+
 
 
 
+-------+----------+------+---+---+
| Given | 20 | 1 mg |   |   |
|       | 19 11:58 |      |   |   |
|       |  AM PDT  |      |   |   |
+-------+----------+------+---+---+
 
 
 
+---+---+
|   |   |
+---+---+
 
 
 
+---------------------------------+-------+----------+------+---+---+
|   labetalol (TRANDATE) 5 mg/mL  | Given | 20 | 5 mg |   |   |
| injection  PRN, Starting Tue    |       | 19  3:12 |      |   |   |
 
| 19 at 1502, Anesthesia     |       |  PM PDT  |      |   |   |
| Intra-op                        |       |          |      |   |   |
+---------------------------------+-------+----------+------+---+---+
 
 
 
+-------+----------+------+---+---+
| Given | 20 | 5 mg |   |   |
|       | 19  3:02 |      |   |   |
|       |  PM PDT  |      |   |   |
+-------+----------+------+---+---+
 
 
 
+---+---+
|   |   |
+---+---+
 
 
 
+---------------------------------+-------+----------+-------+---+---+
|   lidocaine (PF) 2% injection   | Given | 20 | 50 mg |   |   |
| Intravenous, PRN, Starting Tue  |       | 19 11:32 |       |   |   |
| 19 at 1132, Anesthesia     |       |  AM PDT  |       |   |   |
| Intra-op                        |       |          |       |   |   |
+---------------------------------+-------+----------+-------+---+---+
 
 
 
+---+---+
|   |   |
+---+---+
 
 
 
+-----------------------------------+-------+----------+------+---+---+
|   neostigmine 1 mg/mL injection   | Given | 20 | 4 mg |   |   |
| Intravenous, PRN, Starting Tue    |       | 19  2:33 |      |   |   |
| 19 at 1433, Anesthesia       |       |  PM PDT  |      |   |   |
| Intra-op                          |       |          |      |   |   |
+-----------------------------------+-------+----------+------+---+---+
 
 
 
+---+---+
|   |   |
+---+---+
 
 
 
+-----------------------------------+-------+----------+------+---+---+
|   ondansetron (ZOFRAN) injection  | Given | 20 | 4 mg |   |   |
|  Intravenous, PRN, Starting Tue   |       | 19 11:32 |      |   |   |
| 19 at 1132, Anesthesia       |       |  AM PDT  |      |   |   |
| Intra-op                          |       |          |      |   |   |
+-----------------------------------+-------+----------+------+---+---+
 
 
 
+---+---+
 
|   |   |
+---+---+
 
 
 
+-----------------------------------+-------+----------+--------+---+---+
|   propofol (DIPRIVAN) injection   | Given | 20 | 140 mg |   |   |
| Intravenous, PRN, Starting Tue    |       | 19 11:32 |        |   |   |
| 19 at 1132, Anesthesia       |       |  AM PDT  |        |   |   |
| Intra-op                          |       |          |        |   |   |
+-----------------------------------+-------+----------+--------+---+---+
 
 
 
+---+---+
|   |   |
+---+---+
 
 
 
+---------------------------------+-------+----------+-------+---+---+
|   protamine injection           | Given | 20 | 30 mg |   |   |
| Intravenous, PRN, Starting Tue  |       | 19  2:09 |       |   |   |
| 19 at 1409, Anesthesia     |       |  PM PDT  |       |   |   |
| Intra-op                        |       |          |       |   |   |
+---------------------------------+-------+----------+-------+---+---+
 
 
 
+---+---+
|   |   |
+---+---+
 
 
 
+-----------------------------------+----------+----------+----------+-------+---+
|   remifentanil (ULTIVA) injection | Rate/Dos | 20 | 0.075    | 0.3   |   |
|   Intravenous, CONTINUOUS PRN,    | e Change | 19  1:19 | mcg/kg/m | mL/hr |   |
| Starting Tue 19 at 1229,     |          |  PM PDT  | in       |       |   |
| Anesthesia Intra-op               |          |          |          |       |   |
+-----------------------------------+----------+----------+----------+-------+---+
 
 
 
+---------+----------+----------+-------+---+
| New Bag | 20 | 0.05     | 0.2   |   |
|         | 19 12:29 | mcg/kg/m | mL/hr |   |
|         |  PM PDT  | in       |       |   |
+---------+----------+----------+-------+---+
 
 
 
+---+---+
|   |   |
+---+---+
 
 
 
+-----------------------------------+-------+----------+-------+---+---+
|   rocuronium (ZEMURON) injection  | Given | 20 | 10 mg |   |   |
 
|  Intravenous, PRN, Starting Tue   |       | 19  2:17 |       |   |   |
| 19 at 1132, Anesthesia       |       |  PM PDT  |       |   |   |
| Intra-op                          |       |          |       |   |   |
+-----------------------------------+-------+----------+-------+---+---+
 
 
 
+-------+----------+-------+---+---+
| Given | 20 | 40 mg |   |   |
|       | 19 11:32 |       |   |   |
|       |  AM PDT  |       |   |   |
+-------+----------+-------+---+---+
 
 
 
+---+---+
|   |   |
+---+---+
 
 
 
+-----------------------------------+---------+----------+---+----------+---+
|   sodium chloride 0.9% (NS)       | New Bag | 20 |   | 30 mL/hr |   |
| infusion  at 30 mL/hr,            |         | 19 11:34 |   |          |   |
| Intravenous, CONTINUOUS, Starting |         |  AM PDT  |   |          |   |
|  Tue 19 at 1200,             |         |          |   |          |   |
| Recovery/Phase I                  |         |          |   |          |   |
+-----------------------------------+---------+----------+---+----------+---+
 
 
 
+-------------------------+----------+---+---+---+
| Continued by Anesthesia | 20 |   |   |   |
|                         | 19 11:27 |   |   |   |
|                         |  AM PDT  |   |   |   |
+-------------------------+----------+---+---+---+
 
 
 
+---+---+
|   |   |
+---+---+
 documented in this encounter

## 2020-01-13 NOTE — XMS
Encounter Summary
  Created on: 2020
 
 Pasquale Nehemias Martinez
 External Reference #: [id number]
 : 43
 Sex: Male
 
 Demographics
 
 
+-----------------------+----------------------+
| Address               | [address]         |
|                       | [place]  [address] |
+-----------------------+----------------------+
| Home Phone            | [phone]      |
+-----------------------+----------------------+
| Preferred Language    | Unknown              |
+-----------------------+----------------------+
| Marital Status        |               |
+-----------------------+----------------------+
| Gnosticist Affiliation | [id number]                 |
+-----------------------+----------------------+
| Race                  | Unknown              |
+-----------------------+----------------------+
| Ethnic Group          | Unknown              |
+-----------------------+----------------------+
 
 
 Author
 
 
+--------------+--------------------------------------------+
| Author       | [facility]  |
|              | and [facility]                                |
+--------------+--------------------------------------------+
| Organization | [facility]  |
|              | and [place]                                |
+--------------+--------------------------------------------+
| Address      | Unknown                                    |
+--------------+--------------------------------------------+
| Phone        | Unavailable                                |
+--------------+--------------------------------------------+
 
 
 
 Support
 
 
+--------------+--------------+---------------------+-----------------+
| Name         | Relationship | Address             | Phone           |
+--------------+--------------+---------------------+-----------------+
| Rupa Giordano | ECON         | [address]             | [phone] |
|              |              | [address] [place] [address], [place]   |                 |
|              |              | [id number]               |                 |
+--------------+--------------+---------------------+-----------------+
 
 
 
 
 Care Team Providers
 
 
+-----------------------+------+-------------+
| Care Team Member Name | Role | Phone       |
+-----------------------+------+-------------+
| No, Physician         | PCP  | Unavailable |
+-----------------------+------+-------------+
 
 
 
 Reason for Visit
 Auth/Cert
 
+--------+--------+-----------+--------------+--------------+--------------+
| Status | Reason | Specialty | Diagnoses /  | Referred By  | Referred To  |
|        |        |           | Procedures   | Contact      | Contact      |
+--------+--------+-----------+--------------+--------------+--------------+
|        |        |           |   Diagnoses  |              |              |
|        |        |           |  Mesenteric  |              |              |
|        |        |           | ischemia,    |              |              |
|        |        |           | chronic      |              |              |
|        |        |           | ([facility])        |              |              |
|        |        |           | Procedures   |              |              |
|        |        |           | REVASCULARIZ |              |              |
|        |        |           | ATION        |              |              |
|        |        |           | MESENTERIC   |              |              |
+--------+--------+-----------+--------------+--------------+--------------+
 
 
 
 
 Encounter Details
 
 
+--------+-------------+----------------------+----------------------+-------------+
| Date   | Type        | Department           | Care Team            | Description |
+--------+-------------+----------------------+----------------------+-------------+
| / | Anesthesia  |   [facility]    |   Alex Maxwell MD  |             |
| 2019   | [facility]       |  [address]      |             |
|        |             | OPERATING ROOM  [address]  | [place] [address]   |             |
|        |             | [address]           | [phone]         |             |
|        |             | [place], [place]         | [phone] (Fax)   |             |
|        |             | [address]           | Calvin Aguayo MD  [address]  |             |
|        |             | [phone]         | [address]           |             |
|        |             |                      | [place] [address]   |             |
+--------+-------------+----------------------+----------------------+-------------+
 
 
 
 Anesthesia Record
 
 
+--------------------+------------------+-------------------+----------------------+
| Procedure Name     | Responsible      | Anesthesia Start  | Anesthesia Stop Time |
|                    | Anesthesiologist | Time              |                      |
+--------------------+------------------+-------------------+----------------------+
| REVASCULARIZATION  | Alex Maxwell MD | 19 1127     | 19 1508        |
| MESENTERIC (N/A    |                  |                   |                      |
 
| Abdomen)           |                  |                   |                      |
+--------------------+------------------+-------------------+----------------------+
 
 
 
+----+---+-------------+-------------------------------------------------------------------+
| Da | T | Event       | Comment                                                           |
| te | i |             |                                                                   |
|    | m |             |                                                                   |
|    | e |             |                                                                   |
+----+---+-------------+-------------------------------------------------------------------+
| 09 | 1 | An Start    | Reassessment prior to anesthesia induction/procedure.             |
| /2 | 1 |             |                                                                   |
| 4/ | 2 |             |                                                                   |
| 20 | 7 |             |                                                                   |
| 19 |   |             |                                                                   |
+----+---+-------------+-------------------------------------------------------------------+
|    | 1 | An          |                                                                   |
|    | 1 | Induction   |                                                                   |
|    | 3 |             |                                                                   |
|    | 2 |             |                                                                   |
+----+---+-------------+-------------------------------------------------------------------+
|    | 1 | An          |                                                                   |
|    | 1 | Intubation  |                                                                   |
|    | 3 |             |                                                                   |
|    | 5 |             |                                                                   |
+----+---+-------------+-------------------------------------------------------------------+
|    | 1 | Anesthesia  |                                                                   |
|    | 1 | Ready       |                                                                   |
|    | 4 |             |                                                                   |
|    | 3 |             |                                                                   |
+----+---+-------------+-------------------------------------------------------------------+
|    | 1 | First       |                                                                   |
|    | 1 | Inc/Proc St |                                                                   |
|    | 5 |             |                                                                   |
|    | 1 |             |                                                                   |
+----+---+-------------+-------------------------------------------------------------------+
|    | 1 | Quick Note  | incision                                                          |
|    | 1 |             |                                                                   |
|    | 5 |             |                                                                   |
|    | 3 |             |                                                                   |
+----+---+-------------+-------------------------------------------------------------------+
|    | 1 | [place]     |                                                                   |
|    | 1 | 43-degrees  |                                                                   |
|    | 5 |             |                                                                   |
|    | 8 |             |                                                                   |
+----+---+-------------+-------------------------------------------------------------------+
|    | 1 | Quick Note  | Surgical stop                                                     |
|    | 4 |             |                                                                   |
|    | 3 |             |                                                                   |
|    | 4 |             |                                                                   |
+----+---+-------------+-------------------------------------------------------------------+
|    | 1 | Extubation/ |                                                                   |
|    | 4 | Airway LDA  |                                                                   |
|    | 4 | Removal     |                                                                   |
|    | 6 |             |                                                                   |
+----+---+-------------+-------------------------------------------------------------------+
|    | 1 | Quick Note  | SBP 190s right now-->pressure transducer fell on floor falsely    |
|    | 4 |             | elevating pressure                                                |
|    | 4 |             |                                                                   |
 
|    | 7 |             |                                                                   |
+----+---+-------------+-------------------------------------------------------------------+
|    | 1 | an stop     |                                                                   |
|    | 4 | data        |                                                                   |
|    | 5 |             |                                                                   |
|    | 2 |             |                                                                   |
+----+---+-------------+-------------------------------------------------------------------+
|    | 1 | An Stop     | Patient handed off to recovery nurse.  Electronically signed by:  |
|    | 5 |             | Alex Maxwell MD at 2019 15:08                               |
|    | 0 |             |                                                                   |
|    | 8 |             |                                                                   |
+----+---+-------------+-------------------------------------------------------------------+
 
 
 
+------+
| Meds |
+------+
 
 
 
+--------------------------------+-------------+
| Name                           | Total       |
+--------------------------------+-------------+
| fentaNYL                       | 200 mcg     |
+--------------------------------+-------------+
| lidocaine 2%                   | 50 mg       |
+--------------------------------+-------------+
| propofol                       | 140 mg      |
+--------------------------------+-------------+
| rocuronium                     | 50 mg       |
+--------------------------------+-------------+
| dexamethasone                  | 4 mg        |
+--------------------------------+-------------+
| ondansetron                    | 4 mg        |
+--------------------------------+-------------+
| neostigmine                    | 4 mg        |
+--------------------------------+-------------+
| glycopyrrolate                 | 0.4 mg      |
+--------------------------------+-------------+
| HYDROmorphone                  | 2 mg        |
+--------------------------------+-------------+
| hydrALAZINE                    | 30 mg       |
+--------------------------------+-------------+
| remifentanil                   | 602.18 mcg  |
+--------------------------------+-------------+
| heparin                        | 6,000 Units |
+--------------------------------+-------------+
| protamine                      | 30 mg       |
+--------------------------------+-------------+
| labetalol (TRANDATE) injection | 10 mg       |
+--------------------------------+-------------+
| Plasmalyte                     | 1,750 mL    |
+--------------------------------+-------------+
| sodium chloride 0.9% (NS)      | 1,000 mL    |
| infusion                       |             |
+--------------------------------+-------------+
 
 
 
 
+-----------------------+
| Name                  |
+-----------------------+
| N2O Flow Rate (L/Min) |
+-----------------------+
| O2 Flow Rate (L/Min)  |
+-----------------------+
| Insp O2               |
+-----------------------+
| Exp N2O               |
+-----------------------+
| Exp CHRISTINA               |
+-----------------------+
| Air Flow Rate (L/Min) |
+-----------------------+
 
 
 
+-----------------------------------+
| No blood administrations on file. |
+-----------------------------------+
 
 
 
+--------+-----------------------------------+----------------------+----------------------+
| Type   | Details                           | Placement            | Removal              |
+--------+-----------------------------------+----------------------+----------------------+
| Periph | 19; 1107; Left; Anterior    | 19 1107 by     | 19 0821 by     |
| eral   | (palmar), Distal; Forearm;        | Yolanda Trujillo RN  | Araceli Bartholomew RN   |
| IV     | over-the-needle catheter system;  |                      |                      |
|        | 18 gauge; site care per           |                      |                      |
|        | policy/procedure; 19; 0821  |                      |                      |
+--------+-----------------------------------+----------------------+----------------------+
| Airway | Placement Date: 19;         | 19 1135 by Calvin | 19 1446 by     |
|        | Placement Time: 1135; Mask        |  JOANN Aguayo MD           | Alex Maxwell MD     |
|        | Ventilation: EZ; Airway Grade: 4; |                      |                      |
|        |  Successful Technique: video      |                      |                      |
|        | scope; Laryngoscope Blade Size:   |                      |                      |
|        | 3; Airway Type: endotracheal;     |                      |                      |
|        | Size: 7.5; Position: Right;       |                      |                      |
|        | Airway Tube Secured At: 23; Tube  |                      |                      |
|        | Reference Point: lip; Removal     |                      |                      |
|        | Date: 19; Removal Time:     |                      |                      |
|        | 1446                              |                      |                      |
+--------+-----------------------------------+----------------------+----------------------+
| Arteri | 19; 1139; Alcohol; No;      | 19 1139 by Calvin | 19 0420 by     |
| al     | Left; radial artery; 20 gauge;    |  JOANN Aguayo MD           | Octavio Henry,  |
| Line   | continuous blood pressure         |                      | RN                   |
|        | monitoring, frequent blood gas    |                      |                      |
|        | measurement; catheter not patent; |                      |                      |
|        |  19; 0420                   |                      |                      |
+--------+-----------------------------------+----------------------+----------------------+
| Urethr | 19; 1140; indicated due to  | 19 1140 by     | 19 1545 by     |
| al     | specific surgical procedure; All  | Airam Gould RN     | Kaylie Hess V,  |
| Cathet | elements; All elements; All       |                      | RN                   |
| er     | elements; indwelling single lumen |                      |                      |
|        |  catheter; latex; 16; None; 1; 5; |                      |                      |
|        |  10; other (see comments) (Under  |                      |                      |
|        | general anesthesia); drainage bag |                      |                      |
|        |  to dependent drainage; 19; |                      |                      |
 
|        |  1545                             |                      |                      |
+--------+-----------------------------------+----------------------+----------------------+
| Periph | 19; 1148; Right; Proximal;  | 19 1148 by Calvin | 19 220 by     |
| eral   | Forearm; over-the-needle catheter |  JOANN Aguayo MD           | Eliseo Campbell RN  |
| IV     |  system; 18 gauge; 1; other (see  |                      |                      |
|        | comments); infiltrated; 19; |                      |                      |
|        |  2200                             |                      |                      |
+--------+-----------------------------------+----------------------+----------------------+
| Wound  | 19; 1512; Incision;         | 19 1512 by     | 19 1124 by     |
|        | abdomen; 19; 1124           | Shadi Quigley RN  | Araceli Bartholomew RN   |
+--------+-----------------------------------+----------------------+----------------------+
 documented in this encounter
 
 Social History
 
 
+---------------+-------+-----------+--------+------+
| Tobacco Use   | Types | Packs/Day | Years  | Date |
|               |       |           | Used   |      |
+---------------+-------+-----------+--------+------+
| Former Smoker |       |           |        |      |
+---------------+-------+-----------+--------+------+
 
 
 
+---------------------+---+---+---+
| Smokeless Tobacco:  |   |   |   |
| Former User         |   |   |   |
+---------------------+---+---+---+
 
 
 
+------------------------------+
| Comments: quit 50 years ago  |
+------------------------------+
 
 
 
+---------------+-------------+---------+----------+
| Alcohol Use   | Drinks/Week | oz/Week | Comments |
+---------------+-------------+---------+----------+
| Not Currently |             |         | 90m days |
+---------------+-------------+---------+----------+
 
 
 
+------------------+---------------+
| Sex Assigned at  | Date Recorded |
| Birth            |               |
+------------------+---------------+
| Not on file      |               |
+------------------+---------------+
 
 
 
+----------------+-------------+-------------+
| Job Start Date | Occupation  | Industry    |
+----------------+-------------+-------------+
| Not on file    | Not on file | Not on file |
+----------------+-------------+-------------+
 
 
 
 
+----------------+--------------+------------+
| Travel History | Travel Start | Travel End |
+----------------+--------------+------------+
 
 
 
+-------------------------------------+
| No recent travel history available. |
+-------------------------------------+
 documented as of this encounter
 
 Plan of Treatment
 
 
+--------+-------------+------------------+----------------------+-------------+
| Date   | Type        | Specialty        | Care Team            | Description |
+--------+-------------+------------------+----------------------+-------------+
| / | Appointment | Radiology        |   Augustin Lopez DNP      |             |
|    |             |                  | [address]     |             |
|        |             |                  | [place]  |             |
|        |             |                  | [address]  [phone]  |             |
|        |             |                  |  [phone] (Fax)  |             |
+--------+-------------+------------------+----------------------+-------------+
| / | Office      | Vascular Surgery |   Augustin Lopez DNP      |             |
|    | Visit       |                  | [address]     |             |
|        |             |                  | [place]  |             |
|        |             |                  | [address]  [phone]  |             |
|        |             |                  |  [phone] (Fax)  |             |
+--------+-------------+------------------+----------------------+-------------+
 documented as of this encounter
 
 Visit Diagnoses
 Not on filedocumented in this encounter
 
 Administered Medications
 
 
+-----------------------------------+---------+----------+------+------+------+
| Medication Order                  | MAR     | Action   | Dose | Rate | Site |
|                                   | Action  | Date     |      |      |      |
+-----------------------------------+---------+----------+------+------+------+
|   balanced electrolytes in water  | New Bag | 20 |      |      |      |
| (PLASMALYTE-148/NORMOSOL-R)       |         | 19 11:32 |      |      |      |
| infusion  Intravenous, CONTINUOUS |         |  AM PDT  |      |      |      |
|  PRN, Starting 19 at     |         |          |      |      |      |
| 1132, Anesthesia Intra-op         |         |          |      |      |      |
+-----------------------------------+---------+----------+------+------+------+
 
 
 
+---+---+
|   |   |
+---+---+
 
 
 
+--------------------------------+-------+----------+------+---+---+
 
|   dexamethasone (DECADRON) 4   | Given | 20 | 4 mg |   |   |
| mg/mL injection  Intravenous,  |       | 19 11:32 |      |   |   |
| PRN, Starting 19 at   |       |  AM PDT  |      |   |   |
| 1132, Anesthesia Intra-op      |       |          |      |   |   |
+--------------------------------+-------+----------+------+---+---+
 
 
 
+---+---+
|   |   |
+---+---+
 
 
 
+---------------------------------+-------+----------+---------+---+---+
|   fentaNYL (PF) injection       | Given | 20 | 100 mcg |   |   |
| Intravenous, PRN, Starting Tue  |       | 19 12:02 |         |   |   |
| 19 at 1132, Anesthesia     |       |  PM PDT  |         |   |   |
| Intra-op                        |       |          |         |   |   |
+---------------------------------+-------+----------+---------+---+---+
 
 
 
+-------+----------+---------+---+---+
| Given | 20 | 100 mcg |   |   |
|       | 19 11:32 |         |   |   |
|       |  AM PDT  |         |   |   |
+-------+----------+---------+---+---+
 
 
 
+---+---+
|   |   |
+---+---+
 
 
 
+--------------------------------+-------+----------+--------+---+---+
|   glycopyrrolate (ROBINUL)     | Given | 20 | 0.4 mg |   |   |
| injection  Intravenous, PRN,   |       | 19  2:33 |        |   |   |
| Starting Tue 19 at 1433,  |       |  PM PDT  |        |   |   |
| Anesthesia Intra-op            |       |          |        |   |   |
+--------------------------------+-------+----------+--------+---+---+
 
 
 
+---+---+
|   |   |
+---+---+
 
 
 
+--------------------------------+-------+----------+--------+---+---+
|   heparin 1,000 units/mL       | Given | 20 | 6,000  |   |   |
| injection  Intravenous, PRN,   |       | 19  1:32 | Units  |   |   |
| Starting Tue 19 at 1332,  |       |  PM PDT  |        |   |   |
| Anesthesia Intra-op            |       |          |        |   |   |
+--------------------------------+-------+----------+--------+---+---+
 
 
 
 
+---+---+
|   |   |
+---+---+
 
 
 
+--------------------------------+-------+----------+-------+---+---+
|   hydrALAZINE (APRESOLINE)     | Given | 20 | 10 mg |   |   |
| injection  Intravenous, PRN,   |       | 19  2:48 |       |   |   |
| Starting Tue 19 at 1210,  |       |  PM PDT  |       |   |   |
| Anesthesia Intra-op            |       |          |       |   |   |
+--------------------------------+-------+----------+-------+---+---+
 
 
 
+-------+----------+-------+---+---+
| Given | 20 | 10 mg |   |   |
|       | 19  2:43 |       |   |   |
|       |  PM PDT  |       |   |   |
+-------+----------+-------+---+---+
| Given | 20 | 10 mg |   |   |
|       | 19 12:10 |       |   |   |
|       |  PM PDT  |       |   |   |
+-------+----------+-------+---+---+
 
 
 
+---+---+
|   |   |
+---+---+
 
 
 
+--------------------------------+-------+----------+------+---+---+
|   HYDROmorphone (DILAUDID) 2   | Given | 20 | 1 mg |   |   |
| mg/mL injection  Intravenous,  |       | 19 12:07 |      |   |   |
| PRN, Starting e 19 at   |       |  PM PDT  |      |   |   |
| 1158, Anesthesia Intra-op      |       |          |      |   |   |
+--------------------------------+-------+----------+------+---+---+
 
 
 
+-------+----------+------+---+---+
| Given | 20 | 1 mg |   |   |
|       | 19 11:58 |      |   |   |
|       |  AM PDT  |      |   |   |
+-------+----------+------+---+---+
 
 
 
+---+---+
|   |   |
+---+---+
 
 
 
+---------------------------------+-------+----------+------+---+---+
|   labetalol (TRANDATE) 5 mg/mL  | Given | 20 | 5 mg |   |   |
| injection  PRN, Starting Tue    |       | 19  3:12 |      |   |   |
 
| 19 at 1502, Anesthesia     |       |  PM PDT  |      |   |   |
| Intra-op                        |       |          |      |   |   |
+---------------------------------+-------+----------+------+---+---+
 
 
 
+-------+----------+------+---+---+
| Given | 20 | 5 mg |   |   |
|       | 19  3:02 |      |   |   |
|       |  PM PDT  |      |   |   |
+-------+----------+------+---+---+
 
 
 
+---+---+
|   |   |
+---+---+
 
 
 
+---------------------------------+-------+----------+-------+---+---+
|   lidocaine (PF) 2% injection   | Given | 20 | 50 mg |   |   |
| Intravenous, PRN, Starting Tue  |       | 19 11:32 |       |   |   |
| 19 at 1132, Anesthesia     |       |  AM PDT  |       |   |   |
| Intra-op                        |       |          |       |   |   |
+---------------------------------+-------+----------+-------+---+---+
 
 
 
+---+---+
|   |   |
+---+---+
 
 
 
+-----------------------------------+-------+----------+------+---+---+
|   neostigmine 1 mg/mL injection   | Given | 20 | 4 mg |   |   |
| Intravenous, PRN, Starting Tue    |       | 19  2:33 |      |   |   |
| 19 at 1433, Anesthesia       |       |  PM PDT  |      |   |   |
| Intra-op                          |       |          |      |   |   |
+-----------------------------------+-------+----------+------+---+---+
 
 
 
+---+---+
|   |   |
+---+---+
 
 
 
+-----------------------------------+-------+----------+------+---+---+
|   ondansetron (ZOFRAN) injection  | Given | 20 | 4 mg |   |   |
|  Intravenous, PRN, Starting Tue   |       | 19 11:32 |      |   |   |
| 19 at 1132, Anesthesia       |       |  AM PDT  |      |   |   |
| Intra-op                          |       |          |      |   |   |
+-----------------------------------+-------+----------+------+---+---+
 
 
 
+---+---+
 
|   |   |
+---+---+
 
 
 
+-----------------------------------+-------+----------+--------+---+---+
|   propofol (DIPRIVAN) injection   | Given | 20 | 140 mg |   |   |
| Intravenous, PRN, Starting Tue    |       | 19 11:32 |        |   |   |
| 19 at 1132, Anesthesia       |       |  AM PDT  |        |   |   |
| Intra-op                          |       |          |        |   |   |
+-----------------------------------+-------+----------+--------+---+---+
 
 
 
+---+---+
|   |   |
+---+---+
 
 
 
+---------------------------------+-------+----------+-------+---+---+
|   protamine injection           | Given | 20 | 30 mg |   |   |
| Intravenous, PRN, Starting Tue  |       | 19  2:09 |       |   |   |
| 19 at 1409, Anesthesia     |       |  PM PDT  |       |   |   |
| Intra-op                        |       |          |       |   |   |
+---------------------------------+-------+----------+-------+---+---+
 
 
 
+---+---+
|   |   |
+---+---+
 
 
 
+-----------------------------------+----------+----------+----------+-------+---+
|   remifentanil (ULTIVA) injection | Rate/Dos | 20 | 0.075    | 0.3   |   |
|   Intravenous, CONTINUOUS PRN,    | e Change | 19  1:19 | mcg/kg/m | mL/hr |   |
| Starting Tue 19 at 1229,     |          |  PM PDT  | in       |       |   |
| Anesthesia Intra-op               |          |          |          |       |   |
+-----------------------------------+----------+----------+----------+-------+---+
 
 
 
+---------+----------+----------+-------+---+
| New Bag | 20 | 0.05     | 0.2   |   |
|         | 19 12:29 | mcg/kg/m | mL/hr |   |
|         |  PM PDT  | in       |       |   |
+---------+----------+----------+-------+---+
 
 
 
+---+---+
|   |   |
+---+---+
 
 
 
+-----------------------------------+-------+----------+-------+---+---+
|   rocuronium (ZEMURON) injection  | Given | 20 | 10 mg |   |   |
 
|  Intravenous, PRN, Starting Tue   |       | 19  2:17 |       |   |   |
| 19 at 1132, Anesthesia       |       |  PM PDT  |       |   |   |
| Intra-op                          |       |          |       |   |   |
+-----------------------------------+-------+----------+-------+---+---+
 
 
 
+-------+----------+-------+---+---+
| Given | 20 | 40 mg |   |   |
|       | 19 11:32 |       |   |   |
|       |  AM PDT  |       |   |   |
+-------+----------+-------+---+---+
 
 
 
+---+---+
|   |   |
+---+---+
 
 
 
+-----------------------------------+---------+----------+---+----------+---+
|   sodium chloride 0.9% (NS)       | New Bag | 20 |   | 30 mL/hr |   |
| infusion  at 30 mL/hr,            |         | 19 11:34 |   |          |   |
| Intravenous, CONTINUOUS, Starting |         |  AM PDT  |   |          |   |
|  Tue 19 at 1200,             |         |          |   |          |   |
| Recovery/Phase I                  |         |          |   |          |   |
+-----------------------------------+---------+----------+---+----------+---+
 
 
 
+-------------------------+----------+---+---+---+
| Continued by Anesthesia | 20 |   |   |   |
|                         | 19 11:27 |   |   |   |
|                         |  AM PDT  |   |   |   |
+-------------------------+----------+---+---+---+
 
 
 
+---+---+
|   |   |
+---+---+
 documented in this encounter

## 2020-01-13 NOTE — XMS
Encounter Summary
  Created on: 2020
 
 Pasquale Nehemias Martinez
 External Reference #: [id number]
 : 43
 Sex: Male
 
 Demographics
 
 
+-----------------------+----------------------+
| Address               | [address]         |
|                       | [place]  [address] |
+-----------------------+----------------------+
| Home Phone            | [phone]      |
+-----------------------+----------------------+
| Preferred Language    | Unknown              |
+-----------------------+----------------------+
| Marital Status        |               |
+-----------------------+----------------------+
| Orthodoxy Affiliation | 1077                 |
+-----------------------+----------------------+
| Race                  | Unknown              |
+-----------------------+----------------------+
| Ethnic Group          | Unknown              |
+-----------------------+----------------------+
 
 
 Author
 
 
+--------------+--------------------------------------------+
| Author       | [facility]  |
|              | and [facility]                                |
+--------------+--------------------------------------------+
| Organization | [facility]  |
|              | and [place]                                |
+--------------+--------------------------------------------+
| Address      | Unknown                                    |
+--------------+--------------------------------------------+
| Phone        | Unavailable                                |
+--------------+--------------------------------------------+
 
 
 
 Support
 
 
+--------------+--------------+---------------------+-----------------+
| Name         | Relationship | Address             | Phone           |
+--------------+--------------+---------------------+-----------------+
| Rupa Giordano | ECON         | [address]             | [phone] |
|              |              | [place]   |                 |
|              |              | [address]               |                 |
+--------------+--------------+---------------------+-----------------+
 
 
 
 
 Care Team Providers
 
 
+-----------------------+------+-------------+
| Care Team Member Name | Role | Phone       |
+-----------------------+------+-------------+
| No, Physician         | PCP  | Unavailable |
+-----------------------+------+-------------+
 
 
 
 Reason for Visit
 
 
+----------------------+----------+
| Reason               | Comments |
+----------------------+----------+
| Appointment Question |          |
+----------------------+----------+
 
 
 
 Encounter Details
 
 
+--------+-----------+----------------------+----------------------+----------------------+
| Date   | Type      | Department           | Care Team            | Description          |
+--------+-----------+----------------------+----------------------+----------------------+
| / | Telephone |   [facility]      |   Briana Casper Coates, | Appointment Question |
| 2020   |           | VASCULAR SURGERY     |  RN                  |                      |
|        |           | [address] |                      |                      |
|        |           |  E  [place]     |                      |                      |
|        |           | [address]           |                      |                      |
|        |           | [phone]         |                      |                      |
+--------+-----------+----------------------+----------------------+----------------------+
 
 
 
 Social History
 
 
+---------------+-------+-----------+--------+------+
| Tobacco Use   | Types | Packs/Day | Years  | Date |
|               |       |           | Used   |      |
+---------------+-------+-----------+--------+------+
| Former Smoker |       |           |        |      |
+---------------+-------+-----------+--------+------+
 
 
 
+---------------------+---+---+---+
| Smokeless Tobacco:  |   |   |   |
| Former User         |   |   |   |
+---------------------+---+---+---+
 
 
 
+------------------------------+
| Comments: quit 50 years ago  |
 
+------------------------------+
 
 
 
+---------------+-------------+---------+----------+
| Alcohol Use   | Drinks/Week | oz/Week | Comments |
+---------------+-------------+---------+----------+
| Not Currently |             |         | 90m days |
+---------------+-------------+---------+----------+
 
 
 
+------------------+---------------+
| Sex Assigned at  | Date Recorded |
| Birth            |               |
+------------------+---------------+
| Not on file      |               |
+------------------+---------------+
 
 
 
+----------------+-------------+-------------+
| Job Start Date | Occupation  | Industry    |
+----------------+-------------+-------------+
| Not on file    | Not on file | Not on file |
+----------------+-------------+-------------+
 
 
 
+----------------+--------------+------------+
| Travel History | Travel Start | Travel End |
+----------------+--------------+------------+
 
 
 
+-------------------------------------+
| No recent travel history available. |
+-------------------------------------+
 documented as of this encounter
 
 Functional Status
 
 
+---------------------------------------------+----------+--------------------+
| Functional Status                           | Response | Date of Assessment |
+---------------------------------------------+----------+--------------------+
| Are you deaf or do you have serious         | No       | 2019         |
| difficulty hearing?                         |          |                    |
+---------------------------------------------+----------+--------------------+
| Are you blind or do you have serious        | No       | 2019         |
| difficulty seeing, even when wearing        |          |                    |
| glasses?                                    |          |                    |
+---------------------------------------------+----------+--------------------+
| Do you have serious difficulty walking or   | No       | 2019         |
| climbing stairs? (5 years old or older)     |          |                    |
+---------------------------------------------+----------+--------------------+
| Do you have difficulty dressing or bathing? | No       | 2019         |
|  (5 years old or older)                     |          |                    |
+---------------------------------------------+----------+--------------------+
| Because of a physical, mental, or emotional | No       | 2019         |
 
|  condition, do you have difficulty doing    |          |                    |
| errands alone such as visiting a doctor's   |          |                    |
| office or shopping? [15 years old or        |          |                    |
| older)]                                     |          |                    |
+---------------------------------------------+----------+--------------------+
 
 
 
+---------------------------------------------+----------+--------------------+
| Cognitive Status                            | Response | Date of Assessment |
+---------------------------------------------+----------+--------------------+
| Because of a physical, mental, or emotional | No       | 2019         |
|  condition, do you have serious difficulty  |          |                    |
| concentrating, remembering, or making       |          |                    |
| decisions? (5 years old or older)           |          |                    |
+---------------------------------------------+----------+--------------------+
 documented as of this encounter
 
 Plan of Treatment
 
 
+--------+-------------+------------------+----------------------+-------------+
| Date   | Type        | Specialty        | Care Team            | Description |
+--------+-------------+------------------+----------------------+-------------+
| / | Appointment | Radiology        |   Augustin Lopez DNP      |             |
|    |             |                  | [address]     |             |
|        |             |                  | [place]  |             |
|        |             |                  | [address]  [phone]  |             |
|        |             |                  |  [phone] (Fax)  |             |
+--------+-------------+------------------+----------------------+-------------+
| / | Office      | Vascular Surgery |   Augustin Lopez DNP      |             |
|    | Visit       |                  | [address]     |             |
|        |             |                  | [place]  |             |
|        |             |                  | [id number] [phone]  |             |
|        |             |                  |  [phone] (Fax)  |             |
+--------+-------------+------------------+----------------------+-------------+
 documented as of this encounter
 
 Visit Diagnoses
 Not on filedocumented in this encounter

## 2020-01-14 NOTE — NUR
PATIENT RESTING IN BED. RN IN ROOM. VITAL SIGNS AND I&O DONE. CALL LIGHT
WITHIN REACH. NO OTHER NEEDS AT THIS TIME

## 2020-01-14 NOTE — NUR
PT RESTED WELL. ALERT AND ORIENTED, USES CALL LIGHT. INDEPENDENT IN ROOM. ABD
PAIN CONTROLLED WITH PRN MORPHINE AND ICE. IVF AND IV ABX. NPO. DENIES NAUSEA.

## 2020-01-14 NOTE — NUR
AM medications given, assessment complete. Patient laying in bed watching tv,
denies further needs at this time. Call light within reach.

## 2020-01-14 NOTE — NUR
CALL LIGHT ANSWERED. PT REQUESTING ICE PACK FOR ABD PAIN. UP TO BR
INDEPENDENTLY, GAIT STEADY. BACK TO BED, DESHAUN WELL. PRN GIVEN FOR ABD PAIN. PT
REMAINS NPO. CALL LIGHT IN REACH.

## 2020-01-14 NOTE — NUR
ADMINISTERED MEDICATIONS AND ASSESSED PT. HE REPORTS ABD PAIN WHICH HE
DESCRIBES AS A SORENESS IN HIS SKIN. HE HAS LOTION AT THE BEDSIDE THAT HE
APPLIED EARLIER AND AN ICEPACK IN PLACE. HE WOULD LIKE THE TORADOL WHEN IT IS
DUE IF HE IS AWAKE. MRI SCREENING COMPLETED FOR MORNING. HE DENIES FURTHER
NEEDS AT THIS TIME. CALL LIGHT IS CLOSE.

## 2020-01-14 NOTE — NUR
Met with Nehemias and his wife.  He lives in [place] and is retired, but cont.
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  Pt is
jaundiced.  States he is awaiting Dr. Johnston to find out what the plan is.
Would like to dc to home on dc.  Has 13 steps to get into his home.  Rails are
in place.  Denies any problems with navigating steps.

## 2020-01-14 NOTE — NUR
Patient removed IV site in right arm, new IV started in right chest. LR
running at 75 mls/hr. BS of 155, 1 unit of insulin given. Warm blanket
provided. Patient educated on importance of leaving IVs in place. Pillows
placed to reposition patient for comfort. Call light within reach.

## 2020-01-14 NOTE — NUR
PATIENT RESTING IN BED. PATIENT SAYS THAT MAYBE HE WILL TAKE A SHOWER TODAY
BUT NO NOW. CALL LIGHT WITHIN REACH. NO OTHER NEEDS AT THIS TIME

## 2020-01-14 NOTE — NUR
Patient laying in bed watching tv, wife at bedside. Denies pain or nausea.
Discussed POC with patient. IV abx given. No further needs at this time, call
light within reach.

## 2020-01-14 NOTE — NUR
PT CALLED REQUESTING TORADOL FOR PAIN. PT DENIES FURTHER NEEDS AND HE DRANK
THE LAST OF HIS WATER AND IS NPO AT MIDNIGHT. HE DENIES FURTHER NEEDS AT THIS
TIME. CALL LIGHT IS WITHIN REACH.

## 2020-01-14 NOTE — NUR
TALKED TO  IN PACU, IN REGARDS TO PT WITH ER BRIDGE ORDERS
DISCONTINUED INCLUDING PAIN MEDICATIONS AND MAINTAINCE FLUIDS.  SAID
HE WOULD PUT IN ORDERS.

## 2020-01-14 NOTE — NUR
Report received, orders acknowledged. Patient laying in bed, rouses to voice.
Patient denies pain or nausea. LR running at 125 mls/hr. Ambulated to toilet
independently. No further needs at this time, call light within reach.

## 2020-01-14 NOTE — NUR
CALL LIGHT ANSWERED. PT UP TO BR INDEPENDENTLY, GAIT STEADY. BACK TO BED, DESHAUN
WELL. CALL LIGHT IN REACH.

## 2020-01-14 NOTE — NUR
PT WITH EYES CLOSED, HEARING AIDE, OUT AND ON TABLE.  REMOVED WATER AS PT IS
NPO.  RESP EVEN AND UNLABORED.

## 2020-01-14 NOTE — NUR
TALKED WITH THE PT ABOUT HIS DX OF JAUNDICE, PT STATED THAT DR STERLING HAD BEEN
IN AND EXPLAINED TO HIM THAT HE WAS ORDERING A MRI AND PENDING THOSE RESULTS
HE MAY BE GOING TO SURGERY TO HAVE A LOOK-PT SAYS.  PT STATED UNDERSTANDING OF
HIS EDUCATION.  WILL FOLLOW UP WITH HIM LATER.

## 2020-01-15 NOTE — NUR
K pad applied to patients lower back for pain. Set for 20 minutes at 95
degrees F. Denies further needs, call light within reach.

## 2020-01-15 NOTE — NUR
In and spoke with pt.  Had an MRI this AM.  Awaiting results from Dr. Escobar.
C/o of some back pain.

## 2020-01-15 NOTE — NUR
Called and spoke with Diamante.  She states pt.is 30% Service related.  [facility]
states [facility] so he does qualify for services.
He would not have service for payment for nursing home.  Home Health would be
covered.
 
She also state if the patient has cancer would need to contact his PCP Indigo Neumann NP.  Will need order for CANCER CARE NAVIGATION and they will provide
information and resources to the patient.  [phone] ask for Indigo Servin NP.

## 2020-01-15 NOTE — NUR
[facility] contact [facility] for information [facility].  They are in a meeting and
will be available after 10:00.

## 2020-01-15 NOTE — NUR
In and spoke with Nehemias.  States he is awaiting Dr. Johnston.  Denies any needs.
MRI completed this am.

## 2020-01-15 NOTE — NUR
Patient laying in bed visiting with wife at bedside. AM medications given, POC
discussed. Supplies for shower set up, patient states "I'll take one in a
little it and have my wife help me." Denies further needs at this time, call
light within reach.

## 2020-01-15 NOTE — NUR
PT HAD A DIFFICULT TIME SLEEPING LAST NIGHT. HE IS SCHEDULED FOR AN MRI THIS
MORNING. HE IS NPO SINCE MIDNIGHT. HE AMBULATES INDEPENDENTLY IN THE ROOM. LR
IS INFUSING AT 100MLS/HR. HE COMPLAINS OF ABDOMINAL PAIN BEING SORENESS IN
HIS SKIN.

## 2020-01-15 NOTE — HP
[facility]
                                    [address], [place]  [address]
_________________________________________________________________________________________
                                                                 Signed   
 
 
ADMISSION DATE:  
01/14/2020
 
REASON FOR ADMISSION:  
This 74-year-old white man is known to me from the distant past and presented to the
emergency room with complaints of orange urine, vague feelings of unwellness, and
clinical jaundice.  He was evaluated for nearly 8 hours in the emergency room under the
direction of Dr. Springer.  Ultimately, culminating a CT scan which showed a markedly
hydropic gallbladder with intrahepatic ductal dilatation, a double duct sign (dilated
common duct and pancreatic duct), and significant clinical jaundice and a bilirubin of
8.5 with a direct component of 5.3.  Alkaline phosphatase was 481.  Lipase normal at 21.
 His creatinine was elevated at 1.34. 
 
The patient has a prior complex past history of vascular surgery by Dr. Pardo at [facility].  My subsequent call to his assistant Tegan revealed that he underwent an open
abdominal endarterectomy of the superior mesenteric artery.  He subsequently underwent
catheter-based therapy of angioplasty for a plaque had elevated. 
 
The patient has had mild upper abdominal pain, but not much.  No nausea or vomiting and
no fever or chills. 
 
The patient remains on Plavix 75 mg daily, antihypertensive medication
losartan-hydrochlorothiazide, dyslipidemic agent simvastatin, and aspirin 81 mg daily. 
 
Attempts by the emergency room personnel to transfer the patient to [facility] or
elsewhere were unsuccessful.  A snowstorm had initiated during the course of the day and
I was called to assist with his management. 
 
The patient since admission has had no nausea or vomiting.  He is jaundiced but is
essentially pain free he says. 
 
PAST MEDICAL HISTORY:  
Includes formally daily smoker.  He does not use alcohol and no illicit drug noted.
 
SOCIAL HISTORY:  
He is .  He lives in [place].
 
REVIEW OF SYSTEMS:  
He denies any shortness of breath or chest pain.  He has had no extremity pain.  Pain in
the upper abdomen is rather vague and somewhat characterized as bloating.  He has no
dysphagia.  No hematemesis.  No blood per rectum. 
 
 
    Electronically Signed By: LAITH STERLING MD  01/15/20 1208
_________________________________________________________________________________________
PATIENT NAME:     LASHANDA RAHMAN JR                 
MEDICAL RECORD #: [id number]            HISTORY AND PHYSICAL          
          ACCT #: [id number]  
DATE OF BIRTH:   [birth year]            REPORT #: [id number]      
PHYSICIAN:        LAITH STERLING MD                 
PCP:              Augustin Lopez DNP, ARNP              
REPORT IS CONFIDENTIAL AND NOT TO BE RELEASED WITHOUT AUTHORIZATION
 
 
                                  [facility]
                                    [address], [place]  [address]
_________________________________________________________________________________________
                                                                 Signed   
 
 
PHYSICAL EXAMINATION:
GENERAL:  A jaundiced, white man who does not look to be uncomfortable or septic in any
way. 
VITAL SIGNS:  Presentation vital signs showed blood pressure 119/67 and pulse 65.
Subsequent and more recent vital signs show temperature 98.5, pulse of 70, blood
pressure 136/76, and room air saturation 100%. 
NECK:  Shows no thyromegaly or cervical adenopathy.  I detect no carotid bruit.  His
skin is jaundiced.  His sclerae are icteric. 
CHEST:  Clear. 
HEART:  Regular without murmur. 
ABDOMEN:  Flat and scaphoid and rather thin.  He is said to have a 20-pound weight loss
over the past month or two.  He shows no palpable epigastric mass.  There is no ascites.
 Abdominal palpation shows no focal tenderness.  Abdomen shows a well-healed midline
incision. 
EXTREMITIES:  Show no clubbing, cyanosis, or edema.  Dorsalis pedis pulse on the right
and left side is 3/3. 
 
ASSESSMENT:  
The patient has had relatively recent vascular operation to include aortic
endarterectomy related to the superior mesenteric artery with subsequent angioplasty for
a small flap that was noted.  He does not appear to have thrombotic phenomenon at this
time. 
 
He has worrisome findings of painless jaundice with a double duct sign (dilated
pancreatic and common duct structures). 
 
Since his admission, he has been maintained on antibiotic Ancef and n.p.o. status.  He
underwent an ultrasound under my direction, which showed considerable amount of sludge
within the dilated and elongated gallbladder.  No clear evidence of stones proper. 
 
If this obstructive jaundice is related to biliary disease, clear and straightforward
approach could include laparoscopic cholecystectomy and laparoscopic common duct
exploration and clearance of the duct or even open procedure for the same.  My
additional concern, however, is that of pancreatic head neoplasm.  The surrounding soft
tissue in the pancreas looks soft and spongy and somewhat edematous.  There is no dense
mass.  There are two lesions of the liver which cannot be better characterized; possible
metastasis versus hemangiomas. 
 
I have reviewed imaging options with Dr. Enamorado, the radiologist, in detail.  She does
not feel an MRCP would be particularly diagnostic in this situation.  An MRI may be of
benefit, however, to better characterize the head of the pancreas. 
 
 
    Electronically Signed By: LAITH STERLING MD  01/15/20 120
_________________________________________________________________________________________
PATIENT NAME:     LASHANDA RAHMAN                  
MEDICAL RECORD #: [id number]            HISTORY AND PHYSICAL          
          ACCT #: [id number]  
DATE OF BIRTH:   [birth year]            REPORT #: [id number]      
PHYSICIAN:        LAITH STERLING MD                 
PCP:              Augustin Lopez DNP,LADONNAP              
REPORT IS CONFIDENTIAL AND NOT TO BE RELEASED WITHOUT AUTHORIZATION
 
 
                                  [address]  [address]
_________________________________________________________________________________________
                                                                 Signed   
 
 
At this point, we will order for an MRI to be performed tomorrow.  He may have liquids
for the time being.  There is no urgency to operation.  If he does have what appears to
be an obstructing neoplasm of the pancreatic head or the ampulla, there may still be a 
possibility of resectional therapy for attempted care.  I have discussed this in detail
with the patient. 
 
 
 
            ________________________________________
            MD JOSE Fierro/MEHNAZ
Job #:  [id number]
DD:  01/14/2020 15:51:01
DT:  01/14/2020 22:20:51
 
cc:            Dr. Pardo 
               Department of Vascular Surgery 
               [facility]
 
 
Copies:                                
~
 
 
 
 
 
 
 
 
 
 
 
 
 
 
 
 
 
 
 
    Electronically Signed By: LAITH STERLING MD  01/15/20 1208
_________________________________________________________________________________________
PATIENT NAME:     LASHANDA RAHMAN                  
MEDICAL RECORD #: [id number]            HISTORY AND PHYSICAL          
          ACCT #: [id number]  
DATE OF BIRTH:   [birth year]            REPORT #: [id number]      
PHYSICIAN:        LAITH STERLING MD                 
PCP:              Augustin Lopez DNP, ARNP              
REPORT IS CONFIDENTIAL AND NOT TO BE RELEASED WITHOUT AUTHORIZATION

## 2020-01-15 NOTE — NUR
Discharge instructions given, all questions and concerns answered. Patient
verbalized understanding of follow up appointment. IV sites D/C'd, vital signs
taken. All personal belongings collected. Patient leaves unit independently
with nursing staff.

## 2020-01-15 NOTE — NUR
PT IS AWAKE IN BED STATING HIS BACK IS HURTING. OFFERED PT A HEAT PACK. HE
DENIES FURTHER NEEDS AT THIS TIME. CALL LIGHT IS WITHIN REACH.

## 2020-01-15 NOTE — NUR
IN ROOM TO ADMINISTER ANCEF, PT DENIES FURTHER NEEDS AT THIS TIME. HE WILL BE
GOING FOR AN MRI SOON.

## 2020-01-15 NOTE — NUR
HAVE TRIED SEVERAL TIMES TO CONNECT WITH PT TODAY. IN BR, OR IN THE MIDDLE OF
SHAVING. WILL TRY AGAIN

## 2020-01-16 NOTE — DS
[facility]
                                    [address], [place]  [address]
_________________________________________________________________________________________
                                                                 Signed   
 
 
ADMISSION DATE:  01/14/2020
 
DISCHARGE DATE:  01/15/2020
 
REASON FOR ADMISSION:
This 74-year-old white man is known to me from the distant past, who presented to the
emergency room with orange urine and vague feelings of unwellness and clinical jaundice.
 He was evaluated by Dr. Springer for nearly 8 hours in the emergency room, culminating a
CT scan, which showed a markedly hydropic gallbladder with sludge, intrahepatic ductal
dilatation, a double duct sign regarding pancreatic duct, but no clear evidence of
neoplasm in the head of the pancreas.  His bilirubin was noted to be 8.5, indirect
component of 5.3 with an alkaline phosphatase of 481, lipase normal at 21, creatinine
slightly elevated at 1.34. 
 
On the day of his admission, a significant snowstorm began and he was admitted for
further evaluation and care to my service. 
 
Notably, the patient remains on Plavix 75 mg a day as well as losartan and
hydrochlorothiazide and simvastatin and aspirin 81 mg a day. 
 
Notably in the past several months, the patient underwent endarterectomy of the proximal
aorta in relation to the superior mesenteric artery, subsequent angioplasty for a slight
plaque.  He has no severe abdominal pain and there was no evidence of ischemic disease
on CT scan findings. 
 
The patient was formally a daily smoker, does not use alcohol or smoke at this time.
 
SOCIAL HISTORY:
He is .  He is a private businessman locally.  He is a [employer]  and
receives some of his care and essentially all of [facility] coverage [facility]. 
 
HOSPITAL COURSE:
He was admitted and given intravenous antibiotic Ancef on the possibility of biliary
disease as the underlying problem, uncertain if his obstructive jaundice was related to
biliary stones or sludge or neoplasm. 
 
He admittedly was feeling reasonably well without severe pain and certainly, no clinical
evidence of acute cholecystitis.  He was deeply jaundiced and liver enzymes and
bilirubin were unchanged on followup exam.  An ultrasound of the gallbladder was
undertaken, which did confirm layering sludge.  There were no stones, however.  There
was no visualization of stones within the common bile duct, though it was markedly
 
    Electronically Signed By: LAITH STERLING MD  01/16/20 1416
_________________________________________________________________________________________
PATIENT NAME:     LASHANDA RAHMAN JR                 
MEDICAL RECORD #: [id number]            DISCHARGE SUMMARY             
          ACCT #: [id number]  
DATE OF BIRTH:   [birth year]            REPORT #: [id number]      
PHYSICIAN:        LAITH STERLING MD                 
PCP:              Augustin Lopez DNP, ARNP              
REPORT IS CONFIDENTIAL AND NOT TO BE RELEASED WITHOUT AUTHORIZATION
 
 
                                  [facility]
                                    [address], [place]  [address]
_________________________________________________________________________________________
                                                                 Signed   
 
 
dilated as was the pancreatic duct.  On that basis, an MRI was performed on January 15,
2020, which confirmed no sign of actual neoplasm of head of the pancreas or ampulla.  It
did show thickening of the pancreas and some concern for possible pancreatic lymphoma. 
 
Options of management were reviewed, which included open operation including
cholecystectomy, common duct exploration, placement of T-tube, intrabiliary brushings
and biopsy of ampulla as well as some potential choledochoenterostomy either
choledochoduodenostomy or Shanon-en-Y choledochoenterostomy.  Mindful of a more
contemporary approach to include ERCP with stenting and pancreatic brush biopsy of the
ampulla and bile duct, particularly if neoplasm is actually identified and is
resectable.  Plans were made for arrangement for outpatient ERCP. 
 
Attempts at scheduling at [facility] determined that they no longer do ERCP
procedures.  Discussion with Dr. Robert Champion, gastroenterologist in [place]
confirmed the ability to do ERCP, biopsy and stenting, but no ability for endoscopic
ultrasound, which he felt the patient would benefit from. 
 
Ultimately, I had called [facility], put in touch with nurse with
[facility] assisting in arrangement for outpatient endoscopic ultrasound and ERCP,
stenting and biopsy by Dr. Guido. 
 
Final plans for arrangement of date for this referral were pending.  However, the
referral center does have his phone number and they will be contacting back as well. 
 
At this point, the patient feels reasonably well, would like to go home if possible,
awaiting his personal own vehicle to travel to [place] for further evaluation of his
obstructive jaundice, likely related to neoplastic disease.  He understands that benign
disease may still be the cause, though I think it less likely under the circumstances of
his slowly progressive obstructive jaundice, the painless nature of the jaundice, and a
double duct sign on imaging studies. 
 
Of note, there were 2 small liver lesions, which are not better characterized that have
been noted on CT scan and MRI, but do not currently have an appearance of hemangioma and
may well represent metastatic neoplastic disease.  We discussed all this. 
 
DISCHARGE MEDICATIONS:
Include:
1. His usual hydrochlorothiazide 25 mg p.o. daily.
2. Losartan 25 mg tablets two tablets p.o. daily.
3. Simvastatin 10 mg p.o. bedtime.
4. Multivitamin Centrum Silver men's one p.o. daily and  mg p.o. b.i.d. 
I have asked him to discontinue for the time being, aspirin 81 mg a day and Plavix 75 mg
 
    Electronically Signed By: LAITH STERLING MD  01/16/20 1416
_________________________________________________________________________________________
PATIENT NAME:     LASHANDA RAHMAN                  
MEDICAL RECORD #: [id number]            DISCHARGE SUMMARY             
          ACCT #: [id number]  
DATE OF BIRTH:   [birth year]            REPORT #: [id number]      
PHYSICIAN:        LAITH STERLING MD                 
PCP:              Augustin Lopez DNP, ARNP              
REPORT IS CONFIDENTIAL AND NOT TO BE RELEASED WITHOUT AUTHORIZATION
 
 
                                  [facility]
                                    [address]
                                  [place], [place]  [address]
_________________________________________________________________________________________
                                                                 Signed   
 
 
a day pending instructions from his gastroenterologist who will be anticipating ERCP,
papillotomy stenting soon.  We are mindful of his endarterectomy and apparent
angioplasty following that and the hazards of thrombosis, but the risks of bleeding with
ERCP intervention is not small under the circumstances. 
 
DISCHARGE DIAGNOSES:
1. Progressive development of obstructive jaundice with double duct sign on CT scan.
2. Biliary sludge.
3. 20-pound weight loss in past 2 months.
4. Decreased appetite.
5. History of transaortic endarterectomy of superior mesenteric artery with subsequent
limited angioplasty. 
6. Distant history of smoking.
7. Hypertension.
8. Dyslipidemia.
 
 
 
            ________________________________________
            MD JOSE Fierro/WILLYL
Job #:  [id number]
DD:  01/15/2020 13:14:10
DT:  01/15/2020 18:40:03
 
cc:            MD Dr. Racheal Cadena 
               [facility]
 
 
Copies:  MCKENNA GUIDO MD
~
 
 
 
 
 
 
    Electronically Signed By: LAITH STERLING MD  01/16/20 1416
_________________________________________________________________________________________
PATIENT NAME:     LASHANDA RAHMAN JR                 
MEDICAL RECORD #: [id number]            DISCHARGE SUMMARY             
          ACCT #: [id number]  
DATE OF BIRTH:   [birth year]            REPORT #: [id number]      
PHYSICIAN:        LAITH STERLING MD                 
PCP:              Augustin Lopez DNP, ARNP              
REPORT IS CONFIDENTIAL AND NOT TO BE RELEASED WITHOUT AUTHORIZATION

## 2020-02-13 ENCOUNTER — HOSPITAL ENCOUNTER (INPATIENT)
Dept: HOSPITAL 46 - ED | Age: 75
LOS: 4 days | Discharge: ANOTHER HOSPITAL | DRG: 388 | End: 2020-02-17
Attending: SURGERY | Admitting: SURGERY
Payer: MEDICARE

## 2020-02-13 VITALS — BODY MASS INDEX: 21.19 KG/M2 | WEIGHT: 134.99 LBS | HEIGHT: 67 IN

## 2020-02-13 DIAGNOSIS — J45.909: ICD-10-CM

## 2020-02-13 DIAGNOSIS — Z87.891: ICD-10-CM

## 2020-02-13 DIAGNOSIS — E87.6: ICD-10-CM

## 2020-02-13 DIAGNOSIS — C25.0: ICD-10-CM

## 2020-02-13 DIAGNOSIS — C78.7: ICD-10-CM

## 2020-02-13 DIAGNOSIS — Z66: ICD-10-CM

## 2020-02-13 DIAGNOSIS — K56.609: Primary | ICD-10-CM

## 2020-02-13 DIAGNOSIS — M19.90: ICD-10-CM

## 2020-02-13 DIAGNOSIS — E83.42: ICD-10-CM

## 2020-02-13 DIAGNOSIS — E78.5: ICD-10-CM

## 2020-02-13 DIAGNOSIS — Z79.891: ICD-10-CM

## 2020-02-13 DIAGNOSIS — E83.51: ICD-10-CM

## 2020-02-13 DIAGNOSIS — N40.0: ICD-10-CM

## 2020-02-13 DIAGNOSIS — E87.1: ICD-10-CM

## 2020-02-13 DIAGNOSIS — I25.10: ICD-10-CM

## 2020-02-13 DIAGNOSIS — Z79.899: ICD-10-CM

## 2020-02-13 DIAGNOSIS — I10: ICD-10-CM

## 2020-02-13 DIAGNOSIS — R73.03: ICD-10-CM

## 2020-02-13 DIAGNOSIS — T50.2X5A: ICD-10-CM

## 2020-02-13 DIAGNOSIS — K83.1: ICD-10-CM

## 2020-02-13 DIAGNOSIS — E46: ICD-10-CM

## 2020-02-13 PROCEDURE — C9113 INJ PANTOPRAZOLE SODIUM, VIA: HCPCS

## 2020-02-13 NOTE — NUR
PATIENT RESTING QUIETLY NOW, EYES CLOSED AND RESPIRATIONS REGULAR AND EVEN, NG
TUBE DRAINING CLEAR AND BROWNISH DRAINAGE. CALL LIGHT IN REACH.

## 2020-02-13 NOTE — NUR
PATIENT VISITING IN THE ROOM WITH FAMILY. ABD PAIN 5/10. WILL BE BACK TO
MEDICATE SHORTLY. CALL LIGHT IN REACH.

## 2020-02-13 NOTE — NUR
PT ALERT AND ORIENTED. REQEUSTED AND RECEIVED PRN CEPICAL LOZENGE. WARM
BLANKETS AND SOME MOISTENED TOOTHETTES ALSO PROVIED PER COMFORT. PT DECLINES
AMBULATING AT THIS TIME. NO FURTHER NEEDS OR CONCERNS VOICED.

## 2020-02-13 NOTE — NUR
PT RESTING IN SEMIFOWLERS POSITION IN BED ALERT AND ORIENTED. NG TO RIGHT NARE
IS HOOKED UP TO LIWS AND DRAINGING GREEN ANGELA CLEAR FLUIDS. CALL LIGHT IN
REACH. NO NEEDS OR CONCERNS VOICED.

## 2020-02-13 NOTE — NUR
DR KHAN REQUESTED THE POLST FORM. WE DO NOT HAVE IT ON FILE HERE. HIS PCP
DOES NOT HAVE ONE ON FILE EITHER. I CALLED JUAN CONLEY. PT WILL NEED A
POLST FORM.

## 2020-02-13 NOTE — NUR
VITALS AND I&OS DONE AND CHARTED. 2 WARM BLANKETS GIVEN. BEDSIDE TABLE AND
CALL LIGHTS IN REACH. PT NEEDS NOTHING MORE AT THIS TIME.

## 2020-02-13 NOTE — XMS
PreManage Notification: LASHANDA RAHMAN MRN:[id number]
 
Security Information
 
Security Events
No recent Security Events currently on file
 
 
 
CRITERIA MET
------------
- [facility] - 2 Visits in 30 Days
 
 
CARE PROVIDERS
-------------------------------------------------------------------------------------
REMI NICHOLSON     Primary Care     Current
 
PHONE: Unknown
-------------------------------------------------------------------------------------
Carson Schmitz MD     Primary Care     Current
 
PHONE: Unknown
-------------------------------------------------------------------------------------
 
Negin has no Care Guidelines for this patient.
 
ISAIAH VISIT COUNT (12 MO.)
-------------------------------------------------------------------------------------
1 [facility]
 
[address] [place] [address]
-------------------------------------------------------------------------------------
TOTAL 3
-------------------------------------------------------------------------------------
NOTE: Visits indicate total known visits.
 
ED/UCC VISIT TRACKING (12 MO.)
-------------------------------------------------------------------------------------
02/13/2020 04:17
[facility] [place]
 
TYPE: Emergency
 
COMPLAINT:
- SEIZURE
-------------------------------------------------------------------------------------
01/31/2020 10:40
[facility]     [place]
 
TYPE: Emergency
 
DIAGNOSES:
- EMS WEAKNESS
- Other specified diseases of pancreas
-------------------------------------------------------------------------------------
01/13/2020 13:42
[facility] [place]
 
 
TYPE: Emergency
 
COMPLAINT:
- TURNING YELLOW
-------------------------------------------------------------------------------------
 
 
INPATIENT VISIT TRACKING (12 MO.)
-------------------------------------------------------------------------------------
01/31/2020 10:40
[facility]     [place]
 
TYPE: Medical Surgical
 
DIAGNOSES:
- Malignant (primary) neoplasm, unspecified
- Other specified diseases of pancreas
- Malignant neoplasm of head of pancreas
- Obstruction of bile duct
-------------------------------------------------------------------------------------
01/14/2020 09:39
[facility] [place]
 
TYPE: Medical Surgical
 
COMPLAINT:
- JAUNDICE
 
DIAGNOSES:
- Long term (current) use of antithrombotics/antiplatelets
- Other long term (current) drug therapy
- Other specified diseases of gallbladder
- Essential (primary) hypertension
- Long term (current) use of aspirin
- Anorexia
- Hyperlipidemia, unspecified
- Abnormal weight loss
- Obstruction of bile duct
- Personal history of nicotine dependence
-------------------------------------------------------------------------------------
 
[email or web address]

## 2020-02-13 NOTE — NUR
Pt reports sore throat, prn cepical lozenge administered per pt request. Call
light and h2o in reach. Pt denies further needs or concerns. Pt has tolerated
ng to liws with prn lozenges well. Pt remains alert and oriented with
maintenance fluids infusing. NG has had 200mls of clear green fluid out this
shift. Pt is jaundiced. Voiding more than quantity sufficient concentrated
urine in urinal. pt repositions himself independantly on bed but declines
ambulation states "I'm too weak today". encourage deep breathing and coughing.
PT a/o x4.

## 2020-02-13 NOTE — NUR
PATIENT RESTING QUIETLY IN SEMI-FOWLERS POSITION, NG TO LIWS, EYES ARE CLOSED
AND RESPIRATIONS REGULAR AND EVEN. CALL LIGHT IN REACH.

## 2020-02-14 NOTE — NUR
PT HAS REQUESTED PAIN MEDICATIONS EVERY 2 HOURS, NGT FLUSHED EVERY 3-4 HOURS
FOR PATENCY, NEW IV STARTED IN RIGHT AC VIA ULTRASOUND LACED BY LUCERO PERKINS RN
PT HAS ACTIVE BT Q4 QUADRANTS. SUPPOSITRY PLACED FLEETS ENEMA IS NOT
EFFECTIVE. CEPACOL KARLENE. EVERY HOUR.

## 2020-02-14 NOTE — CONS
[facility]
                                    [address], [place]  [address]
_________________________________________________________________________________________
                                                                 Signed   
 
 
DATE OF CONSULTATION:
02/13/2020
 
CHIEF COMPLAINT:
Nausea, vomiting, abdominal distention.
 
HISTORY OF PRESENT ILLNESS:
Lashanda is a 74-year-old gentleman I have known for quite a few years.  He had undergone a
repeat colonoscopy with us early last year.  He then had an open aortic endarterectomy
with Dr. Pardo at [facility] in September 2019.  He then came back
with painless jaundice and has pancreatic cancer with what looks like mets to the liver.
 It looks like it is involving the SMA and so forth.  He went down to [facility] for his stents on January 30, 2020.  They had to come through the
liver to access the bile duct into the duodenum and then come from below to bring up the
endo-biliary stent or both remain in place.  He has been discharged back to [facility]
over the last 10 days.  The hope was he gain some weight, maybe undergo some
chemotherapy.  He is also under the impression that he might have surgery for his
cancer.  In the meantime, he has actually dropped from around 207 pounds down to 135
pounds.  He is actually on the edge of cachexia.  He came into the emergency room
earlier today with some abdominal distention, nausea, vomiting.  His white count is
normal and the abdominal exam is benign other than some mild distention.  CT scan shows
the tumor in the head of the pancreas and the stents and it also shows significant
amount of fluid and debris in the stomach, which suggests he has at least a high-grade
gastric outlet obstruction, but he also has contrast in the colon from a CT scan several
days ago and then the jejunum and ileum appear to be dilated and fluid-filled with a
transition point somewhere in the left high pelvis.  Consequently, an NG tube was placed
and I was asked to admit him as a general surgeon on-call.  We also could see that his
sodium and potassium are quite low. 
 
ALLERGIES:
None.
 
MEDICATIONS:
Hydrochlorothiazide 25 mg p.o. daily, losartan 25 mg p.o. daily, simvastatin 10 mg p.o.
at bedtime, multivitamin one p.o. daily, docusate, and oxycodone. 
 
PAST MEDICAL HISTORY:
Sinusitis, hypertension, prediabetic, asthma, thrombosed hemorrhoids in 2007, BPH,
hyperlipidemia, arthritis, and pancreatic cancer. 
 
PAST SURGICAL HISTORY:
Includes a negative colonoscopy in 2003 at age 57 and a colonoscopy last year showing
diverticulosis.  He has had a tonsillectomy and adenoidectomy.  He has had an incision
of a thrombosed hemorrhoid in 2007.  He had his open aortic endarterectomy with Dr. Pardo
 
    Electronically Signed By: MAYRA KHAN MD  02/14/20 0534
_________________________________________________________________________________________
PATIENT NAME:     LASHANDA RAHMAN JR                 
MEDICAL RECORD #: [id number]            CONSULTATION                  
          ACCT #: [id number]  
DATE OF BIRTH:   [birth year]            REPORT #: [id number]      
PHYSICIAN:        MAYRA KHAN MD             
PCP:              Augustin Lopez DNP, ARNP              
REPORT IS CONFIDENTIAL AND NOT TO BE RELEASED WITHOUT AUTHORIZATION
 
 
                                  [address]  [address]
_________________________________________________________________________________________
                                                                 Signed   
 
 
at [facility] in September 2019 and on 01/30/2020, he had the
percutaneous and the endo-biliary stent placed for his common bile duct. 
 
SOCIAL HISTORY:
He does drink some coffee.  He had quit smoking back in 1969.  He quit drinking in June
of 2019.  He was drinking up to three shots of scotch each day.  He does use some edible
marijuana.  He had chewed one can of snuff, but quit way back in 1969.  He is .
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
company.  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  He
has three children.  His son is currently over [facility] and unfortunately, a
stepdaughter is undergoing bilateral mastectomies.  He goes to the [facility] in
[place], [place] and his nurse is Rupa Singh.  Occasionally, he does go to the
[facility] as well.  He prefers the [facility] here in [place], [place].  He had
continued to drive up until recently. 
 
FAMILY HISTORY:
Mother had a heart attack.  No family history of colon cancer or polyps.  No reactions
to anesthetics. 
 
REVIEW OF SYSTEMS:
He had 10 systems reviewed.  Of course, he wears glasses and has hearing aids and some
tinnitus.  We talked about high blood pressure, his recent diagnosis of pancreatic
cancer and then the vascular surgery from last fall. 
 
PHYSICAL EXAMINATION:
VITAL SIGNS:  Blood pressure is 94/52, pulse 66, respiratory rate 14, temperature is
98.3, and 100% on room air.  He is 5 feet 7 inches at 61 kg (135 pounds), previously 207
pounds. 
GENERAL:  Lashanda is a 74-year-old gentleman who is lying supine semi-recumbent in his
hospital bed.  Our nurse is at the bedside.  He is still jaundiced, although I was told
his bilirubin was originally over 20 and it is now down to 6.4.  He is quite thin on the
edge of cachexia, although his albumin is 3.5.  He is cooperative.  He is alert, awake
and interactive, seems to have good memory of most of the details. 
LUNGS:  Clear to auscultation. 
HEART:  Regular rate and rhythm without murmur. 
ABDOMEN:  Mildly distended, generally a little full, but soft.  He has a long
periumbilical midline incision that is healing well.  No evidence of any hernias.  He
has a percutaneous drain taped in place along the right chest wall. 
RECTAL:  Exam is not performed today.
 
LABORATORY DATA:
His white blood cell count is 9.4, hemoglobin 9.8 with a mean cell volume 100,
neutrophils are 46, platelets 293.  His sodium is 124 with a potassium of 2.6.  His BUN
is 21, creatinine 1.13, glucose 137, total bilirubin is down to 6.4 from over 20.  AST
 
    Electronically Signed By: MAYRA KHAN MD  02/14/20 0534
_________________________________________________________________________________________
PATIENT NAME:     LASHANDA RAHMAN JR                 
MEDICAL RECORD #: [id number]            CONSULTATION                  
          ACCT #: [id number]  
DATE OF BIRTH:   [birth year]            REPORT #: [id number]      
PHYSICIAN:        MAYRA KHAN MD             
PCP:              Augustin Lopez DNP, ARNP              
REPORT IS CONFIDENTIAL AND NOT TO BE RELEASED WITHOUT AUTHORIZATION
 
 
                                  [facility]
                                    [address], [place]  [address]
_________________________________________________________________________________________
                                                                 Signed   
 
 
122, , alkaline phosphatase is 351, albumin 3.5, lipase 58. 
 
RADIOGRAPHIC STUDIES:
A CT scan is reviewed including the report and the images.  I can clearly see the
percutaneous stent coming through the liver into the bile duct and the endo-biliary
stent coming from below.  His pancreatic head has an amorphous mass.  It looks like the
SMA and other structures are involved.  His stomach is very dilated with fluid and food
debris.  His jejunum and ileum are also dilated and fluid-filled.  There is probably a
transition point in the left pelvis.  It looks like he has several mets in the liver.
He has a small right pleural effusion. 
 
ASSESSMENT AND PLAN:
Lashanda is a 74-year-old gentleman who presents with what appears to be unresectable
metastatic pancreatic cancer.  Unfortunately, he may have a gastric outlet obstruction
and a small bowel obstruction in the left pelvis as well.  At this point, we are going
to admit him for conservative measures with an NG tube decompression and replace the
sodium and potassium.  We are going to track down his records from [facility] as well as the [facility] with respect to the above.  We
will also have time to talk to his wife and his children and will proceed from there.
In the meantime, we will get our Medical Service to see him as well.  Lashanda expressed
understanding and agrees above plan. 
 
 
 
            ________________________________________
            Mayra Khan MD 
 
 
ALB/MODL
Job #:  [id number]
DD:  02/13/2020 11:52:19
DT:  02/13/2020 13:45:42
 
cc:            Zaheer Curry [facility] 
 
               Mayra Khan MD
 
 
Copies:  MAYRA KHAN MD
 
 
 
    Electronically Signed By: MAYRA KHAN MD  02/14/20 0534
_________________________________________________________________________________________
PATIENT NAME:     LASHANDA RAHMAN                  
MEDICAL RECORD #: [id number]            CONSULTATION                  
          ACCT #: [id number]  
DATE OF BIRTH:   [birth year]            REPORT #: [id number]      
PHYSICIAN:        MAYRA KHAN MD             
PCP:              Augustin Lopez DNP, ARNP              
REPORT IS CONFIDENTIAL AND NOT TO BE RELEASED WITHOUT AUTHORIZATION
 
 
                                  [facility]
                                    [address], [place]  [address]
_________________________________________________________________________________________
                                                                 Signed   
 
 
~
 
 
 
 
 
 
 
 
 
 
 
 
 
 
 
 
 
 
 
 
 
 
 
 
 
 
 
 
 
 
 
 
 
 
 
 
 
 
 
 
 
 
 
 
    Electronically Signed By: MAYRA KHAN MD  02/14/20 0534
_________________________________________________________________________________________
PATIENT NAME:     LASHANDA RAHMAN JR                 
MEDICAL RECORD #: [id number]            CONSULTATION                  
          ACCT #: [id number]  
DATE OF BIRTH:   [birth year]            REPORT #: [id number]      
PHYSICIAN:        MAYRA KHAN MD             
PCP:              Augustin Lopez DNP, ARNP              
REPORT IS CONFIDENTIAL AND NOT TO BE RELEASED WITHOUT AUTHORIZATION

## 2020-02-14 NOTE — NUR
PATIENT WAS UP ON THE COMMODE AND VOIDED BUT NO RESULT FROM DAYSHIFT
SUPPOSITORY SO ENEMA JUST GIVEN AND PATIENT CURRENTLY BACK IN BED HOLDING IT
IN. PATIENT HAS CALL LIGHT AND WILL CALL WHEN ENEMA IS RELEASED AND NEEDS
CLEANED UP.

## 2020-02-14 NOTE — NUR
PATIENT HAD A MEDICUM BM FROM ENEMA, CLEANED UP, 3/10 PAIN AND ANOTHER 1MG IV
DILAUDID GIVEN AND A CEPACOL LOZENGE. PATIENT POSITIONED TO COMFORT AND CALL
LIGHT IN REACH.

## 2020-02-14 NOTE — NUR
PT REPORTS PAIN IS OK, 40CC FLUSH TO NGT AND 30CC OF AIR TO AIR VENT. MORE
DRAINAGE NOTED TO COME OUT, 150CC

## 2020-02-14 NOTE — NUR
PATIENT JUST GIVEN ANOTHER CEPACOL LOZANGE FOR HIS THROAT AND 1MG IV DILAUDID
FOR 4/10 ABD PAIN. PATIENT GOING TO TRY AND GET SOME MORE SLEEP. CALL LIGHT IN
REACH.

## 2020-02-14 NOTE — NUR
PATIENT HAVING 5/10 PAIN AGAIN AND GIVEN 1MG IV DILAUDID AND A CEPACOL
LOZENGE, VOIDED 400MLS IN URINAL. PATIENT WANTS TO SLEEP SO I+O AND VS DONE AT
THIS TIME AND PATIENT GOING BACK TO SLEEP. CALL LIGHT IN REACH.

## 2020-02-14 NOTE — NUR
CHARGE RN ROUNDING NOTE. PT RESTING IN BED. PRIMARY RN AT BEDSIDE FOR ENEMA
ADMINISTRATION. THIS WRTIER ASSISTS. PT TOLERATED WELL. PT DENIES QUESTIONS OR
CONCERNS AT THIS TIME. CALL LIGHT IN REACH. PRIMARY RN REMAINS AT BEDSIDE.
WHITE BOARD UPDDATED.

## 2020-02-14 NOTE — NUR
PATIENT FEELS THAT HIS PAIN WAS CONTROLLED WELL THROUGHT THE NIGHT AND JUST
GOT ANOTHER 1MG IV DILAUDID FOR ABD PAIN 4/10 AND A CEPACOL DROP FOR HIS
THROAT. PATIENT ALSO FELS LIKE HE GOT A FAIR AMOUNT OF REST. MOTH SWABS REMAIN
AT BEDSIDE AS PATIENT REMAINS NPO AND NG-TUBE STILL TO SUCTION. CALL LIGHT IN
REACH.

## 2020-02-14 NOTE — NUR
pt up to bedside commode, celena changed, pt verbalized he feels comfortable
sitting up and wants to give it sometime to try to have bm.

## 2020-02-14 NOTE — NUR
pt resting in bed alert and oriented, reports he dhas had small amount of
flatus, only 40cc of drainagfe from ngt over night.

## 2020-02-14 NOTE — NUR
PT WAS INTIALLY NOT WANTING AN ULTRASOUND IV PLACED AS HE WAS WANTING
"SOMETHING MORE PERMANENT". DR. KHAN NOTIFIED AND STATES AN ULTRASOUND IV IS
APPROPRIATE AT THIS TIME. PT UPDATED AND AGREEABLE TO RECEIVE AN ULTRASOUND
IV. ULTRASOUND GUIDED IV PLACED PROXIMAL TO THE RIGHT AC WITH LIDOCAINE 1%. PT
TOLERATED WELL. IV SITE DRAWS BLOOD AND FLUSHES EASILY. PT'S RN NOTIFIED.

## 2020-02-15 NOTE — NUR
Pt assisted back to bed from his chair. NGT remains to low intermit wall
suction and was flushed with 30 ml of tap water and is functioning well. SCD's
on and running. Call bell and personal items within reach.

## 2020-02-15 NOTE — NUR
Dressing soaked with yellow colored drainage to the left abd. This was the
forth time this dressing was changed this shift. Pt now visiting with his
friend.

## 2020-02-15 NOTE — NUR
CHARGE RN ROUNDING NOTE. PT REPORTS PAIN 5/10 TO ABD AND BACK. PT ALSO
REQUESTING PRN CEPACOL LOZENGE. DILAUDID AND CEPACOL PROVIDED. NG TUBE FLUSHED
WITH 10 ML TAP WATER PER PT REQUEST. NG TUBE TO LIWS, DRAINING GREENISH YELLOW
FLUID. PT DENIES FURTHER NEEDS AT THIS TIME. WOULD LIKE LIGHTS LEFT ON. AGREES
TO CALL FOR NEEDS. WHITE BOARD UDPATED. CALL LIGHT IN REACH.

## 2020-02-15 NOTE — NUR
WASHED PATIENT'S FACE AND CLEANED HIS GLASSES. HE DID ORAL CARE. GOT HIM 2
WARM BLANKETS. CHANGED HIS ATTENDS. I GETTING READY TO WALK OUT AND HE SAID HE
NEEDED TO USE THE URINAL. NOW PATIENT IS TUCKED IN AND WATCHING TV.

## 2020-02-15 NOTE — NUR
PATIENT HAVING 3/10 ABD PAIN AND GIVEN, 1MG DILAUDID AND CEPACOL AND A WARM
BLANKET AND PULLED UP IN BED. CALL LIGHT IN REACH.

## 2020-02-15 NOTE — NUR
PT STATES HIS ABD PAIN IS A 2 AT THIS TIME. HE IS SPEAKING WITH HIS VISITORS
AND DENIES ANY NEW PROBLEMS. THE DRESSING TO HIS LEFT ABD WAS CHANGED AS IT
WAS SOAKED. NO NOTED S/S OF INFECTION, WILL CONTINUE TO MONITOR.

## 2020-02-15 NOTE — NUR
IN TO DO ASSESSMENT. pt COMPLAINED ABOUT MEDICATIONS ALL QUESTIONS ANSWERED.
ASSESSMENT DONE. DRESSING CHANGED. VITALS DONE. LIGHTS OFF FOR COMFORT. CALL
LIGHT WITHIN REACH.

## 2020-02-15 NOTE — NUR
pt CALLED. REQUESTED PRN PAIN MEDICATION FOR 2/10 PAIN AND ANOTHER LOZENGE.
EDUCATED ON PAIN MANAGEMENT. MEDICATIONS GIVEN (SEE MAR). NO FURTHER REQUESTS
AT THIS TIME. CALL LIGHT WITHIN REACH.

## 2020-02-15 NOTE — NUR
0700: Report received from Harry LOERA. Pt resting in his bed and states his pain
is acceptable at a 3 at this time. Call bell within reach.

## 2020-02-15 NOTE — NUR
Helped boost pt up in bed, bedding was wet and discolored, changed bedding
and gown,
set pt up to use urinal, tucked pt in bed when done.

## 2020-02-15 NOTE — NUR
ROUNDED ON pt. RESTING WITH EYES CLOSED, RESPIRATIONS REGULAR AND UNLABORED.
CALL LIGHT WITHIN REACH.

## 2020-02-15 NOTE — NUR
PATIENT HAS RESTED FAIRLY WELL THROUGH THE NIGHT WITH DILAUDID EVERY 2 TO 3
HOURS,CEPACOL LOZANGES THROUGH THE NIGHT. NG DRAINED 300MLS. PATIENT HAD A
MEDIUM BM AFTER ENEMA AT THE BEGINING OF THE SHIFT. CALL LIGHT IN REACH.

## 2020-02-15 NOTE — NUR
Pt states his largest concern to know what channel the car races are on, which
was looked up for him.

## 2020-02-15 NOTE — NUR
PT STATES HIS PAIN IS A 3/10 WHICH HE STATES IS ACCEPTABLE TO HIM. NG TUBE
FLUSHED WITH 30 ML OF TAP WATER AND IS DRAINING GREEN COLORED DRAINAGE. PT
JAUNDICE WITHOUT DIFFICULTY. PT DID STATE HE HAS SOME SORENESS IN HIS THROAT
AND WAS GIVEN A CEPACOL AS HE REQUESTED. WILL CONTINUE TO MONITOR. CALL BELL
WITHIN REACH.

## 2020-02-15 NOTE — NUR
Pt given an IS and instructed in it's use which he is using at this time. Pt
assisted up to the chair at this time. Temp is 97.8.

## 2020-02-16 NOTE — NUR
CALLED IN TO PLACE ON ULTRASOUND IV. ATTEMPTED TO PLACE ON ULTRASOUND IV INTO
THE PT'S LEFT UPPER ARM. THIS ATTEMPT WAS UNSUCCESSFUL. SECOND ATTEMPT WITHOUT
THE ULTRASOUND WAS SUCCESSFUL. THE IV DRAWS BLOOD AND FLUSHES EASILY. PT
REPORTS NO PAIN AT THE SITE. FLUIDS STARTED AND INFUSING EASILY. PT'S RN,
ESTRELLITA DE LEON.

## 2020-02-16 NOTE — NUR
PRN PAIN MEDS GIVEN FOR 3/10 PAIN. NG TUBE FLUSHED. PROVIDED FRESH SWABS. NO
FURTHER REQUESTS AT THIS TIME. CALL LIGHT WITHIN REACH.

## 2020-02-16 NOTE — NUR
PRN PAIN MED GIVEN FOR 3/10 PAIN. VITALS AND I&O RECORDED. NG TUBE FLUSHED,
CANNISTER EMPTIED. NO FURTHER REQUESTS AT THIS TIME. CALL LIGHT WITHIN REACH.

## 2020-02-16 NOTE — NUR
PT ASSESSMENT COMPLETE. PT RATES PAIN 4/10 TO ABDOMEN, BACK, AND THROAT. PRN
PAIN MEDICATION ADMINISTERED. CEPACOL LOZENGES PROVIDED. ABDOMEN WITH
DISTENSION, TENDER TO PALPATION. BT'S ACTIVE. NG TUBE TO LIWS SUCTION. FLUSHED
WITH 25 ML OF TAP WATER. DRAINAGE TO WALL CANISTER GREEN/YELLOW IN COLOR WITH
WHITE CONTRAST PRESENT IN TUBE AS WELL. PT ASSISTED TO USE THE URNIAL. WARM
BLANKETS AND FRESH ICE CHIPS PROVIDED. PT DENIES FURTHER NEEDS AT THIS TIME.
CALL LIGHT IN REACH.

## 2020-02-16 NOTE — NUR
PRN PAIN MED GIVEN FOR 3/10 PAIN. NG TUBE FLUSHED. ASSISTED pt WITH URINAL.
WARM BLANKETS PROVIDED. CALL LIGHT WITHIN REACH.

## 2020-02-16 NOTE — NUR
pt CALLED FOR PAIN MEDS. DISCUSSED MEDICATIONS AND PAIN MANAGEMENT. pt
REQUESTED THAT THIS RN GIVE PAIN MEDICATION Q2H. WILL TRY AND CONTINUE TO
MONITOR PAIN. CALL LIGHT WITHIN REACH.

## 2020-02-16 NOTE — NUR
Patient laying in bed, rouses to voice. Vital signs taken. Patient denies
nausea or pain. No needs at this time, call light within reach.

## 2020-02-16 NOTE — NUR
Patient laying in bed, alert and oriented. 50 mls of barium inserted into NG
tube, waited several minutes and inserted an additional 50 mls. NG capped.
Imaging called and will be to room at 1545. Patient denies pain or nausea with
instillation. Call light within reach.

## 2020-02-16 NOTE — NUR
IV PUMP BEEPING, ERROR RESOLVED. pt REPORTED 3/10 PAIN AT THIS TIME. WILL
RETURN WHEN IT IS TIME FOR NEXT DOSE, pt AGREEABLE. CALL LIGHT WITHIN REACH.

## 2020-02-16 NOTE — NUR
Patient laying in bed watching tv. NG in place on LIWS. AM medications given,
fluids running at 125 mls/hr. Patient reports pain of 4/10, prn pain
medication given (see MAR). Denies needs at this time, call light within
reach.

## 2020-02-16 NOTE — NUR
PRN PAIN MED GIVEN (SEE MAR) FOR 3/10. ASSESSMENT DONE. NG TUBE FLUSHED.
OUTPUT THICK. NO FURTHER REQUESTS AT THIS TIME. CALL LIGHT WITHIN REACH.

## 2020-02-17 NOTE — NUR
PT HELD FINAL ORDERED ENEMA FOR SHORT AMOUNT OF TIME. 2PA TRANSFER TO [facility]. HAD
MODERTAE AMOUNT OF MIX OF LIQUID AND VERY HARD STOOL. ASSISTED BACK TO BED.
ASSISTED TO POSITION OF COMFORT. PERSONAL ITEMS AND CALL LIGHT WITHIN REACH. 3
VISITORS IN ROOM AT THIS TIME.

## 2020-02-17 NOTE — NUR
PT RESTING IN BED WATCHING TV. PT ASKS WRITER TO CHECK NG TUBE. NG TUBE
PATENT, DRAINING YELLOW AND WHITE DRAINAGE. PT ABLE TO SCOOT HIMSELF UP IN
BED. PT DENIES FURTHER NEEDS AT THIS TIME. CALL LIGHT IN REACH.

## 2020-02-17 NOTE — NUR
PT REQUESTED I CHECK BACK AFTER CARE CONF WITH DR KHAN. WIFE AND FAMILY HAVE
LEFT, VISITOR IN WITH PT. PT THANKED ME FOR STOPPING, I COULD TELL HE WAS
ENGAGED IN CONVERSATION WITH VISITOR. GAVE BLESSING AND LET HIM KNOW I AM
PRAYING FOR HIM. HE THANKED ME, WILL BE TRANSFERRED TO ANOTHER FACILITY FOR
SURGERY. WILL FOLLOW AS NEEDED

## 2020-02-17 NOTE — NUR
Call from Mahi at [facility].  Room confirmed on [address].
nurses to call [phone] for report.  Transport crew to go to ER and check
in at Desk.  Notify desk pt is a direct admit.

## 2020-02-17 NOTE — NUR
PT AWAKE UNTIL AROUND 0400. PRN DILAUDID ADMINISTERED ~ Q 3 HOURS. PT DENIES
NAUSEA OR SOB. NG TO LIWS, GREEN/YLW DRAINAGE. PORT TO R FLANK WITH DRESSING
IN PLACE. PT UP TO [facility] X 1 WITH 2PA. OTHERWISE USES URINAL IN BED WITH
ASSISTANCE. PT VERY WEAK. MAINTENANCE FLUIDS INFUSING.

## 2020-02-17 NOTE — NUR
Faxed transfer papers to Dr. Alberto after updating him by phone.  He will call
me when he speaks with the accepting physician.

## 2020-02-17 NOTE — NUR
Received call from [facility] they do not have any beds.  Called transfer center
at [facility].  Information given for transfer and they will call
back.

## 2020-02-17 NOTE — NUR
PT GIVEN FRESH ICE AND WARM BLANKETS. MANY VISITORS AT BEDSIDE. PT DENIES
NEEDS OR CONCERNS AT THIS TIME. AWAITING TRANSFER TO [place].

## 2020-02-17 NOTE — NUR
PT ASSESSMENT COMPLETE. PT RATES PAIN 4/10, PRN DILAUDID ADMINISTERED. PT
DENIES SOB OR NASUEA. NG TUBE TO LIWS WITH YLW DRAINAGE PRESENT IN TUBE. BT'S
ACTIVE. PT DENIES ABD TENDERNESS. PT DENIES FURTHER NEEDS AT THIS TIME. CALL
LIGHT IN REACH.

## 2020-02-17 NOTE — NUR
PATIENT RESTING IN BED. PATIENT'S HANDS AND FACE CLEANED. ONE PERSON
ASSISTING. CALL LIGHT WITHIN REACH. NO OTHER NEEDS AT THIS TIME

## 2020-02-17 NOTE — NUR
[facility] ARRIVED AT 1640. PT FULL ASSIST SLIDE TRANSFER TO [facility]. NGT CLAMPED.
IVF RUNNING IN RIGHT AC AND SENT WITH EMS PER ORDERS. REPORT GIVEN. PERSONAL
ITEMS WITH FAMILY. EMS LEFT WITH PT AT 1650.

## 2020-02-17 NOTE — NUR
Called [facility] [employer] and requested crew for transport later this
afternoon.  They will work on it.  Will call when I hear from [facility] for room.

## 2020-02-17 NOTE — NUR
Spoke with Alan, son is in the room.  Pt is trying to decide if
he will transfer to [place] for workup and possible further
surgery or stay in [facility].  States he has alot of things he heeds to
finish. Asked if he has a poa and he states he has his daughter in law for
medical and wife for financial.  I asked him what his biggest concerns are and
he states his wife.  Daughter in law in the room and states Dr. Alberto will be
here at noon for a meeting with the family and patient.  Let them know I will
return for this meeting.

## 2020-02-17 NOTE — NUR
Call from floor nurse, Dr. Alberto is in the room speaking with family.  Into
room and Dr. Alberto is giving options of transfer to [facility] in [place] for
possible surgery, SNF, Assisted livings, or Home with Hospice.  Wife becomes
upset and cries during the meeting.  After discussion is completed, daughter
in law asks if we can speak in private.
 
Spoke daughter in law and she states concern, Alan is not fully understanding
his options. She is concerned he will go to [place] and the DrIban will not do
the surgery.  Reviewed options with her.  She would like this Rn to discuss
with wife Rupa.  Rupa to my office and states she is very concerned Alan is
making decisions as he is concerned about her.  She wants him to make
decisions which are in his best interest.  She requests daughter in law and my
self speak with Alan and give him his options and let him know to make choices
in his best interest.  She would like all family members to leave for this
discussion.
 
Daughter in law and I in with wife.  Other family members have arrived and she
requests all go for coffee.  In and spoke with Alan. Again reviewed options of
going to [place] for possible further surgery.  This could make him feel
better as he could possible get his NG out.  If he does not have surgery, he
will require NG which is difficult long term. If no NG will more than likely
vomit due to sbo.  Reviewed hospice in assisted living, SNF, or moving to
[address] so he and Rupa can live with daughter in law.  Discussed wife's
concern he is making decisions based on her need instead of his.  He states he
thinks it's best if he goes to [place].  If this plans fails he will return
on Hospice.  He requests I call [facility] and get him on a waiting list as
they are currently full.  Understanding stated and I will notify Dr. Alberto to
begin transfer.

## 2020-02-17 NOTE — NUR
PT UTLIZES CALL LIGHT TO USE THE BATHROOM. PT WOULD LIKE TO GET TO [facility].
ASSSITED TO [facility] AND BACK TO BED WITH 2PA. PT RATES PAIN 4/10. PRN DIALUDID
ADMINISTERED. NG TUBE FLUSHED WITH 50 ML TAP WATER. PT DENIES FURTHER NEEDS AT
THIS TIME. WARM BLANKETS AND ICE CHIPS PROVIDED. CALL LIGHT IN REACH.

## 2020-02-17 NOTE — NUR
Notified by [facility], they will arrive for transport in 5-10 minutes.  Charge nurse
Radha notified, pt and family notified.

## 2020-02-17 NOTE — NUR
PT CALLED FOR ASSISTANCE AND STATED "I NEED HELP GETTING RID OF THIS ENEMA,
THERE ARE THREE OF THEM IN ME NOW AND IT NEEDS TO COME OUT." PT AGREEABLE TO
TRANSFER TO [facility] TO ATTEMPT BM. PT BECAME VERY ANXIOUS, SHAKING, RR INCREASED
STATED "HELP ME, HELP ME!" ASKING STAFF TO "JUST DO IT ALL, JUST LIFT ME UP."
ALSO CRIES OUT "HELP ME GOD". PT 2P TRANSFER TO [facility]. HAD MEDIUM SOFT, DARK
BROWN BM. 2PA BACK TO BED. [facility] PROVIDED AND CLEAN ATTENDS APPLIED. PT
GIVEN WARM BLANKETS AND ASSISTED TO POSITION OF COMFORT. PT STATED MULTIPLE
TIMES THAT HE WOULD LIKE PAIN MEDS "AS SOON AS POSSIBLE". RATES PAIN 7/10.
CALL LIGHT WITHIN REACH.

## 2020-02-17 NOTE — NUR
Received call from Jesusita Ramos (Evy Tanner) will accept pt.  Pt can
go to surgical or floor bed.  Called transfer center at [facility] in [place].
They will check with Dr. Tanner, but do not think there are any beds
available.  Will call me back.

## 2020-02-17 NOTE — NUR
JANKI RN ADMINISTERD PRN DILAUDID TO PT AT 1604 PER PT AND FAMILY REQUEST.
DRESSING TO BILIARY STENT SITE NEAR RIGHT FLANK AREA CHANGED AT THIS TIME.
DRESSING SATUATED WITH BRIGHT YELLOW FLUID. REDRESSED WITH GAUZE, ABD AND FOAM
TAPE. STENT SITE WITHOUT REDNESS OR INFLAMMATION, SUTURES IN PLACE. PT
ASSISTED TO VOID USING URINAL. SMEAR OF BM NOTED IN ATTENDS, [facility]
PROVIDED AND CLEAN ATTENDS APPLIED. NGT FLUSHED WITH 10ML OF TAP WATER. NGT
CONT TO PUT OUT BRIGHT YELLOW OUTPUT. PT NOW AWAITING ARRIVAL OF EMS FOR
TRANSFER TO [place]. CALL LIGHT WITHIN REACH.

## 2020-02-17 NOTE — NUR
PT SITTING UP IN BED SMILING, LAUGHING AND CHATTING WITH VISITORS/FAMILY. PT
REQUESTING PAIN MEDICATION FOR REPORTS OF 7/10 ABD PAIN. DAUGHTER IN LAW DESTINEE
REITERATES OFTEN THAT PT SHOULD BE RECIEVING PAIN MEDICATION AS OFTEN AS ORDER
ALLOWS. PT NOTED TO BE MUCH LESS ANXIOUS FROM THIS AM. DENIES NAUSEA. NGT
FLUSHED. PUTTING OUT BRIGHT YELLOW OUTPUT. CALL LIGHT WITHIN REACH.

## 2020-02-17 NOTE — NUR
PT ALERT, OREINTED AND SITNG UP IN BED WITH NG TUBE IN AND SIPPING SOMETHING
TO DRINK. WIFE DAVID AND OTHER VISITORS IN. PT REQUESTED I COME IN FOLLOWING
CARE CONF WITH DR KHAN. WILL FOLLOW AS NEEDED. LUZ MARIA GONZALEZ ALSO EXPRESSED
CONCERN FOR PT AND FAMILY WITH PTS' RECENT DIAGNOSIS. GAVE BLESSING, WILL
FOLLOW AS NEEDED

## 2020-02-17 NOTE — NUR
PATIENT RESTING IN BED. VISITORS IN ROOM. VITAL SIGNS AND I&O DONE. CALL LIGHT
WITHIN REACH. NO OTHER NEEDS AT THIS TIME

## 2020-02-17 NOTE — NUR
PATIENT RESTING IN BED. FAMILY IN ROOM. FINAL VITAL SIGNS WERE OBTAINED PRIOR
TO DISCHARGE FROM THE UNIT

## 2020-02-17 NOTE — NUR
PRN DILAUDID ADMINISTERED PER PT REQUEST FOR 7/10 ABD PAIN. PT HAD MANY OTHER
SMALL REQUESTS FOR ICE CHIPS, MOUTH SWABS, PILLOW ADJUSTMENTS, WARM BLANKETS,
PLUG PHONE INTO  ETC, COMPLETED AT THIS TIME. CALL LIGHT WITHIN REACH.

## 2020-02-17 NOTE — NUR
called back pt wife per message. updated her pt night night and current status
awake wife rn at bedside at this time. she said she would be up to see him
about noon and that  said he would be at the hospital around noon as
well and plan to have a little meeting in patient room to discuss ongoing plan
of care

## 2020-02-17 NOTE — NUR
PATIENT SITTING UP IN BED. VITAL SIGNS AND I&O DONE. HIGH TEMP. RN NOTIFIED.
CALL LIGHT WITHIN REACH. NO OTHER NEEDS AT THIS TIME

## 2020-02-17 NOTE — NUR
PT SITTING IN BED CALMLY, WATCHING TV. FLAT AFFECT. PT RATES PAIN 7/10 "ALL
OVER ABDOMEN, LOWER BACK, AND HIPS". MEDICATED WITH PRN DILAUDID. AM MEDS
ADMINISTERED AND ASSESSMENT COMPLETED. PT DENIES NAUSEA. NGT TO LIWS PUTTING
OUT MODERATE AMOUNT OF YELLOW OUTPUT. IV IN RIGHT AC INFUSING WNL, NO REDNESS
OR INFLAMMATION NOTED. STENT DRESSING TO RIGHT FLANK CDI. PT ALERT AND
ORIENTED TO ALL. CALL LIGHT WITHIN REACH.

## 2024-05-09 NOTE — XMS
Encounter Summary
  Created on: 2020
 
 Pasquale Nehemias Martinez
 External Reference #: [id number]
 : 43
 Sex: Male
 
 Demographics
 
 
+-----------------------+----------------------+
| Address               | [address]         |
|                       | [place]  [address] |
+-----------------------+----------------------+
| Home Phone            | [phone]      |
+-----------------------+----------------------+
| Preferred Language    | Unknown              |
+-----------------------+----------------------+
| Marital Status        |               |
+-----------------------+----------------------+
| Jewish Affiliation | 1077                 |
+-----------------------+----------------------+
| Race                  | Unknown              |
+-----------------------+----------------------+
| Ethnic Group          | Unknown              |
+-----------------------+----------------------+
 
 
 Author
 
 
+--------------+--------------------------------------------+
| Author       | [facility]  |
|              | and [facility]                                |
+--------------+--------------------------------------------+
| Organization | [facility]  |
|              | and [place]                                |
+--------------+--------------------------------------------+
| Address      | Unknown                                    |
+--------------+--------------------------------------------+
| Phone        | Unavailable                                |
+--------------+--------------------------------------------+
 
 
 
 Support
 
 
+--------------+--------------+---------------------+-----------------+
| Name         | Relationship | Address             | Phone           |
+--------------+--------------+---------------------+-----------------+
| Rupa Giordano | ECON         | [address]             | [phone] |
|              |              | [facility] [place]   |                 |
|              |              | [address]               |                 |
+--------------+--------------+---------------------+-----------------+
 
 
 
 
 Care Team Providers
 
 
+-----------------------+------+-------------+
| Care Team Member Name | Role | Phone       |
+-----------------------+------+-------------+
| No, Physician         | PCP  | Unavailable |
+-----------------------+------+-------------+
 
 
 
 Reason for Referral
 Diagnostic/Screening (Routine)
 
+------------+--------+-----------+--------------+--------------+---------------+
| Status     | Reason | Specialty | Diagnoses /  | Referred By  | Referred To   |
|            |        |           | Procedures   | Contact      | Contact       |
+------------+--------+-----------+--------------+--------------+---------------+
| Authorized |        |           |   Diagnoses  |   Augustin Lopez,  |   [facility]  |
|            |        |           |  Mesenteric  | DNP  [address]    | HOSPITAL      |
|            |        |           | ischemia,    | [address]  | [address]       |
|            |        |           | chronic      |  [address]       | [place] [address]   |
|            |        |           | ([facility])  H/O   | [address] [address] | [place] |
|            |        |           | endarterecto |  [address]       |  [address]   |
|            |        |           | my           | Phone:       | Phone:        |
|            |        |           | Procedures   | [phone] | [phone]  |
|            |        |           | CT Angiogram |   Fax:       |  Fax:         |
|            |        |           |  Abdomen     | [phone] | [phone]  |
|            |        |           | Pelvis w     |              |               |
|            |        |           | Contrast     |              |               |
+------------+--------+-----------+--------------+--------------+---------------+
 
 
 
 
 Reason for Visit
 
 
+-----------+----------+
| Reason    | Comments |
+-----------+----------+
| Follow-up |          |
+-----------+----------+
 
 
 
 Encounter Details
 
 
+--------+---------+----------------------+----------------------+----------------------+
| Date   | Type    | Department           | Care Team            | Description          |
+--------+---------+----------------------+----------------------+----------------------+
| 10/23/ | Office  |   [facility]      |   Augustin Lopez, ZAY      | Mesenteric ischemia, |
| 2019   | Visit   | VASCULAR SURGERY     | [address]     |  chronic (HCC)       |
|        |         | [address] | [address]  [place], [place]  | (Primary Dx); H/O    |
|        |         |  E  [place], [place]     | [address]  [phone]  | endarterectomy       |
|        |         | [address]           |  [phone] (Fax)  |                      |
|        |         | [phone]         |                      |                      |
+--------+---------+----------------------+----------------------+----------------------+
 
 
 
 
 Social History
 
 
+---------------+-------+-----------+--------+------+
| Tobacco Use   | Types | Packs/Day | Years  | Date |
|               |       |           | Used   |      |
+---------------+-------+-----------+--------+------+
| Former Smoker |       |           |        |      |
+---------------+-------+-----------+--------+------+
 
 
 
+---------------------+---+---+---+
| Smokeless Tobacco:  |   |   |   |
| Former User         |   |   |   |
+---------------------+---+---+---+
 
 
 
+------------------------------+
| Comments: quit 50 years ago  |
+------------------------------+
 
 
 
+---------------+-------------+---------+----------+
| Alcohol Use   | Drinks/Week | oz/Week | Comments |
+---------------+-------------+---------+----------+
| Not Currently |             |         | 90m days |
+---------------+-------------+---------+----------+
 
 
 
+------------------+---------------+
| Sex Assigned at  | Date Recorded |
| Birth            |               |
+------------------+---------------+
| Not on file      |               |
+------------------+---------------+
 
 
 
+----------------+-------------+-------------+
| Job Start Date | Occupation  | Industry    |
+----------------+-------------+-------------+
| Not on file    | Not on file | Not on file |
+----------------+-------------+-------------+
 
 
 
+----------------+--------------+------------+
| Travel History | Travel Start | Travel End |
+----------------+--------------+------------+
 
 
 
+-------------------------------------+
 
| No recent travel history available. |
+-------------------------------------+
 documented as of this encounter
 
 Last Filed Vital Signs
 
 
+-------------------+----------------------+----------------------+----------+
| Vital Sign        | Reading              | Time Taken           | Comments |
+-------------------+----------------------+----------------------+----------+
| Blood Pressure    | 141/87               | 10/23/2019  9:22 AM  |          |
|                   |                      | PDT                  |          |
+-------------------+----------------------+----------------------+----------+
| Pulse             | 69                   | 10/23/2019  9:22 AM  |          |
|                   |                      | PDT                  |          |
+-------------------+----------------------+----------------------+----------+
| Temperature       | -                    | -                    |          |
+-------------------+----------------------+----------------------+----------+
| Respiratory Rate  | -                    | -                    |          |
+-------------------+----------------------+----------------------+----------+
| Oxygen Saturation | 100%                 | 10/23/2019  9:22 AM  |          |
|                   |                      | PDT                  |          |
+-------------------+----------------------+----------------------+----------+
| Inhaled Oxygen    | -                    | -                    |          |
| Concentration     |                      |                      |          |
+-------------------+----------------------+----------------------+----------+
| Weight            | 73.5 kg (162 lb 1.6  | 10/23/2019  9:22 AM  |          |
|                   | oz)                  | PDT                  |          |
+-------------------+----------------------+----------------------+----------+
| Height            | -                    | -                    |          |
+-------------------+----------------------+----------------------+----------+
| Body Mass Index   | 25.77                | 2019 10:43 AM  |          |
|                   |                      | PDT                  |          |
+-------------------+----------------------+----------------------+----------+
 documented in this encounter
 
 Functional Status
 
 
+---------------------------------------------+----------+--------------------+
| Functional Status                           | Response | Date of Assessment |
+---------------------------------------------+----------+--------------------+
| Are you deaf or do you have serious         | No       | 2019         |
| difficulty hearing?                         |          |                    |
+---------------------------------------------+----------+--------------------+
| Are you blind or do you have serious        | No       | 2019         |
| difficulty seeing, even when wearing        |          |                    |
| glasses?                                    |          |                    |
+---------------------------------------------+----------+--------------------+
| Do you have serious difficulty walking or   | No       | 2019         |
| climbing stairs? (5 years old or older)     |          |                    |
+---------------------------------------------+----------+--------------------+
| Do you have difficulty dressing or bathing? | No       | 2019         |
|  (5 years old or older)                     |          |                    |
+---------------------------------------------+----------+--------------------+
| Because of a physical, mental, or emotional | No       | 2019         |
|  condition, do you have difficulty doing    |          |                    |
| errands alone such as visiting a doctor's   |          |                    |
| office or shopping? [15 years old or        |          |                    |
| older)]                                     |          |                    |
 
+---------------------------------------------+----------+--------------------+
 
 
 
+---------------------------------------------+----------+--------------------+
| Cognitive Status                            | Response | Date of Assessment |
+---------------------------------------------+----------+--------------------+
| Because of a physical, mental, or emotional | No       | 2019         |
|  condition, do you have serious difficulty  |          |                    |
| concentrating, remembering, or making       |          |                    |
| decisions? (5 years old or older)           |          |                    |
+---------------------------------------------+----------+--------------------+
 documented as of this encounter
 
 Progress Notes
 Augustin Lopez DNP - 10/23/2019 10:30 AM [facility]
 [address] [place] [address]
 Office: [phone] Fax: [phone]
 
 DATE OF VISIT: 10/23/2019
 PATIENT NAME: Nehemias Giordano
 : 1945; AGE: 74 y.o.; Sex:M 
 PHONE  NUMBER:  [phone]  (Home)
 MRN: [id number]; 
 PROVIDER: Augustin Lopez DNP
 PRIMARY  CARE  /  REFERRING  PHYSICIAN:  No ref. provider found  /  No Physician on file  /
 p 
 
 REASON  FOR  EVALUATION  /  CHIEF  COMPLAINT: Vascular Surgery Postoperative Visit for SMA 
endarterectomy 
 
 The patient presents today for a Vascular Surgery Postoperative Visit.
 
 Mr. Giordano is a pleasant 74 y.o. male with no significant PMH who is referred to me for abd
ominal pain. Patient has been describing intermittent abdominal pain that radiates across hi
s abdomen to his back. Patient does not endorse any specific worsening pain associated with 
eating, however he does state that he has not been eating a lot. Patient has been watching h
is diet and has been trying to decrease his carbohydrates. Patient has lost 24 lbs over the 
last 2 months. The patient had a recent CT and MRI abdomen from [facility]. Elyse
ent denies any surgical intervention for this abdominal pain, however has had past abdominal
 surgeries. Due to chronic mesenteric ischemia, he is status post SMA endarterectomy with karsten
vine pericardial patch on 2019. The patient reports his weight has been stable, appetit
e is fair, eating more, slightly constipated. Pain is controlled with current analgesics. Me
dications being used: acetaminophen. The patient denies  fever, wound drainage, increasing r
edness, pus, increasing pain, increasing swelling. Wife reports he is not drinking enough wa
ter and has been sedentary after surgery. Physical examination revealed surgical incision wh
ich is healing well without signs of infection. He has been able to ambulate without difficu
lty. He has chronic back pain. 
 
 VITAL SIGNS: /87  | Pulse 69  | Wt 73.5 kg (162 lb 1.6 oz)  | SpO2 100%  | BMI 25.77 
kg/m  
 
 PHYSICAL EXAM: 
 Constitutional: Well nourished, no signs of distress 
 Cardiovascular: Normal rate, regular rhythm.
 Pulmonary/Chest: No respiratory distress. No adventitious sounds. 
 Abdominal: Soft. No abdominal distension or tenderness. 
 Musculoskeletal: Normal range of motion. 
 Extremities: No edema, cyanosis or clubbing.
 Neurological: He is alert and oriented. No muscle weakness and normal gait.
 
 VASCULAR: Palpable femoralpulses were present bilaterally with palpable bilateral popli
teal pulses. Palpable pulses were present in bilateral dorsalis pedis and poster
ior tibial artery arteries. Palpable bilateral carotid, radial, brachial pulses. Surgical  i
ncision  wounds  are healing well without signs of infection.
 
 Assessment & Plan:
 Chronic mesenteric ischemia with status post SMA endarterectomy - The patient is doing well
. All staples removed today. Wound care discussed with patient, monitor for signs of infecti
on. Patient unable to tolerate abdominal ultrasound today. We will need to obtain a CTA for 
postop evaluation, order sent to [facility]. Continue aspirin daily. I've explained to th
e patient that in the event he experiences mesenteric ischemia as evidenced by post-prandial
 abdominal pain, appetite change, or unintentional weight loss, the patient should contact m
e immediately to return to my clinic or go to nearby emergency room right away.
 
 Hypertension - The patient is advised to maintain his antihypertensive medication to keep s
ystolic blood pressure target level of 130-140 mm Hg and diastolic blood pressure level of 7
0-80 mm Hg. The patient is advised that uncontrolled hypertension can accelerate atheroscler
otic disease progression. 
 
 Dyslipidemia - The patient is advised to undergo lipid level evaluation with fasting blood 
test every 6 months with target LDL level of less than 70 mg/dL. The patient is advised to c
ontinue taking antiplatelet daily for life to reduce the risk of myocardial infarction and p
eripheral arterial disease ( He is currently on aspirin). Additionally, the patient is infor
med that hyperlipidemia can accelerate atherosclerotic disease progression and negatively af
fect the outcome of lower leg vascular interventions. 
 
 Augustin Lopez DNP
 Electronically signed by Augustin Lopez DNP at 10/23/2019 11:16 AM PDTdocumented in this encounte
r
 
 Plan of Treatment
 
 
+--------+-------------+------------------+----------------------+-------------+
| Date   | Type        | Specialty        | Care Team            | Description |
+--------+-------------+------------------+----------------------+-------------+
| / | Appointment | Radiology        |   Augustin Lopez DNP      |             |
|    |             |                  |  [address]     |             |
|        |             |                  | [facility] [place]  |             |
|        |             |                  | [id number] [phone]  |             |
|        |             |                  |  [phone] (Fax)  |             |
+--------+-------------+------------------+----------------------+-------------+
| / | Office      | Vascular Surgery |   Augustin Lopez DNP      |             |
|    | Visit       |                  | [address]     |             |
|        |             |                  | [address]  [place]  |             |
|        |             |                  | [address]  [phone]  |             |
|        |             |                  |  [phone] (Fax)  |             |
+--------+-------------+------------------+----------------------+-------------+
 
 
 
+----------------------+---------+--------+----------------------+----------------------+
| Name                 | Type    | Priori | Associated Diagnoses | Order Schedule       |
|                      |         | ty     |                      |                      |
+----------------------+---------+--------+----------------------+----------------------+
| CT Angiogram Abdomen | Imaging | Routin |   Mesenteric         | Expected: 10/23/2019 |
|  Pelvis w Contrast   |         | e      | ischemia, chronic    |  (Approximate),      |
|                      |         |        | (HCC)  H/O           | Expires: 10/23/2020  |
|                      |         |        | endarterectomy       |                      |
+----------------------+---------+--------+----------------------+----------------------+
 
| Creatinine           | Lab     | Routin |   Mesenteric         | Expected: 10/23/2019 |
|                      |         | e      | ischemia, chronic    |  (Approximate),      |
|                      |         |        | (HCC)  H/O           | Expires: 10/23/2020  |
|                      |         |        | endarterectomy       |                      |
+----------------------+---------+--------+----------------------+----------------------+
 documented as of this encounter
 
 Visit Diagnoses
 
 
+------------------------------------------------------------------------------------+
| Diagnosis                                                                          |
+------------------------------------------------------------------------------------+
|   Mesenteric ischemia, chronic (HCC) - Primary  Chronic vascular insufficiency of  |
| intestine                                                                          |
+------------------------------------------------------------------------------------+
|   H/O endarterectomy  Personal history of surgery to other organs                  |
+------------------------------------------------------------------------------------+
 documented in this encounter H&P reviewed. The patient was examined and there are no changes to the H&P.